# Patient Record
Sex: MALE | Race: WHITE | URBAN - METROPOLITAN AREA
[De-identification: names, ages, dates, MRNs, and addresses within clinical notes are randomized per-mention and may not be internally consistent; named-entity substitution may affect disease eponyms.]

---

## 2017-10-18 ENCOUNTER — EMERGENCY (EMERGENCY)
Facility: HOSPITAL | Age: 82
LOS: 0 days | Discharge: HOME | End: 2017-10-18

## 2017-10-18 DIAGNOSIS — J44.1 CHRONIC OBSTRUCTIVE PULMONARY DISEASE WITH (ACUTE) EXACERBATION: ICD-10-CM

## 2017-10-18 DIAGNOSIS — R10.13 EPIGASTRIC PAIN: ICD-10-CM

## 2017-10-18 DIAGNOSIS — Z79.899 OTHER LONG TERM (CURRENT) DRUG THERAPY: ICD-10-CM

## 2017-10-18 DIAGNOSIS — I10 ESSENTIAL (PRIMARY) HYPERTENSION: ICD-10-CM

## 2017-10-18 DIAGNOSIS — K80.20 CALCULUS OF GALLBLADDER WITHOUT CHOLECYSTITIS WITHOUT OBSTRUCTION: ICD-10-CM

## 2017-10-18 DIAGNOSIS — E87.1 HYPO-OSMOLALITY AND HYPONATREMIA: ICD-10-CM

## 2017-10-24 PROBLEM — Z00.00 ENCOUNTER FOR PREVENTIVE HEALTH EXAMINATION: Status: ACTIVE | Noted: 2017-10-24

## 2017-11-09 ENCOUNTER — APPOINTMENT (OUTPATIENT)
Dept: SURGERY | Facility: CLINIC | Age: 82
End: 2017-11-09
Payer: MEDICARE

## 2017-11-09 VITALS
HEIGHT: 65 IN | OXYGEN SATURATION: 98 % | SYSTOLIC BLOOD PRESSURE: 130 MMHG | HEART RATE: 88 BPM | DIASTOLIC BLOOD PRESSURE: 70 MMHG | BODY MASS INDEX: 22.49 KG/M2 | WEIGHT: 135 LBS

## 2017-11-09 DIAGNOSIS — Z86.79 PERSONAL HISTORY OF OTHER DISEASES OF THE CIRCULATORY SYSTEM: ICD-10-CM

## 2017-11-09 DIAGNOSIS — Z87.448 PERSONAL HISTORY OF OTHER DISEASES OF URINARY SYSTEM: ICD-10-CM

## 2017-11-09 DIAGNOSIS — Z78.9 OTHER SPECIFIED HEALTH STATUS: ICD-10-CM

## 2017-11-09 PROCEDURE — 99202 OFFICE O/P NEW SF 15 MIN: CPT

## 2017-11-09 RX ORDER — BENAZEPRIL HYDROCHLORIDE 5 MG/1
5 TABLET ORAL
Refills: 0 | Status: ACTIVE | COMMUNITY

## 2017-11-15 ENCOUNTER — OUTPATIENT (OUTPATIENT)
Dept: OUTPATIENT SERVICES | Facility: HOSPITAL | Age: 82
LOS: 1 days | Discharge: HOME | End: 2017-11-15

## 2017-11-15 DIAGNOSIS — Z01.818 ENCOUNTER FOR OTHER PREPROCEDURAL EXAMINATION: ICD-10-CM

## 2017-11-15 DIAGNOSIS — J44.1 CHRONIC OBSTRUCTIVE PULMONARY DISEASE WITH (ACUTE) EXACERBATION: ICD-10-CM

## 2017-11-15 DIAGNOSIS — I10 ESSENTIAL (PRIMARY) HYPERTENSION: ICD-10-CM

## 2017-11-27 ENCOUNTER — OUTPATIENT (OUTPATIENT)
Dept: OUTPATIENT SERVICES | Facility: HOSPITAL | Age: 82
LOS: 1 days | Discharge: HOME | End: 2017-11-27

## 2017-11-27 DIAGNOSIS — J44.1 CHRONIC OBSTRUCTIVE PULMONARY DISEASE WITH (ACUTE) EXACERBATION: ICD-10-CM

## 2017-11-27 DIAGNOSIS — I10 ESSENTIAL (PRIMARY) HYPERTENSION: ICD-10-CM

## 2017-11-30 DIAGNOSIS — I10 ESSENTIAL (PRIMARY) HYPERTENSION: ICD-10-CM

## 2017-11-30 DIAGNOSIS — K80.10 CALCULUS OF GALLBLADDER WITH CHRONIC CHOLECYSTITIS WITHOUT OBSTRUCTION: ICD-10-CM

## 2017-12-05 ENCOUNTER — APPOINTMENT (OUTPATIENT)
Dept: SURGERY | Facility: CLINIC | Age: 82
End: 2017-12-05
Payer: MEDICARE

## 2017-12-05 VITALS
BODY MASS INDEX: 22.49 KG/M2 | DIASTOLIC BLOOD PRESSURE: 78 MMHG | HEIGHT: 65 IN | WEIGHT: 135 LBS | SYSTOLIC BLOOD PRESSURE: 133 MMHG

## 2017-12-05 PROCEDURE — 99024 POSTOP FOLLOW-UP VISIT: CPT

## 2018-01-24 ENCOUNTER — APPOINTMENT (OUTPATIENT)
Dept: UROLOGY | Facility: CLINIC | Age: 83
End: 2018-01-24
Payer: MEDICARE

## 2018-01-24 VITALS
BODY MASS INDEX: 22.49 KG/M2 | WEIGHT: 135 LBS | SYSTOLIC BLOOD PRESSURE: 137 MMHG | DIASTOLIC BLOOD PRESSURE: 66 MMHG | HEART RATE: 88 BPM | HEIGHT: 65 IN

## 2018-01-24 DIAGNOSIS — K80.20 CALCULUS OF GALLBLADDER W/OUT CHOLECYSTITIS W/OUT OBSTRUCTION: ICD-10-CM

## 2018-01-24 PROCEDURE — 99202 OFFICE O/P NEW SF 15 MIN: CPT

## 2018-01-24 PROCEDURE — 51702 INSERT TEMP BLADDER CATH: CPT

## 2018-01-24 NOTE — LETTER BODY
[Dear  ___] : Dear  [unfilled], [Attached please find my note.] : Attached please find my note. [Thank you very much for allowing me to participate in the care of this patient. If you have any questions, please do not hesitate to contact me] : Thank you very much for allowing me to participate in the care of this patient. If you have any questions, please do not hesitate to contact me. [DrPatti  ___] : Dr. GOLD  [FreeTextEntry2] : Kumar Aceves MD\par [address]\[place] [address]

## 2018-01-24 NOTE — PHYSICAL EXAM
[General Appearance - Well Developed] : well developed [General Appearance - Well Nourished] : well nourished [Normal Appearance] : normal appearance [Well Groomed] : well groomed [General Appearance - In No Acute Distress] : no acute distress [Edema] : no peripheral edema [Respiration, Rhythm And Depth] : normal respiratory rhythm and effort [Exaggerated Use Of Accessory Muscles For Inspiration] : no accessory muscle use [Auscultation Breath Sounds / Voice Sounds] : lungs were clear to auscultation bilaterally [Abdomen Soft] : soft [Abdomen Tenderness] : non-tender [Abdomen Hernia] : no hernia was discovered [Costovertebral Angle Tenderness] : no ~M costovertebral angle tenderness [Urethral Meatus] : meatus normal [Penis Abnormality] : normal uncircumcised penis [Testes Tenderness] : no tenderness of the testes [Testes Mass (___cm)] : there were no testicular masses [Normal Station and Gait] : the gait and station were normal for the patient's age [] : no rash [Oriented To Time, Place, And Person] : oriented to person, place, and time [Affect] : the affect was normal [Mood] : the mood was normal [Not Anxious] : not anxious [FreeTextEntry1] : DTR's = & 2+

## 2018-01-24 NOTE — ASSESSMENT
[FreeTextEntry1] : Walking around with a catheter long-term is not the safest thing due to the risk of stricture, prostatitis, and epididymo-orchitis. He’s done well with it for a long time but I still think other options might suffice. As well though he’s got a small feeling prostate, if urodynamic shows that he could Valsalva and raises intravesical pressure it might be worthwhile decreasing outflow resistant so we can get catheter free. However until and if we know that he’s a candidate for that and be willing to consider it I would not do any further testing. An alternative option would be to put in a suprapubic tube but he’d rather not have any procedures given how well he’s doing till now.\par Please note he was often the InterStim which is a potential option but at 82 years old he does not want to start up with his spine.

## 2018-01-24 NOTE — LETTER HEADER
[Viviane Mirza MD] : Viviane Mirza MD [Director of Urology] : Director of Urology [[address]] : [address] [[address]] : [address] [[place] [address]] : [place] [address] [Tel [phone]] : Tel [phone] [Fax [phone]] : Fax [phone]

## 2018-01-24 NOTE — HISTORY OF PRESENT ILLNESS
[Urinary Retention] : urinary retention [FreeTextEntry1] : Douglas is a very pleasant 82-year-old male who has at least two years of urinary retention. He was treated by Dr. Omayra anders with a microwave and was offered the InterStim. His been living with a transurethral Waldron catheter and tells me he was never offered self-catheterization and never offered a suprapubic tube. He’s been doing very well with it having it changed every six weeks, initially was every four weeks and he understands it could clog off but his co-pay went up and he really can’t come back any more often. He is here today as the other Dr. grossman [facility] physicians practice and our group is now the participating physicians on [place].

## 2018-02-26 ENCOUNTER — APPOINTMENT (OUTPATIENT)
Dept: UROLOGY | Facility: CLINIC | Age: 83
End: 2018-02-26
Payer: MEDICARE

## 2018-02-26 VITALS
HEART RATE: 90 BPM | SYSTOLIC BLOOD PRESSURE: 140 MMHG | DIASTOLIC BLOOD PRESSURE: 67 MMHG | HEIGHT: 65 IN | BODY MASS INDEX: 22.49 KG/M2 | WEIGHT: 135 LBS

## 2018-02-26 PROCEDURE — 51702 INSERT TEMP BLADDER CATH: CPT

## 2018-04-09 ENCOUNTER — APPOINTMENT (OUTPATIENT)
Dept: UROLOGY | Facility: CLINIC | Age: 83
End: 2018-04-09
Payer: MEDICARE

## 2018-04-09 VITALS
BODY MASS INDEX: 23.32 KG/M2 | SYSTOLIC BLOOD PRESSURE: 138 MMHG | HEART RATE: 82 BPM | HEIGHT: 65 IN | WEIGHT: 140 LBS | DIASTOLIC BLOOD PRESSURE: 84 MMHG

## 2018-04-09 PROCEDURE — 51702 INSERT TEMP BLADDER CATH: CPT

## 2018-04-09 PROCEDURE — 51705 CHANGE OF BLADDER TUBE: CPT

## 2018-05-25 ENCOUNTER — APPOINTMENT (OUTPATIENT)
Dept: UROLOGY | Facility: CLINIC | Age: 83
End: 2018-05-25
Payer: MEDICARE

## 2018-05-25 VITALS
DIASTOLIC BLOOD PRESSURE: 62 MMHG | SYSTOLIC BLOOD PRESSURE: 119 MMHG | HEART RATE: 83 BPM | WEIGHT: 140 LBS | HEIGHT: 65 IN | BODY MASS INDEX: 23.32 KG/M2

## 2018-05-25 PROCEDURE — 51705 CHANGE OF BLADDER TUBE: CPT

## 2018-05-25 PROCEDURE — 51702 INSERT TEMP BLADDER CATH: CPT

## 2018-07-16 ENCOUNTER — APPOINTMENT (OUTPATIENT)
Dept: UROLOGY | Facility: CLINIC | Age: 83
End: 2018-07-16
Payer: MEDICARE

## 2018-07-16 VITALS
BODY MASS INDEX: 23.32 KG/M2 | HEART RATE: 85 BPM | WEIGHT: 140 LBS | HEIGHT: 65 IN | DIASTOLIC BLOOD PRESSURE: 71 MMHG | SYSTOLIC BLOOD PRESSURE: 116 MMHG

## 2018-07-16 PROCEDURE — 51705 CHANGE OF BLADDER TUBE: CPT

## 2018-07-16 PROCEDURE — 51702 INSERT TEMP BLADDER CATH: CPT

## 2018-07-27 PROBLEM — Z78.9 ALCOHOL USE: Status: ACTIVE | Noted: 2017-11-09

## 2018-08-09 ENCOUNTER — EMERGENCY (EMERGENCY)
Facility: HOSPITAL | Age: 83
LOS: 0 days | Discharge: HOME | End: 2018-08-09
Attending: EMERGENCY MEDICINE | Admitting: EMERGENCY MEDICINE

## 2018-08-09 VITALS
HEART RATE: 111 BPM | HEIGHT: 64 IN | RESPIRATION RATE: 19 BRPM | SYSTOLIC BLOOD PRESSURE: 163 MMHG | TEMPERATURE: 97 F | OXYGEN SATURATION: 98 % | WEIGHT: 139.99 LBS | DIASTOLIC BLOOD PRESSURE: 81 MMHG

## 2018-08-09 DIAGNOSIS — T83.091A OTHER MECHANICAL COMPLICATION OF INDWELLING URETHRAL CATHETER, INITIAL ENCOUNTER: ICD-10-CM

## 2018-08-09 DIAGNOSIS — Z79.899 OTHER LONG TERM (CURRENT) DRUG THERAPY: ICD-10-CM
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
## 2018-08-09 DIAGNOSIS — Z87.448 PERSONAL HISTORY OF OTHER DISEASES OF URINARY SYSTEM: ICD-10-CM
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
## 2018-08-09 DIAGNOSIS — I10 ESSENTIAL (PRIMARY) HYPERTENSION: ICD-10-CM
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
## 2018-08-09 LAB
APPEARANCE UR: CLEAR — SIGNIFICANT CHANGE UP
BACTERIA # UR AUTO: ABNORMAL
BILIRUB UR-MCNC: NEGATIVE — SIGNIFICANT CHANGE UP
COLOR SPEC: YELLOW — SIGNIFICANT CHANGE UP
COMMENT - URINE: SIGNIFICANT CHANGE UP
DIFF PNL FLD: ABNORMAL
EPI CELLS # UR: ABNORMAL /HPF
GLUCOSE UR QL: NEGATIVE MG/DL — SIGNIFICANT CHANGE UP
KETONES UR-MCNC: NEGATIVE — SIGNIFICANT CHANGE UP
LEUKOCYTE ESTERASE UR-ACNC: ABNORMAL
NITRITE UR-MCNC: NEGATIVE — SIGNIFICANT CHANGE UP
PH UR: 5.5 — SIGNIFICANT CHANGE UP (ref 5–8)
PROT UR-MCNC: 30 MG/DL
RBC CASTS # UR COMP ASSIST: SIGNIFICANT CHANGE UP /HPF
SP GR SPEC: 1.02 — SIGNIFICANT CHANGE UP (ref 1.01–1.03)
UROBILINOGEN FLD QL: 0.2 MG/DL — SIGNIFICANT CHANGE UP (ref 0.2–0.2)
WBC UR QL: SIGNIFICANT CHANGE UP /HPF

## 2018-08-09 RX ORDER — ACETAMINOPHEN 500 MG
650 TABLET ORAL ONCE
Qty: 0 | Refills: 0 | Status: COMPLETED | OUTPATIENT
Start: 2018-08-09 | End: 2018-08-09

## 2018-08-09 NOTE — ED PROVIDER NOTE - OBJECTIVE STATEMENT
Patient is an 84 yo m who presents for evaluation of discharge and urinary pain from a catheter he denies any fevers or chills he denies any vomiting he denies any back pain he is able to ambulate well he has a history of neurogenic bladder.

## 2018-08-09 NOTE — ED PROVIDER NOTE - MEDICAL DECISION MAKING DETAILS
Patient is an 82 yo m who presents for evaluation of discharge and urinary pain from a catheter he denies any fevers or chills he denies any vomiting he denies any back pain he is able to ambulate well he has a history of neurogenic bladder.  On physical exam the patient is nc/at perrla eomi oropharynx clear cta b/l, rrr s1s2 noted abd-soft nt nd bs + ext from neuro from with  no focal deficits, gu- indwelling catheter noted with no pain to palpation of the posteior cva I will obtain ua and replace catheter patient agrees with the plan of care

## 2018-08-09 NOTE — ED ADULT NURSE NOTE - NSIMPLEMENTINTERV_GEN_ALL_ED
Implemented All Universal Safety Interventions:  [place] to call system. Call bell, personal items and telephone within reach. Instruct patient to call for assistance. Room bathroom lighting operational. Non-slip footwear when patient is off stretcher. Physically safe environment: no spills, clutter or unnecessary equipment. Stretcher in lowest position, wheels locked, appropriate side rails in place.

## 2018-08-09 NOTE — ED PROVIDER NOTE - ATTENDING CONTRIBUTION TO CARE
Patient is an 84 yo m who presents for evaluation of discharge and urinary pain from a catheter he denies any fevers or chills he denies any vomiting he denies any back pain he is able to ambulate well he has a history of neurogenic bladder.  On physical exam the patient is nc/at perrla eomi oropharynx clear cta b/l, rrr s1s2 noted abd-soft nt nd bs + ext from neuro from with  no focal deficits, gu- indwelling catheter noted with no pain to palpation of the posteior cva I will obtain ua and replace catheter patient agrees with the plan of care

## 2018-08-10 LAB
CULTURE RESULTS: NO GROWTH — SIGNIFICANT CHANGE UP
SPECIMEN SOURCE: SIGNIFICANT CHANGE UP

## 2018-08-27 ENCOUNTER — APPOINTMENT (OUTPATIENT)
Dept: UROLOGY | Facility: CLINIC | Age: 83
End: 2018-08-27
Payer: MEDICARE

## 2018-08-27 VITALS
SYSTOLIC BLOOD PRESSURE: 149 MMHG | WEIGHT: 140 LBS | BODY MASS INDEX: 23.32 KG/M2 | HEART RATE: 82 BPM | HEIGHT: 65 IN | DIASTOLIC BLOOD PRESSURE: 82 MMHG

## 2018-08-27 PROCEDURE — 51702 INSERT TEMP BLADDER CATH: CPT

## 2018-10-05 ENCOUNTER — APPOINTMENT (OUTPATIENT)
Dept: UROLOGY | Facility: CLINIC | Age: 83
End: 2018-10-05
Payer: MEDICARE

## 2018-10-05 VITALS
SYSTOLIC BLOOD PRESSURE: 150 MMHG | BODY MASS INDEX: 23.32 KG/M2 | HEART RATE: 80 BPM | DIASTOLIC BLOOD PRESSURE: 72 MMHG | HEIGHT: 65 IN | WEIGHT: 140 LBS

## 2018-10-05 PROBLEM — I10 ESSENTIAL (PRIMARY) HYPERTENSION: Chronic | Status: ACTIVE | Noted: 2018-08-09

## 2018-10-05 PROBLEM — Z92.89 PERSONAL HISTORY OF OTHER MEDICAL TREATMENT: Chronic | Status: ACTIVE | Noted: 2018-08-09

## 2018-10-05 PROBLEM — N31.9 NEUROMUSCULAR DYSFUNCTION OF BLADDER, UNSPECIFIED: Chronic | Status: ACTIVE | Noted: 2018-08-09

## 2018-10-05 PROCEDURE — 51702 INSERT TEMP BLADDER CATH: CPT

## 2018-10-05 PROCEDURE — 99213 OFFICE O/P EST LOW 20 MIN: CPT | Mod: 25

## 2018-11-13 ENCOUNTER — APPOINTMENT (OUTPATIENT)
Dept: UROLOGY | Facility: CLINIC | Age: 83
End: 2018-11-13
Payer: MEDICARE

## 2018-11-13 VITALS
DIASTOLIC BLOOD PRESSURE: 72 MMHG | HEIGHT: 65 IN | SYSTOLIC BLOOD PRESSURE: 125 MMHG | BODY MASS INDEX: 23.32 KG/M2 | WEIGHT: 140 LBS | HEART RATE: 84 BPM

## 2018-11-13 PROCEDURE — 51703 INSERT BLADDER CATH COMPLEX: CPT

## 2019-01-02 ENCOUNTER — APPOINTMENT (OUTPATIENT)
Dept: UROLOGY | Facility: CLINIC | Age: 84
End: 2019-01-02
Payer: COMMERCIAL

## 2019-01-02 VITALS
SYSTOLIC BLOOD PRESSURE: 129 MMHG | HEIGHT: 65 IN | WEIGHT: 140 LBS | BODY MASS INDEX: 23.32 KG/M2 | DIASTOLIC BLOOD PRESSURE: 72 MMHG | HEART RATE: 101 BPM

## 2019-01-02 PROCEDURE — 51703 INSERT BLADDER CATH COMPLEX: CPT

## 2019-02-01 ENCOUNTER — APPOINTMENT (OUTPATIENT)
Dept: UROLOGY | Facility: CLINIC | Age: 84
End: 2019-02-01

## 2019-02-01 VITALS — WEIGHT: 140 LBS | HEIGHT: 65 IN | BODY MASS INDEX: 23.32 KG/M2

## 2019-03-01 ENCOUNTER — APPOINTMENT (OUTPATIENT)
Dept: UROLOGY | Facility: CLINIC | Age: 84
End: 2019-03-01

## 2019-04-02 ENCOUNTER — APPOINTMENT (OUTPATIENT)
Dept: UROLOGY | Facility: CLINIC | Age: 84
End: 2019-04-02
Payer: MEDICARE

## 2019-04-02 VITALS — WEIGHT: 140 LBS | HEIGHT: 65 IN | BODY MASS INDEX: 23.32 KG/M2

## 2019-04-02 PROCEDURE — 51703 INSERT BLADDER CATH COMPLEX: CPT

## 2019-05-02 ENCOUNTER — APPOINTMENT (OUTPATIENT)
Dept: UROLOGY | Facility: CLINIC | Age: 84
End: 2019-05-02
Payer: MEDICARE

## 2019-05-02 VITALS — WEIGHT: 140 LBS | HEIGHT: 65 IN | BODY MASS INDEX: 23.32 KG/M2

## 2019-05-02 PROCEDURE — 51703 INSERT BLADDER CATH COMPLEX: CPT

## 2019-06-12 ENCOUNTER — APPOINTMENT (OUTPATIENT)
Dept: UROLOGY | Facility: CLINIC | Age: 84
End: 2019-06-12
Payer: MEDICARE

## 2019-06-12 PROCEDURE — 51703 INSERT BLADDER CATH COMPLEX: CPT

## 2019-07-17 ENCOUNTER — APPOINTMENT (OUTPATIENT)
Dept: UROLOGY | Facility: CLINIC | Age: 84
End: 2019-07-17
Payer: MEDICARE

## 2019-07-17 PROCEDURE — 51703 INSERT BLADDER CATH COMPLEX: CPT

## 2019-08-14 ENCOUNTER — APPOINTMENT (OUTPATIENT)
Dept: UROLOGY | Facility: CLINIC | Age: 84
End: 2019-08-14
Payer: MEDICARE

## 2019-08-14 VITALS — SYSTOLIC BLOOD PRESSURE: 116 MMHG | HEART RATE: 69 BPM | DIASTOLIC BLOOD PRESSURE: 71 MMHG

## 2019-08-14 PROCEDURE — 51703 INSERT BLADDER CATH COMPLEX: CPT

## 2019-09-11 ENCOUNTER — APPOINTMENT (OUTPATIENT)
Dept: UROLOGY | Facility: CLINIC | Age: 84
End: 2019-09-11

## 2019-09-19 ENCOUNTER — APPOINTMENT (OUTPATIENT)
Dept: UROLOGY | Facility: CLINIC | Age: 84
End: 2019-09-19
Payer: MEDICARE

## 2019-09-19 PROCEDURE — 51703 INSERT BLADDER CATH COMPLEX: CPT

## 2019-09-24 ENCOUNTER — OUTPATIENT (OUTPATIENT)
Dept: OUTPATIENT SERVICES | Facility: HOSPITAL | Age: 84
LOS: 1 days | Discharge: HOME | End: 2019-09-24

## 2019-09-24 DIAGNOSIS — Z79.899 OTHER LONG TERM (CURRENT) DRUG THERAPY: ICD-10-CM

## 2019-09-24 DIAGNOSIS — D50.9 IRON DEFICIENCY ANEMIA, UNSPECIFIED: ICD-10-CM

## 2019-10-23 ENCOUNTER — APPOINTMENT (OUTPATIENT)
Dept: UROLOGY | Facility: CLINIC | Age: 84
End: 2019-10-23
Payer: MEDICARE

## 2019-10-23 PROCEDURE — 51703 INSERT BLADDER CATH COMPLEX: CPT

## 2019-11-26 ENCOUNTER — APPOINTMENT (OUTPATIENT)
Dept: UROLOGY | Facility: CLINIC | Age: 84
End: 2019-11-26
Payer: MEDICARE

## 2019-11-26 PROCEDURE — 51703 INSERT BLADDER CATH COMPLEX: CPT

## 2020-01-02 ENCOUNTER — APPOINTMENT (OUTPATIENT)
Dept: UROLOGY | Facility: CLINIC | Age: 85
End: 2020-01-02
Payer: MEDICARE

## 2020-01-02 PROCEDURE — 51703 INSERT BLADDER CATH COMPLEX: CPT

## 2020-02-06 ENCOUNTER — APPOINTMENT (OUTPATIENT)
Dept: UROLOGY | Facility: CLINIC | Age: 85
End: 2020-02-06
Payer: MEDICARE

## 2020-02-06 VITALS — WEIGHT: 140 LBS | BODY MASS INDEX: 23.32 KG/M2 | HEIGHT: 65 IN

## 2020-02-06 PROCEDURE — 51703 INSERT BLADDER CATH COMPLEX: CPT

## 2020-03-11 ENCOUNTER — APPOINTMENT (OUTPATIENT)
Dept: UROLOGY | Facility: CLINIC | Age: 85
End: 2020-03-11
Payer: MEDICARE

## 2020-03-11 PROCEDURE — 51702 INSERT TEMP BLADDER CATH: CPT

## 2020-04-15 ENCOUNTER — APPOINTMENT (OUTPATIENT)
Dept: UROLOGY | Facility: CLINIC | Age: 85
End: 2020-04-15
Payer: MEDICARE

## 2020-04-15 VITALS
TEMPERATURE: 97.6 F | BODY MASS INDEX: 23.32 KG/M2 | WEIGHT: 140 LBS | DIASTOLIC BLOOD PRESSURE: 70 MMHG | SYSTOLIC BLOOD PRESSURE: 110 MMHG | HEIGHT: 65 IN | HEART RATE: 60 BPM

## 2020-04-15 PROCEDURE — 51702 INSERT TEMP BLADDER CATH: CPT

## 2020-05-12 ENCOUNTER — APPOINTMENT (OUTPATIENT)
Dept: UROLOGY | Facility: CLINIC | Age: 85
End: 2020-05-12
Payer: MEDICARE

## 2020-05-12 VITALS
HEIGHT: 65 IN | BODY MASS INDEX: 23.32 KG/M2 | TEMPERATURE: 97.6 F | HEART RATE: 82 BPM | SYSTOLIC BLOOD PRESSURE: 123 MMHG | WEIGHT: 140 LBS | DIASTOLIC BLOOD PRESSURE: 63 MMHG

## 2020-05-12 PROCEDURE — 51703 INSERT BLADDER CATH COMPLEX: CPT

## 2020-06-16 ENCOUNTER — APPOINTMENT (OUTPATIENT)
Dept: UROLOGY | Facility: CLINIC | Age: 85
End: 2020-06-16
Payer: MEDICARE

## 2020-06-16 PROCEDURE — 51703 INSERT BLADDER CATH COMPLEX: CPT

## 2020-07-21 ENCOUNTER — APPOINTMENT (OUTPATIENT)
Dept: UROLOGY | Facility: CLINIC | Age: 85
End: 2020-07-21
Payer: MEDICARE

## 2020-07-21 VITALS — HEIGHT: 65 IN | BODY MASS INDEX: 23.32 KG/M2 | TEMPERATURE: 98.2 F | WEIGHT: 140 LBS

## 2020-07-21 PROCEDURE — 51703 INSERT BLADDER CATH COMPLEX: CPT

## 2020-09-01 ENCOUNTER — APPOINTMENT (OUTPATIENT)
Dept: UROLOGY | Facility: CLINIC | Age: 85
End: 2020-09-01
Payer: MEDICARE

## 2020-09-01 VITALS
WEIGHT: 140 LBS | DIASTOLIC BLOOD PRESSURE: 66 MMHG | HEART RATE: 73 BPM | TEMPERATURE: 98.1 F | SYSTOLIC BLOOD PRESSURE: 124 MMHG | HEIGHT: 65 IN | BODY MASS INDEX: 23.32 KG/M2

## 2020-09-01 PROCEDURE — 51703 INSERT BLADDER CATH COMPLEX: CPT

## 2020-10-06 ENCOUNTER — APPOINTMENT (OUTPATIENT)
Dept: UROLOGY | Facility: CLINIC | Age: 85
End: 2020-10-06
Payer: MEDICARE

## 2020-10-06 VITALS — BODY MASS INDEX: 23.32 KG/M2 | HEIGHT: 65 IN | TEMPERATURE: 97.3 F | WEIGHT: 140 LBS

## 2020-10-06 PROCEDURE — 51703 INSERT BLADDER CATH COMPLEX: CPT

## 2020-11-17 ENCOUNTER — APPOINTMENT (OUTPATIENT)
Dept: UROLOGY | Facility: CLINIC | Age: 85
End: 2020-11-17
Payer: MEDICARE

## 2020-11-17 VITALS — WEIGHT: 140 LBS | TEMPERATURE: 97.3 F | HEIGHT: 65 IN | BODY MASS INDEX: 23.32 KG/M2

## 2020-11-17 PROCEDURE — 99072 ADDL SUPL MATRL&STAF TM PHE: CPT

## 2020-11-17 PROCEDURE — 51703 INSERT BLADDER CATH COMPLEX: CPT

## 2020-12-23 ENCOUNTER — APPOINTMENT (OUTPATIENT)
Dept: UROLOGY | Facility: CLINIC | Age: 85
End: 2020-12-23
Payer: MEDICARE

## 2020-12-23 VITALS
DIASTOLIC BLOOD PRESSURE: 64 MMHG | HEART RATE: 82 BPM | SYSTOLIC BLOOD PRESSURE: 118 MMHG | WEIGHT: 140 LBS | TEMPERATURE: 97.9 F | BODY MASS INDEX: 23.32 KG/M2 | HEIGHT: 65 IN

## 2020-12-23 PROCEDURE — 99072 ADDL SUPL MATRL&STAF TM PHE: CPT

## 2020-12-23 PROCEDURE — 51703 INSERT BLADDER CATH COMPLEX: CPT

## 2021-01-27 ENCOUNTER — APPOINTMENT (OUTPATIENT)
Dept: UROLOGY | Facility: CLINIC | Age: 86
End: 2021-01-27
Payer: MEDICARE

## 2021-01-27 VITALS — HEART RATE: 74 BPM | DIASTOLIC BLOOD PRESSURE: 74 MMHG | SYSTOLIC BLOOD PRESSURE: 131 MMHG

## 2021-01-27 VITALS — TEMPERATURE: 97.5 F | WEIGHT: 135 LBS | BODY MASS INDEX: 22.49 KG/M2 | HEIGHT: 65 IN

## 2021-01-27 PROCEDURE — 51703 INSERT BLADDER CATH COMPLEX: CPT

## 2021-01-27 PROCEDURE — 99072 ADDL SUPL MATRL&STAF TM PHE: CPT

## 2021-03-03 ENCOUNTER — APPOINTMENT (OUTPATIENT)
Dept: UROLOGY | Facility: CLINIC | Age: 86
End: 2021-03-03
Payer: MEDICARE

## 2021-03-03 VITALS — TEMPERATURE: 97.8 F | BODY MASS INDEX: 22.82 KG/M2 | HEIGHT: 65 IN | WEIGHT: 137 LBS

## 2021-03-03 PROCEDURE — 99072 ADDL SUPL MATRL&STAF TM PHE: CPT

## 2021-03-03 PROCEDURE — 51702 INSERT TEMP BLADDER CATH: CPT

## 2021-04-01 ENCOUNTER — APPOINTMENT (OUTPATIENT)
Dept: UROLOGY | Facility: CLINIC | Age: 86
End: 2021-04-01
Payer: MEDICARE

## 2021-04-01 VITALS — BODY MASS INDEX: 22.82 KG/M2 | HEIGHT: 65 IN | TEMPERATURE: 97.1 F | WEIGHT: 137 LBS

## 2021-04-01 PROCEDURE — 51703 INSERT BLADDER CATH COMPLEX: CPT

## 2021-04-01 PROCEDURE — 99072 ADDL SUPL MATRL&STAF TM PHE: CPT

## 2021-05-13 ENCOUNTER — APPOINTMENT (OUTPATIENT)
Dept: UROLOGY | Facility: CLINIC | Age: 86
End: 2021-05-13
Payer: MEDICARE

## 2021-05-13 VITALS — WEIGHT: 129 LBS | HEIGHT: 65 IN | BODY MASS INDEX: 21.49 KG/M2 | TEMPERATURE: 98.2 F

## 2021-05-13 PROCEDURE — 99072 ADDL SUPL MATRL&STAF TM PHE: CPT

## 2021-05-13 PROCEDURE — 51703 INSERT BLADDER CATH COMPLEX: CPT

## 2021-06-17 ENCOUNTER — APPOINTMENT (OUTPATIENT)
Dept: UROLOGY | Facility: CLINIC | Age: 86
End: 2021-06-17
Payer: MEDICARE

## 2021-06-17 VITALS — BODY MASS INDEX: 21.66 KG/M2 | HEIGHT: 65 IN | TEMPERATURE: 98.2 F | WEIGHT: 130 LBS

## 2021-06-17 PROCEDURE — 51703 INSERT BLADDER CATH COMPLEX: CPT

## 2021-07-22 ENCOUNTER — APPOINTMENT (OUTPATIENT)
Dept: UROLOGY | Facility: CLINIC | Age: 86
End: 2021-07-22
Payer: MEDICARE

## 2021-07-22 PROCEDURE — 51703 INSERT BLADDER CATH COMPLEX: CPT

## 2021-08-25 ENCOUNTER — APPOINTMENT (OUTPATIENT)
Dept: UROLOGY | Facility: CLINIC | Age: 86
End: 2021-08-25
Payer: MEDICARE

## 2021-08-25 VITALS — BODY MASS INDEX: 21.66 KG/M2 | WEIGHT: 130 LBS | HEIGHT: 65 IN

## 2021-08-25 PROCEDURE — 51703 INSERT BLADDER CATH COMPLEX: CPT

## 2021-09-29 ENCOUNTER — APPOINTMENT (OUTPATIENT)
Dept: UROLOGY | Facility: CLINIC | Age: 86
End: 2021-09-29
Payer: MEDICARE

## 2021-09-29 VITALS — HEIGHT: 65 IN | WEIGHT: 130 LBS | BODY MASS INDEX: 21.66 KG/M2

## 2021-09-29 PROCEDURE — 51703 INSERT BLADDER CATH COMPLEX: CPT

## 2021-11-02 ENCOUNTER — APPOINTMENT (OUTPATIENT)
Dept: UROLOGY | Facility: CLINIC | Age: 86
End: 2021-11-02
Payer: MEDICARE

## 2021-11-02 PROCEDURE — 51703 INSERT BLADDER CATH COMPLEX: CPT

## 2021-12-08 ENCOUNTER — APPOINTMENT (OUTPATIENT)
Dept: UROLOGY | Facility: CLINIC | Age: 86
End: 2021-12-08
Payer: MEDICARE

## 2021-12-08 VITALS — WEIGHT: 140 LBS | HEIGHT: 65 IN | BODY MASS INDEX: 23.32 KG/M2

## 2021-12-08 PROCEDURE — 51703 INSERT BLADDER CATH COMPLEX: CPT

## 2022-01-12 ENCOUNTER — APPOINTMENT (OUTPATIENT)
Dept: UROLOGY | Facility: CLINIC | Age: 87
End: 2022-01-12
Payer: MEDICARE

## 2022-01-12 VITALS — WEIGHT: 135 LBS | BODY MASS INDEX: 22.49 KG/M2 | HEIGHT: 65 IN

## 2022-01-12 PROCEDURE — 51703 INSERT BLADDER CATH COMPLEX: CPT

## 2022-02-16 ENCOUNTER — APPOINTMENT (OUTPATIENT)
Dept: UROLOGY | Facility: CLINIC | Age: 87
End: 2022-02-16
Payer: MEDICARE

## 2022-02-16 VITALS — HEIGHT: 65 IN | TEMPERATURE: 98.3 F | WEIGHT: 135 LBS | BODY MASS INDEX: 22.49 KG/M2

## 2022-02-16 PROCEDURE — 51703 INSERT BLADDER CATH COMPLEX: CPT

## 2022-03-23 ENCOUNTER — APPOINTMENT (OUTPATIENT)
Dept: UROLOGY | Facility: CLINIC | Age: 87
End: 2022-03-23
Payer: MEDICARE

## 2022-03-23 PROCEDURE — 51703 INSERT BLADDER CATH COMPLEX: CPT

## 2022-04-27 ENCOUNTER — APPOINTMENT (OUTPATIENT)
Dept: UROLOGY | Facility: CLINIC | Age: 87
End: 2022-04-27
Payer: MEDICARE

## 2022-04-27 VITALS — HEIGHT: 65 IN | BODY MASS INDEX: 22.49 KG/M2 | WEIGHT: 135 LBS

## 2022-04-27 PROCEDURE — 51703 INSERT BLADDER CATH COMPLEX: CPT

## 2022-06-01 ENCOUNTER — APPOINTMENT (OUTPATIENT)
Dept: UROLOGY | Facility: CLINIC | Age: 87
End: 2022-06-01
Payer: MEDICARE

## 2022-06-01 VITALS
DIASTOLIC BLOOD PRESSURE: 79 MMHG | HEART RATE: 77 BPM | WEIGHT: 135 LBS | SYSTOLIC BLOOD PRESSURE: 144 MMHG | HEIGHT: 65 IN | BODY MASS INDEX: 22.49 KG/M2

## 2022-06-01 PROCEDURE — 51703 INSERT BLADDER CATH COMPLEX: CPT

## 2022-07-13 ENCOUNTER — APPOINTMENT (OUTPATIENT)
Dept: UROLOGY | Facility: CLINIC | Age: 87
End: 2022-07-13

## 2022-07-13 VITALS
WEIGHT: 136 LBS | HEIGHT: 65 IN | DIASTOLIC BLOOD PRESSURE: 85 MMHG | BODY MASS INDEX: 22.66 KG/M2 | HEART RATE: 73 BPM | SYSTOLIC BLOOD PRESSURE: 139 MMHG

## 2022-07-13 PROCEDURE — 51703 INSERT BLADDER CATH COMPLEX: CPT

## 2022-08-17 ENCOUNTER — APPOINTMENT (OUTPATIENT)
Dept: UROLOGY | Facility: CLINIC | Age: 87
End: 2022-08-17

## 2022-08-17 PROCEDURE — 51703 INSERT BLADDER CATH COMPLEX: CPT

## 2022-09-21 ENCOUNTER — APPOINTMENT (OUTPATIENT)
Dept: UROLOGY | Facility: CLINIC | Age: 87
End: 2022-09-21

## 2022-09-21 VITALS
DIASTOLIC BLOOD PRESSURE: 87 MMHG | TEMPERATURE: 97.5 F | SYSTOLIC BLOOD PRESSURE: 162 MMHG | HEART RATE: 73 BPM | HEIGHT: 65 IN | WEIGHT: 140 LBS | BODY MASS INDEX: 23.32 KG/M2

## 2022-09-21 PROCEDURE — 51703 INSERT BLADDER CATH COMPLEX: CPT

## 2022-10-18 ENCOUNTER — APPOINTMENT (OUTPATIENT)
Dept: UROLOGY | Facility: CLINIC | Age: 87
End: 2022-10-18

## 2022-10-18 PROCEDURE — 51703 INSERT BLADDER CATH COMPLEX: CPT

## 2022-11-22 ENCOUNTER — APPOINTMENT (OUTPATIENT)
Dept: UROLOGY | Facility: CLINIC | Age: 87
End: 2022-11-22

## 2022-11-22 VITALS
WEIGHT: 140 LBS | DIASTOLIC BLOOD PRESSURE: 87 MMHG | HEIGHT: 65 IN | HEART RATE: 75 BPM | SYSTOLIC BLOOD PRESSURE: 171 MMHG | BODY MASS INDEX: 23.32 KG/M2

## 2022-11-22 DIAGNOSIS — N40.1 BENIGN PROSTATIC HYPERPLASIA WITH LOWER URINARY TRACT SYMPMS: ICD-10-CM

## 2022-11-22 DIAGNOSIS — N13.8 BENIGN PROSTATIC HYPERPLASIA WITH LOWER URINARY TRACT SYMPMS: ICD-10-CM

## 2022-11-22 PROCEDURE — 51703 INSERT BLADDER CATH COMPLEX: CPT

## 2022-11-22 RX ORDER — FLUOCINONIDE 0.5 MG/G
0.05 CREAM TOPICAL
Qty: 30 | Refills: 0 | Status: ACTIVE | COMMUNITY
Start: 2022-09-02

## 2022-12-11 ENCOUNTER — EMERGENCY (EMERGENCY)
Facility: HOSPITAL | Age: 87
LOS: 0 days | Discharge: HOME | End: 2022-12-11
Attending: EMERGENCY MEDICINE | Admitting: EMERGENCY MEDICINE

## 2022-12-11 VITALS
OXYGEN SATURATION: 98 % | SYSTOLIC BLOOD PRESSURE: 169 MMHG | TEMPERATURE: 96 F | DIASTOLIC BLOOD PRESSURE: 94 MMHG | WEIGHT: 139.99 LBS | HEIGHT: 65 IN | HEART RATE: 124 BPM | RESPIRATION RATE: 18 BRPM

## 2022-12-11 VITALS
OXYGEN SATURATION: 99 % | SYSTOLIC BLOOD PRESSURE: 174 MMHG | DIASTOLIC BLOOD PRESSURE: 91 MMHG | HEART RATE: 96 BPM | RESPIRATION RATE: 20 BRPM | TEMPERATURE: 98 F
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
## 2022-12-11 DIAGNOSIS — T83.031A LEAKAGE OF INDWELLING URETHRAL CATHETER, INITIAL ENCOUNTER: ICD-10-CM

## 2022-12-11 DIAGNOSIS — I10 ESSENTIAL (PRIMARY) HYPERTENSION: ICD-10-CM

## 2022-12-11 DIAGNOSIS — Z87.448 PERSONAL HISTORY OF OTHER DISEASES OF URINARY SYSTEM: ICD-10-CM
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
## 2022-12-11 PROCEDURE — 99283 EMERGENCY DEPT VISIT LOW MDM: CPT

## 2022-12-11 NOTE — ED PROVIDER NOTE - ATTENDING APP SHARED VISIT CONTRIBUTION OF CARE
86 yo M h/o neurogenic bladder with indwelling Waldron catheter, last changed about 4-5 weeks ago by Urology presents with urine leaking around catheter. no fever or chills.  On exam pt in NAD AAO x 3, seen ambulate with steady gait, abd soft nt, bladder distended,

## 2022-12-11 NOTE — ED PROVIDER NOTE - PATIENT PORTAL LINK FT
You can access the FollowMyHealth Patient Portal offered by [facility] by registering at the following website: [email or web address]. By joining [employer]’s FollowMyHealth portal, you will also be able to view your health information using other applications (apps) compatible with our system.

## 2022-12-11 NOTE — ED PROVIDER NOTE - CLINICAL SUMMARY MEDICAL DECISION MAKING FREE TEXT BOX
Wladron catheter changed with good urinary output, no complication.  Pt will follow up with his Urologist as scheduled.

## 2022-12-11 NOTE — ED ADULT NURSE NOTE - CAS DISCH TRANSFER METHOD
Foot and Ankle Follow Up                                         6/12/2020    Ai Stair     1967       History of Present Illness:    Parish Ma is seen for Follow-up (Bilateral foot pain  LOV 10/18/19)       Parish Ma returns after 8 months for reevaluation of his bilateral foot pain. 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 He states he spent 20 years in the [employer] standing on steel surfaces and feels that his hyperpronation has caused his chronic pain. He states that he has constant pain in his feet up to 6 at rest and up to 7+ with ambulation. This is associated with a burning pain. He has had no specific history of injury and has not required any surgery on his bilateral feet. He describes no sensory loss, and describes no evidence of infection. Pertinent items are noted in HPI  Review of systems reviewed from Patient History Form dated on 10/18/19 and available in the patient's chart under the Media tab. The patient's past medical history, medications, allergies, family history, social history, HPI have been reviewed, dated, and recorded in the chart. Temp 97.7 °F (36.5 °C)   Ht 6' 2\" (1.88 m)   Wt 285 lb (129.3 kg)   BMI 36.59 kg/m²      Physical Examination    General Appearance: no acute distress, alert, oriented x 3, appropriate mood and affect, obese  Limps : yes - moderate  Swelling: Yes first MP Left   Ecchymosis: No   Errythemia: No   Deformity: Yes bilateral hyperpronation    Palpitation/Tenderness: Yes LT First MP, plantar fascia, ankle and ATFL ;  RT First MP, ankle, ATFL, plantar fascia bilateral      Pules (0-4) Dorsalis  Pedis Posterior  Tibialis   Right 2+    Left 2+      Range of Motion: Degrees   Dorsiflexion Plantar Flexion Inversion Eversion   Right 12 52 50 12   Left 12 54 54 12     Sensation: Normal    Testing:    Too Many Toe Sign Single Toe Raise Tinnel Sign   Right           + neg    Left           + neg
Private car

## 2022-12-11 NOTE — ED PROVIDER NOTE - NSICDXPASTMEDICALHX_GEN_ALL_CORE_FT
PAST MEDICAL HISTORY:  Waldron catheter in place 2016    HTN (hypertension)     Neurogenic bladder

## 2022-12-11 NOTE — ED PROVIDER NOTE - PHYSICAL EXAMINATION
Vital Signs: I have reviewed the initial vital signs.  Constitutional: well-nourished, no acute distress, normocephalic  Eyes: PERRLA, EOMI,  clear conjunctiva  Gastrointestinal: soft, non-tender, urinary bladder distention   Musculoskeletal: supple neck, no lower extremity edema, no bony tenderness  Integumentary: warm, dry, no rash  Neurologic: awake, alert, cranial nerves II-XII grossly intact, extremities’ motor and sensory functions grossly intact, no focal deficits

## 2022-12-11 NOTE — ED PROVIDER NOTE - OBJECTIVE STATEMENT
88 y/o male with hx of neurogenic bladder with yañez catheter last changed 5 weeks ago , presents with leaking from around yañez catheter. patient with increased abdominal distention. no fevers. no vomiting or diarrhea

## 2022-12-11 NOTE — ED PROCEDURE NOTE - NS ED ATTENDING STATEMENT MOD
This was a shared visit with the PANCHITO. I reviewed and verified the documentation and independently performed the documented:

## 2022-12-11 NOTE — ED PROVIDER NOTE - NS ED ROS FT
Review of Systems    Constitutional: (-) fever/ chills (-)loss of appetite or  weight loss  Cardiovascular: (-) chest pain, (-) syncope (-) palpitations  Respiratory: (-) cough, (-) shortness of breath  Gastrointestinal: (-) vomiting, (-) diarrhea (-) abdominal pain  : (-) dysuria , hematuria   neck: (-) neck pain or stiffness  Musculoskeletal:  (-) back pain, (-) joint pain   Integumentary: (-) rash, (-) swelling  Neurological: (-) headache, (-) altered mental status

## 2022-12-11 NOTE — ED PROVIDER NOTE - NSFOLLOWUPINSTRUCTIONS_ED_ALL_ED_FT
Waldron Catheter Care, Adult    A Waldron catheter is a soft, flexible tube that is placed into the bladder to drain urine. A Waldron catheter may be inserted if:    You leak urine or are not able to control when you urinate (urinary incontinence).  You are not able to urinate when you need to (urinary retention).   You had prostate surgery or surgery on the genitals.  You have certain medical conditions, such as multiple sclerosis, dementia, or a spinal cord injury.    If you are going home with a Waldron catheter in place, follow the instructions below.    TAKING CARE OF THE CATHETER   Wash your hands with soap and water.   Using mild soap and warm water on a clean washcloth:    Clean the area on your body closest to the catheter insertion site using a circular motion, moving away from the catheter. Never wipe toward the catheter because this could sweep bacteria up into the urethra and cause infection.   Remove all traces of soap. Pat the area dry with a clean towel. For males, reposition the foreskin.    Attach the catheter to your leg so there is no tension on the catheter. Use adhesive tape or a leg strap. If you are using adhesive tape, remove any sticky residue left behind by the previous tape you used.   Keep the drainage bag below the level of the bladder, but keep it off the floor.   Check throughout the day to be sure the catheter is working and urine is draining freely. Make sure the tubing does not become kinked.  Do not pull on the catheter or try to remove it. Pulling could damage internal tissues.    TAKING CARE OF THE DRAINAGE BAGS  You will be given two drainage bags to take home. One is a large overnight drainage bag, and the other is a smaller leg bag that fits underneath clothing. You may wear the overnight bag at any time, but you should never wear the smaller leg bag at night. Follow the instructions below for how to empty, change, and clean your drainage bags.    Emptying the Drainage Bag    You must empty your drainage bag when it is ?–½ full or at least 2–3 times a day.     Wash your hands with soap and water.   Keep the drainage bag below your hips, below the level of your bladder. This stops urine from going back into the tubing and into your bladder.    Hold the dirty bag over the toilet or a clean container.   Open the pour spout at the bottom of the bag and empty the urine into the toilet or container. Do not let the pour spout touch the toilet, container, or any other surface. Doing so can place bacteria on the bag, which can cause an infection.   Clean the pour spout with a gauze pad or cotton ball that has rubbing alcohol on it.   Close the pour spout.   Attach the bag to your leg with adhesive tape or a leg strap.   Wash your hands well.    Changing the Drainage Bag    Change your drainage bag once a month or sooner if it starts to smell bad or look dirty. Below are steps to follow when changing the drainage bag.     Wash your hands with soap and water.   Pinch off the rubber catheter so that urine does not spill out.   Disconnect the catheter tube from the drainage tube at the connection valve. Do not let the tubes touch any surface.    Clean the end of the catheter tube with an alcohol wipe. Use a different alcohol wipe to clean the end of the drainage tube.   Connect the catheter tube to the drainage tube of the clean drainage bag.   Attach the new bag to the leg with adhesive tape or a leg strap. Avoid attaching the new bag too tightly.    Wash your hands well.    Cleaning the Drainage Bag     Wash your hands with soap and water.   Wash the bag in warm, soapy water.   Rinse the bag thoroughly with warm water.   Fill the bag with a solution of white vinegar and water (1 cup vinegar to 1 qt warm water [.2 L vinegar to 1 L warm water]). Close the bag and soak it for 30 minutes in the solution.    Rinse the bag with warm water.    Hang the bag to dry with the pour spout open and hanging downward.   Store the clean bag (once it is dry) in a clean plastic bag.   Wash your hands well.     PREVENTING INFECTION  Wash your hands before and after handling your catheter.  Take showers daily and wash the area where the catheter enters your body. Do not take baths. Replace wet leg straps with dry ones, if this applies.  Do not use powders, sprays, or lotions on the genital area. Only use creams, lotions, or ointments as directed by your caregiver.  For females, wipe from front to back after each bowel movement.   Drink enough fluids to keep your urine clear or pale yellow unless you have a fluid restriction.   Do not let the drainage bag or tubing touch or lie on the floor.   Wear cotton underwear to absorb moisture and to keep your .    SEEK MEDICAL CARE IF:  Your urine is cloudy or smells unusually bad.  Your catheter becomes clogged.  You are not draining urine into the bag or your bladder feels full.  Your catheter starts to leak.    SEEK IMMEDIATE MEDICAL CARE IF:  You have pain, swelling, redness, or pus where the catheter enters the body.  You have pain in the abdomen, legs, lower back, or bladder.  You have a fever.  You see blood fill the catheter, or your urine is pink or red.  You have nausea, vomiting, or chills.  Your catheter gets pulled out.    MAKE SURE YOU:  Understand these instructions.  Will watch your condition.  Will get help right away if you are not doing well or get worse.    ADDITIONAL NOTES AND INSTRUCTIONS    Please follow up with your Primary MD in 24-48 hr.  Seek immediate medical care for any new/worsening signs or symptoms.

## 2022-12-11 NOTE — ED PROVIDER NOTE - NSICDXNOFAMILYHX_GEN_ALL_ED
<-- Click to add NO pertinent Family History Bill For Individual Tests Below?: no Venipuncture Paragraph: An alcohol pad was applied to the venipuncture site. Venipuncture was performed using a butterfly needle. Pressure and a bandaid was applied to the site. No complications were noted. Detail Level: None Number Of Tubes Drawn: 3

## 2022-12-11 NOTE — ED PROVIDER NOTE - CARE PROVIDER_API CALL
Feliz Mario)  Urology  [address] [place] [address]  Phone: [phone]  Fax: [phone]  Follow Up Time:

## 2022-12-21 ENCOUNTER — APPOINTMENT (OUTPATIENT)
Dept: UROLOGY | Facility: CLINIC | Age: 87
End: 2022-12-21

## 2023-01-10 ENCOUNTER — APPOINTMENT (OUTPATIENT)
Dept: UROLOGY | Facility: CLINIC | Age: 88
End: 2023-01-10
Payer: MEDICARE

## 2023-01-10 VITALS
SYSTOLIC BLOOD PRESSURE: 189 MMHG | BODY MASS INDEX: 24.16 KG/M2 | WEIGHT: 145 LBS | HEIGHT: 65 IN | DIASTOLIC BLOOD PRESSURE: 84 MMHG

## 2023-01-10 PROCEDURE — 51702 INSERT TEMP BLADDER CATH: CPT

## 2023-02-15 ENCOUNTER — APPOINTMENT (OUTPATIENT)
Dept: UROLOGY | Facility: CLINIC | Age: 88
End: 2023-02-15
Payer: MEDICARE

## 2023-02-15 PROCEDURE — 51702 INSERT TEMP BLADDER CATH: CPT

## 2023-03-22 ENCOUNTER — APPOINTMENT (OUTPATIENT)
Dept: UROLOGY | Facility: CLINIC | Age: 88
End: 2023-03-22
Payer: MEDICARE

## 2023-03-22 PROCEDURE — 51705 CHANGE OF BLADDER TUBE: CPT

## 2023-04-26 ENCOUNTER — APPOINTMENT (OUTPATIENT)
Dept: UROLOGY | Facility: CLINIC | Age: 88
End: 2023-04-26

## 2023-04-27 ENCOUNTER — APPOINTMENT (OUTPATIENT)
Dept: UROLOGY | Facility: CLINIC | Age: 88
End: 2023-04-27
Payer: MEDICARE

## 2023-04-27 PROCEDURE — 51702 INSERT TEMP BLADDER CATH: CPT

## 2023-05-30 ENCOUNTER — APPOINTMENT (OUTPATIENT)
Dept: UROLOGY | Facility: CLINIC | Age: 88
End: 2023-05-30
Payer: MEDICARE

## 2023-05-30 PROCEDURE — 51702 INSERT TEMP BLADDER CATH: CPT

## 2023-05-30 RX ORDER — CIPROFLOXACIN HYDROCHLORIDE 500 MG/1
500 TABLET, FILM COATED ORAL
Qty: 10 | Refills: 5 | Status: ACTIVE | COMMUNITY
Start: 2019-01-02 | End: 1900-01-01

## 2023-07-05 ENCOUNTER — APPOINTMENT (OUTPATIENT)
Dept: UROLOGY | Facility: CLINIC | Age: 88
End: 2023-07-05
Payer: MEDICARE

## 2023-07-05 VITALS
HEIGHT: 65 IN | HEART RATE: 77 BPM | DIASTOLIC BLOOD PRESSURE: 78 MMHG | SYSTOLIC BLOOD PRESSURE: 142 MMHG | BODY MASS INDEX: 24.16 KG/M2 | WEIGHT: 145 LBS

## 2023-07-05 DIAGNOSIS — N31.9 NEUROMUSCULAR DYSFUNCTION OF BLADDER, UNSPECIFIED: ICD-10-CM

## 2023-07-05 DIAGNOSIS — R33.9 RETENTION OF URINE, UNSPECIFIED: ICD-10-CM

## 2023-07-05 PROCEDURE — 51702 INSERT TEMP BLADDER CATH: CPT

## 2023-08-09 ENCOUNTER — APPOINTMENT (OUTPATIENT)
Dept: UROLOGY | Facility: CLINIC | Age: 88
End: 2023-08-09
Payer: MEDICARE

## 2023-08-09 VITALS
WEIGHT: 145 LBS | HEART RATE: 79 BPM | SYSTOLIC BLOOD PRESSURE: 144 MMHG | DIASTOLIC BLOOD PRESSURE: 79 MMHG | BODY MASS INDEX: 24.16 KG/M2 | TEMPERATURE: 98 F | HEIGHT: 65 IN

## 2023-08-09 PROCEDURE — 51702 INSERT TEMP BLADDER CATH: CPT

## 2023-09-13 ENCOUNTER — APPOINTMENT (OUTPATIENT)
Dept: UROLOGY | Facility: CLINIC | Age: 88
End: 2023-09-13
Payer: MEDICARE

## 2023-09-13 PROCEDURE — 51702 INSERT TEMP BLADDER CATH: CPT

## 2023-10-20 ENCOUNTER — APPOINTMENT (OUTPATIENT)
Dept: UROLOGY | Facility: CLINIC | Age: 88
End: 2023-10-20
Payer: MEDICARE

## 2023-10-20 PROCEDURE — 51702 INSERT TEMP BLADDER CATH: CPT

## 2023-10-23 ENCOUNTER — INPATIENT (INPATIENT)
Facility: HOSPITAL | Age: 88
LOS: 2 days | Discharge: HOME | DRG: 64 | End: 2023-10-26
Attending: GENERAL ACUTE CARE HOSPITAL | Admitting: GENERAL ACUTE CARE HOSPITAL
Payer: MEDICARE

## 2023-10-23 VITALS
HEART RATE: 86 BPM | RESPIRATION RATE: 18 BRPM | TEMPERATURE: 98 F | SYSTOLIC BLOOD PRESSURE: 175 MMHG | OXYGEN SATURATION: 99 % | DIASTOLIC BLOOD PRESSURE: 86 MMHG | WEIGHT: 141.98 LBS

## 2023-10-23 DIAGNOSIS — I62.9 NONTRAUMATIC INTRACRANIAL HEMORRHAGE, UNSPECIFIED: ICD-10-CM

## 2023-10-23 LAB
ALBUMIN SERPL ELPH-MCNC: 4.2 G/DL — SIGNIFICANT CHANGE UP (ref 3.5–5.2)
ALBUMIN SERPL ELPH-MCNC: 4.2 G/DL — SIGNIFICANT CHANGE UP (ref 3.5–5.2)
ALP SERPL-CCNC: 90 U/L — SIGNIFICANT CHANGE UP (ref 30–115)
ALP SERPL-CCNC: 90 U/L — SIGNIFICANT CHANGE UP (ref 30–115)
ALT FLD-CCNC: 10 U/L — SIGNIFICANT CHANGE UP (ref 0–41)
ALT FLD-CCNC: 10 U/L — SIGNIFICANT CHANGE UP (ref 0–41)
ANION GAP SERPL CALC-SCNC: 10 MMOL/L — SIGNIFICANT CHANGE UP (ref 7–14)
ANION GAP SERPL CALC-SCNC: 10 MMOL/L — SIGNIFICANT CHANGE UP (ref 7–14)
AST SERPL-CCNC: 20 U/L — SIGNIFICANT CHANGE UP (ref 0–41)
AST SERPL-CCNC: 20 U/L — SIGNIFICANT CHANGE UP (ref 0–41)
BASOPHILS # BLD AUTO: 0.02 K/UL — SIGNIFICANT CHANGE UP (ref 0–0.2)
BASOPHILS # BLD AUTO: 0.02 K/UL — SIGNIFICANT CHANGE UP (ref 0–0.2)
BASOPHILS NFR BLD AUTO: 0.4 % — SIGNIFICANT CHANGE UP (ref 0–1)
BASOPHILS NFR BLD AUTO: 0.4 % — SIGNIFICANT CHANGE UP (ref 0–1)
BILIRUB SERPL-MCNC: 0.4 MG/DL — SIGNIFICANT CHANGE UP (ref 0.2–1.2)
BILIRUB SERPL-MCNC: 0.4 MG/DL — SIGNIFICANT CHANGE UP (ref 0.2–1.2)
BUN SERPL-MCNC: 13 MG/DL — SIGNIFICANT CHANGE UP (ref 10–20)
BUN SERPL-MCNC: 13 MG/DL — SIGNIFICANT CHANGE UP (ref 10–20)
CALCIUM SERPL-MCNC: 9 MG/DL — SIGNIFICANT CHANGE UP (ref 8.4–10.5)
CALCIUM SERPL-MCNC: 9 MG/DL — SIGNIFICANT CHANGE UP (ref 8.4–10.5)
CHLORIDE SERPL-SCNC: 91 MMOL/L — LOW (ref 98–110)
CHLORIDE SERPL-SCNC: 91 MMOL/L — LOW (ref 98–110)
CO2 SERPL-SCNC: 25 MMOL/L — SIGNIFICANT CHANGE UP (ref 17–32)
CO2 SERPL-SCNC: 25 MMOL/L — SIGNIFICANT CHANGE UP (ref 17–32)
CREAT SERPL-MCNC: 1.2 MG/DL — SIGNIFICANT CHANGE UP (ref 0.7–1.5)
CREAT SERPL-MCNC: 1.2 MG/DL — SIGNIFICANT CHANGE UP (ref 0.7–1.5)
EGFR: 59 ML/MIN/1.73M2 — LOW
EGFR: 59 ML/MIN/1.73M2 — LOW
EOSINOPHIL # BLD AUTO: 0.22 K/UL — SIGNIFICANT CHANGE UP (ref 0–0.7)
EOSINOPHIL # BLD AUTO: 0.22 K/UL — SIGNIFICANT CHANGE UP (ref 0–0.7)
EOSINOPHIL NFR BLD AUTO: 4.3 % — SIGNIFICANT CHANGE UP (ref 0–8)
EOSINOPHIL NFR BLD AUTO: 4.3 % — SIGNIFICANT CHANGE UP (ref 0–8)
FLUAV AG NPH QL: SIGNIFICANT CHANGE UP
FLUAV AG NPH QL: SIGNIFICANT CHANGE UP
FLUBV AG NPH QL: SIGNIFICANT CHANGE UP
FLUBV AG NPH QL: SIGNIFICANT CHANGE UP
GAS PNL BLDV: SIGNIFICANT CHANGE UP
GAS PNL BLDV: SIGNIFICANT CHANGE UP
GLUCOSE SERPL-MCNC: 89 MG/DL — SIGNIFICANT CHANGE UP (ref 70–99)
GLUCOSE SERPL-MCNC: 89 MG/DL — SIGNIFICANT CHANGE UP (ref 70–99)
HCT VFR BLD CALC: 34.7 % — LOW (ref 42–52)
HCT VFR BLD CALC: 34.7 % — LOW (ref 42–52)
HGB BLD-MCNC: 12.1 G/DL — LOW (ref 14–18)
HGB BLD-MCNC: 12.1 G/DL — LOW (ref 14–18)
IMM GRANULOCYTES NFR BLD AUTO: 0.2 % — SIGNIFICANT CHANGE UP (ref 0.1–0.3)
IMM GRANULOCYTES NFR BLD AUTO: 0.2 % — SIGNIFICANT CHANGE UP (ref 0.1–0.3)
LYMPHOCYTES # BLD AUTO: 0.77 K/UL — LOW (ref 1.2–3.4)
LYMPHOCYTES # BLD AUTO: 0.77 K/UL — LOW (ref 1.2–3.4)
LYMPHOCYTES # BLD AUTO: 15.2 % — LOW (ref 20.5–51.1)
LYMPHOCYTES # BLD AUTO: 15.2 % — LOW (ref 20.5–51.1)
MCHC RBC-ENTMCNC: 29.8 PG — SIGNIFICANT CHANGE UP (ref 27–31)
MCHC RBC-ENTMCNC: 29.8 PG — SIGNIFICANT CHANGE UP (ref 27–31)
MCHC RBC-ENTMCNC: 34.9 G/DL — SIGNIFICANT CHANGE UP (ref 32–37)
MCHC RBC-ENTMCNC: 34.9 G/DL — SIGNIFICANT CHANGE UP (ref 32–37)
MCV RBC AUTO: 85.5 FL — SIGNIFICANT CHANGE UP (ref 80–94)
MCV RBC AUTO: 85.5 FL — SIGNIFICANT CHANGE UP (ref 80–94)
MONOCYTES # BLD AUTO: 0.57 K/UL — SIGNIFICANT CHANGE UP (ref 0.1–0.6)
MONOCYTES # BLD AUTO: 0.57 K/UL — SIGNIFICANT CHANGE UP (ref 0.1–0.6)
MONOCYTES NFR BLD AUTO: 11.3 % — HIGH (ref 1.7–9.3)
MONOCYTES NFR BLD AUTO: 11.3 % — HIGH (ref 1.7–9.3)
NEUTROPHILS # BLD AUTO: 3.47 K/UL — SIGNIFICANT CHANGE UP (ref 1.4–6.5)
NEUTROPHILS # BLD AUTO: 3.47 K/UL — SIGNIFICANT CHANGE UP (ref 1.4–6.5)
NEUTROPHILS NFR BLD AUTO: 68.6 % — SIGNIFICANT CHANGE UP (ref 42.2–75.2)
NEUTROPHILS NFR BLD AUTO: 68.6 % — SIGNIFICANT CHANGE UP (ref 42.2–75.2)
NRBC # BLD: 0 /100 WBCS — SIGNIFICANT CHANGE UP (ref 0–0)
NRBC # BLD: 0 /100 WBCS — SIGNIFICANT CHANGE UP (ref 0–0)
PLATELET # BLD AUTO: 173 K/UL — SIGNIFICANT CHANGE UP (ref 130–400)
PLATELET # BLD AUTO: 173 K/UL — SIGNIFICANT CHANGE UP (ref 130–400)
PMV BLD: 9.3 FL — SIGNIFICANT CHANGE UP (ref 7.4–10.4)
PMV BLD: 9.3 FL — SIGNIFICANT CHANGE UP (ref 7.4–10.4)
POTASSIUM SERPL-MCNC: 4.4 MMOL/L — SIGNIFICANT CHANGE UP (ref 3.5–5)
POTASSIUM SERPL-MCNC: 4.4 MMOL/L — SIGNIFICANT CHANGE UP (ref 3.5–5)
POTASSIUM SERPL-SCNC: 4.4 MMOL/L — SIGNIFICANT CHANGE UP (ref 3.5–5)
POTASSIUM SERPL-SCNC: 4.4 MMOL/L — SIGNIFICANT CHANGE UP (ref 3.5–5)
PROT SERPL-MCNC: 6.7 G/DL — SIGNIFICANT CHANGE UP (ref 6–8)
PROT SERPL-MCNC: 6.7 G/DL — SIGNIFICANT CHANGE UP (ref 6–8)
RBC # BLD: 4.06 M/UL — LOW (ref 4.7–6.1)
RBC # BLD: 4.06 M/UL — LOW (ref 4.7–6.1)
RBC # FLD: 13.4 % — SIGNIFICANT CHANGE UP (ref 11.5–14.5)
RBC # FLD: 13.4 % — SIGNIFICANT CHANGE UP (ref 11.5–14.5)
RSV RNA NPH QL NAA+NON-PROBE: SIGNIFICANT CHANGE UP
RSV RNA NPH QL NAA+NON-PROBE: SIGNIFICANT CHANGE UP
SARS-COV-2 RNA SPEC QL NAA+PROBE: SIGNIFICANT CHANGE UP
SARS-COV-2 RNA SPEC QL NAA+PROBE: SIGNIFICANT CHANGE UP
SODIUM SERPL-SCNC: 126 MMOL/L — LOW (ref 135–146)
SODIUM SERPL-SCNC: 126 MMOL/L — LOW (ref 135–146)
TROPONIN T SERPL-MCNC: <0.01 NG/ML — SIGNIFICANT CHANGE UP
TROPONIN T SERPL-MCNC: <0.01 NG/ML — SIGNIFICANT CHANGE UP
WBC # BLD: 5.06 K/UL — SIGNIFICANT CHANGE UP (ref 4.8–10.8)
WBC # BLD: 5.06 K/UL — SIGNIFICANT CHANGE UP (ref 4.8–10.8)
WBC # FLD AUTO: 5.06 K/UL — SIGNIFICANT CHANGE UP (ref 4.8–10.8)
WBC # FLD AUTO: 5.06 K/UL — SIGNIFICANT CHANGE UP (ref 4.8–10.8)

## 2023-10-23 PROCEDURE — 97162 PT EVAL MOD COMPLEX 30 MIN: CPT | Mod: GP

## 2023-10-23 PROCEDURE — 70496 CT ANGIOGRAPHY HEAD: CPT | Mod: 26

## 2023-10-23 PROCEDURE — 93010 ELECTROCARDIOGRAM REPORT: CPT | Mod: 77

## 2023-10-23 PROCEDURE — 83036 HEMOGLOBIN GLYCOSYLATED A1C: CPT

## 2023-10-23 PROCEDURE — 70551 MRI BRAIN STEM W/O DYE: CPT

## 2023-10-23 PROCEDURE — 93306 TTE W/DOPPLER COMPLETE: CPT

## 2023-10-23 PROCEDURE — 93005 ELECTROCARDIOGRAM TRACING: CPT

## 2023-10-23 PROCEDURE — 71046 X-RAY EXAM CHEST 2 VIEWS: CPT | Mod: 26

## 2023-10-23 PROCEDURE — 86780 TREPONEMA PALLIDUM: CPT

## 2023-10-23 PROCEDURE — 99282 EMERGENCY DEPT VISIT SF MDM: CPT

## 2023-10-23 PROCEDURE — 83930 ASSAY OF BLOOD OSMOLALITY: CPT

## 2023-10-23 PROCEDURE — 80061 LIPID PANEL: CPT

## 2023-10-23 PROCEDURE — 70545 MR ANGIOGRAPHY HEAD W/DYE: CPT

## 2023-10-23 PROCEDURE — 82607 VITAMIN B-12: CPT

## 2023-10-23 PROCEDURE — 85027 COMPLETE CBC AUTOMATED: CPT

## 2023-10-23 PROCEDURE — 80053 COMPREHEN METABOLIC PANEL: CPT

## 2023-10-23 PROCEDURE — 97116 GAIT TRAINING THERAPY: CPT | Mod: GP

## 2023-10-23 PROCEDURE — 82746 ASSAY OF FOLIC ACID SERUM: CPT

## 2023-10-23 PROCEDURE — 80048 BASIC METABOLIC PNL TOTAL CA: CPT

## 2023-10-23 PROCEDURE — 70450 CT HEAD/BRAIN W/O DYE: CPT | Mod: 26,MA

## 2023-10-23 PROCEDURE — 97535 SELF CARE MNGMENT TRAINING: CPT | Mod: GO

## 2023-10-23 PROCEDURE — 97530 THERAPEUTIC ACTIVITIES: CPT | Mod: GO

## 2023-10-23 PROCEDURE — 99285 EMERGENCY DEPT VISIT HI MDM: CPT

## 2023-10-23 PROCEDURE — 84425 ASSAY OF VITAMIN B-1: CPT

## 2023-10-23 PROCEDURE — 97166 OT EVAL MOD COMPLEX 45 MIN: CPT | Mod: GO

## 2023-10-23 PROCEDURE — A9579: CPT

## 2023-10-23 PROCEDURE — 70552 MRI BRAIN STEM W/DYE: CPT

## 2023-10-23 PROCEDURE — 83935 ASSAY OF URINE OSMOLALITY: CPT

## 2023-10-23 PROCEDURE — 84300 ASSAY OF URINE SODIUM: CPT

## 2023-10-23 PROCEDURE — 85025 COMPLETE CBC W/AUTO DIFF WBC: CPT

## 2023-10-23 PROCEDURE — 84443 ASSAY THYROID STIM HORMONE: CPT

## 2023-10-23 PROCEDURE — 70496 CT ANGIOGRAPHY HEAD: CPT | Mod: MA

## 2023-10-23 PROCEDURE — 70498 CT ANGIOGRAPHY NECK: CPT | Mod: 26

## 2023-10-23 PROCEDURE — 70498 CT ANGIOGRAPHY NECK: CPT | Mod: MA

## 2023-10-23 PROCEDURE — 83735 ASSAY OF MAGNESIUM: CPT

## 2023-10-23 PROCEDURE — 36415 COLL VENOUS BLD VENIPUNCTURE: CPT

## 2023-10-23 RX ORDER — LABETALOL HCL 100 MG
10 TABLET ORAL EVERY 6 HOURS
Refills: 0 | Status: DISCONTINUED | OUTPATIENT
Start: 2023-10-23 | End: 2023-10-26

## 2023-10-23 RX ORDER — HYDRALAZINE HCL 50 MG
10 TABLET ORAL EVERY 6 HOURS
Refills: 0 | Status: DISCONTINUED | OUTPATIENT
Start: 2023-10-23 | End: 2023-10-23

## 2023-10-23 RX ORDER — SENNA PLUS 8.6 MG/1
2 TABLET ORAL AT BEDTIME
Refills: 0 | Status: DISCONTINUED | OUTPATIENT
Start: 2023-10-23 | End: 2023-10-26

## 2023-10-23 RX ORDER — INFLUENZA VIRUS VACCINE 15; 15; 15; 15 UG/.5ML; UG/.5ML; UG/.5ML; UG/.5ML
0.7 SUSPENSION INTRAMUSCULAR ONCE
Refills: 0 | Status: DISCONTINUED | OUTPATIENT
Start: 2023-10-23 | End: 2023-10-26

## 2023-10-23 RX ORDER — LABETALOL HCL 100 MG
5 TABLET ORAL ONCE
Refills: 0 | Status: COMPLETED | OUTPATIENT
Start: 2023-10-23 | End: 2023-10-23

## 2023-10-23 RX ORDER — LABETALOL HCL 100 MG
10 TABLET ORAL EVERY 6 HOURS
Refills: 0 | Status: DISCONTINUED | OUTPATIENT
Start: 2023-10-23 | End: 2023-10-23

## 2023-10-23 RX ORDER — HYDRALAZINE HCL 50 MG
10 TABLET ORAL EVERY 6 HOURS
Refills: 0 | Status: DISCONTINUED | OUTPATIENT
Start: 2023-10-23 | End: 2023-10-24

## 2023-10-23 RX ORDER — BENAZEPRIL HYDROCHLORIDE 40 MG/1
1 TABLET ORAL
Qty: 0 | Refills: 0 | DISCHARGE

## 2023-10-23 RX ORDER — LEVETIRACETAM 250 MG/1
1000 TABLET, FILM COATED ORAL EVERY 12 HOURS
Refills: 0 | Status: DISCONTINUED | OUTPATIENT
Start: 2023-10-23 | End: 2023-10-23

## 2023-10-23 RX ORDER — SODIUM CHLORIDE 9 MG/ML
1000 INJECTION INTRAMUSCULAR; INTRAVENOUS; SUBCUTANEOUS
Refills: 0 | Status: DISCONTINUED | OUTPATIENT
Start: 2023-10-23 | End: 2023-10-24

## 2023-10-23 RX ADMIN — SODIUM CHLORIDE 65 MILLILITER(S): 9 INJECTION INTRAMUSCULAR; INTRAVENOUS; SUBCUTANEOUS at 20:12

## 2023-10-23 RX ADMIN — Medication 5 MILLIGRAM(S): at 23:53

## 2023-10-23 RX ADMIN — LEVETIRACETAM 400 MILLIGRAM(S): 250 TABLET, FILM COATED ORAL at 14:04

## 2023-10-23 NOTE — H&P ADULT - HISTORY OF PRESENT ILLNESS
88M. PMH: HTN, BPH, Neurogenic Bladder w/ indwelling yañez, Hx of Cholecystectomy, BL cataract surgery  Presented (10/23) c/o R forehead pain x1week a/w worsening confusion and weakness xfew months, episodes of "legs giving out", with known fall 1week prior (unwitnessed). Pt fell while getting out of bed, his his left arm. Denies HT/LOC. Son at bedside notes pt is getting lost while driving, having difficulty paying bills. Pt also reports recent non-productive cough and post-nasal drip with subjective chills and feeling "warm", no measured fever. Denies chest pain, SOB, dizziness, nausea, vomiting, abdominal pain, unexpected weight loss, urinary symptoms, or changes in BM.  Baseline:  Patient lives at home alone, ambulates without assistive devices, and carries out all ADL independently, son checks in on him every day.    In ED: VS: afebrile, HTN (175/86), nontachycardic (HR 86), saturating well on RA  Labs significant for: Na 126, Trop (-), VBG unremarkable, RVP (-), CXR: trace blunting of L costophrenic angle, EKG: Normal  CTH: (+): Acute hemorrhage in R parieto-occipital region  ED Interventions: Started on keppra, JEANETTEU eval'd (not a candidate)   88M. PMH: HTN (BP medications discontinued 1 month ago by PCP), BPH, Neurogenic Bladder w/ indwelling yañez, Hx of Cholecystectomy, BL cataract surgery  Presented (10/23) c/o R forehead pain x1week a/w worsening confusion and weakness xfew months, episodes of "legs giving out", with known fall 1week prior (unwitnessed). Pt fell while getting out of bed, his his left arm. Denies HT/LOC. Son at bedside notes pt is getting lost while driving, having difficulty paying bills. Pt also reports recent non-productive cough and post-nasal drip with subjective chills and feeling "warm", no measured fever. Denies chest pain, SOB, dizziness, nausea, vomiting, abdominal pain, unexpected weight loss, urinary symptoms, or changes in BM.  Baseline:  Patient lives at home alone, ambulates without assistive devices, and carries out all ADL independently, son checks in on him every day.    In ED: VS: afebrile, HTN (175/86), nontachycardic (HR 86), saturating well on RA  Labs significant for: Na 126, Trop (-), VBG unremarkable, RVP (-), CXR: trace blunting of L costophrenic angle, EKG: Normal  CTH: (+): Acute hemorrhage in R parieto-occipital region  ED Interventions: Started on keppra, NSICU eval'd (not a candidate)

## 2023-10-23 NOTE — H&P ADULT - NSHPLABSRESULTS_GEN_ALL_CORE
.  LABS:                         12.1   5.06  )-----------( 173      ( 23 Oct 2023 13:10 )             34.7     10-23    126<L>  |  91<L>  |  13  ----------------------------<  89  4.4   |  25  |  1.2    Ca    9.0      23 Oct 2023 13:10    TPro  6.7  /  Alb  4.2  /  TBili  0.4  /  DBili  x   /  AST  20  /  ALT  10  /  AlkPhos  90  10-23      Urinalysis Basic - ( 23 Oct 2023 13:10 )    Color: x / Appearance: x / SG: x / pH: x  Gluc: 89 mg/dL / Ketone: x  / Bili: x / Urobili: x   Blood: x / Protein: x / Nitrite: x   Leuk Esterase: x / RBC: x / WBC x   Sq Epi: x / Non Sq Epi: x / Bacteria: x            RADIOLOGY, EKG & ADDITIONAL TESTS: Reviewed.

## 2023-10-23 NOTE — H&P ADULT - NSHPPHYSICALEXAM_GEN_ALL_CORE
- Mental Status:  Pleasant, Alert, awake, oriented to person, place, and time; Speech is fluent with intact naming, repetition, and comprehension; Occasionally forgetful during interview, briefly shouted at overhead announcement to stop interrupting him  - Cranial Nerves II-XII:    II:  Visual field LLQ deficit; Pupils are equal, round, and reactive to light;    III, IV, VI:  Extraocular movements are intact without nystagmus.  V:  Facial sensation is intact in the V1-V3 distribution bilaterally.  VII:  Face is symmetric with normal eye closure and smile  VIII:  Hearing is intact to finger rub.  IX, X:  Uvula is midline and soft palate rises symmetrically  XI:  Head turning and shoulder shrug are intact.  XII:  Tongue protudes in the midline.  - Motor:  Strength is 5/5 throughout.  There is no pronator drift.  Normal muscle bulk and tone throughout.  - Sensory:  Intact throughout to light touch, no extinction   - Coordination:  Finger-nose-finger and heel-knee-shin intact without dysmetria.   NIHSS: 1 (VF cut)

## 2023-10-23 NOTE — ED PROVIDER NOTE - PHYSICAL EXAMINATION
VITAL SIGNS: I have reviewed nursing notes and confirm.  CONSTITUTIONAL: In no acute distress.  SKIN: Skin exam is warm and dry.  HEAD: Normocephalic; atraumatic.  CARD: S1, S2; Regular rate and rhythm.  RESP: CTAB. Speaking in full sentences.   ABD: Normal bowel sounds; soft; non-distended; non-tender. No CVA tenderness.  EXT: Normal ROM.   NEURO: Alert, oriented. EOM intact. Strength 5/5 throughout. (-) pronator drift. Sensation intact.   PSYCH: Cooperative, appropriate.

## 2023-10-23 NOTE — H&P ADULT - ASSESSMENT
88M. PMH: HTN, BPH, Neurogenic Bladder w/ indwelling yañez, BL cataract surgery. Presented (10/23) c/o R forehead pain x1week a/w worsening confusion and weakness xfew months with 1 known fall 1 month prior (unwitnessed). Found to have acute hemorrhage on CTH.     NEUROLOGY  # Acute hemorrhage in R parieto-occipital region  - CTH: (+): Acute hemorrhage in R parieto-occipital region  - CTA H&N (10/23): Redemonstration of an acute hemorrhage within the right occipital lobe without abnormal underlying vascularity to suggest vascular malformation, Stenosis of the proximal right P2 segment of the PCA, Mild stenosis of the proximal right internal carotid artery due to calcified atherosclerotic plaque  - NSICU eval'd (not a candidate)  - Admit to Stroke Unit and Tele monitoring  - Neurochecks & Vitals q4 hrs  - Fall and Aspiration precautions  - Provide stroke education  - Physiatry eval/Physical therapy/Occupational therapy/Speech and Swallow  - Obtain Paraneoplastic w/u  - F/u HbA1c, TSH and Lipid panel  - F/u CTV head and neck - R/O Dural sinus thrombosis, cortical vein thrombosis, hemorrhagic mass,  dural / parenchymal vascular anomaly  - F/u MRI Brain Non-con - R/O hemorrhagic mass/ amyloid ( superficial siderosis vs micro hemorrhages)  - F/u TTE w bubble study  - Hold AEDs    #Worsening cognition, possible dementia   - Obtain MOCA/Mini mental status exam  - Obtain TSH, B12, Folate, RPR    CARDIOLOGY  #Hypertension  - Stroke Code EKG Results: Normal, Trop (-)  - BP Goal: <140  - F/u TTE with bubble  - C/w Labetalol 10mg IV PRN/Hydralazine 10mg IV PRN    #HLD  - LDL results:     PULMONOLOGY  - VBG unremarkable  - CXR: trace blunting of L costophrenic angle,   - call provider if SPO2 < 94%    GATROENTEROLOGY  - Speech and Swallow Eval:   - Diet:  - PPX:     RENAL/ELECTROLYTES  - Replete K<4 and Mg <2  #Hyponatremia  - Denies diuretics, appears euvolemic on exam - 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  - Obtain urine Na and osmolality, serum osmolality   - Obtain AM cortisol, TSH, Free T4  - C/w IVF: NaCl at 60 cc/hr  and check Na in 8 hrs do not correct > 8-10mmoles/ 24 hrs    #Neurogenic Bladder ( refused self catheterization in the passed)  #Hx of TURP  #CKD3 (Baseline Cr 1.3-1.5)  - Check UA  - Check PSA     INFECTIOUS DISEASE  - RVP (-)  - Monitor     ENDOCRINE  - A1C results:   - TSH results:  - Can start ISS if indicated    HEME/ONC  - DVT ppx: SCDs    Dispo  - Stroke Unit  - Physiatry eval:  - Physical therapy Eval: 88M. PMH: HTN, BPH, Neurogenic Bladder w/ indwelling yañez, BL cataract surgery. Presented (10/23) c/o R forehead pain x1week a/w worsening confusion and weakness xfew months with 1 known fall 1 month prior (unwitnessed). Found to have acute hemorrhage on CTH.     NEUROLOGY  # Acute hemorrhage in R parieto-occipital region  - CTH (10/23): (+): Acute hemorrhage in R parieto-occipital region  - CTA H&N (10/23): Redemonstration of an acute hemorrhage within the right occipital lobe without abnormal underlying vascularity to suggest vascular malformation, Stenosis of the proximal right P2 segment of the PCA, Mild stenosis of the proximal right internal carotid artery due to calcified atherosclerotic plaque  - NSICU eval'd (10/23) (not a candidate), Neurosurgery eval'd (10/23): No acute intervention  - Admit to Stroke Unit and Tele monitoring  - Neurochecks & Vitals q4 hrs  - Fall and Aspiration precautions  - Provide stroke education  - Physiatry eval/Physical therapy/Occupational therapy/Speech and Swallow  - Obtain Paraneoplastic w/u  - F/u HbA1c, TSH and Lipid panel  - F/u CTV head and neck - R/O Dural sinus thrombosis, cortical vein thrombosis, hemorrhagic mass,  dural / parenchymal vascular anomaly  - F/u MRI Brain Non-con - R/O hemorrhagic mass/ amyloid ( superficial siderosis vs micro hemorrhages)  - F/u TTE w bubble study  - Hold AEDs    #Worsening cognition, possible dementia   - Obtain MOCA/Mini mental status exam  - Obtain TSH, B12, Folate, RPR    CARDIOLOGY  #Hypertension  - Stroke Code EKG Results: Normal, Trop (-)  - BP Goal: <140  - F/u TTE with bubble  - C/w Labetalol 10mg IV PRN/Hydralazine 10mg IV PRN    #HLD  - LDL results:     PULMONOLOGY  - VBG unremarkable  - CXR: trace blunting of L costophrenic angle,   - call provider if SPO2 < 94%    GATROENTEROLOGY  - Speech and Swallow Eval:   - Diet:  - PPX:     RENAL/ELECTROLYTES  - Replete K<4 and Mg <2  #Hyponatremia  - Denies diuretics, appears euvolemic on exam - 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  - Obtain urine Na and osmolality, serum osmolality   - Obtain AM cortisol, TSH, Free T4  - C/w IVF: NaCl at 60 cc/hr  and check Na in 8 hrs do not correct > 8-10mmoles/ 24 hrs    #Neurogenic Bladder ( refused self catheterization in the passed)  #Hx of TURP  #CKD3 (Baseline Cr 1.3-1.5)  - Check UA  - Check PSA     INFECTIOUS DISEASE  - RVP (-)  - Monitor     ENDOCRINE  - A1C results:   - TSH results:  - Can start ISS if indicated    HEME/ONC  - DVT ppx: SCDs    Dispo  - Stroke Unit  - Physiatry eval:  - Physical therapy Eval: 88M. PMH: HTN, BPH, Neurogenic Bladder w/ indwelling yañez, BL cataract surgery. Presented (10/23) c/o R forehead pain x1week a/w worsening confusion and weakness xfew months with 1 known fall 1 month prior (unwitnessed). Found to have acute hemorrhage on CTH.     NEUROLOGY  # Acute hemorrhage in R parieto-occipital region  - CTH (10/23): (+): Acute hemorrhage in R parieto-occipital region  - CTA H&N (10/23): Redemonstration of an acute hemorrhage within the right occipital lobe without abnormal underlying vascularity to suggest vascular malformation, Stenosis of the proximal right P2 segment of the PCA, Mild stenosis of the proximal right internal carotid artery due to calcified atherosclerotic plaque  - NSICU eval'd (10/23) (not a candidate), Neurosurgery eval'd (10/23): No acute intervention  - Admit to Stroke Unit and Tele monitoring  - Neurochecks & Vitals Q2H  - Fall and Aspiration precautions  - Provide stroke education  - Physiatry eval/Physical therapy/Occupational therapy/Speech and Swallow  - Obtain Paraneoplastic w/u  - F/u HbA1c, TSH and Lipid panel  - F/u CTV head and neck - R/O Dural sinus thrombosis, cortical vein thrombosis, hemorrhagic mass,  dural / parenchymal vascular anomaly  - F/u MRI Brain Non-con - R/O hemorrhagic mass/ amyloid ( superficial siderosis vs micro hemorrhages)  - F/u TTE w bubble study  - Hold AEDs    #Worsening cognition, possible dementia   - Obtain MOCA/Mini mental status exam  - Obtain TSH, B12, Folate, RPR    CARDIOLOGY  #Hypertension  - Stroke Code EKG Results: Normal, Trop (-)  - BP Goal: <140  - F/u TTE with bubble  - C/w Labetalol 10mg IV PRN/Hydralazine 10mg IV PRN    #HLD  - LDL results:     PULMONOLOGY  - VBG unremarkable  - CXR: trace blunting of L costophrenic angle,   - call provider if SPO2 < 94%    GATROENTEROLOGY  - Speech and Swallow Eval:   - Diet:  - PPX:     RENAL/ELECTROLYTES  - Replete K<4 and Mg <2  #Hyponatremia  - Denies diuretics, appears euvolemic on exam - 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  - Obtain urine Na and osmolality, serum osmolality   - Obtain AM cortisol, TSH, Free T4  - C/w IVF: NaCl at 60 cc/hr  and check Na in 8 hrs do not correct > 8-10mmoles/ 24 hrs    #Neurogenic Bladder ( refused self catheterization in the passed)  #Hx of TURP  #CKD3 (Baseline Cr 1.3-1.5)  - Check UA  - Check PSA     INFECTIOUS DISEASE  - RVP (-)  - Monitor     ENDOCRINE  - A1C results:   - TSH results:  - Can start ISS if indicated    HEME/ONC  - DVT ppx: SCDs    Dispo  - Stroke Unit  - Physiatry eval:  - Physical therapy Eval: 88M. PMH: HTN, BPH, Neurogenic Bladder w/ indwelling yañez, BL cataract surgery. Presented (10/23) c/o R forehead pain x1week a/w worsening confusion and weakness xfew months with 1 known fall 1 month prior (unwitnessed). Found to have acute hemorrhage on CTH.     NEUROLOGY  # Acute hemorrhage in R parieto-occipital region  - CTH (10/23): (+): Acute hemorrhage in R parieto-occipital region  - CTA H&N (10/23): Redemonstration of an acute hemorrhage within the right occipital lobe without abnormal underlying vascularity to suggest vascular malformation, Stenosis of the proximal right P2 segment of the PCA, Mild stenosis of the proximal right internal carotid artery due to calcified atherosclerotic plaque  - NSICU eval'd (10/23) (not a candidate), Neurosurgery eval'd (10/23): No acute intervention  - Admit to Stroke Unit and Tele monitoring  - Neurochecks & Vitals Q2H  - Fall and Aspiration precautions  - Provide stroke education  - Physiatry eval/Physical therapy/Occupational therapy/Speech and Swallow  - Obtain Paraneoplastic w/u  - F/u HbA1c, TSH and Lipid panel  - F/u CTV head and neck - R/O Dural sinus thrombosis, cortical vein thrombosis, hemorrhagic mass,  dural / parenchymal vascular anomaly  - F/u MRI Brain Non-con - R/O hemorrhagic mass/ amyloid ( superficial siderosis vs micro hemorrhages)  - F/u TTE w bubble study  - Hold AEDs    #Worsening cognition, possible dementia   - Obtain MOCA/Mini mental status exam  - Obtain TSH, B12, Folate, RPR    CARDIOLOGY  #Hypertension  - Pt off BP meds per PCP, reports home readings in 130s typically   - Stroke Code EKG Results: Normal, Trop (-)  - BP Goal: <140  - F/u TTE with bubble  - C/w Labetalol 10mg IV PRN/Hydralazine 10mg IV PRN    #HLD  - LDL results:     PULMONOLOGY  - VBG unremarkable  - CXR: trace blunting of L costophrenic angle,   - call provider if SPO2 < 94%    GATROENTEROLOGY  - Speech and Swallow Eval:   - Diet:  - PPX:     RENAL/ELECTROLYTES  - Replete K<4 and Mg <2  #Hyponatremia  - Denies diuretics, appears euvolemic on exam - 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  - Obtain urine Na and osmolality, serum osmolality   - Obtain AM cortisol, TSH, Free T4  - C/w IVF: NaCl at 60 cc/hr  and check Na in 8 hrs do not correct > 8-10mmoles/ 24 hrs    #Neurogenic Bladder ( refused self catheterization in the passed)  #Hx of TURP  #CKD3 (Baseline Cr 1.3-1.5)  - Check UA  - Check PSA     INFECTIOUS DISEASE  - RVP (-)  - Monitor     ENDOCRINE  - A1C results:   - TSH results:  - Can start ISS if indicated    HEME/ONC  - DVT ppx: SCDs    Dispo  - Stroke Unit  - Physiatry eval:  - Physical therapy Eval:

## 2023-10-23 NOTE — CONSULT NOTE ADULT - TIME BILLING
Patient is a 88y Male PMH cholecystectomy, B/L cataract surgery, and neurogenic bladder w chronic indwelling yañez changed [id number] .  patient presents to ED with complaint of headache and "worsening forgetfulness over several months".  He also had episodes of "legs   giving out" and falls . Recent episode approximately one week ago an episode occurred when standing out of bed.  Not taking amtiplt,  anticoagulants or herbal remedies. He denies dizziness,, nausea, LOC, with no unexpected weight loss.  Assessment    Spontaneous R parietal/occipital 11cc  Intraparenchymal hemorrhage  - Check CTA /CTV  head and neck - R/O Dural sinus thrombosis, cortical vein thrombosis, hemorrhagic mass,  dural / parenchymal vascular anomaly  - MRI- R/O hemorrhagic mass/ amyloid ( superficial siderosis vs micro hemorrhages)  - Repeat CT of Brain at 5pm - check for hemorrhagic expansion  - Hold starting anti- epileptic medication  - cardiac monitoring in step down unit  - Check Cardiac Echo/ EKG ( peaked T waves - check troponin )  - Check Stroke core measures- HGBA1C, Lipid profile   - PT/OT consultation     Hyponatremia  - Denies diuretics  - Check cortisol and TSH with free T4  - Start Normal saline at 60 cc/hr  and check Na in 8 hrs do not correct > 8-10mmoles/ 24 hrs  - Clinically patient appears euvolemic - ? SIADH - check urine Na and osmolality    neurogenic Bladder ( refused self catheterization in the passed)  - Check UA  - Check PSA- Hx of TURP    Worsening cognition ( Dementia eval)  - subacute to chronic  - Check TSH  - B12 and Folate   - OT eval - MOCA vs MMSE tseting.   - Check RPR.     Renal insufficency  - Monitoring Cr - unclear of acuity in increased CR

## 2023-10-23 NOTE — ED PROVIDER NOTE - CLINICAL SUMMARY MEDICAL DECISION MAKING FREE TEXT BOX
Pt transferred to [facility] from [facility], signed out to me from Dr. Valdovinos. 87 yo M presented to ED with worsening confusion and weakness, found to have ICH on imaging. Labs and EKG were ordered and reviewed.  Imaging was ordered and reviewed by me.  Appropriate medications for patient's presenting complaints were ordered and effects were reassessed.  Patient's records (prior hospital, ED visit, and/or nursing home notes if available) were reviewed.  Additional history was obtained from EMS, family, and/or PCP (where available).  Escalation to admission/observation was considered. Neurosurgery, neurocritical care and neurology consulted. Pending recommendations and disposition. Signed out to Dr. Alberto. Pt transferred to [facility] from [facility], signed out to me from Dr. Valdovinos. 89 yo M presented to ED with worsening confusion and weakness, found to have ICH on imaging. Labs and EKG were ordered and reviewed.  Imaging was ordered and reviewed by me.  Appropriate medications for patient's presenting complaints were ordered and effects were reassessed.  Patient's records (prior hospital, ED visit, and/or nursing home notes if available) were reviewed.  Additional history was obtained from EMS, family, and/or PCP (where available).  Escalation to admission/observation was considered. Neurosurgery, neurocritical care and neurology consulted. Pending recommendations and disposition. Signed out to Dr. Alberto.    Per neurology - admit to stroke unit.

## 2023-10-23 NOTE — H&P ADULT - ATTENDING COMMENTS
In discussing in-depth with patient and son at bedside, patient has suffered recent falls but denies any apparent known head trauma. They report that patient has been having progressive mentation issues for the past several months. I described the current imaging findings and indicated that further workup was necessary to assess for underlying vascular lesions that may be responsible for the bleed. No focal deficits appreciated on exam. The bleed appears a bit heterogeneic and multi-layered with surrounding edema in a location that gives concern for an underlying vascular lesion, tumor, or amyloid angiopathy as potential causes. Venous hemorrhage also remains a part of the differential given timeframe and bleed appearance.    - Follow-up MRI and vessel imaging; further workup to follow based on findings  - SBP <140  - Follow-up workup for hyponatremia (presumptively chronic)  - PT/OT  - Rest as above

## 2023-10-23 NOTE — CONSULT NOTE ADULT - ASSESSMENT
88y Male PMH cholecystectomy, B/L cataract surgery, and neurogenic bladder w indwelling yañez changed [id number] who presents to the ED brought in by his son with concerns for worsening confusion and weakness over the past few months, getting lost while driving, difficulty paying bills, along with recent complaints of intermittent right forehead pain that began a week ago.    Recommendations:    - Q4H Neurochecks  - Repeat CTH with CTA at 1700 - Rule out any vascular anomaly as cause for the ICH  - MR Brain/MRA (if concerned regarding renal fx, can get MRA over CTA) - Rule out tumor  - Dementia w/u: B12, TSH, free T4, VDRL  - MOCA/Mini mental status exam  - Paraneoplastic w/u  - Cardiac Enzymes  - ECHO  - Formal EKG  - CXR given smoking hx  - UA - Chronic indwelling yañez with recent infectious symptoms  - NeuroIR consult  - Not a candidate for NSICU at this time, would defer disposition per stroke neurology.    88y Male PMH cholecystectomy, B/L cataract surgery, and neurogenic bladder w indwelling yañez changed [id number] who presents to the ED brought in by his son with concerns for worsening confusion and weakness over the past few months, getting lost while driving, difficulty paying bills, along with recent complaints of intermittent right forehead pain that began a week ago.    Recommendations:    - Q4H Neurochecks  - Repeat CTH with CTA at 1700 - Rule out any vascular anomaly as cause for the ICH  - MR Brain/MRA (if concerned regarding renal fx, can get MRA over CTA) - Rule out tumor  - Dementia w/u: B12, TSH, free T4, VDRL  - MOCA/Mini mental status exam  - Paraneoplastic w/u  - Cardiac Enzymes  - Hyponatremic, likely chronic- replace with NS  - random cortisol - R/o cause of hyponatremia  - ECHO  - Formal EKG  - CXR given smoking hx  - UA - Chronic indwelling yañez with recent infectious symptoms  - NeuroIR consult  - Not a candidate for NSICU at this time, would defer disposition per stroke neurology.

## 2023-10-23 NOTE — ED ADULT NURSE NOTE - NSFALLHARMRISKINTERV_ED_ALL_ED

## 2023-10-23 NOTE — ED PROVIDER NOTE - OBJECTIVE STATEMENT
Patient is a 88y Male PMH cholecystectomy, B/L cataract surgery, and neurogenic bladder w indwelling yañez changed [id number] who presents to the ED brought in by his son with concerns for worsening confusion and weakness over the past few months, along with recent complaints of intermittent right forehead pain that began a week ago. Patient lives at home alone, ambulates without assistive devices, and carries out all ADL independently, son checks in on him every day. Patient endorses an unwitnessed fall 1 month ago in the middle of the night while getting out of bed when he hit his left arm, denies head strike or known LOC. Reports recent non-productive cough and post-nasal drip with subjective chills and feeling "warm", no measured fever. Denies chest pain, SOB, dizziness, nausea, vomiting, abdominal pain, urinary symptoms, or changes in BM.

## 2023-10-23 NOTE — PATIENT PROFILE ADULT - PUBLIC BENEFITS
Dr.Kelly Ross called in regards to the pt. 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        spoke with , pt will f/u with him this Monday 05/14/18 if possible.         Nelsy, please double book pt at 3pm on Monday the 14th. Per  pt is already aware of appt   no

## 2023-10-23 NOTE — ED PROVIDER NOTE - PROGRESS NOTE DETAILS
Spoke with radiologist about head CT. Consult placed in teams ICH chat. supervised care of this patient. patient ct shows bleed. ich chat started . [place]: spoke with Darío from [facility] recommends transfer [place]. SPoke with Dr. arora at [place] accepting Authored by Frances Lorenz DO: Pt arrived at [facility]. Pt stable at this time, neurocritical care bedside. Not recommending neuro critical care at this time for admission. Pending neurology eval and recommendations.

## 2023-10-24 LAB
ALBUMIN SERPL ELPH-MCNC: 3.6 G/DL — SIGNIFICANT CHANGE UP (ref 3.5–5.2)
ALBUMIN SERPL ELPH-MCNC: 3.6 G/DL — SIGNIFICANT CHANGE UP (ref 3.5–5.2)
ALP SERPL-CCNC: 81 U/L — SIGNIFICANT CHANGE UP (ref 30–115)
ALP SERPL-CCNC: 81 U/L — SIGNIFICANT CHANGE UP (ref 30–115)
ALT FLD-CCNC: 10 U/L — SIGNIFICANT CHANGE UP (ref 0–41)
ALT FLD-CCNC: 10 U/L — SIGNIFICANT CHANGE UP (ref 0–41)
ANION GAP SERPL CALC-SCNC: 8 MMOL/L — SIGNIFICANT CHANGE UP (ref 7–14)
ANION GAP SERPL CALC-SCNC: 8 MMOL/L — SIGNIFICANT CHANGE UP (ref 7–14)
AST SERPL-CCNC: 18 U/L — SIGNIFICANT CHANGE UP (ref 0–41)
AST SERPL-CCNC: 18 U/L — SIGNIFICANT CHANGE UP (ref 0–41)
BASOPHILS # BLD AUTO: 0.01 K/UL — SIGNIFICANT CHANGE UP (ref 0–0.2)
BASOPHILS # BLD AUTO: 0.01 K/UL — SIGNIFICANT CHANGE UP (ref 0–0.2)
BASOPHILS NFR BLD AUTO: 0.2 % — SIGNIFICANT CHANGE UP (ref 0–1)
BASOPHILS NFR BLD AUTO: 0.2 % — SIGNIFICANT CHANGE UP (ref 0–1)
BILIRUB SERPL-MCNC: 0.6 MG/DL — SIGNIFICANT CHANGE UP (ref 0.2–1.2)
BILIRUB SERPL-MCNC: 0.6 MG/DL — SIGNIFICANT CHANGE UP (ref 0.2–1.2)
BUN SERPL-MCNC: 12 MG/DL — SIGNIFICANT CHANGE UP (ref 10–20)
BUN SERPL-MCNC: 12 MG/DL — SIGNIFICANT CHANGE UP (ref 10–20)
CALCIUM SERPL-MCNC: 8.9 MG/DL — SIGNIFICANT CHANGE UP (ref 8.4–10.4)
CALCIUM SERPL-MCNC: 8.9 MG/DL — SIGNIFICANT CHANGE UP (ref 8.4–10.4)
CHLORIDE SERPL-SCNC: 93 MMOL/L — LOW (ref 98–110)
CHLORIDE SERPL-SCNC: 93 MMOL/L — LOW (ref 98–110)
CO2 SERPL-SCNC: 25 MMOL/L — SIGNIFICANT CHANGE UP (ref 17–32)
CO2 SERPL-SCNC: 25 MMOL/L — SIGNIFICANT CHANGE UP (ref 17–32)
CREAT SERPL-MCNC: 1.3 MG/DL — SIGNIFICANT CHANGE UP (ref 0.7–1.5)
CREAT SERPL-MCNC: 1.3 MG/DL — SIGNIFICANT CHANGE UP (ref 0.7–1.5)
EGFR: 54 ML/MIN/1.73M2 — LOW
EGFR: 54 ML/MIN/1.73M2 — LOW
EOSINOPHIL # BLD AUTO: 0.27 K/UL — SIGNIFICANT CHANGE UP (ref 0–0.7)
EOSINOPHIL # BLD AUTO: 0.27 K/UL — SIGNIFICANT CHANGE UP (ref 0–0.7)
EOSINOPHIL NFR BLD AUTO: 4.8 % — SIGNIFICANT CHANGE UP (ref 0–8)
EOSINOPHIL NFR BLD AUTO: 4.8 % — SIGNIFICANT CHANGE UP (ref 0–8)
GLUCOSE SERPL-MCNC: 93 MG/DL — SIGNIFICANT CHANGE UP (ref 70–99)
GLUCOSE SERPL-MCNC: 93 MG/DL — SIGNIFICANT CHANGE UP (ref 70–99)
HCT VFR BLD CALC: 35.5 % — LOW (ref 42–52)
HCT VFR BLD CALC: 35.5 % — LOW (ref 42–52)
HGB BLD-MCNC: 12.1 G/DL — LOW (ref 14–18)
HGB BLD-MCNC: 12.1 G/DL — LOW (ref 14–18)
IMM GRANULOCYTES NFR BLD AUTO: 0.4 % — HIGH (ref 0.1–0.3)
IMM GRANULOCYTES NFR BLD AUTO: 0.4 % — HIGH (ref 0.1–0.3)
LYMPHOCYTES # BLD AUTO: 0.97 K/UL — LOW (ref 1.2–3.4)
LYMPHOCYTES # BLD AUTO: 0.97 K/UL — LOW (ref 1.2–3.4)
LYMPHOCYTES # BLD AUTO: 17.2 % — LOW (ref 20.5–51.1)
LYMPHOCYTES # BLD AUTO: 17.2 % — LOW (ref 20.5–51.1)
MAGNESIUM SERPL-MCNC: 1.9 MG/DL — SIGNIFICANT CHANGE UP (ref 1.8–2.4)
MAGNESIUM SERPL-MCNC: 1.9 MG/DL — SIGNIFICANT CHANGE UP (ref 1.8–2.4)
MCHC RBC-ENTMCNC: 29.9 PG — SIGNIFICANT CHANGE UP (ref 27–31)
MCHC RBC-ENTMCNC: 29.9 PG — SIGNIFICANT CHANGE UP (ref 27–31)
MCHC RBC-ENTMCNC: 34.1 G/DL — SIGNIFICANT CHANGE UP (ref 32–37)
MCHC RBC-ENTMCNC: 34.1 G/DL — SIGNIFICANT CHANGE UP (ref 32–37)
MCV RBC AUTO: 87.7 FL — SIGNIFICANT CHANGE UP (ref 80–94)
MCV RBC AUTO: 87.7 FL — SIGNIFICANT CHANGE UP (ref 80–94)
MONOCYTES # BLD AUTO: 0.62 K/UL — HIGH (ref 0.1–0.6)
MONOCYTES # BLD AUTO: 0.62 K/UL — HIGH (ref 0.1–0.6)
MONOCYTES NFR BLD AUTO: 11 % — HIGH (ref 1.7–9.3)
MONOCYTES NFR BLD AUTO: 11 % — HIGH (ref 1.7–9.3)
NEUTROPHILS # BLD AUTO: 3.76 K/UL — SIGNIFICANT CHANGE UP (ref 1.4–6.5)
NEUTROPHILS # BLD AUTO: 3.76 K/UL — SIGNIFICANT CHANGE UP (ref 1.4–6.5)
NEUTROPHILS NFR BLD AUTO: 66.4 % — SIGNIFICANT CHANGE UP (ref 42.2–75.2)
NEUTROPHILS NFR BLD AUTO: 66.4 % — SIGNIFICANT CHANGE UP (ref 42.2–75.2)
NRBC # BLD: 0 /100 WBCS — SIGNIFICANT CHANGE UP (ref 0–0)
NRBC # BLD: 0 /100 WBCS — SIGNIFICANT CHANGE UP (ref 0–0)
OSMOLALITY SERPL: 272 MOS/KG — LOW (ref 280–301)
OSMOLALITY SERPL: 272 MOS/KG — LOW (ref 280–301)
PLATELET # BLD AUTO: 159 K/UL — SIGNIFICANT CHANGE UP (ref 130–400)
PLATELET # BLD AUTO: 159 K/UL — SIGNIFICANT CHANGE UP (ref 130–400)
PMV BLD: 9.9 FL — SIGNIFICANT CHANGE UP (ref 7.4–10.4)
PMV BLD: 9.9 FL — SIGNIFICANT CHANGE UP (ref 7.4–10.4)
POTASSIUM SERPL-MCNC: 4.7 MMOL/L — SIGNIFICANT CHANGE UP (ref 3.5–5)
POTASSIUM SERPL-MCNC: 4.7 MMOL/L — SIGNIFICANT CHANGE UP (ref 3.5–5)
POTASSIUM SERPL-SCNC: 4.7 MMOL/L — SIGNIFICANT CHANGE UP (ref 3.5–5)
POTASSIUM SERPL-SCNC: 4.7 MMOL/L — SIGNIFICANT CHANGE UP (ref 3.5–5)
PROT SERPL-MCNC: 6 G/DL — SIGNIFICANT CHANGE UP (ref 6–8)
PROT SERPL-MCNC: 6 G/DL — SIGNIFICANT CHANGE UP (ref 6–8)
RBC # BLD: 4.05 M/UL — LOW (ref 4.7–6.1)
RBC # BLD: 4.05 M/UL — LOW (ref 4.7–6.1)
RBC # FLD: 13.4 % — SIGNIFICANT CHANGE UP (ref 11.5–14.5)
RBC # FLD: 13.4 % — SIGNIFICANT CHANGE UP (ref 11.5–14.5)
SODIUM SERPL-SCNC: 126 MMOL/L — LOW (ref 135–146)
SODIUM SERPL-SCNC: 126 MMOL/L — LOW (ref 135–146)
WBC # BLD: 5.65 K/UL — SIGNIFICANT CHANGE UP (ref 4.8–10.8)
WBC # BLD: 5.65 K/UL — SIGNIFICANT CHANGE UP (ref 4.8–10.8)
WBC # FLD AUTO: 5.65 K/UL — SIGNIFICANT CHANGE UP (ref 4.8–10.8)
WBC # FLD AUTO: 5.65 K/UL — SIGNIFICANT CHANGE UP (ref 4.8–10.8)

## 2023-10-24 PROCEDURE — 99233 SBSQ HOSP IP/OBS HIGH 50: CPT

## 2023-10-24 PROCEDURE — 70551 MRI BRAIN STEM W/O DYE: CPT | Mod: 26

## 2023-10-24 PROCEDURE — 93306 TTE W/DOPPLER COMPLETE: CPT | Mod: 26

## 2023-10-24 PROCEDURE — 99223 1ST HOSP IP/OBS HIGH 75: CPT

## 2023-10-24 RX ORDER — LISINOPRIL 2.5 MG/1
5 TABLET ORAL DAILY
Refills: 0 | Status: DISCONTINUED | OUTPATIENT
Start: 2023-10-24 | End: 2023-10-24

## 2023-10-24 RX ORDER — LISINOPRIL 2.5 MG/1
5 TABLET ORAL DAILY
Refills: 0 | Status: DISCONTINUED | OUTPATIENT
Start: 2023-10-24 | End: 2023-10-26

## 2023-10-24 RX ORDER — ENOXAPARIN SODIUM 100 MG/ML
40 INJECTION SUBCUTANEOUS EVERY 24 HOURS
Refills: 0 | Status: DISCONTINUED | OUTPATIENT
Start: 2023-10-24 | End: 2023-10-26

## 2023-10-24 RX ADMIN — Medication 10 MILLIGRAM(S): at 08:46

## 2023-10-24 RX ADMIN — ENOXAPARIN SODIUM 40 MILLIGRAM(S): 100 INJECTION SUBCUTANEOUS at 13:17

## 2023-10-24 RX ADMIN — LISINOPRIL 5 MILLIGRAM(S): 2.5 TABLET ORAL at 13:16

## 2023-10-24 RX ADMIN — Medication 1 MILLIGRAM(S): at 16:09

## 2023-10-24 NOTE — PROGRESS NOTE ADULT - SUBJECTIVE AND OBJECTIVE BOX
HD#2    S/P Occ ICH    Pt seen and examined at bedside. Pt without complaints at this time. Denies HA, dizziness or visual changes.     Vital Signs Last 24 Hrs  T(C): 36 (24 Oct 2023 08:00), Max: 36.8 (23 Oct 2023 21:35)  T(F): 96.8 (24 Oct 2023 08:00), Max: 98.3 (23 Oct 2023 21:35)  HR: 68 (24 Oct 2023 08:00) (58 - 95)  BP: 139/69 (24 Oct 2023 09:05) (121/62 - 175/85)  BP(mean): 111 (24 Oct 2023 06:00) (82 - 112)  RR: 20 (24 Oct 2023 09:05) (14 - 20)  SpO2: 99% (24 Oct 2023 08:00) (97% - 100%)    Parameters below as of 24 Oct 2023 08:00  Patient On (Oxygen Delivery Method): room air        I&O's Detail    23 Oct 2023 07:01  -  24 Oct 2023 07:00  --------------------------------------------------------  IN:  Total IN: 0 mL    OUT:    Indwelling Catheter - Urethral (mL): 700 mL  Total OUT: 700 mL    Total NET: -700 mL        I&O's Summary    23 Oct 2023 07:01  -  24 Oct 2023 07:00  --------------------------------------------------------  IN: 0 mL / OUT: 700 mL / NET: -700 mL        REVIEW OF SYSTEMS    [X] A ten-point review of systems was otherwise negative except as noted.  [ ] Due to altered mental status/intubation, subjective information were not able to be obtained from the patient. History was obtained, to the extent possible, from review of the chart and collateral sources of information.      PHYSICAL EXAM:  Neurological:  On PE:    A&Ox3 with clear speech  PERRL  EOMI  No droop  tongue midline  No drift  Finger to nose intact  ANDREA - good strength  Follows complex commands    LABS:                        12.1   5.06  )-----------( 173      ( 23 Oct 2023 13:10 )             34.7     10-23    126<L>  |  91<L>  |  13  ----------------------------<  89  4.4   |  25  |  1.2    Ca    9.0      23 Oct 2023 13:10    TPro  6.7  /  Alb  4.2  /  TBili  0.4  /  DBili  x   /  AST  20  /  ALT  10  /  AlkPhos  90  10-23      Urinalysis Basic - ( 23 Oct 2023 13:10 )    Color: x / Appearance: x / SG: x / pH: x  Gluc: 89 mg/dL / Ketone: x  / Bili: x / Urobili: x   Blood: x / Protein: x / Nitrite: x   Leuk Esterase: x / RBC: x / WBC x   Sq Epi: x / Non Sq Epi: x / Bacteria: x      CARDIAC MARKERS ( 23 Oct 2023 13:10 )  x     / <0.01 ng/mL / x     / x     / x        Allergies    No Known Allergies    Intolerances      MEDICATIONS:  Antibiotics:    Neuro:  LORazepam   Injectable 1 milliGRAM(s) IV Push once PRN      IVF:      CULTURES:      RADIOLOGY & ADDITIONAL TESTS:      ASSESSMENT:  88y Male s/p    Non-traumatic intracranial hemorrhage    No pertinent family history in first degree relatives    Handoff    MEWS Score    HTN (hypertension)    Neurogenic bladder    Waldron catheter in place    Intracranial hemorrhage    No significant past surgical history    WEAKNESS    90+    [email or web address]        PLAN:  No Neurosurgical Intervention  Care as per Neurology

## 2023-10-24 NOTE — OCCUPATIONAL THERAPY INITIAL EVALUATION ADULT - PLANNED THERAPY INTERVENTIONS, OT EVAL
ADL retraining/IADL retraining/balance training/bed mobility training/neuromuscular re-education/strengthening/stretching/transfer training

## 2023-10-24 NOTE — PHYSICAL THERAPY INITIAL EVALUATION ADULT - PERTINENT HX OF CURRENT PROBLEM, REHAB EVAL
87 y/o R handed male with PMH: HTN (BP medications discontinued 1 month ago by PCP), BPH, Neurogenic Bladder w/ indwelling yañez, Hx of Cholecystectomy, BL cataract surgery  Presented (10/23) c/o R forehead pain x1week a/w worsening confusion and weakness xfew months, episodes of "legs giving out", with known fall 1week prior (unwitnessed). Pt fell while getting out of bed, his his left arm. Denies HT/LOC. Son at bedside notes pt is getting lost while driving, having difficulty paying bills. Pt also reports recent non-productive cough and post-nasal drip with subjective chills and feeling "warm", no measured fever. Denies chest pain, SOB, dizziness, nausea, vomiting, abdominal pain, unexpected weight loss, urinary symptoms, or changes in BM.

## 2023-10-24 NOTE — OCCUPATIONAL THERAPY INITIAL EVALUATION ADULT - GENERAL OBSERVATIONS, REHAB EVAL
Pt encountered semi english in bed in NAD +IV locked +b/l sequentials +tele +pulse oxi +BP cuff. Pt agreed to OT eval. BP initially supine in bed 145/76 [id number], seated at eob 151/79 [id number] hr73, prior to stand up 141/73 [id number] hr69, and final supine in bed 139/72 [id number] hr75(JEFF Moore notified and permitted the eval, blood pressure medication taken 15 minutes prior to OT eval). Pt left semi english in bed in [facility], JEFF Moore aware.

## 2023-10-24 NOTE — CONSULT NOTE ADULT - ASSESSMENT
88M. PMH: HTN, BPH, Neurogenic Bladder w/ indwelling yañez, BL cataract surgery. Presented (10/23) c/o R forehead pain x1week a/w worsening confusion and weakness xfew months with 1 known fall 1 month prior (unwitnessed). Found to have acute hemorrhage on CTH.     # Acute hemorrhage in R parieto-occipital region  - CTH (10/23): (+): Acute hemorrhage in R parieto-occipital region  - CTA H&N (10/23): Redemonstration of an acute hemorrhage within the right occipital lobe without abnormal underlying vascularity to suggest vascular malformation, Stenosis of the proximal right P2 segment of the PCA, Mild stenosis of the proximal right internal carotid artery due to calcified atherosclerotic plaque  - NSICU eval'd (10/23) (not a candidate), Neurosurgery eval'd (10/23): No acute intervention  - Stroke Unit and Tele monitoring  - Neurochecks & Vitals Q2H  - Fall and Aspiration precautions  - Provide stroke education  - Physiatry eval/Physical therapy/Occupational therapy/Speech and Swallow  - Obtain Paraneoplastic w/u  - F/u HbA1c, TSH and Lipid panel  - F/u CTV head and neck - R/O Dural sinus thrombosis, cortical vein thrombosis, hemorrhagic mass,  dural / parenchymal vascular anomaly  - F/u MRI Brain Non-con - R/O hemorrhagic mass/ amyloid ( superficial siderosis vs micro hemorrhages)  - F/u TTE w bubble study    #Suspected mild dementia   - Obtain TSH, B12, Folate, RPR, U/A    #Hypertension  - Pt off BP meds per PCP, reports home readings in 130s typically   - Stroke Code EKG Results: Normal, Trop (-)  - BP Goal: <140  - C/w Labetalol 10mg IV PRN/Hydralazine 10mg IV PRN  - consider resuming ACE (was on Benazepril before)    #HLD  - LDL results:     RENAL/ELECTROLYTES  - Replete K<4 and Mg <2    #Hyponatremia  - Denies diuretics, appears euvolemic on exam - 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  - Obtain urine Na and osmolality, serum osmolality   - Obtain AM cortisol, TSH  - check Na this am: do not correct > 8-10mmoles/ 24 hrs    #Neurogenic Bladder ( refused self catheterization in the passed)  #Hx of TURP  #CKD3 (Baseline Cr 1.3-1.5)  - cont yañez    #Nutrition/Fluids/Electrolytes   - replete K<4 and Mg <2  - ensure regular BMs  - consider Miralax BID, Senna    #DVT Px  SCDs  Heparin or Lovenox if ok from neuro standpoint    #Progress Note Handoff  Pending: Clinical improvement and stability__x___PT____x___MRI, TTE_  Pt/Family discussion: Pt informed and agrees with the current plan  Disposition: Home______/SNF_______/4A______/To be determined____x____    My note supersedes the residents note should a discrepancy arise.    Chart and notes personally reviewed.  Care Discussed with Consultants/Other Providers/ Housestaff [ x] YES [ ] NO   Radiology, labs, old records personally reviewed.    discussed w/ housestaff, nursing, case management, neuro team    Attestation Statements:    Attestation Statements:  Risk Statement (NON-critical care).     On this date of service, level of risk to patient is considered: High.     Due to: acute ICH, stroke unit care, neuro checks, telemetry monitoring, hyponatremia    Time-based billing (NON-critical care).     75 minutes spent on total encounter. The necessity of the time spent during the encounter on this date of service was due to:     time spent on review of labs, imaging studies, old records, obtaining history, personally examining patient, counselling and communicating with patient/ family, entering orders for medications/tests/etc, discussions with other health care providers, documentation in electronic health records, independent interpretation of labs, imaging/procedure results and care coordination.

## 2023-10-24 NOTE — OCCUPATIONAL THERAPY INITIAL EVALUATION ADULT - NS ASR FOLLOW COMMAND OT EVAL
benefits from repeat cues and visual demonstrations to enhance command following/75% of the time/able to follow single-step instructions

## 2023-10-24 NOTE — PROGRESS NOTE ADULT - ASSESSMENT
88M. PMH: HTN, BPH, Neurogenic Bladder w/ indwelling yañez, BL cataract surgery. Presented (10/23) c/o R forehead pain x1week a/w worsening confusion and weakness xfew months with 1 known fall 1 month prior (unwitnessed). Found to have acute hemorrhage on CTH.    # R parieto-occipital acute hemorrhage possibly 2/2 tumor vs hemorrhagic conversion of ischemic stroke vs AVM vs amyloid angiopathy  - CTH (10/23): (+): Acute hemorrhage in R parieto-occipital region  - CTA H&N (10/23): Redemonstration of an acute hemorrhage within the right occipital lobe without abnormal underlying vascularity to suggest vascular malformation, Stenosis of the proximal right P2 segment of the PCA, Mild stenosis of the proximal right internal carotid artery due to calcified atherosclerotic plaque  - NSICU eval'd (10/23) (not a candidate), Neurosurgery eval'd (10/23): No acute intervention  - Brain MRI w/ and w/out contrast  - Fall and Aspiration precautions  - Physiatry eval/Physical therapy/Occupational therapy/Speech and Swallow  - F/u HbA1c, TSH and Lipid panel  - F/u TTE     #Worsening cognition, possible dementia   - Obtain TSH, B12, Folate, thiamine, RPR    #Hypertension  - Pt off BP meds per PCP, reports home readings in 130s typically   - Stroke Code EKG Results: Normal, Trop (-)  - BP Goal: <140  - start lisinopril 5 mg    #HLD  - LDL results:     #Hyponatremia  - Denies diuretics, appears euvolemic on exam - 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

## 2023-10-24 NOTE — OCCUPATIONAL THERAPY INITIAL EVALUATION ADULT - SHORT TERM MEMORY, REHAB EVAL
able to repeat 3/3 words immediately; able to recall 3/3 words with 2 options provided, moderate impairment/impaired

## 2023-10-24 NOTE — PROGRESS NOTE ADULT - SUBJECTIVE AND OBJECTIVE BOX
Neurology Progress Note    Interval History:    Patient was seen and examined, no acute event over night.     Medications:  enoxaparin Injectable 40 milliGRAM(s) SubCutaneous every 24 hours  influenza  Vaccine (HIGH DOSE) 0.7 milliLiter(s) IntraMuscular once  labetalol Injectable 10 milliGRAM(s) IV Push every 6 hours PRN  lisinopril 5 milliGRAM(s) Oral daily  LORazepam   Injectable 1 milliGRAM(s) IV Push once PRN  senna 2 Tablet(s) Oral at bedtime PRN      Vital Signs Last 24 Hrs  T(C): 36 (24 Oct 2023 08:00), Max: 36.8 (23 Oct 2023 21:35)  T(F): 96.8 (24 Oct 2023 08:00), Max: 98.3 (23 Oct 2023 21:35)  HR: 68 (24 Oct 2023 08:00) (58 - 95)  BP: 139/69 (24 Oct 2023 09:05) (121/62 - 175/85)  BP(mean): 111 (24 Oct 2023 06:00) (82 - 112)  RR: 20 (24 Oct 2023 09:05) (14 - 20)  SpO2: 99% (24 Oct 2023 08:00) (97% - 100%)    Parameters below as of 24 Oct 2023 08:00  Patient On (Oxygen Delivery Method): room air        Neurological Examination:  General:  Appearance is consistent with chronologic age.   Cognitive/Language:  Awake, alert, and oriented to person, place, time and date.  Recent and remote memory intact.  Fund of knowledge is appropriate.  Naming, repetition and comprehension intact. Nondysarthric.    Cranial Nerves  - Eyes: Visual acuity intact, L inferior quadrantanopsia, EOMI w/o nystagmus, skew or reported double vision.  PERRL.  No ptosis/weakness of eyelid closure.    - Face:  Facial sensation normal V1 - 3, no facial asymmetry.    - Ears/Nose/Throat:  Hearing grossly intact b/l to finger rub.  Palate elevates midline.  Tongue and uvula midline.   Motor exam: Normal tone and bulk. No tenderness, twitching, tremors or involuntary movements.            Upper extremity                  Bicep     Tricep     HG                                                 R      5/5        5/5          5/5                                                    L       5/5        5/5          5/5              Lower extremity                   HF        KE        DF         PF                                                  R     5/5       5/5       5/5       5/5                                               L      5/5      5/5       5/5        5/5    Sensory examination:  Intact to light touch and pinprick, pain, temperature and proprioception and vibration in all extremities.  Reflexes: 2+ b/l biceps, triceps, patella and achilles.  Plantar response downgoing b/l.  Lashaun, clonus absent.  Cerebellum: FTN/HKS intact.  No dysmetria.    Gait narrow based and normal.    Labs:  CBC Full  -  ( 23 Oct 2023 13:10 )  WBC Count : 5.06 K/uL  RBC Count : 4.06 M/uL  Hemoglobin : 12.1 g/dL  Hematocrit : 34.7 %  Platelet Count - Automated : 173 K/uL  Mean Cell Volume : 85.5 fL  Mean Cell Hemoglobin : 29.8 pg  Mean Cell Hemoglobin Concentration : 34.9 g/dL  Auto Neutrophil # : 3.47 K/uL  Auto Lymphocyte # : 0.77 K/uL  Auto Monocyte # : 0.57 K/uL  Auto Eosinophil # : 0.22 K/uL  Auto Basophil # : 0.02 K/uL  Auto Neutrophil % : 68.6 %  Auto Lymphocyte % : 15.2 %  Auto Monocyte % : 11.3 %  Auto Eosinophil % : 4.3 %  Auto Basophil % : 0.4 %    10-23    126<L>  |  91<L>  |  13  ----------------------------<  89  4.4   |  25  |  1.2    Ca    9.0      23 Oct 2023 13:10    TPro  6.7  /  Alb  4.2  /  TBili  0.4  /  DBili  x   /  AST  20  /  ALT  10  /  AlkPhos  90  10-23    LIVER FUNCTIONS - ( 23 Oct 2023 13:10 )  Alb: 4.2 g/dL / Pro: 6.7 g/dL / ALK PHOS: 90 U/L / ALT: 10 U/L / AST: 20 U/L / GGT: x             Urinalysis Basic - ( 23 Oct 2023 13:10 )    Color: x / Appearance: x / SG: x / pH: x  Gluc: 89 mg/dL / Ketone: x  / Bili: x / Urobili: x   Blood: x / Protein: x / Nitrite: x   Leuk Esterase: x / RBC: x / WBC x   Sq Epi: x / Non Sq Epi: x / Bacteria: x        RADIOLOGY & ADDITIONAL TESTS:

## 2023-10-24 NOTE — PROGRESS NOTE ADULT - ATTENDING COMMENTS
Exam stable. No concerning findings noted on CTA. Reviewed images/plan with patient in-depth.    - Obtain MRI/MRV to further investigate for underlying lesion  - Follow-up serum workup for mentation  - Start antihypertensives for SBP goal <140  - Send serum/urine studies for hyponatremia  - Given 24h+ from ictus, okay to start chemical DVT ppx  - Okay to downgrade to regular floor bed  - Rest as above. Exam stable. No concerning findings noted on CTA. Reviewed images/plan with patient in-depth.    - Obtain MRI/MRV to further investigate for underlying lesion  - Follow-up serum workup for mentation  - Start antihypertensives for SBP goal <140  - Send serum/urine studies for hyponatremia  - D/C IVF; repeat BMP; avoid rapid correction of Na  - Given 24h+ from ictus, okay to start chemical DVT ppx  - Okay to downgrade to regular floor bed  - Rest as above.

## 2023-10-24 NOTE — OCCUPATIONAL THERAPY INITIAL EVALUATION ADULT - TRANSFER TRAINING, PT EVAL
Pt will perform bed<>chair transfer using appropriate AD with independent by dc. Pt will perform tub transfer with independent by dc.

## 2023-10-24 NOTE — OCCUPATIONAL THERAPY INITIAL EVALUATION ADULT - ADL RETRAINING, OT EVAL
Pt will perform LB dressing task with independent by dc. Pt will perform bathing task with independent by dc.

## 2023-10-24 NOTE — CONSULT NOTE ADULT - SUBJECTIVE AND OBJECTIVE BOX
HPI:    Patient is a 88y Male PMH cholecystectomy, B/L cataract surgery, and neurogenic bladder w indwelling yañez changed [id number] who presents to the ED brought in by his son with concerns for worsening confusion and weakness over the past few months, getting lost while driving, difficulty paying bills, along with recent complaints of intermittent right forehead pain that began a week ago. Patient lives at home alone, ambulates without assistive devices, and carries out all ADL independently, son checks in on him every day. Patient endorses an unwitnessed fall 1 month ago in the middle of the night while getting out of bed when he hit his left arm, denies head strike or known LOC. Reports recent non-productive cough and post-nasal drip with subjective chills and feeling "warm", no measured fever. Denies chest pain, SOB, dizziness, nausea, vomiting, abdominal pain, urinary symptoms, or changes in BM. Pt c/o some HA, Michael dizziness or visual changes.     PAST MEDICAL & SURGICAL HISTORY:  HTN (hypertension)  Neurogenic bladder  Yañez catheter in place  2016  No significant past surgical history    Home Medications:  benazepril 5 mg oral tablet: 1 tab(s) orally once a day (09 Aug 2018 07:40)      Allergies    No Known Allergies    Intolerances    ROS:  [X] A ten-point review of systems is negative except as noted   [  ] Due to altered mental status/intubation, subjective information were not able to be obtained from the patient. History was obtained, to the extent possible, from review of the chart and collateral sources of information    MEDICATIONS  (STANDING):  levETIRAcetam  IVPB 1000 milliGRAM(s) IV Intermittent every 12 hours    MEDICATIONS  (PRN):      ICU Vital Signs Last 24 Hrs  T(C): 36.6 (23 Oct 2023 15:05), Max: 37.1 (23 Oct 2023 12:19)  T(F): 97.9 (23 Oct 2023 15:05), Max: 98.7 (23 Oct 2023 12:19)  HR: 88 (23 Oct 2023 15:05) (84 - 95)  BP: 157/82 (23 Oct 2023 15:05) (129/80 - 175/86)  BP(mean): --  ABP: --  ABP(mean): --  RR: 16 (23 Oct 2023 15:05) (16 - 18)  SpO2: 100% (23 Oct 2023 15:05) (99% - 100%)    O2 Parameters below as of 23 Oct 2023 15:05  Patient On (Oxygen Delivery Method): room air    I&O's Detail                            12.1   5.06  )-----------( 173      ( 23 Oct 2023 13:10 )             34.7     10-23    126<L>  |  91<L>  |  13  ----------------------------<  89  4.4   |  25  |  1.2    Ca    9.0      23 Oct 2023 13:10    TPro  6.7  /  Alb  4.2  /  TBili  0.4  /  DBili  x   /  AST  20  /  ALT  10  /  AlkPhos  90  10-23    CARDIAC MARKERS ( 23 Oct 2023 13:10 )  x     / <0.01 ng/mL / x     / x     / x          Urinalysis Basic - ( 23 Oct 2023 13:10 )    Color: x / Appearance: x / SG: x / pH: x  Gluc: 89 mg/dL / Ketone: x  / Bili: x / Urobili: x   Blood: x / Protein: x / Nitrite: x   Leuk Esterase: x / RBC: x / WBC x   Sq Epi: x / Non Sq Epi: x / Bacteria: x        On PE:    A&Ox3 with clear speech  Pupils unequal, B/L cataract surg. Left pupil irreg  EOMI  No droop  tongue midline  No drift  Finger to nose intact  ANDREA - good strength  Follows complex commands      Radiology:  < from: CT Head No Cont (10.23.23 @ 13:07) >  1.  Acute hemorrhage in the right posterior parietal-occipital region,   differential includes parenchymal hematoma, hemorrhagic infarct, and   hemorrhage into an underlying lesion, follow-up to resolution is   recommended followed by postcontrast examination.    2.  Cerebral and cerebellar atrophy.    3.  Periventricular white matter hypodensities, nonspecific, differential   diagnostic possibilities include ischemic change, gliosis or   demyelination.    < end of copied text >      Assessment:  As above    Plan:  No Neurosurgical intervention  Repeat HCT   Frequent Neuro checks  Neurology  
SUMMARY: Patient is a 88y Male PMH cholecystectomy, B/L cataract surgery, and neurogenic bladder w indwelling yañez changed [id number] who presents to the ED brought in by his son with concerns for worsening confusion and weakness over the past few months, getting lost while driving, difficulty paying bills, along with recent complaints of intermittent right forehead pain that began a week ago. Patient lives at home alone, ambulates without assistive devices, and carries out all ADL independently, son checks in on him every day. Patient endorses an unwitnessed fall 1 month ago in the middle of the night while getting out of bed when he hit his left arm, denies head strike or known LOC. Reports recent non-productive cough and post-nasal drip with subjective chills and feeling "warm", no measured fever. Denies chest pain, SOB, dizziness, nausea, vomiting, abdominal pain, urinary symptoms, or changes in BM.    ALLERGIES: Allergies    No Known Allergies    Intolerances    ADMISSION SCORES:   GCS: 15  NIHSS: 0   ICH Score: 1    SEDATION SCORES:  RASS:   CAM-ICU:     REVIEW OF SYSTEMS: Negative except for that of HPI    VITALS: [x] Reviewed  ICU Vital Signs Last 24 Hrs  T(C): 36.6 (23 Oct 2023 15:05), Max: 37.1 (23 Oct 2023 12:19)  T(F): 97.9 (23 Oct 2023 15:05), Max: 98.7 (23 Oct 2023 12:19)  HR: 88 (23 Oct 2023 15:05) (84 - 95)  BP: 157/82 (23 Oct 2023 15:05) (129/80 - 175/86)  BP(mean): --  ABP: --  ABP(mean): --  RR: 16 (23 Oct 2023 15:05) (16 - 18)  SpO2: 100% (23 Oct 2023 15:05) (99% - 100%)          LABS:  Na: 126 (10-23 @ 13:10)  K: 4.4 (10-23 @ 13:10)  Cl: 91 (10-23 @ 13:10)  CO2: 25 (10-23 @ 13:10)  BUN: 13 (10-23 @ 13:10)  Cr: 1.2 (10-23 @ 13:10)  Glu: 89(10-23 @ 13:10)    Hgb: 12.1 (10-23 @ 13:10)  Hct: 34.7 (10-23 @ 13:10)  WBC: 5.06 (10-23 @ 13:10)  Plt: 173 (10-23 @ 13:10)    INR:   PTT:     LIVER FUNCTIONS - ( 23 Oct 2023 13:10 )  Alb: 4.2 g/dL / Pro: 6.7 g/dL / ALK PHOS: 90 U/L / ALT: 10 U/L / AST: 20 U/L / GGT: x             MEDICATIONS  (STANDING):  levETIRAcetam  IVPB 1000 milliGRAM(s) IV Intermittent every 12 hours    MEDICATIONS  (PRN):      IMAGING/DATA: [x] Reviewed    EXAMINATION:  General: No acute distress  HEENT: Anicteric sclerae  Cardiac: S1, split S2  Lungs: Clear  Abdomen: Soft, non-tender, +BS  Extremities: No c/c/e  Skin/Incision Site: Clean, dry and intact  Neurologic: Awake, alert, fully oriented, follows commands, PERRL, VFFtc, EOMI, face symmetric, tongue midline, no drift, full strength, unable to perform 3 word recall      DEVICES:   [] Restraints [] PIVs [] ET tube [] central line [] PICC [] arterial line [] yañez [] NGT/OGT [] EVD [] LD [] VENANCIO/HMV [] LiCOX [] ICP monitor [] Trach [] PEG [] Chest Tube [] other:    
JEMAL RANGEL  88y  Male    Patient is a 88y old  Male who presents with a chief complaint of     HPI:  88M. PMH: HTN (BP medications discontinued 1 month ago by PCP), BPH, Neurogenic Bladder w/ indwelling yañez, Hx of Cholecystectomy, BL cataract surgery  Presented (10/23) c/o R forehead pain x1week a/w worsening confusion and weakness xfew months, episodes of "legs giving out", with known fall 1week prior (unwitnessed). Pt fell while getting out of bed, his his left arm. Denies HT/LOC. Son at bedside notes pt is getting lost while driving, having difficulty paying bills. Pt also reports recent non-productive cough and post-nasal drip with subjective chills and feeling "warm", no measured fever. Denies chest pain, SOB, dizziness, nausea, vomiting, abdominal pain, unexpected weight loss, urinary symptoms, or changes in BM.  Baseline:  Patient lives at home alone, ambulates without assistive devices, and carries out all ADL independently, son checks in on him every day.    In ED: VS: afebrile, HTN (175/86), nontachycardic (HR 86), saturating well on RA  Labs significant for: Na 126, Trop (-), VBG unremarkable, RVP (-), CXR: trace blunting of L costophrenic angle, EKG: Normal  CTH: (+): Acute hemorrhage in R parieto-occipital region  ED Interventions: Started on keppra, NSICU eval'd (not a candidate)   (23 Oct 2023 19:22)    S: Patient was examined and seen at bedside. This morning pt is resting comfortably in bed and reports no new issues or overnight events. No complaints, feels better. Hypertensive overnight.  Denies CP, SOB, N/V/D/C/AP, cough, F, chills, dizziness, new focal weakness, HA, vision changes, dysuria, or urinary symptoms, blood in stool.  ROS: all other systems reviewed and are negative    PAST MEDICAL & SURGICAL HISTORY:  HTN (hypertension)      Neurogenic bladder      Yañez catheter in place  2016      No significant past surgical history        SOCIAL HISTORY:  Tobacco: negative  Illicit drugs: negative  Alcohol: social  Family history reviewed and otherwise non-contributory No clotting disorders, CVAs at early age.  ALLERGIES: NKDA    MEDICATIONS  (STANDING):  influenza  Vaccine (HIGH DOSE) 0.7 milliLiter(s) IntraMuscular once  sodium chloride 0.9%. 1000 milliLiter(s) (65 mL/Hr) IV Continuous <Continuous>    MEDICATIONS  (PRN):  hydrALAZINE Injectable 10 milliGRAM(s) IV Push every 6 hours PRN SBP >140 and HR <70  labetalol Injectable 10 milliGRAM(s) IV Push every 6 hours PRN Systolic blood pressure > 140 and HR >70  LORazepam   Injectable 1 milliGRAM(s) IV Push once PRN Anxiety  senna 2 Tablet(s) Oral at bedtime PRN Constipation    Home Medications:          Vital Signs Last 24 Hrs  T(C): 36 (24 Oct 2023 08:00), Max: 37.1 (23 Oct 2023 12:19)  T(F): 96.8 (24 Oct 2023 08:00), Max: 98.7 (23 Oct 2023 12:19)  HR: 68 (24 Oct 2023 08:00) (58 - 95)  BP: 139/69 (24 Oct 2023 09:05) (121/62 - 175/86)  BP(mean): 111 (24 Oct 2023 06:00) (82 - 112)  RR: 20 (24 Oct 2023 09:05) (14 - 20)  SpO2: 99% (24 Oct 2023 08:00) (97% - 100%)    Parameters below as of 24 Oct 2023 08:00  Patient On (Oxygen Delivery Method): room air      CAPILLARY BLOOD GLUCOSE          General: NAD. Looks stated age.  HEENT: clean oropharynx, EOMI, no LAD, LLQ field cut  Neck: trachea midline, no thyromegaly  CV: nl S1 S2; no m/r/g  Resp: decreased breath sounds at base  GI: NT/ND/S +BS  MS: no clubbing/cyanosis/edema, +pulses  Neuro: motor, sensory intact; + reflexes  Skin: no rashes, nl turgor  Psychiatric: AA0x3 w/ fair insight and judgement    tele: SR, nonspecific changes (on my own evaluation of tele monitor)    LABS:                        12.1   5.06  )-----------( 173      ( 23 Oct 2023 13:10 )             34.7     10-23    126<L>  |  91<L>  |  13  ----------------------------<  89  4.4   |  25  |  1.2    Ca    9.0      23 Oct 2023 13:10    TPro  6.7  /  Alb  4.2  /  TBili  0.4  /  DBili  x   /  AST  20  /  ALT  10  /  AlkPhos  90  10-23    LIVER FUNCTIONS - ( 23 Oct 2023 13:10 )  Alb: 4.2 g/dL / Pro: 6.7 g/dL / ALK PHOS: 90 U/L / ALT: 10 U/L / AST: 20 U/L / GGT: x           CARDIAC MARKERS ( 23 Oct 2023 13:10 )  x     / <0.01 ng/mL / x     / x     / x            Urinalysis Basic - ( 23 Oct 2023 13:10 )    Color: x / Appearance: x / SG: x / pH: x  Gluc: 89 mg/dL / Ketone: x  / Bili: x / Urobili: x   Blood: x / Protein: x / Nitrite: x   Leuk Esterase: x / RBC: x / WBC x   Sq Epi: x / Non Sq Epi: x / Bacteria: x            EKG - SR, nonspecific changes (my read)  Chart and consultant noted personally reviewed.  Care Discussed with Consultants/Other Providers/ Housestaff [ x] YES [ ] NO   Radiology, labs, old records personally reviewed.

## 2023-10-24 NOTE — PHYSICAL THERAPY INITIAL EVALUATION ADULT - GAIT TRAINING, PT EVAL
Goal: By discharge: Ambulation: 200 feet without AD with supervision Stairs: 4 with 1 HR with supervision

## 2023-10-24 NOTE — PHYSICAL THERAPY INITIAL EVALUATION ADULT - GENERAL OBSERVATIONS, REHAB EVAL
Patient was seen from 14:50-14:08 for PT IE. Patient was rec'd in semi reclined in bed, +yañez catheter, +BP cuff, +Cardiac monitor, +pulse ox, +IVL, NAD, agreeable to participate in PT. Patient was seen from 14:50-14:08 for PT IE. Patient was rec'd in semi reclined in bed, +yañez catheter, +BP cuff, +Cardiac monitor, +pulse ox, +IVL, NAD, agreeable to participate in PT. VS at the beginning of PT session: 136/73, HR: 72

## 2023-10-24 NOTE — OCCUPATIONAL THERAPY INITIAL EVALUATION ADULT - ADDITIONAL COMMENTS
+pt reported independent prior ambulated with no AD(has sc and rw from a friend) +independent in BADLs prior, has shower chair and used it for showering activity

## 2023-10-25 LAB
A1C WITH ESTIMATED AVERAGE GLUCOSE RESULT: 5.5 % — SIGNIFICANT CHANGE UP (ref 4–5.6)
A1C WITH ESTIMATED AVERAGE GLUCOSE RESULT: 5.5 % — SIGNIFICANT CHANGE UP (ref 4–5.6)
ANION GAP SERPL CALC-SCNC: 9 MMOL/L — SIGNIFICANT CHANGE UP (ref 7–14)
ANION GAP SERPL CALC-SCNC: 9 MMOL/L — SIGNIFICANT CHANGE UP (ref 7–14)
BUN SERPL-MCNC: 10 MG/DL — SIGNIFICANT CHANGE UP (ref 10–20)
BUN SERPL-MCNC: 10 MG/DL — SIGNIFICANT CHANGE UP (ref 10–20)
CALCIUM SERPL-MCNC: 9 MG/DL — SIGNIFICANT CHANGE UP (ref 8.4–10.5)
CALCIUM SERPL-MCNC: 9 MG/DL — SIGNIFICANT CHANGE UP (ref 8.4–10.5)
CHLORIDE SERPL-SCNC: 93 MMOL/L — LOW (ref 98–110)
CHLORIDE SERPL-SCNC: 93 MMOL/L — LOW (ref 98–110)
CHOLEST SERPL-MCNC: 215 MG/DL — HIGH
CHOLEST SERPL-MCNC: 215 MG/DL — HIGH
CO2 SERPL-SCNC: 24 MMOL/L — SIGNIFICANT CHANGE UP (ref 17–32)
CO2 SERPL-SCNC: 24 MMOL/L — SIGNIFICANT CHANGE UP (ref 17–32)
CREAT SERPL-MCNC: 1.3 MG/DL — SIGNIFICANT CHANGE UP (ref 0.7–1.5)
CREAT SERPL-MCNC: 1.3 MG/DL — SIGNIFICANT CHANGE UP (ref 0.7–1.5)
EGFR: 54 ML/MIN/1.73M2 — LOW
EGFR: 54 ML/MIN/1.73M2 — LOW
ESTIMATED AVERAGE GLUCOSE: 111 MG/DL — SIGNIFICANT CHANGE UP (ref 68–114)
ESTIMATED AVERAGE GLUCOSE: 111 MG/DL — SIGNIFICANT CHANGE UP (ref 68–114)
FOLATE SERPL-MCNC: 5.9 NG/ML — SIGNIFICANT CHANGE UP
FOLATE SERPL-MCNC: 5.9 NG/ML — SIGNIFICANT CHANGE UP
GLUCOSE SERPL-MCNC: 93 MG/DL — SIGNIFICANT CHANGE UP (ref 70–99)
GLUCOSE SERPL-MCNC: 93 MG/DL — SIGNIFICANT CHANGE UP (ref 70–99)
HCT VFR BLD CALC: 37.6 % — LOW (ref 42–52)
HCT VFR BLD CALC: 37.6 % — LOW (ref 42–52)
HDLC SERPL-MCNC: 46 MG/DL — SIGNIFICANT CHANGE UP
HDLC SERPL-MCNC: 46 MG/DL — SIGNIFICANT CHANGE UP
HGB BLD-MCNC: 12.9 G/DL — LOW (ref 14–18)
HGB BLD-MCNC: 12.9 G/DL — LOW (ref 14–18)
LIPID PNL WITH DIRECT LDL SERPL: 152 MG/DL — HIGH
LIPID PNL WITH DIRECT LDL SERPL: 152 MG/DL — HIGH
MCHC RBC-ENTMCNC: 29.7 PG — SIGNIFICANT CHANGE UP (ref 27–31)
MCHC RBC-ENTMCNC: 29.7 PG — SIGNIFICANT CHANGE UP (ref 27–31)
MCHC RBC-ENTMCNC: 34.3 G/DL — SIGNIFICANT CHANGE UP (ref 32–37)
MCHC RBC-ENTMCNC: 34.3 G/DL — SIGNIFICANT CHANGE UP (ref 32–37)
MCV RBC AUTO: 86.4 FL — SIGNIFICANT CHANGE UP (ref 80–94)
MCV RBC AUTO: 86.4 FL — SIGNIFICANT CHANGE UP (ref 80–94)
NON HDL CHOLESTEROL: 169 MG/DL — HIGH
NON HDL CHOLESTEROL: 169 MG/DL — HIGH
NRBC # BLD: 0 /100 WBCS — SIGNIFICANT CHANGE UP (ref 0–0)
NRBC # BLD: 0 /100 WBCS — SIGNIFICANT CHANGE UP (ref 0–0)
OSMOLALITY UR: 188 MOS/KG — SIGNIFICANT CHANGE UP (ref 50–1200)
OSMOLALITY UR: 188 MOS/KG — SIGNIFICANT CHANGE UP (ref 50–1200)
PLATELET # BLD AUTO: 166 K/UL — SIGNIFICANT CHANGE UP (ref 130–400)
PLATELET # BLD AUTO: 166 K/UL — SIGNIFICANT CHANGE UP (ref 130–400)
PMV BLD: 9.1 FL — SIGNIFICANT CHANGE UP (ref 7.4–10.4)
PMV BLD: 9.1 FL — SIGNIFICANT CHANGE UP (ref 7.4–10.4)
POTASSIUM SERPL-MCNC: 5.2 MMOL/L — HIGH (ref 3.5–5)
POTASSIUM SERPL-MCNC: 5.2 MMOL/L — HIGH (ref 3.5–5)
POTASSIUM SERPL-SCNC: 5.2 MMOL/L — HIGH (ref 3.5–5)
POTASSIUM SERPL-SCNC: 5.2 MMOL/L — HIGH (ref 3.5–5)
RBC # BLD: 4.35 M/UL — LOW (ref 4.7–6.1)
RBC # BLD: 4.35 M/UL — LOW (ref 4.7–6.1)
RBC # FLD: 13.3 % — SIGNIFICANT CHANGE UP (ref 11.5–14.5)
RBC # FLD: 13.3 % — SIGNIFICANT CHANGE UP (ref 11.5–14.5)
SODIUM SERPL-SCNC: 126 MMOL/L — LOW (ref 135–146)
SODIUM SERPL-SCNC: 126 MMOL/L — LOW (ref 135–146)
SODIUM UR-SCNC: 44 MMOL/L — SIGNIFICANT CHANGE UP
SODIUM UR-SCNC: 44 MMOL/L — SIGNIFICANT CHANGE UP
T PALLIDUM AB TITR SER: NEGATIVE — SIGNIFICANT CHANGE UP
T PALLIDUM AB TITR SER: NEGATIVE — SIGNIFICANT CHANGE UP
TRIGL SERPL-MCNC: 83 MG/DL — SIGNIFICANT CHANGE UP
TRIGL SERPL-MCNC: 83 MG/DL — SIGNIFICANT CHANGE UP
TSH SERPL-MCNC: 1.28 UIU/ML — SIGNIFICANT CHANGE UP (ref 0.27–4.2)
TSH SERPL-MCNC: 1.28 UIU/ML — SIGNIFICANT CHANGE UP (ref 0.27–4.2)
VIT B12 SERPL-MCNC: 415 PG/ML — SIGNIFICANT CHANGE UP (ref 232–1245)
VIT B12 SERPL-MCNC: 415 PG/ML — SIGNIFICANT CHANGE UP (ref 232–1245)
WBC # BLD: 5.56 K/UL — SIGNIFICANT CHANGE UP (ref 4.8–10.8)
WBC # BLD: 5.56 K/UL — SIGNIFICANT CHANGE UP (ref 4.8–10.8)
WBC # FLD AUTO: 5.56 K/UL — SIGNIFICANT CHANGE UP (ref 4.8–10.8)
WBC # FLD AUTO: 5.56 K/UL — SIGNIFICANT CHANGE UP (ref 4.8–10.8)

## 2023-10-25 PROCEDURE — 99232 SBSQ HOSP IP/OBS MODERATE 35: CPT

## 2023-10-25 RX ORDER — MIDAZOLAM HYDROCHLORIDE 1 MG/ML
0.5 INJECTION, SOLUTION INTRAMUSCULAR; INTRAVENOUS
Refills: 0 | Status: DISCONTINUED | OUTPATIENT
Start: 2023-10-25 | End: 2023-10-26

## 2023-10-25 RX ORDER — MIDAZOLAM HYDROCHLORIDE 1 MG/ML
5 INJECTION, SOLUTION INTRAMUSCULAR; INTRAVENOUS ONCE
Refills: 0 | Status: DISCONTINUED | OUTPATIENT
Start: 2023-10-25 | End: 2023-10-25

## 2023-10-25 RX ADMIN — LISINOPRIL 5 MILLIGRAM(S): 2.5 TABLET ORAL at 05:38

## 2023-10-25 RX ADMIN — ENOXAPARIN SODIUM 40 MILLIGRAM(S): 100 INJECTION SUBCUTANEOUS at 13:05

## 2023-10-25 NOTE — PROGRESS NOTE ADULT - ATTENDING COMMENTS
No acute events overnight. MRI obtained is suboptimal given patient movement/restlessness. Etiology remains unclear at this time. Exam remains stable.    - Will obtain repeat MRI/MRV under sedation  - SBP goal <140 for now  - Follow-up up serum/urine studies for hyponatremia  - PT/OT recommending home PT/OT  - Once imaging complete, will consider discharge

## 2023-10-25 NOTE — PROGRESS NOTE ADULT - ASSESSMENT
88M. PMH: HTN, BPH, Neurogenic Bladder w/ indwelling yañez, BL cataract surgery. Presented (10/23) c/o R forehead pain x1week a/w worsening confusion and weakness xfew months with 1 known fall 1 month prior (unwitnessed). Found to have acute hemorrhage on CTH.    # R parieto-occipital acute hemorrhage possibly 2/2 tumor vs hemorrhagic conversion of ischemic stroke vs AVM vs amyloid angiopathy  - CTH (10/23): (+): Acute hemorrhage in R parieto-occipital region  - CTA H&N (10/23): Redemonstration of an acute hemorrhage within the right occipital lobe without abnormal underlying vascularity to suggest vascular malformation, Stenosis of the proximal right P2 segment of the PCA, Mild stenosis of the proximal right internal carotid artery due to calcified atherosclerotic plaque  - NSICU eval'd (10/23) (not a candidate), Neurosurgery eval'd (10/23): No acute intervention  - Brain MRI w/ and w/out contrast completed report pending  - obtain MRV  - Fall and Aspiration precautions  - Physiatry eval/Physical therapy/Occupational therapy/Speech and Swallow  - F/u HbA1c, TSH and Lipid panel  - F/u TTE     #Worsening cognition, possible dementia   - Obtain TSH, B12, Folate, thiamine, RPR    #Hypertension  - Pt off BP meds per PCP, reports home readings in 130s typically   - BP Goal: <140  - start lisinopril 5 mg    #HLD  - LDL results:     #Hyponatremia  - Denies diuretics, appears euvolemic on exam - 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 PMH: HTN, BPH, Neurogenic Bladder w/ indwelling yañez, BL cataract surgery. Presented (10/23) c/o R forehead pain x1week a/w worsening confusion and weakness xfew months with 1 known fall 1 month prior (unwitnessed). Found to have acute hemorrhage on CTH.    # R parieto-occipital acute hemorrhage possibly 2/2 tumor vs hemorrhagic conversion of ischemic stroke vs AVM vs amyloid angiopathy  - CTH (10/23): (+): Acute hemorrhage in R parieto-occipital region  - CTA H&N (10/23): Redemonstration of an acute hemorrhage within the right occipital lobe without abnormal underlying vascularity to suggest vascular malformation, Stenosis of the proximal right P2 segment of the PCA, Mild stenosis of the proximal right internal carotid artery due to calcified atherosclerotic plaque  - NSICU eval'd (10/23) (not a candidate), Neurosurgery eval'd (10/23): No acute intervention  - Brain MRI w/out con completed report pending  - obtain MRV and MR Brain w/ contrast  - Fall and Aspiration precautions  - Physiatry eval/Physical therapy/Occupational therapy/Speech and Swallow  - F/u HbA1c, TSH and Lipid panel  - F/u TTE     #Worsening cognition, possible dementia   - Obtain TSH, B12, Folate, thiamine, RPR    #Hypertension  - Pt off BP meds per PCP, reports home readings in 130s typically   - BP Goal: <140  - start lisinopril 5 mg    #HLD  - LDL results:     #Hyponatremia  - Denies diuretics, appears euvolemic on exam - 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 PMH: HTN, BPH, Neurogenic Bladder w/ indwelling yañez, BL cataract surgery. Presented (10/23) c/o R forehead pain x1week a/w worsening confusion and weakness xfew months with 1 known fall 1 month prior (unwitnessed). Found to have acute hemorrhage on CTH.    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

## 2023-10-25 NOTE — PROGRESS NOTE ADULT - ASSESSMENT
88M. PMH: HTN, BPH, Neurogenic Bladder w/ indwelling yañez, BL cataract surgery. Presented (10/23) c/o R forehead pain x1week a/w worsening confusion and weakness xfew months with 1 known fall 1 month prior (unwitnessed). Found to have acute hemorrhage on CTH.     # Acute hemorrhage in R parieto-occipital region  - CTH (10/23): (+): Acute hemorrhage in R parieto-occipital region  - CTA H&N (10/23): Redemonstration of an acute hemorrhage within the right occipital lobe without abnormal underlying vascularity to suggest vascular malformation, Stenosis of the proximal right P2 segment of the PCA, Mild stenosis of the proximal right internal carotid artery due to calcified atherosclerotic plaque  - NSICU eval'd (10/23) (not a candidate), Neurosurgery eval'd (10/23): No acute intervention  - Tele monitoring  - Neurochecks & Vitals q8H  - Fall and Aspiration precautions  - Provide stroke education  - Physiatry eval/Physical therapy/Occupational therapy/Speech and Swallow- Home PT; vs; Outpatient PT  - Obtain Paraneoplastic w/u  - F/u HbA1c, TSH and Lipid panel  - IMaging as above       #Suspected mild dementia   - Obtain TSH, B12, Folate, RPR, U/A    #Hypertension  - Pt off BP meds per PCP, reports home readings in 130s typically   - Stroke Code EKG Results: Normal, Trop (-)  - BP Goal: <140  - C/w Labetalol 10mg IV PRN/Hydralazine 10mg IV PRN  - continue lisinopril 5 mg oral     #HLD  - LDL results: pending     RENAL/ELECTROLYTES  - Replete K<4 and Mg <2    #Hyponatremia  - Denies diuretics, appears euvolemic on exam - 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  - Obtain urine Na and osmolality, serum osmolality   - Obtain AM cortisol, TSH  - check Na this am: do not correct > 8-10mmoles/ 24 hrs    #Neurogenic Bladder ( refused self catheterization in the passed)  #Hx of TURP  #CKD3 (Baseline Cr 1.3-1.5)  - cont yañez    #Nutrition/Fluids/Electrolytes   - replete K<4 and Mg <2  - ensure regular BMs  - consider Miralax BID, Senna    #DVT Px  SCDs  Lovenox if ok from neuro standpoint    #Progress Note Handoff  Pending: Clinical improvement and stability__x___PT____  Pt/Family discussion: Pt informed and agrees with the current plan  Disposition: Home______/SNF_______/4A______/To be determined____x____

## 2023-10-25 NOTE — PROGRESS NOTE ADULT - SUBJECTIVE AND OBJECTIVE BOX
SUBJECTIVE:    Patient is a 88y old Male who presents with a chief complaint of HA, unsteady, confusion (24 Oct 2023 10:24)    Currently admitted to medicine with the primary diagnosis of:    Today is hospital day 2d.     Overnight Events:     No acute overnight events     PAST MEDICAL & SURGICAL HISTORY  HTN (hypertension)    Neurogenic bladder    Waldron catheter in place  2016    No significant past surgical history      ALLERGIES:  No Known Allergies    MEDICATIONS:  STANDING MEDICATIONS  enoxaparin Injectable 40 milliGRAM(s) SubCutaneous every 24 hours  influenza  Vaccine (HIGH DOSE) 0.7 milliLiter(s) IntraMuscular once  lisinopril 5 milliGRAM(s) Oral daily  LORazepam   Injectable 0.5 milliGRAM(s) IV Push once    PRN MEDICATIONS  labetalol Injectable 10 milliGRAM(s) IV Push every 6 hours PRN  senna 2 Tablet(s) Oral at bedtime PRN    VITALS:   ICU Vital Signs Last 24 Hrs  T(C): 36.2 (25 Oct 2023 04:50), Max: 36.2 (24 Oct 2023 12:25)  T(F): 97.2 (25 Oct 2023 04:50), Max: 97.2 (24 Oct 2023 12:25)  HR: 73 (25 Oct 2023 05:36) (66 - 84)  BP: 122/61 (25 Oct 2023 05:36) (89/55 - 154/84)  BP(mean): 86 (25 Oct 2023 05:36) (86 - 98)  ABP: --  ABP(mean): --  RR: 18 (25 Oct 2023 05:36) (18 - 19)  SpO2: --        LABS:                        12.1   5.65  )-----------( 159      ( 24 Oct 2023 16:00 )             35.5     10-24    126<L>  |  93<L>  |  12  ----------------------------<  93  4.7   |  25  |  1.3    Ca    8.9      24 Oct 2023 16:00  Mg     1.9     10-24    TPro  6.0  /  Alb  3.6  /  TBili  0.6  /  DBili  x   /  AST  18  /  ALT  10  /  AlkPhos  81  10-24      Urinalysis Basic - ( 24 Oct 2023 16:00 )    Color: x / Appearance: x / SG: x / pH: x  Gluc: 93 mg/dL / Ketone: x  / Bili: x / Urobili: x   Blood: x / Protein: x / Nitrite: x   Leuk Esterase: x / RBC: x / WBC x   Sq Epi: x / Non Sq Epi: x / Bacteria: x            CARDIAC MARKERS ( 23 Oct 2023 13:10 )  x     / <0.01 ng/mL / x     / x     / x            RADIOLOGY:    IMPRESSION:    CTA HEAD:  Redemonstration of an acute hemorrhage within the right occipital lobe   without abnormal underlying vascularity to suggest vascular malformation.    Stenosis of the proximal right P2 segment of the PCA.    CTA NECK:  Mild stenosis of the proximal right internal carotid artery due to   calcified atherosclerotic plaque.    --- End of Report ---    IMPRESSION:    1.  Motion limited, incomplete exam. The patient was unable to tolerate   the study.    2.  Right occipital lobe parenchymal hematoma measuring 3.4 cm with   surrounding edema.    3.  Moderate chronic microvascular change and parenchymal atrophy.    --- End of Report ---      < from: TTE Echo Complete w/o Contrast w/ Doppler (10.24.23 @ 11:50) >  Summary:   1. LV EjectionFraction by De La Rosa's Method with a biplane EF of 71 %.   2. Normal global left ventricular systolic function.   3. Spectral Doppler shows impaired relaxation pattern of left   ventricular myocardial filling (Grade I diastolic dysfunction).    < end of copied text >    PHYSICAL EXAM:  GEN:   LUNGS:   HEART:   ABD:   EXT:   NEURO:     Mobility (6 Click Score):      /24    Lines:  Central line:              Date placed:             Indication:   Intravenous Access:   NG tube:   Waldron Catheter:          SUBJECTIVE:    Patient is a 88y old Male who presents with a chief complaint of HA, unsteady, confusion (24 Oct 2023 10:24)    Currently admitted to medicine with the primary diagnosis of:    Today is hospital day 2d.     Overnight Events:     No acute overnight events     PAST MEDICAL & SURGICAL HISTORY  HTN (hypertension)    Neurogenic bladder    Waldron catheter in place  2016    No significant past surgical history      ALLERGIES:  No Known Allergies    MEDICATIONS:  STANDING MEDICATIONS  enoxaparin Injectable 40 milliGRAM(s) SubCutaneous every 24 hours  influenza  Vaccine (HIGH DOSE) 0.7 milliLiter(s) IntraMuscular once  lisinopril 5 milliGRAM(s) Oral daily  LORazepam   Injectable 0.5 milliGRAM(s) IV Push once    PRN MEDICATIONS  labetalol Injectable 10 milliGRAM(s) IV Push every 6 hours PRN  senna 2 Tablet(s) Oral at bedtime PRN    VITALS:   ICU Vital Signs Last 24 Hrs  T(C): 36.2 (25 Oct 2023 04:50), Max: 36.2 (24 Oct 2023 12:25)  T(F): 97.2 (25 Oct 2023 04:50), Max: 97.2 (24 Oct 2023 12:25)  HR: 73 (25 Oct 2023 05:36) (66 - 84)  BP: 122/61 (25 Oct 2023 05:36) (89/55 - 154/84)  BP(mean): 86 (25 Oct 2023 05:36) (86 - 98)  ABP: --  ABP(mean): --  RR: 18 (25 Oct 2023 05:36) (18 - 19)  SpO2: --        LABS:                        12.1   5.65  )-----------( 159      ( 24 Oct 2023 16:00 )             35.5     10-24    126<L>  |  93<L>  |  12  ----------------------------<  93  4.7   |  25  |  1.3    Ca    8.9      24 Oct 2023 16:00  Mg     1.9     10-24    TPro  6.0  /  Alb  3.6  /  TBili  0.6  /  DBili  x   /  AST  18  /  ALT  10  /  AlkPhos  81  10-24      Urinalysis Basic - ( 24 Oct 2023 16:00 )    Color: x / Appearance: x / SG: x / pH: x  Gluc: 93 mg/dL / Ketone: x  / Bili: x / Urobili: x   Blood: x / Protein: x / Nitrite: x   Leuk Esterase: x / RBC: x / WBC x   Sq Epi: x / Non Sq Epi: x / Bacteria: x            CARDIAC MARKERS ( 23 Oct 2023 13:10 )  x     / <0.01 ng/mL / x     / x     / x            RADIOLOGY:    IMPRESSION:    CTA HEAD:  Redemonstration of an acute hemorrhage within the right occipital lobe   without abnormal underlying vascularity to suggest vascular malformation.    Stenosis of the proximal right P2 segment of the PCA.    CTA NECK:  Mild stenosis of the proximal right internal carotid artery due to   calcified atherosclerotic plaque.    --- End of Report ---    IMPRESSION:    1.  Motion limited, incomplete exam. The patient was unable to tolerate   the study.    2.  Right occipital lobe parenchymal hematoma measuring 3.4 cm with   surrounding edema.    3.  Moderate chronic microvascular change and parenchymal atrophy.    --- End of Report ---      < from: TTE Echo Complete w/o Contrast w/ Doppler (10.24.23 @ 11:50) >  Summary:   1. LV EjectionFraction by De La Rosa's Method with a biplane EF of 71 %.   2. Normal global left ventricular systolic function.   3. Spectral Doppler shows impaired relaxation pattern of left   ventricular myocardial filling (Grade I diastolic dysfunction).    < end of copied text >    PHYSICAL EXAM:    GENERAL: NAD, lying in bed comfortably  CHEST/LUNG: Clear to auscultation bilaterally;   Unlabored respirations  HEART: Regular rate and rhythm; No murmurs, rubs, or gallops  ABDOMEN: Bowel sounds present; Soft, Nontender, Nondistended.    EXTREMITIES:  no edema  NERVOUS SYSTEM:  Alert & Oriented X3, speech clear. right arm drift

## 2023-10-25 NOTE — PROGRESS NOTE ADULT - SUBJECTIVE AND OBJECTIVE BOX
Neurology Progress Note    Interval History:    Patient was seen and examined, no acute event over night.     Medications:  enoxaparin Injectable 40 milliGRAM(s) SubCutaneous every 24 hours  influenza  Vaccine (HIGH DOSE) 0.7 milliLiter(s) IntraMuscular once  labetalol Injectable 10 milliGRAM(s) IV Push every 6 hours PRN  lisinopril 5 milliGRAM(s) Oral daily  LORazepam   Injectable 0.5 milliGRAM(s) IV Push once  senna 2 Tablet(s) Oral at bedtime PRN      Vital Signs Last 24 Hrs  T(C): 36.2 (25 Oct 2023 04:50), Max: 36.2 (24 Oct 2023 12:25)  T(F): 97.2 (25 Oct 2023 04:50), Max: 97.2 (24 Oct 2023 12:25)  HR: 73 (25 Oct 2023 05:36) (66 - 84)  BP: 122/61 (25 Oct 2023 05:36) (89/55 - 154/84)  BP(mean): 86 (25 Oct 2023 05:36) (86 - 98)  RR: 18 (25 Oct 2023 05:36) (18 - 20)  SpO2: --        Neurological Examination:  General:  Appearance is consistent with chronologic age.   Cognitive/Language:  Awake, alert, and oriented to person, place, time and date.  Recent and remote memory intact.  Fund of knowledge is appropriate.  Naming, repetition and comprehension intact. Nondysarthric.    Cranial Nerves  - Eyes: Visual acuity intact, L inferior quadrantoanopsia. EOMI w/o nystagmus, skew or reported double vision.  PERRL.  No ptosis/weakness of eyelid closure.    - Face:  Facial sensation normal V1 - 3, no facial asymmetry.    - Ears/Nose/Throat:  Hearing grossly intact b/l to finger rub.  Palate elevates midline.  Tongue and uvula midline.   Motor exam: Normal tone and bulk. No tenderness, twitching, tremors or involuntary movements.            Upper extremity                  Bicep     Tricep     HG                                                 R      5/5        5/5          5/5                                                    L       5/5        5/5          5/5              Lower extremity                   HF        KE        DF         PF                                                  R     5/5       5/5       5/5       5/5                                               L      5/5      5/5       5/5        5/5    Sensory examination:  Intact to light touch and pinprick, pain, temperature and proprioception and vibration in all extremities.  Reflexes: 2+ b/l biceps, triceps, patella and achilles.  Plantar response downgoing b/l.  Lashaun, clonus absent.  Cerebellum: FTN/HKS intact.  No dysmetria.    Gait narrow based and normal.    Labs:  CBC Full  -  ( 24 Oct 2023 16:00 )  WBC Count : 5.65 K/uL  RBC Count : 4.05 M/uL  Hemoglobin : 12.1 g/dL  Hematocrit : 35.5 %  Platelet Count - Automated : 159 K/uL  Mean Cell Volume : 87.7 fL  Mean Cell Hemoglobin : 29.9 pg  Mean Cell Hemoglobin Concentration : 34.1 g/dL  Auto Neutrophil # : 3.76 K/uL  Auto Lymphocyte # : 0.97 K/uL  Auto Monocyte # : 0.62 K/uL  Auto Eosinophil # : 0.27 K/uL  Auto Basophil # : 0.01 K/uL  Auto Neutrophil % : 66.4 %  Auto Lymphocyte % : 17.2 %  Auto Monocyte % : 11.0 %  Auto Eosinophil % : 4.8 %  Auto Basophil % : 0.2 %    10-24    126<L>  |  93<L>  |  12  ----------------------------<  93  4.7   |  25  |  1.3    Ca    8.9      24 Oct 2023 16:00  Mg     1.9     10-24    TPro  6.0  /  Alb  3.6  /  TBili  0.6  /  DBili  x   /  AST  18  /  ALT  10  /  AlkPhos  81  10-24    LIVER FUNCTIONS - ( 24 Oct 2023 16:00 )  Alb: 3.6 g/dL / Pro: 6.0 g/dL / ALK PHOS: 81 U/L / ALT: 10 U/L / AST: 18 U/L / GGT: x             Urinalysis Basic - ( 24 Oct 2023 16:00 )    Color: x / Appearance: x / SG: x / pH: x  Gluc: 93 mg/dL / Ketone: x  / Bili: x / Urobili: x   Blood: x / Protein: x / Nitrite: x   Leuk Esterase: x / RBC: x / WBC x   Sq Epi: x / Non Sq Epi: x / Bacteria: x        RADIOLOGY & ADDITIONAL TESTS:

## 2023-10-26 ENCOUNTER — TRANSCRIPTION ENCOUNTER (OUTPATIENT)
Age: 88
End: 2023-10-26

## 2023-10-26 VITALS — HEIGHT: 65 IN | WEIGHT: 145.28 LBS

## 2023-10-26 DIAGNOSIS — I10 ESSENTIAL (PRIMARY) HYPERTENSION: ICD-10-CM

## 2023-10-26 DIAGNOSIS — Z87.438 PERSONAL HISTORY OF OTHER DISEASES OF MALE GENITAL ORGANS: ICD-10-CM

## 2023-10-26 DIAGNOSIS — N31.9 NEUROMUSCULAR DYSFUNCTION OF BLADDER, UNSPECIFIED: ICD-10-CM

## 2023-10-26 LAB
ANION GAP SERPL CALC-SCNC: 9 MMOL/L — SIGNIFICANT CHANGE UP (ref 7–14)
ANION GAP SERPL CALC-SCNC: 9 MMOL/L — SIGNIFICANT CHANGE UP (ref 7–14)
BUN SERPL-MCNC: 10 MG/DL — SIGNIFICANT CHANGE UP (ref 10–20)
BUN SERPL-MCNC: 10 MG/DL — SIGNIFICANT CHANGE UP (ref 10–20)
CALCIUM SERPL-MCNC: 9 MG/DL — SIGNIFICANT CHANGE UP (ref 8.4–10.5)
CALCIUM SERPL-MCNC: 9 MG/DL — SIGNIFICANT CHANGE UP (ref 8.4–10.5)
CHLORIDE SERPL-SCNC: 97 MMOL/L — LOW (ref 98–110)
CHLORIDE SERPL-SCNC: 97 MMOL/L — LOW (ref 98–110)
CO2 SERPL-SCNC: 23 MMOL/L — SIGNIFICANT CHANGE UP (ref 17–32)
CO2 SERPL-SCNC: 23 MMOL/L — SIGNIFICANT CHANGE UP (ref 17–32)
CREAT SERPL-MCNC: 1.3 MG/DL — SIGNIFICANT CHANGE UP (ref 0.7–1.5)
CREAT SERPL-MCNC: 1.3 MG/DL — SIGNIFICANT CHANGE UP (ref 0.7–1.5)
EGFR: 54 ML/MIN/1.73M2 — LOW
EGFR: 54 ML/MIN/1.73M2 — LOW
GLUCOSE SERPL-MCNC: 85 MG/DL — SIGNIFICANT CHANGE UP (ref 70–99)
GLUCOSE SERPL-MCNC: 85 MG/DL — SIGNIFICANT CHANGE UP (ref 70–99)
HCT VFR BLD CALC: 38.1 % — LOW (ref 42–52)
HCT VFR BLD CALC: 38.1 % — LOW (ref 42–52)
HGB BLD-MCNC: 13 G/DL — LOW (ref 14–18)
HGB BLD-MCNC: 13 G/DL — LOW (ref 14–18)
MCHC RBC-ENTMCNC: 29.8 PG — SIGNIFICANT CHANGE UP (ref 27–31)
MCHC RBC-ENTMCNC: 29.8 PG — SIGNIFICANT CHANGE UP (ref 27–31)
MCHC RBC-ENTMCNC: 34.1 G/DL — SIGNIFICANT CHANGE UP (ref 32–37)
MCHC RBC-ENTMCNC: 34.1 G/DL — SIGNIFICANT CHANGE UP (ref 32–37)
MCV RBC AUTO: 87.4 FL — SIGNIFICANT CHANGE UP (ref 80–94)
MCV RBC AUTO: 87.4 FL — SIGNIFICANT CHANGE UP (ref 80–94)
NRBC # BLD: 0 /100 WBCS — SIGNIFICANT CHANGE UP (ref 0–0)
NRBC # BLD: 0 /100 WBCS — SIGNIFICANT CHANGE UP (ref 0–0)
PLATELET # BLD AUTO: 169 K/UL — SIGNIFICANT CHANGE UP (ref 130–400)
PLATELET # BLD AUTO: 169 K/UL — SIGNIFICANT CHANGE UP (ref 130–400)
PMV BLD: 9.4 FL — SIGNIFICANT CHANGE UP (ref 7.4–10.4)
PMV BLD: 9.4 FL — SIGNIFICANT CHANGE UP (ref 7.4–10.4)
POTASSIUM SERPL-MCNC: 4.9 MMOL/L — SIGNIFICANT CHANGE UP (ref 3.5–5)
POTASSIUM SERPL-MCNC: 4.9 MMOL/L — SIGNIFICANT CHANGE UP (ref 3.5–5)
POTASSIUM SERPL-SCNC: 4.9 MMOL/L — SIGNIFICANT CHANGE UP (ref 3.5–5)
POTASSIUM SERPL-SCNC: 4.9 MMOL/L — SIGNIFICANT CHANGE UP (ref 3.5–5)
RBC # BLD: 4.36 M/UL — LOW (ref 4.7–6.1)
RBC # BLD: 4.36 M/UL — LOW (ref 4.7–6.1)
RBC # FLD: 13.5 % — SIGNIFICANT CHANGE UP (ref 11.5–14.5)
RBC # FLD: 13.5 % — SIGNIFICANT CHANGE UP (ref 11.5–14.5)
SODIUM SERPL-SCNC: 129 MMOL/L — LOW (ref 135–146)
SODIUM SERPL-SCNC: 129 MMOL/L — LOW (ref 135–146)
WBC # BLD: 5.5 K/UL — SIGNIFICANT CHANGE UP (ref 4.8–10.8)
WBC # BLD: 5.5 K/UL — SIGNIFICANT CHANGE UP (ref 4.8–10.8)
WBC # FLD AUTO: 5.5 K/UL — SIGNIFICANT CHANGE UP (ref 4.8–10.8)
WBC # FLD AUTO: 5.5 K/UL — SIGNIFICANT CHANGE UP (ref 4.8–10.8)

## 2023-10-26 PROCEDURE — 70545 MR ANGIOGRAPHY HEAD W/DYE: CPT | Mod: 26,59

## 2023-10-26 PROCEDURE — 70552 MRI BRAIN STEM W/DYE: CPT | Mod: 26

## 2023-10-26 PROCEDURE — 99232 SBSQ HOSP IP/OBS MODERATE 35: CPT

## 2023-10-26 PROCEDURE — 99239 HOSP IP/OBS DSCHRG MGMT >30: CPT

## 2023-10-26 RX ORDER — SODIUM CHLORIDE 9 MG/ML
1 INJECTION INTRAMUSCULAR; INTRAVENOUS; SUBCUTANEOUS ONCE
Refills: 0 | Status: COMPLETED | OUTPATIENT
Start: 2023-10-26 | End: 2023-10-26

## 2023-10-26 RX ORDER — LISINOPRIL 2.5 MG/1
1 TABLET ORAL
Qty: 30 | Refills: 0
Start: 2023-10-26 | End: 2023-11-24

## 2023-10-26 RX ORDER — BENAZEPRIL HYDROCHLORIDE 40 MG/1
0.5 TABLET ORAL
Refills: 0 | DISCHARGE

## 2023-10-26 RX ADMIN — LISINOPRIL 5 MILLIGRAM(S): 2.5 TABLET ORAL at 05:29

## 2023-10-26 RX ADMIN — ENOXAPARIN SODIUM 40 MILLIGRAM(S): 100 INJECTION SUBCUTANEOUS at 11:56

## 2023-10-26 RX ADMIN — SODIUM CHLORIDE 1 GRAM(S): 9 INJECTION INTRAMUSCULAR; INTRAVENOUS; SUBCUTANEOUS at 11:54

## 2023-10-26 NOTE — PROGRESS NOTE ADULT - ASSESSMENT
88M. PMH: HTN, BPH, Neurogenic Bladder w/ indwelling yañez, BL cataract surgery. Presented (10/23) c/o R forehead pain x1week a/w worsening confusion and weakness xfew months with 1 known fall 1 month prior (unwitnessed). Found to have acute hemorrhage on CTH.     # Acute hemorrhage in R parieto-occipital region  - CTH (10/23): (+): Acute hemorrhage in R parieto-occipital region  - CTA H&N (10/23): Redemonstration of an acute hemorrhage within the right occipital lobe without abnormal underlying vascularity to suggest vascular malformation, Stenosis of the proximal right P2 segment of the PCA, Mild stenosis of the proximal right internal carotid artery due to calcified atherosclerotic plaque  - NSICU eval'd (10/23) (not a candidate), Neurosurgery eval'd (10/23): No acute intervention  - Stroke Unit and Tele monitoring  - Neurochecks   - Fall and Aspiration precautions  - Provide stroke education  - Physiatry eval/Physical therapy/Occupational therapy/Speech and Swallow  MRI/MRV pending    #Suspected mild dementia   - f/u TSH, B12, Folate, RPR, U/A    #Hypertension  - Pt off BP meds per PCP, reports home readings in 130s typically   - Stroke Code EKG Results: Normal, Trop (-)  - BP Goal: <140  - C/w Labetalol 10mg IV PRN/Hydralazine 10mg IV PRN  - resumed ACE (was on Benazepril before)    #HLD  -   statin    RENAL/ELECTROLYTES  - Replete K<4 and Mg <2    #Hyponatremia  - Denies diuretics, appears euvolemic on exam - 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  -improved w/ salt tabs    #Neurogenic Bladder ( refused self catheterization in the passed)  #Hx of TURP  #CKD3 (Baseline Cr 1.3-1.5)  - cont yañez    #Nutrition/Fluids/Electrolytes   - replete K<4 and Mg <2  - ensure regular BMs  - consider Miralax BID, Senna    #DVT Px  SCDs  Heparin or Lovenox if ok from neuro standpoint    #Progress Note Handoff  Pending: Clinical improvement and stability__x___PT____x___MRI  Pt/Family discussion: Pt informed and agrees with the current plan  Disposition: Home______/SNF_______/4A______/To be determined____x____    My note supersedes the residents note should a discrepancy arise.    Chart and notes personally reviewed.  Care Discussed with Consultants/Other Providers/ Housestaff [ x] YES [ ] NO   Radiology, labs, old records personally reviewed.    discussed w/ housestaff, nursing, case management, neuro team    Attestation Statements:    Attestation Statements:  Risk Statement (NON-critical care).     On this date of service, level of risk to patient is considered: High.     Due to: acute ICH, stroke unit care, neuro checks, telemetry monitoring, hyponatremia    Time-based billing (NON-critical care).     35 minutes spent on total encounter. The necessity of the time spent during the encounter on this date of service was due to:     time spent on review of labs, imaging studies, old records, obtaining history, personally examining patient, counselling and communicating with patient/ family, entering orders for medications/tests/etc, discussions with other health care providers, documentation in electronic health records, independent interpretation of labs, imaging/procedure results and care coordination.     88M. PMH: HTN, BPH, Neurogenic Bladder w/ indwelling yañez, BL cataract surgery. Presented (10/23) c/o R forehead pain x1week a/w worsening confusion and weakness xfew months with 1 known fall 1 month prior (unwitnessed). Found to have acute hemorrhage on CTH.     # Acute hemorrhage in R parieto-occipital region w/ surrounding edema  - CTH (10/23): (+): Acute hemorrhage in R parieto-occipital region  - CTA H&N (10/23): Redemonstration of an acute hemorrhage within the right occipital lobe without abnormal underlying vascularity to suggest vascular malformation, Stenosis of the proximal right P2 segment of the PCA, Mild stenosis of the proximal right internal carotid artery due to calcified atherosclerotic plaque  - NSICU eval'd (10/23) (not a candidate), Neurosurgery eval'd (10/23): No acute intervention  - Stroke Unit and Tele monitoring  - Neurochecks   - Fall and Aspiration precautions  - Provide stroke education  - Physiatry eval/Physical therapy/Occupational therapy/Speech and Swallow  MRI/MRV pending    #Suspected mild dementia   - f/u TSH, B12, Folate, RPR, U/A    #Hypertension  - Pt off BP meds per PCP, reports home readings in 130s typically   - Stroke Code EKG Results: Normal, Trop (-)  - BP Goal: <140  - C/w Labetalol 10mg IV PRN/Hydralazine 10mg IV PRN  - resumed ACE (was on Benazepril before)    #HLD  -   statin    RENAL/ELECTROLYTES  - Replete K<4 and Mg <2    #Hyponatremia  - Denies diuretics, appears euvolemic on exam - 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  -improved w/ salt tabs    #Neurogenic Bladder ( refused self catheterization in the passed)  #Hx of TURP  #CKD3 (Baseline Cr 1.3-1.5)  - cont yañez    #Nutrition/Fluids/Electrolytes   - replete K<4 and Mg <2  - ensure regular BMs  - consider Miralax BID, Senna    #DVT Px  SCDs  Heparin or Lovenox if ok from neuro standpoint    #Progress Note Handoff  Pending: Clinical improvement and stability__x___PT____x___MRI  Pt/Family discussion: Pt informed and agrees with the current plan  Disposition: Home______/SNF_______/4A______/To be determined____x____    My note supersedes the residents note should a discrepancy arise.    Chart and notes personally reviewed.  Care Discussed with Consultants/Other Providers/ Housestaff [ x] YES [ ] NO   Radiology, labs, old records personally reviewed.    discussed w/ housestaff, nursing, case management, neuro team    Attestation Statements:    Attestation Statements:  Risk Statement (NON-critical care).     On this date of service, level of risk to patient is considered: High.     Due to: acute ICH, stroke unit care, neuro checks, telemetry monitoring, hyponatremia    Time-based billing (NON-critical care).     35 minutes spent on total encounter. The necessity of the time spent during the encounter on this date of service was due to:     time spent on review of labs, imaging studies, old records, obtaining history, personally examining patient, counselling and communicating with patient/ family, entering orders for medications/tests/etc, discussions with other health care providers, documentation in electronic health records, independent interpretation of labs, imaging/procedure results and care coordination.

## 2023-10-26 NOTE — DISCHARGE NOTE NURSING/CASE MANAGEMENT/SOCIAL WORK - NSDCPEFALRISK_GEN_ALL_CORE
For information on Fall & Injury Prevention, visit: [email or web address] OR  [email or web address] OR  https://www.cdc.gov/steadi/patient.html

## 2023-10-26 NOTE — DISCHARGE NOTE PROVIDER - NSDCCPCAREPLAN_GEN_ALL_CORE_FT
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 During an hemorrhagic stroke, blood leaks out of your blood vessels into your brain because of a break in the blood vessel wall. This can damage areas in the brain that control other parts of the body. The blood will get absorbed back into your body over time like a bruise.   Doing your regular tasks may be difficult after you've had a stroke, but you can learn new ways to manage your daily activities. In fact, doing daily activities may help you to regain muscle strength. Be patient, give yourself time to adjust, and appreciate the progress you make. When showering or bathing, test the water temperature with a hand or foot that was not affected by the stroke. Use grab bars, a shower seat, a hand-held showerhead, and a long-handled brush. For dressing, dress while sitting, starting with the affected side or limb. Wear shirts that pull easily over your head and pants or skirts with elastic waistbands. Use zippers with loops attached to the pull tabs. You will learn additional new ways to manage after a stroke in rehabilitation. It is normal to feel fatigue after a stroke, while some days may be worse than others, you will continue to improve.  Call 911 right away if you have any of the following symptoms of another stroke:  B: Balance: Sudden: Dizziness, loss of balance, or a sense of falling, difficulty with coordinating movement  E: Eyes: Sudden double vision or trouble seeing in one or both eyes  F: Face: Sudden uneven face  A: Arms (Legs): Sudden weakness, tingling, or loss of feeling on one side of your face or body  S: Speech: Sudden trouble talking or slurred speech, sudden difficulty understanding others  T: Time: Please call 911 right away and go to the emergency room  •Sudden, severe headache  •Blackouts or seizures

## 2023-10-26 NOTE — PROGRESS NOTE ADULT - SUBJECTIVE AND OBJECTIVE BOX
JEMAL RANGEL  88y  Male    Patient is a 88y old  Male who presents with a chief complaint of     HPI:  88M. PMH: HTN (BP medications discontinued 1 month ago by PCP), BPH, Neurogenic Bladder w/ indwelling yañez, Hx of Cholecystectomy, BL cataract surgery  Presented (10/23) c/o R forehead pain x1week a/w worsening confusion and weakness xfew months, episodes of "legs giving out", with known fall 1week prior (unwitnessed). Pt fell while getting out of bed, his his left arm. Denies HT/LOC. Son at bedside notes pt is getting lost while driving, having difficulty paying bills. Pt also reports recent non-productive cough and post-nasal drip with subjective chills and feeling "warm", no measured fever. Denies chest pain, SOB, dizziness, nausea, vomiting, abdominal pain, unexpected weight loss, urinary symptoms, or changes in BM.  Baseline:  Patient lives at home alone, ambulates without assistive devices, and carries out all ADL independently, son checks in on him every day.    In ED: VS: afebrile, HTN (175/86), nontachycardic (HR 86), saturating well on RA  Labs significant for: Na 126, Trop (-), VBG unremarkable, RVP (-), CXR: trace blunting of L costophrenic angle, EKG: Normal  CTH: (+): Acute hemorrhage in R parieto-occipital region  ED Interventions: Started on keppra, NSICU eval'd (not a candidate)   (23 Oct 2023 19:22)    S: Patient was examined and seen at bedside. This morning pt is resting comfortably in bed and reports no new issues or overnight events. No complaints, feels better. Awaiting repeat MRI.  Denies CP, SOB, N/V/D/C/AP, cough, F, chills, dizziness, new focal weakness, HA, vision changes, dysuria, or urinary symptoms, blood in stool.  ROS: all other systems reviewed and are negative    PAST MEDICAL & SURGICAL HISTORY:  HTN (hypertension)      Neurogenic bladder      Yañez catheter in place  2016      No significant past surgical history        SOCIAL HISTORY:  Tobacco: negative  Illicit drugs: negative  Alcohol: social  Family history reviewed and otherwise non-contributory No clotting disorders, CVAs at early age.  ALLERGIES: NKDA      General: NAD. Looks stated age.  HEENT: clean oropharynx, EOMI, no LAD, LLQ field cut  Neck: trachea midline, no thyromegaly  CV: nl S1 S2; no m/r/g  Resp: decreased breath sounds at base  GI: NT/ND/S +BS  MS: no clubbing/cyanosis/edema, +pulses  Neuro: motor, sensory intact; + reflexes  Skin: no rashes, nl turgor  Psychiatric: AA0x3 w/ fair insight and judgement    tele: SR, nonspecific changes (on my own evaluation of tele monitor)          Home Medications:    MEDICATIONS  (STANDING):  enoxaparin Injectable 40 milliGRAM(s) SubCutaneous every 24 hours  influenza  Vaccine (HIGH DOSE) 0.7 milliLiter(s) IntraMuscular once  lisinopril 5 milliGRAM(s) Oral daily    MEDICATIONS  (PRN):  labetalol Injectable 10 milliGRAM(s) IV Push every 6 hours PRN Systolic blood pressure > 140 and HR >70  midazolam Injectable 0.5 milliGRAM(s) IV Push every 5 minutes PRN agitation  senna 2 Tablet(s) Oral at bedtime PRN Constipation    Vital Signs Last 24 Hrs  T(C): 36.3 (26 Oct 2023 12:39), Max: 36.3 (25 Oct 2023 20:59)  T(F): 97.3 (26 Oct 2023 12:39), Max: 97.4 (25 Oct 2023 20:59)  HR: 75 (26 Oct 2023 12:39) (70 - 80)  BP: 169/77 (26 Oct 2023 12:39) (110/55 - 169/77)  BP(mean): 111 (26 Oct 2023 12:39) (76 - 111)  RR: 18 (26 Oct 2023 12:39) (18 - 18)  SpO2: 97% (26 Oct 2023 12:39) (97% - 97%)      CAPILLARY BLOOD GLUCOSE        LABS:                        13.0   5.50  )-----------( 169      ( 26 Oct 2023 06:23 )             38.1     10-26    129<L>  |  97<L>  |  10  ----------------------------<  85  4.9   |  23  |  1.3    Ca    9.0      26 Oct 2023 06:23              Urinalysis Basic - ( 26 Oct 2023 06:23 )    Color: x / Appearance: x / SG: x / pH: x  Gluc: 85 mg/dL / Ketone: x  / Bili: x / Urobili: x   Blood: x / Protein: x / Nitrite: x   Leuk Esterase: x / RBC: x / WBC x   Sq Epi: x / Non Sq Epi: x / Bacteria: x              Consultant Notes Reviewed:  [x ] YES  [ ] NO  Care Discussed with Consultants/Other Providers/ Housestaff [ x] YES  [ ] NO  Radiology, labs, new studies personally reviewed.

## 2023-10-26 NOTE — CDI QUERY NOTE - NSCDIOTHERTXTBX_GEN_ALL_CORE_HH
Based on your professional judgment and clinical indicators, can the MRI findings of surrounding edema be further specified as:     --- Clinically significant MRI findings of surrounding edema associated with Right occipital lobe hematoma   --- Clinically insignificant MRI findings of surrounding edema within the right occipital lobe  --- Other (please specify)  --- Clinically unable to determine    Clinical Indicator:   MRI:  10/25/2023:  FINDINGS: There is a T2 hypointense lesion with susceptibility artifact within the right occipital lobe measuring 2.7 x 3.4 cm with surrounding edema compatible with a hematoma.  IMPRESSION:  2.  Right occipital lobe parenchymal hematoma measuring 3.4 cm with surrounding edema.    Documentation:  10/24/2023:  Progress Note: Neurosurgery PA: · This patient has the following condition(s)/diagnoses on this admission:	Cerebral Edema                        Assessment:  Non-traumatic intracranial hemorrhage     Meds: Levetiracetam IV ( 10/23/2023),  [id number] IV every 6hrs (10/23/2023 – 9/20/2024)    Thank you.

## 2023-10-26 NOTE — DISCHARGE NOTE PROVIDER - NSDCFUADDINST_GEN_ALL_CORE_FT
You have been given a prescription for lisinopril an anti-hypertensive medication. Please monitor your blood pressure at home and keep a daily log and bring it to your next appointment with your PMD.  Please follow up with your PMD within 1-2 weeks of discharge.   Please follow up with outpatient physical therapy upon discharge, we have given you a prescription for it.

## 2023-10-26 NOTE — PROGRESS NOTE ADULT - ASSESSMENT
88M w/ PMH HTN, BPH, Neurogenic Bladder w/ indwelling yañez, BL cataract surgery. Presented (10/23) c/o R forehead pain x1week a/w worsening confusion and weakness for few months with 1 known fall 1 month prior (unwitnessed). Found to have acute hemorrhage on CTH.    # R parieto-occipital acute hemorrhage possibly 2/2 tumor vs hemorrhagic conversion of ischemic stroke vs AVM vs amyloid angiopathy  - Brain MRI w/out con completed no acute infarct confirmed R parieto occipital infarct  - obtain MRV and MR Brain w/ contrast  - Fall and Aspiration precautions    #Worsening cognition, possible dementia   - TSH, vitamin b12, folate wnl  - RPR negative    #Hypertension  - BP Goal: <140  - c/w lisinopril 5 mg    #HLD  - LDL results: 152    #Hyponatremia  - Denies diuretics, appears euvolemic on exam - 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  - urine Na nad urine osm are elevated with euvolemia inidicated possible cerebral salt wasting  - replete sodium with sodium tab once and trend bmp    #Neurogenic Bladder (w/ indwelling yañez)  #Hx of TURP  #CKD3 (Baseline Cr 1.3-1.5)    Diet: regular  DVT ppx: SCDs and lovenox

## 2023-10-26 NOTE — DISCHARGE NOTE PROVIDER - NSDCFUADDAPPT_GEN_ALL_CORE_FT
Please follow up with the stroke neurologist within 1-2 weeks of discharge. The neurology office will call you with your appointment date and time.

## 2023-10-26 NOTE — DISCHARGE NOTE NURSING/CASE MANAGEMENT/SOCIAL WORK - PATIENT PORTAL LINK FT
You can access the FollowMyHealth Patient Portal offered by [facility] by registering at the following website: [email or web address]. By joining [facility]’s FollowMyHealth portal, you will also be able to view your health information using other applications (apps) compatible with our system.

## 2023-10-26 NOTE — DISCHARGE NOTE PROVIDER - NSFOLLOWUPCLINICS_GEN_ALL_ED_FT
[facility]  Neurology  [address] [place] [address], [facility] [address]  [place] [address]  Phone: [phone]  Fax:   Follow Up Time: 1 month

## 2023-10-26 NOTE — DISCHARGE NOTE PROVIDER - HOSPITAL COURSE
Hospital course:  88y Male with PMH     During this hospital course, patient had a Intracranial R parieto-occipital hemorrhage secondary to    Patient had the following workup done in house:  CTH: Acute hemorrhage in R parieto-occipital region  CTA Head: Redemonstration of an acute hemorrhage within the right occipital lobe without abnormal underlying vascularity to suggest vascular malformation.  Stenosis of the proximal right P2 segment of the PCA.  CTA NECK: Mild stenosis of the proximal right internal carotid artery due to calcified atherosclerotic plaque.  MR Head: no acute infarct, ICH bleed stable R parieto-occipital region  MR Brain w/ Contrast  MR venogram w/ contrast  TTE:   Labs:     Physical exam at discharge:    Discharge NIHSS:   New medications on discharge:    Imaging to be done as outpatient:    Further outpatient workup:    Outpatient follow up with stroke neurology and PMD. Hospital course:  88y Male with PMH     During this hospital course, patient had a Intracranial R parieto-occipital hemorrhage secondary to    Patient had the following workup done in house:  CTH: Acute hemorrhage in R parieto-occipital region  CTA Head: Redemonstration of an acute hemorrhage within the right occipital lobe without abnormal underlying vascularity to suggest vascular malformation.  Stenosis of the proximal right P2 segment of the PCA.  CTA NECK: Mild stenosis of the proximal right internal carotid artery due to calcified atherosclerotic plaque.  MR Head: no acute infarct, ICH bleed stable R parieto-occipital region  MR Brain w/ Contrast:  MR venogram w/ contrast:  TTE: wnl   Labs: LDL: 152, A1c: 5.5, TSH wnl, vitamin b12 wnl, folate wnl    Physical exam at discharge:  Neurological Examination:  General:  Appearance is consistent with chronologic age.   Cognitive/Language:  Awake, alert, and oriented to person, place, time and date.  Recent and remote memory intact.  Fund of knowledge is appropriate.  Naming, repetition and comprehension intact. Nondysarthric.    Cranial Nerves  - Eyes: Visual acuity intact, L inferior quandrantanopsia. EOMI w/o nystagmus, skew or reported double vision.  PERRL.  No ptosis/weakness of eyelid closure.    - Face:  Facial sensation normal V1 - 3, no facial asymmetry.    - Ears/Nose/Throat:  Hearing grossly intact b/l to finger rub.  Palate elevates midline.  Tongue and uvula midline.   Motor exam: Normal tone and bulk. No tenderness, twitching, tremors or involuntary movements.            Upper extremity                  Bicep     Tricep     HG                                                 R      5/5        5/5          5/5                                                    L       5/5        5/5          5/5              Lower extremity                   HF        KE        DF         PF                                                  R     5/5       5/5       5/5       5/5                                               L      5/5      5/5       5/5        5/5    Sensory examination:  Intact to light touch and pinprick, pain, temperature and proprioception and vibration in all extremities.  Reflexes: 2+ b/l biceps, triceps, patella and achilles.  Plantar response downgoing b/l.  Lashaun, clonus absent.  Cerebellum: FTN/HKS intact.  No dysmetria.    Gait narrow based and normal.    Discharge NIHSS: 1 for L inferior quadrantanopsia    New medications on discharge:    Outpatient follow up with stroke neurology and PMD. Hospital course:  88y Male with PMH HTN (BP medications discontinued 1 month ago by PCP), BPH, Neurogenic Bladder w/ indwelling yañez, Hx of Cholecystectomy, BL cataract surgery  Presented (10/23) c/o R forehead pain x1week a/w worsening confusion and weakness xfew months, episodes of "legs giving out", with known fall 1week prior (unwitnessed). Pt fell while getting out of bed, his his left arm. Denies HT/LOC. Pt was found to have acute IPH on CT head so pt was admitted to stroke unit for further wokrup.    During this hospital course, patient had a Intracranial R parieto-occipital hemorrhage secondary 2/2 to trauma from fall. AVM, Tumor, infarct, and venous thrombosis ruled out were ruled out.       Patient had the following workup done in house:  CTH: Acute hemorrhage in R parieto-occipital region  CTA Head: Redemonstration of an acute hemorrhage within the right occipital lobe without abnormal underlying vascularity to suggest vascular malformation.  Stenosis of the proximal right P2 segment of the PCA.  CTA NECK: Mild stenosis of the proximal right internal carotid artery due to calcified atherosclerotic plaque.  MR Head: no acute infarct, ICH bleed stable R parieto-occipital region  MR Brain w/ Contrast: no evidence of tumor, Bleed is stable  MR venogram w/ contrast: no evidence of venous thrombosis  TTE: wnl   Labs: LDL: 152, A1c: 5.5, TSH wnl, vitamin b12 wnl, folate wnl    Physical exam at discharge:  Neurological Examination:  General:  Appearance is consistent with chronologic age.   Cognitive/Language:  Awake, alert, and oriented to person, place, time and date.  Recent and remote memory intact.  Fund of knowledge is appropriate.  Naming, repetition and comprehension intact. Nondysarthric.    Cranial Nerves  - Eyes: Visual acuity intact, L inferior quandrantanopsia. EOMI w/o nystagmus, skew or reported double vision.  PERRL.  No ptosis/weakness of eyelid closure.    - Face:  Facial sensation normal V1 - 3, no facial asymmetry.    - Ears/Nose/Throat:  Hearing grossly intact b/l to finger rub.  Palate elevates midline.  Tongue and uvula midline.   Motor exam: Normal tone and bulk. No tenderness, twitching, tremors or involuntary movements.            Upper extremity                  Bicep     Tricep     HG                                                 R      5/5        5/5          5/5                                                    L       5/5        5/5          5/5              Lower extremity                   HF        KE        DF         PF                                                  R     5/5 5/5 5/5 5/5                                               L      5/5 5/5 5/5 5/5    Sensory examination:  Intact to light touch and pinprick, pain, temperature and proprioception and vibration in all extremities.  Reflexes: 2+ b/l biceps, triceps, patella and achilles.  Plantar response downgoing b/l.  Lashaun, clonus absent.  Cerebellum: FTN/HKS intact.  No dysmetria.      Discharge NIHSS: 1 for L inferior quadrantanopsia    Outpatient follow up with stroke neurology and PMD. Hospital course:  88y Male with PMH HTN (BP medications discontinued 1 month ago by PCP), BPH, Neurogenic Bladder w/ indwelling yañez, Hx of Cholecystectomy, BL cataract surgery Presented (10/23) c/o R forehead pain x1week a/w worsening confusion and weakness xfew months, episodes of "legs giving out", with known fall 1week prior (unwitnessed). Pt fell while getting out of bed, his his left arm. Denies HT/LOC. Pt was found to have acute IPH on CT head so pt was admitted to stroke unit for further workup. Repeat scans showed stable bleed and AVM, Tumor, infarct, and venous thrombosis ruled out were ruled out as causes of the bleed. Physical therapy evaluated the patient and recommended outpatient physical therapy.      During this hospital course, patient had a Intracranial R parieto-occipital hemorrhage secondary 2/2 to trauma from fall. AVM, Tumor, infarct, and venous thrombosis ruled out were ruled out.       Patient had the following workup done in house:  CTH: Acute hemorrhage in R parieto-occipital region  CTA Head: Redemonstration of an acute hemorrhage within the right occipital lobe without abnormal underlying vascularity to suggest vascular malformation.  Stenosis of the proximal right P2 segment of the PCA.  CTA NECK: Mild stenosis of the proximal right internal carotid artery due to calcified atherosclerotic plaque.  MR Head: no acute infarct, ICH bleed stable R parieto-occipital region  MR Brain w/ Contrast: no evidence of tumor, Bleed is stable  MR venogram w/ contrast: no evidence of venous thrombosis  TTE: wnl   Labs: LDL: 152, A1c: 5.5, TSH wnl, vitamin b12 wnl, folate wnl    Physical exam at discharge:  Neurological Examination:  General:  Appearance is consistent with chronologic age.   Cognitive/Language:  Awake, alert, and oriented to person, place, time and date.  Recent and remote memory intact.  Fund of knowledge is appropriate.  Naming, repetition and comprehension intact. Nondysarthric.    Cranial Nerves  - Eyes: Visual acuity intact, L inferior quandrantanopsia. EOMI w/o nystagmus, skew or reported double vision.  PERRL.  No ptosis/weakness of eyelid closure.    - Face:  Facial sensation normal V1 - 3, no facial asymmetry.    - Ears/Nose/Throat:  Hearing grossly intact b/l to finger rub.  Palate elevates midline.  Tongue and uvula midline.   Motor exam: Normal tone and bulk. No tenderness, twitching, tremors or involuntary movements.            Upper extremity                  Bicep     Tricep     HG                                                 R      5/5 5/5          5/5                                                    L       5/5 5/5          5/5              Lower extremity                   HF        KE        DF         PF                                                  R     5/5 5/5       5/5       5/5                                               L      5/5 5/5 5/5 5/5    Sensory examination:  Intact to light touch and pinprick, pain, temperature and proprioception and vibration in all extremities.  Reflexes: 2+ b/l biceps, triceps, patella and achilles.  Plantar response downgoing b/l.  Lashaun, clonus absent.  Cerebellum: FTN/HKS intact.  No dysmetria.      Discharge NIHSS: 1 for L inferior quadrantanopsia    Outpatient follow up with stroke neurology and PMD.

## 2023-10-26 NOTE — DISCHARGE NOTE PROVIDER - NSDCFUSCHEDAPPT_GEN_ALL_CORE_FT
[facility]  UROLOGY [address] [place] [address]  Scheduled Appointment: 11/22/2023

## 2023-10-26 NOTE — PROGRESS NOTE ADULT - SUBJECTIVE AND OBJECTIVE BOX
Neurology Progress Note    Interval History:    Patient was seen and examined, no acute event over night.     Medications:  enoxaparin Injectable 40 milliGRAM(s) SubCutaneous every 24 hours  influenza  Vaccine (HIGH DOSE) 0.7 milliLiter(s) IntraMuscular once  labetalol Injectable 10 milliGRAM(s) IV Push every 6 hours PRN  lisinopril 5 milliGRAM(s) Oral daily  midazolam Injectable 0.5 milliGRAM(s) IV Push every 5 minutes PRN  senna 2 Tablet(s) Oral at bedtime PRN  sodium chloride 1 Gram(s) Oral once      Vital Signs Last 24 Hrs  T(C): 35.6 (26 Oct 2023 04:52), Max: 36.3 (25 Oct 2023 20:59)  T(F): 96 (26 Oct 2023 04:52), Max: 97.4 (25 Oct 2023 20:59)  HR: 70 (26 Oct 2023 04:52) (70 - 80)  BP: 110/55 (26 Oct 2023 04:52) (110/55 - 140/73)  BP(mean): 76 (26 Oct 2023 04:52) (76 - 100)  RR: 18 (26 Oct 2023 04:52) (18 - 18)  SpO2: --        Neurological Examination:  General:  Appearance is consistent with chronologic age.   Cognitive/Language:  Awake, alert, and oriented to person, place, time and date.  Recent and remote memory intact.  Fund of knowledge is appropriate.  Naming, repetition and comprehension intact. Nondysarthric.    Cranial Nerves  - Eyes: Visual acuity intact, L inferior quandrantanopsia. EOMI w/o nystagmus, skew or reported double vision.  PERRL.  No ptosis/weakness of eyelid closure.    - Face:  Facial sensation normal V1 - 3, no facial asymmetry.    - Ears/Nose/Throat:  Hearing grossly intact b/l to finger rub.  Palate elevates midline.  Tongue and uvula midline.   Motor exam: Normal tone and bulk. No tenderness, twitching, tremors or involuntary movements.            Upper extremity                  Bicep     Tricep     HG                                                 R      5/5        5/5          5/5                                                    L       5/5        5/5          5/5              Lower extremity                   HF        KE        DF         PF                                                  R     5/5       5/5       5/5       5/5                                               L      5/5      5/5       5/5        5/5    Sensory examination:  Intact to light touch and pinprick, pain, temperature and proprioception and vibration in all extremities.  Reflexes: 2+ b/l biceps, triceps, patella and achilles.  Plantar response downgoing b/l.  Lashaun, clonus absent.  Cerebellum: FTN/HKS intact.  No dysmetria.    Gait narrow based and normal.    Labs:  CBC Full  -  ( 26 Oct 2023 06:23 )  WBC Count : 5.50 K/uL  RBC Count : 4.36 M/uL  Hemoglobin : 13.0 g/dL  Hematocrit : 38.1 %  Platelet Count - Automated : 169 K/uL  Mean Cell Volume : 87.4 fL  Mean Cell Hemoglobin : 29.8 pg  Mean Cell Hemoglobin Concentration : 34.1 g/dL  Auto Neutrophil # : x  Auto Lymphocyte # : x  Auto Monocyte # : x  Auto Eosinophil # : x  Auto Basophil # : x  Auto Neutrophil % : x  Auto Lymphocyte % : x  Auto Monocyte % : x  Auto Eosinophil % : x  Auto Basophil % : x    10-26    129<L>  |  97<L>  |  10  ----------------------------<  85  4.9   |  23  |  1.3    Ca    9.0      26 Oct 2023 06:23  Mg     1.9     10-24    TPro  6.0  /  Alb  3.6  /  TBili  0.6  /  DBili  x   /  AST  18  /  ALT  10  /  AlkPhos  81  10-24    LIVER FUNCTIONS - ( 24 Oct 2023 16:00 )  Alb: 3.6 g/dL / Pro: 6.0 g/dL / ALK PHOS: 81 U/L / ALT: 10 U/L / AST: 18 U/L / GGT: x             Urinalysis Basic - ( 26 Oct 2023 06:23 )    Color: x / Appearance: x / SG: x / pH: x  Gluc: 85 mg/dL / Ketone: x  / Bili: x / Urobili: x   Blood: x / Protein: x / Nitrite: x   Leuk Esterase: x / RBC: x / WBC x   Sq Epi: x / Non Sq Epi: x / Bacteria: x        RADIOLOGY & ADDITIONAL TESTS:

## 2023-10-27 ENCOUNTER — INPATIENT (INPATIENT)
Facility: HOSPITAL | Age: 88
LOS: 4 days | Discharge: NURSING, REHAB OR LONG-TERM CARE | DRG: 562 | End: 2023-11-01
Attending: INTERNAL MEDICINE | Admitting: INTERNAL MEDICINE
Payer: MEDICARE

## 2023-10-27 VITALS
HEART RATE: 95 BPM | WEIGHT: 145.06 LBS | DIASTOLIC BLOOD PRESSURE: 78 MMHG | HEIGHT: 65 IN | TEMPERATURE: 98 F | RESPIRATION RATE: 18 BRPM | SYSTOLIC BLOOD PRESSURE: 141 MMHG | OXYGEN SATURATION: 100 %
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
## 2023-10-27 LAB
ALBUMIN SERPL ELPH-MCNC: 4.3 G/DL — SIGNIFICANT CHANGE UP (ref 3.5–5.2)
ALBUMIN SERPL ELPH-MCNC: 4.3 G/DL — SIGNIFICANT CHANGE UP (ref 3.5–5.2)
ALP SERPL-CCNC: 84 U/L — SIGNIFICANT CHANGE UP (ref 30–115)
ALP SERPL-CCNC: 84 U/L — SIGNIFICANT CHANGE UP (ref 30–115)
ALT FLD-CCNC: 17 U/L — SIGNIFICANT CHANGE UP (ref 0–41)
ALT FLD-CCNC: 17 U/L — SIGNIFICANT CHANGE UP (ref 0–41)
ANION GAP SERPL CALC-SCNC: 13 MMOL/L — SIGNIFICANT CHANGE UP (ref 7–14)
ANION GAP SERPL CALC-SCNC: 13 MMOL/L — SIGNIFICANT CHANGE UP (ref 7–14)
APTT BLD: 32 SEC — SIGNIFICANT CHANGE UP (ref 27–39.2)
APTT BLD: 32 SEC — SIGNIFICANT CHANGE UP (ref 27–39.2)
AST SERPL-CCNC: 33 U/L — SIGNIFICANT CHANGE UP (ref 0–41)
AST SERPL-CCNC: 33 U/L — SIGNIFICANT CHANGE UP (ref 0–41)
BASOPHILS # BLD AUTO: 0.01 K/UL — SIGNIFICANT CHANGE UP (ref 0–0.2)
BASOPHILS # BLD AUTO: 0.01 K/UL — SIGNIFICANT CHANGE UP (ref 0–0.2)
BASOPHILS NFR BLD AUTO: 0.1 % — SIGNIFICANT CHANGE UP (ref 0–1)
BASOPHILS NFR BLD AUTO: 0.1 % — SIGNIFICANT CHANGE UP (ref 0–1)
BILIRUB SERPL-MCNC: 0.9 MG/DL — SIGNIFICANT CHANGE UP (ref 0.2–1.2)
BILIRUB SERPL-MCNC: 0.9 MG/DL — SIGNIFICANT CHANGE UP (ref 0.2–1.2)
BUN SERPL-MCNC: 23 MG/DL — HIGH (ref 10–20)
BUN SERPL-MCNC: 23 MG/DL — HIGH (ref 10–20)
CALCIUM SERPL-MCNC: 9.8 MG/DL — SIGNIFICANT CHANGE UP (ref 8.4–10.5)
CALCIUM SERPL-MCNC: 9.8 MG/DL — SIGNIFICANT CHANGE UP (ref 8.4–10.5)
CHLORIDE SERPL-SCNC: 91 MMOL/L — LOW (ref 98–110)
CHLORIDE SERPL-SCNC: 91 MMOL/L — LOW (ref 98–110)
CO2 SERPL-SCNC: 23 MMOL/L — SIGNIFICANT CHANGE UP (ref 17–32)
CO2 SERPL-SCNC: 23 MMOL/L — SIGNIFICANT CHANGE UP (ref 17–32)
CREAT SERPL-MCNC: 1.6 MG/DL — HIGH (ref 0.7–1.5)
CREAT SERPL-MCNC: 1.6 MG/DL — HIGH (ref 0.7–1.5)
EGFR: 42 ML/MIN/1.73M2 — LOW
EGFR: 42 ML/MIN/1.73M2 — LOW
EOSINOPHIL # BLD AUTO: 0.05 K/UL — SIGNIFICANT CHANGE UP (ref 0–0.7)
EOSINOPHIL # BLD AUTO: 0.05 K/UL — SIGNIFICANT CHANGE UP (ref 0–0.7)
EOSINOPHIL NFR BLD AUTO: 0.7 % — SIGNIFICANT CHANGE UP (ref 0–8)
EOSINOPHIL NFR BLD AUTO: 0.7 % — SIGNIFICANT CHANGE UP (ref 0–8)
GLUCOSE SERPL-MCNC: 90 MG/DL — SIGNIFICANT CHANGE UP (ref 70–99)
GLUCOSE SERPL-MCNC: 90 MG/DL — SIGNIFICANT CHANGE UP (ref 70–99)
HCT VFR BLD CALC: 37 % — LOW (ref 42–52)
HCT VFR BLD CALC: 37 % — LOW (ref 42–52)
HGB BLD-MCNC: 13.4 G/DL — LOW (ref 14–18)
HGB BLD-MCNC: 13.4 G/DL — LOW (ref 14–18)
IMM GRANULOCYTES NFR BLD AUTO: 0.3 % — SIGNIFICANT CHANGE UP (ref 0.1–0.3)
IMM GRANULOCYTES NFR BLD AUTO: 0.3 % — SIGNIFICANT CHANGE UP (ref 0.1–0.3)
INR BLD: 1.12 RATIO — SIGNIFICANT CHANGE UP (ref 0.65–1.3)
INR BLD: 1.12 RATIO — SIGNIFICANT CHANGE UP (ref 0.65–1.3)
LYMPHOCYTES # BLD AUTO: 0.55 K/UL — LOW (ref 1.2–3.4)
LYMPHOCYTES # BLD AUTO: 0.55 K/UL — LOW (ref 1.2–3.4)
LYMPHOCYTES # BLD AUTO: 8.2 % — LOW (ref 20.5–51.1)
LYMPHOCYTES # BLD AUTO: 8.2 % — LOW (ref 20.5–51.1)
MCHC RBC-ENTMCNC: 31.1 PG — HIGH (ref 27–31)
MCHC RBC-ENTMCNC: 31.1 PG — HIGH (ref 27–31)
MCHC RBC-ENTMCNC: 36.2 G/DL — SIGNIFICANT CHANGE UP (ref 32–37)
MCHC RBC-ENTMCNC: 36.2 G/DL — SIGNIFICANT CHANGE UP (ref 32–37)
MCV RBC AUTO: 85.8 FL — SIGNIFICANT CHANGE UP (ref 80–94)
MCV RBC AUTO: 85.8 FL — SIGNIFICANT CHANGE UP (ref 80–94)
MONOCYTES # BLD AUTO: 0.64 K/UL — HIGH (ref 0.1–0.6)
MONOCYTES # BLD AUTO: 0.64 K/UL — HIGH (ref 0.1–0.6)
MONOCYTES NFR BLD AUTO: 9.6 % — HIGH (ref 1.7–9.3)
MONOCYTES NFR BLD AUTO: 9.6 % — HIGH (ref 1.7–9.3)
NEUTROPHILS # BLD AUTO: 5.42 K/UL — SIGNIFICANT CHANGE UP (ref 1.4–6.5)
NEUTROPHILS # BLD AUTO: 5.42 K/UL — SIGNIFICANT CHANGE UP (ref 1.4–6.5)
NEUTROPHILS NFR BLD AUTO: 81.1 % — HIGH (ref 42.2–75.2)
NEUTROPHILS NFR BLD AUTO: 81.1 % — HIGH (ref 42.2–75.2)
NRBC # BLD: 0 /100 WBCS — SIGNIFICANT CHANGE UP (ref 0–0)
NRBC # BLD: 0 /100 WBCS — SIGNIFICANT CHANGE UP (ref 0–0)
PLATELET # BLD AUTO: 190 K/UL — SIGNIFICANT CHANGE UP (ref 130–400)
PLATELET # BLD AUTO: 190 K/UL — SIGNIFICANT CHANGE UP (ref 130–400)
PMV BLD: 9.7 FL — SIGNIFICANT CHANGE UP (ref 7.4–10.4)
PMV BLD: 9.7 FL — SIGNIFICANT CHANGE UP (ref 7.4–10.4)
POTASSIUM SERPL-MCNC: 5 MMOL/L — SIGNIFICANT CHANGE UP (ref 3.5–5)
POTASSIUM SERPL-MCNC: 5 MMOL/L — SIGNIFICANT CHANGE UP (ref 3.5–5)
POTASSIUM SERPL-SCNC: 5 MMOL/L — SIGNIFICANT CHANGE UP (ref 3.5–5)
POTASSIUM SERPL-SCNC: 5 MMOL/L — SIGNIFICANT CHANGE UP (ref 3.5–5)
PROT SERPL-MCNC: 7 G/DL — SIGNIFICANT CHANGE UP (ref 6–8)
PROT SERPL-MCNC: 7 G/DL — SIGNIFICANT CHANGE UP (ref 6–8)
PROTHROM AB SERPL-ACNC: 12.8 SEC — SIGNIFICANT CHANGE UP (ref 9.95–12.87)
PROTHROM AB SERPL-ACNC: 12.8 SEC — SIGNIFICANT CHANGE UP (ref 9.95–12.87)
RBC # BLD: 4.31 M/UL — LOW (ref 4.7–6.1)
RBC # BLD: 4.31 M/UL — LOW (ref 4.7–6.1)
RBC # FLD: 13.4 % — SIGNIFICANT CHANGE UP (ref 11.5–14.5)
RBC # FLD: 13.4 % — SIGNIFICANT CHANGE UP (ref 11.5–14.5)
SODIUM SERPL-SCNC: 127 MMOL/L — LOW (ref 135–146)
SODIUM SERPL-SCNC: 127 MMOL/L — LOW (ref 135–146)
WBC # BLD: 6.69 K/UL — SIGNIFICANT CHANGE UP (ref 4.8–10.8)
WBC # BLD: 6.69 K/UL — SIGNIFICANT CHANGE UP (ref 4.8–10.8)
WBC # FLD AUTO: 6.69 K/UL — SIGNIFICANT CHANGE UP (ref 4.8–10.8)
WBC # FLD AUTO: 6.69 K/UL — SIGNIFICANT CHANGE UP (ref 4.8–10.8)

## 2023-10-27 PROCEDURE — 97110 THERAPEUTIC EXERCISES: CPT | Mod: GP

## 2023-10-27 PROCEDURE — 73070 X-RAY EXAM OF ELBOW: CPT | Mod: 26,LT

## 2023-10-27 PROCEDURE — 93010 ELECTROCARDIOGRAM REPORT: CPT

## 2023-10-27 PROCEDURE — 99285 EMERGENCY DEPT VISIT HI MDM: CPT

## 2023-10-27 PROCEDURE — 84100 ASSAY OF PHOSPHORUS: CPT

## 2023-10-27 PROCEDURE — 83735 ASSAY OF MAGNESIUM: CPT

## 2023-10-27 PROCEDURE — 81001 URINALYSIS AUTO W/SCOPE: CPT

## 2023-10-27 PROCEDURE — 70450 CT HEAD/BRAIN W/O DYE: CPT | Mod: 26,MA

## 2023-10-27 PROCEDURE — 83930 ASSAY OF BLOOD OSMOLALITY: CPT

## 2023-10-27 PROCEDURE — 97116 GAIT TRAINING THERAPY: CPT | Mod: GP

## 2023-10-27 PROCEDURE — 73030 X-RAY EXAM OF SHOULDER: CPT | Mod: 26,LT

## 2023-10-27 PROCEDURE — 80048 BASIC METABOLIC PNL TOTAL CA: CPT

## 2023-10-27 PROCEDURE — 99222 1ST HOSP IP/OBS MODERATE 55: CPT

## 2023-10-27 PROCEDURE — 85027 COMPLETE CBC AUTOMATED: CPT

## 2023-10-27 PROCEDURE — 85025 COMPLETE CBC W/AUTO DIFF WBC: CPT

## 2023-10-27 PROCEDURE — 36415 COLL VENOUS BLD VENIPUNCTURE: CPT

## 2023-10-27 PROCEDURE — 97162 PT EVAL MOD COMPLEX 30 MIN: CPT | Mod: GP

## 2023-10-27 PROCEDURE — 87635 SARS-COV-2 COVID-19 AMP PRB: CPT

## 2023-10-27 RX ORDER — SODIUM CHLORIDE 9 MG/ML
1000 INJECTION, SOLUTION INTRAVENOUS ONCE
Refills: 0 | Status: COMPLETED | OUTPATIENT
Start: 2023-10-27 | End: 2023-10-27

## 2023-10-27 RX ORDER — LISINOPRIL 2.5 MG/1
5 TABLET ORAL DAILY
Refills: 0 | Status: DISCONTINUED | OUTPATIENT
Start: 2023-10-27 | End: 2023-11-01

## 2023-10-27 RX ORDER — INFLUENZA VIRUS VACCINE 15; 15; 15; 15 UG/.5ML; UG/.5ML; UG/.5ML; UG/.5ML
0.7 SUSPENSION INTRAMUSCULAR ONCE
Refills: 0 | Status: COMPLETED | OUTPATIENT
Start: 2023-10-27 | End: 2023-10-27

## 2023-10-27 RX ORDER — HEPARIN SODIUM 5000 [USP'U]/ML
5000 INJECTION INTRAVENOUS; SUBCUTANEOUS EVERY 8 HOURS
Refills: 0 | Status: DISCONTINUED | OUTPATIENT
Start: 2023-10-27 | End: 2023-11-01

## 2023-10-27 RX ORDER — PANTOPRAZOLE SODIUM 20 MG/1
40 TABLET, DELAYED RELEASE ORAL
Refills: 0 | Status: DISCONTINUED | OUTPATIENT
Start: 2023-10-27 | End: 2023-11-01

## 2023-10-27 RX ORDER — CHLORHEXIDINE GLUCONATE 213 G/1000ML
1 SOLUTION TOPICAL
Refills: 0 | Status: DISCONTINUED | OUTPATIENT
Start: 2023-10-27 | End: 2023-11-01

## 2023-10-27 RX ADMIN — SODIUM CHLORIDE 1000 MILLILITER(S): 9 INJECTION, SOLUTION INTRAVENOUS at 19:47

## 2023-10-27 RX ADMIN — HEPARIN SODIUM 5000 UNIT(S): 5000 INJECTION INTRAVENOUS; SUBCUTANEOUS at 23:54

## 2023-10-27 NOTE — H&P ADULT - NSHPLABSRESULTS_GEN_ALL_CORE
10-27    127<L>  |  91<L>  |  23<H>  ----------------------------<  90  5.0   |  23  |  1.6<H>    Ca    9.8      27 Oct 2023 18:43    TPro  7.0  /  Alb  4.3  /  TBili  0.9  /  DBili  x   /  AST  33  /  ALT  17  /  AlkPhos  84  10-27                            13.4   6.69  )-----------( 190      ( 27 Oct 2023 18:43 )             37.0     CAPILLARY BLOOD GLUCOSE    < from: CT Head No Cont (10.27.23 @ 18:44) >    IMPRESSION:    1. The hemorrhage and surrounding edema in the right posterior   parietal-occipital region are unchanged when compared with the most   recent MRI of 10/26/2023.    2. There is no acute subdural, epidural, or subarachnoid hemorrhage.    3. No evidence of depressed skull fracture.    < end of copied text >

## 2023-10-27 NOTE — ED PROVIDER NOTE - OBJECTIVE STATEMENT
patient brought to ED by family with c/o fall, and left elbow pain, Was admitted to [place] [address] with fall, intracranial bleed, Dc'd home yesterday, Family unable to care for patient , would like rehab admission

## 2023-10-27 NOTE — H&P ADULT - HISTORY OF PRESENT ILLNESS
88y Male with PMH HTN (BP medications discontinued 1 month ago by PCP), BPH, Neurogenic Bladder w/ indwelling yañez, Hx of Cholecystectomy, BL cataract surgery Presented (10/23) to [facility] c/o R forehead pain x1week a/w worsening confusion and weakness xfew months, episodes of "legs giving out", with known fall 1week prior (unwitnessed). Pt fell while getting out of bed, hit his left arm. Denies HT/LOC. Pt was found to have acute IPH on CT head so pt was admitted to stroke unit for further workup. Repeat scans showed stable bleed and AVM, Tumor, infarct, and venous thrombosis ruled out were ruled out as causes of the bleed. Physical therapy evaluated the patient and recommended outpatient physical therapy.    During the hospital course, patient had a Intracranial R parieto-occipital hemorrhage secondary 2/2 to trauma from fall. AVM, Tumor, infarct, and venous thrombosis ruled out were ruled out.     pt had multiple ct and mri at [facility] on previous admission    Patient presenting to the [facility] for evaluation s/p fall. and family unable to care for him   family wants patient placed, unable  to manage at home, Needs ortho eval for elbow fx with overlying abrasion,  Splinted in ED   Case discussed with neurosurgery no change in CT from yesterday,

## 2023-10-27 NOTE — PATIENT PROFILE ADULT - FALL HARM RISK - ATTEMPT OOB
Dear Abeba,     -Mammogram was normal.  ADVISE: rechecking in 1 year.      Please send a [employer] message or call [phone]  if you have any questions.      DEZ Henley, CNP  [facility]    If you have further questions about the interpretation of your labs, [email or web address] is a good website to check out for further information.    
No

## 2023-10-27 NOTE — ED PROVIDER NOTE - CARE PLAN
1 Principal Discharge DX:	Fall  Secondary Diagnosis:	Left elbow fracture  Secondary Diagnosis:	Abrasion  Secondary Diagnosis:	H/O intracranial hemorrhage

## 2023-10-27 NOTE — ED ADULT NURSE NOTE - NSFALLUNIVINTERV_ED_ALL_ED
Bed/Stretcher in lowest position, wheels locked, appropriate side rails in place/Call bell, personal items and telephone in reach/Instruct patient to call for assistance before getting out of bed/chair/stretcher/Non-slip footwear applied when patient is off stretcher/[place] to call system/Physically safe environment - no spills, clutter or unnecessary equipment/Purposeful proactive rounding/Room/bathroom lighting operational, light cord in reach

## 2023-10-27 NOTE — H&P ADULT - NS ATTEND AMEND GEN_ALL_CORE FT
Seen while in the ER Seen soon after arrival to medical floor, resting. Agree with above Seen soon after arrival to medical floor, resting. Agree with above, consider Rehab consult after seen by orthopedist. Clarify advance directives with family when able

## 2023-10-27 NOTE — PATIENT PROFILE ADULT - HAVE YOU RECENTLY LOST WEIGHT WITHOUT TRYING?
Medicare Wellness Visit, Male    The best way to live healthy is to have a lifestyle where you eat a well-balanced diet, exercise regularly, limit alcohol use, and quit all forms of tobacco/nicotine, if applicable. Regular preventive services are another way to keep healthy. Preventive services (vaccines, screening tests, monitoring & exams) can help personalize your care plan, which helps you manage your own care. Screening tests can find health problems at the earliest stages, when they are easiest to treat. [facility] follows the current, evidence-based guidelines published by the [facility] Demond Kristopher ([facility]) when recommending preventive services for our patients. Because we follow these guidelines, sometimes recommendations change over time as research supports it. (For example, a prostate screening blood test is no longer routinely recommended for men with no symptoms). Of course, you and your doctor may decide to screen more often for some diseases, based on your risk and co-morbidities (chronic disease you are already diagnosed with). Preventive services for you include:  - Medicare offers their members a free annual wellness visit, which is time for you and your primary care provider to discuss and plan for your preventive service needs.  Take advantage of this benefit every year!    -Over the age of 72 should receive the recommended pneumonia vaccines.   -All adults should have a flu vaccine yearly.  -All adults should receive a tetanus vaccine every 10 years.  -Over the age of 48 should receive the shingles vaccines.    -All adults should be screened once for Hepatitis C.  -All adults age 38-68 who are overweight should have a diabetes screening test once every three years.   -Other screening tests & preventive services for persons with diabetes include: an eye exam to screen for diabetic retinopathy, a kidney function test, a foot exam, and stricter control over your cholesterol.   -Cardiovascular screening for adults with routine risk involves an electrocardiogram (ECG) at intervals determined by the provider.     -Colorectal cancer screening should be done for adults age 43-69 with no increased risk factors for colorectal cancer. There are a number of acceptable methods of screening for this type of cancer. Each test has its own benefits and drawbacks. Discuss with your provider what is most appropriate for you during your annual wellness visit. The different tests include: colonoscopy (considered the best screening method), a fecal occult blood test, a fecal DNA test, and sigmoidoscopy.    -Lung cancer screening is recommended annually with a low dose CT scan for adults between age 54 and 68, who have smoked at least 30 pack years (equivalent of 1 pack per day for 30 days), and who is a current smoker or quit less than 15 years ago. -An Abdominal Aortic Aneurysm (AAA) Screening is recommended for men age 73-68 who has ever smoked in their lifetime. Here is a list of your current Health Maintenance items (your personalized list of preventive services) with a due date: There are no preventive care reminders to display for this patient. Preventing Falls: Care Instructions  Your Care Instructions    Getting around your home safely can be a challenge if you have injuries or health problems that make it easy for you to fall. Loose rugs and furniture in walkways are among the dangers for many older people who have problems walking or who have poor eyesight. People who have conditions such as arthritis, osteoporosis, or dementia also have to be careful not to fall. You can make your home safer with a few simple measures. Follow-up care is a key part of your treatment and safety. Be sure to make and go to all appointments, and call your doctor if you are having problems.  It's also a good idea to know your test results and keep a list of the medicines you take. How can you care for yourself at home? Taking care of yourself  You may get dizzy if you do not drink enough water. To prevent dehydration, drink plenty of fluids, enough so that your urine is light yellow or clear like water. Choose water and other caffeine-free clear liquids. If you have kidney, heart, or liver disease and have to limit fluids, talk with your doctor before you increase the amount of fluids you drink. Exercise regularly to improve your strength, muscle tone, and balance. Walk if you can. Swimming may be a good choice if you cannot walk easily. Have your vision and hearing checked each year or any time you notice a change. If you have trouble seeing and hearing, you might not be able to avoid objects and could lose your balance. Know the side effects of the medicines you take. Ask your doctor or pharmacist whether the medicines you take can affect your balance. Sleeping pills or sedatives can affect your balance. Limit the amount of alcohol you drink. Alcohol can impair your balance and other senses. Ask your doctor whether calluses or corns on your feet need to be removed. If you wear loose-fitting shoes because of calluses or corns, you can lose your balance and fall. Talk to your doctor if you have numbness in your feet. Preventing falls at home  Remove raised doorway thresholds, throw rugs, and clutter. Repair loose carpet or raised areas in the floor. Move furniture and electrical cords to keep them out of walking paths. Use nonskid floor wax, and wipe up spills right away, especially on ceramic tile floors. If you use a walker or cane, put rubber tips on it. If you use crutches, clean the bottoms of them regularly with an abrasive pad, such as steel wool. Keep your house well lit, especially stairways, porches, and outside walkways. Use night-lights in areas such as hallways and bathrooms.  Add extra light switches or use remote switches (such as switches that go on or off when you clap your hands) to make it easier to turn lights on if you have to get up during the night. Install sturdy handrails on stairways. Move items in your cabinets so that the things you use a lot are on the lower shelves (about waist level). Keep a cordless phone and a flashlight with new batteries by your bed. If possible, put a phone in each of the main rooms of your house, or carry a cell phone in case you fall and cannot reach a phone. Or, you can wear a device around your neck or wrist. You push a button that sends a signal for help. Wear low-heeled shoes that fit well and give your feet good support. Use footwear with nonskid soles. Check the heels and soles of your shoes for wear. Repair or replace worn heels or soles. Do not wear socks without shoes on wood floors. Walk on the grass when the sidewalks are slippery. If you live in an area that gets snow and ice in the winter, sprinkle salt on slippery steps and sidewalks. Preventing falls in the bath  Install grab bars and nonskid mats inside and outside your shower or tub and near the toilet and sinks. Use shower chairs and bath benches. Use a hand-held shower head that will allow you to sit while showering. Get into a tub or shower by putting the weaker leg in first. Get out of a tub or shower with your strong side first.  Repair loose toilet seats and consider installing a raised toilet seat to make getting on and off the toilet easier. Keep your bathroom door unlocked while you are in the shower. Where can you learn more? Go to http://deirdre-ladonna.info/. Enter [phone] [id number] [phone] in the search box to learn more about \"Preventing Falls: Care Instructions. \"  Current as of: March 16, 2018  Content Version: 11.8  © 8797-2669 [employer]. Care instructions adapted under license by [employer] (which disclaims liability or warranty for this information).  If you have questions about a medical condition or this instruction, always ask your healthcare professional. [place] [address] any warranty or liability for your use of this information. No (0)

## 2023-10-27 NOTE — PATIENT PROFILE ADULT - FUNCTIONAL ASSESSMENT - BASIC MOBILITY 6.
3-calculated by average/Not able to assess (calculate score using [facility] averaging method)

## 2023-10-27 NOTE — ED ADULT NURSE NOTE - NSFALLRISKASMT_ED_ALL_ED_DT
Clinic hours for Dr. Pak:  MONDAY   7:00 A.M. - 4:15 P.M.   TUESDAY  7:30 A.M. - 4:00 P.M.  WEDNESDAY  7:30 A.M. - 4:00 P.M.   THURSDAY  9:00 A.M. - 3:30 P.M.   FRIDAY   WE ARE OUT OF THE OFFICE     [address] [address]                      Phone [phone]                  If you need a refill on your prescription please call your pharmacy and let them know. Please be proactive and call before your medication runs out. The pharmacy will then contact us for the refill. Please allow 24-48 hours for the refill to be processed.      If your physician has ordered additional laboratory or radiology testing as part of your ongoing plan of care, please allow 5-7 business days from the day of your lab draw or test for the results to be sent and reviewed by your provider. If your results are critical and require more immediate intervention, you will be contacted sooner. Your results will be conveyed to you via a phone call or letter.    If you are a [employer] user-Test results may be posted within a few hours or a few weeks, depending on the test. Once your test results are available for viewing, you will receive an e-mail stating that you have a test result, which is ready for review.  Not all tests result at the same time.  Your office may wait to reach out to you once ALL results are final.  Please keep in mind, your doctor may not always have had the opportunity to review your results before they were released to your [employer] account.     You may receive a patient satisfaction survey in the mail. Please take the time to complete, as your feedback is very important to us. We strive to make your experience exceptional and your comments help us with that goal. We look forward to hearing from you.    [facility]     [address] [place] [address].   [place], [place]  [id number] [phone]  Hours of Operation:   Monday - Friday 700  a.m. -1000 p.m.  Saturday and Sunday 800 a.m. - 400 p.m.  Holiday Hours Vary. Please call the clinic for Holiday hours.     27-Oct-2023 19:05

## 2023-10-27 NOTE — ED ADULT TRIAGE NOTE - CHIEF COMPLAINT QUOTE
Patient had a trip and fall at home. Patient states he feels like his legs gave out. Denies head trauma. C/O pain to left knee and left elbow

## 2023-10-27 NOTE — H&P ADULT - ASSESSMENT
88y Male with PMH HTN (BP medications discontinued 1 month ago by PCP), BPH, Neurogenic Bladder w/ indwelling yañez, Hx of Cholecystectomy, BL cataract surgery Presented (10/23) to [facility] ER c/o R forehead pain x1week a/w worsening confusion and weakness xfew months, episodes of "legs giving out", with known fall 1week prior (unwitnessed). Pt fell while getting out of bed, hit his left arm.  During the hospital course, patient had a Intracranial R parieto-occipital hemorrhage secondary 2/2 to trauma from fall. AVM, Tumor, infarct, and venous thrombosis ruled out were ruled out.   [facility] and family unable to care for him   family wants patient placed, unable  to manage at home, Needs ortho eval for elbow fx with overlying abrasion, Case discussed with neurosurgery no change in CT from yesterday,     # s/p fall  # LUE fx   # R parieto-occipital acute hemorrhage possibly 2/2 tumor vs hemorrhagic conversion of ischemic stroke vs AVM vs amyloid angiopathy  - pt eval   - Fall and Aspiration precautions  - No acute neurosurgical intervention as per note  - Obtain CTH for any change in mental status  - Can follow up outpatient with Dr. Weinstein in 2 weeks  - ortho eval for fx   - ss for placement     # FRANCI 1.6  ( was discharged yesterday 1.3 )   # Hyponatremia 127  - likely hypovolemia   - s/p 1 liter given , will c/w light hydration   - trend bmp    #Worsening cognition, possible dementia   - TSH, vitamin b12, folate wnl  - RPR negative    #Hypertension  - BP Goal: <140  - c/w lisinopril 5 mg    #HLD  - LDL results: 152    #Neurogenic Bladder (w/ indwelling yañez)  #Hx of TURP  #CKD3 (Baseline Cr 1.3-1.5)    Diet: regular  DVT ppx: SCDs and lovenox ( as was given on last admission )     d/w dr jean baptiste

## 2023-10-27 NOTE — ED PROVIDER NOTE - PROGRESS NOTE DETAILS
family wants patient placed, unable  to manage at home, Needs ortho eval for elbow fx with overlying abrasion, Case discussed with neurosurgery no change in CT from yesterday, no need to transfer north

## 2023-10-27 NOTE — ED PROVIDER NOTE - CLINICAL SUMMARY MEDICAL DECISION MAKING FREE TEXT BOX
88-year-old male history of hypertension BPH neurogenic bladder with indewelling yañez catheter presenting for evaluation from home status post mechanical trip and fall today.  Per patient, tripped fell, landed on left elbow.  No head injury/LOC or anticoagulation.  Per son EMR, patient was recently admitted/discharged for intraparenchymal hemorrhage.  Per son, since discharge, patient has not appeared well, is intermittently confused, is unsteady, and is unable to be left alone for extended periods of time.  Well appearing, NAD, non toxic. NCAT PERRLA EOMI neck supple non tender normal wob cta bl rrr abdomen s nt nd no rebound no guarding WWPx4 neuro non focal 2+ equal pulses, within 2-second capillary refill.  Abrasion to left elbow with tenderness to palpation to site.  No other bony tenderness to palpation throughout.  No signs of trauma to trunk.  No visible injuries or bony tenderness to palpation to bilateral lower extremities.  Labs imaging reviewed.  Patient splinted.    Patient found to have mild FRANCI.  Likely prerenal.  Fluids given.  Will admit for further evaluation.

## 2023-10-27 NOTE — H&P ADULT - NSHPPHYSICALEXAM_GEN_ALL_CORE
Vital Signs Last 24 Hrs  T(C): 36.2 (27 Oct 2023 21:07), Max: 36.8 (27 Oct 2023 17:15)  T(F): 97.2 (27 Oct 2023 21:07), Max: 98.3 (27 Oct 2023 17:15)  HR: 98 (27 Oct 2023 21:07) (95 - 98)  BP: 128/75 (27 Oct 2023 21:07) (128/75 - 141/78)  RR: 18 (27 Oct 2023 21:07) (18 - 18)  SpO2: 100% (27 Oct 2023 21:07) (100% - 100%)    Parameters below as of 27 Oct 2023 21:07  Patient On (Oxygen Delivery Method): room air    GEN : Well appearing, NAD, non toxic.  ENT:  NCAT  EOMI neck supple non tender normal wob   CHest : cta bl   cards: s1 s2 heard rrr ,   Abd:  abdomen soft nt nd no rebound no guarding   neuro / msk: non focal 2+ equal pulses, within 2-second capillary refill.  Abrasion to left elbow with tenderness to palpation to site.  No other bony tenderness to palpation throughout.  No signs of trauma to trunk.  No visible injuries or bony tenderness to palpation to bilateral lower extremities.  .  LUE :tenderness/ swelling, and abrasion to left elbow

## 2023-10-27 NOTE — PATIENT PROFILE ADULT - FALL HARM RISK - HARM RISK INTERVENTIONS

## 2023-10-28 LAB
ANION GAP SERPL CALC-SCNC: 13 MMOL/L — SIGNIFICANT CHANGE UP (ref 7–14)
ANION GAP SERPL CALC-SCNC: 13 MMOL/L — SIGNIFICANT CHANGE UP (ref 7–14)
APPEARANCE UR: CLEAR — SIGNIFICANT CHANGE UP
APPEARANCE UR: CLEAR — SIGNIFICANT CHANGE UP
BACTERIA # UR AUTO: ABNORMAL /HPF
BACTERIA # UR AUTO: ABNORMAL /HPF
BILIRUB UR-MCNC: NEGATIVE — SIGNIFICANT CHANGE UP
BILIRUB UR-MCNC: NEGATIVE — SIGNIFICANT CHANGE UP
BUN SERPL-MCNC: 20 MG/DL — SIGNIFICANT CHANGE UP (ref 10–20)
BUN SERPL-MCNC: 20 MG/DL — SIGNIFICANT CHANGE UP (ref 10–20)
CALCIUM SERPL-MCNC: 9.6 MG/DL — SIGNIFICANT CHANGE UP (ref 8.4–10.5)
CALCIUM SERPL-MCNC: 9.6 MG/DL — SIGNIFICANT CHANGE UP (ref 8.4–10.5)
CHLORIDE SERPL-SCNC: 96 MMOL/L — LOW (ref 98–110)
CHLORIDE SERPL-SCNC: 96 MMOL/L — LOW (ref 98–110)
CO2 SERPL-SCNC: 22 MMOL/L — SIGNIFICANT CHANGE UP (ref 17–32)
CO2 SERPL-SCNC: 22 MMOL/L — SIGNIFICANT CHANGE UP (ref 17–32)
COLOR SPEC: YELLOW — SIGNIFICANT CHANGE UP
COLOR SPEC: YELLOW — SIGNIFICANT CHANGE UP
CREAT SERPL-MCNC: 1.3 MG/DL — SIGNIFICANT CHANGE UP (ref 0.7–1.5)
CREAT SERPL-MCNC: 1.3 MG/DL — SIGNIFICANT CHANGE UP (ref 0.7–1.5)
DIFF PNL FLD: ABNORMAL
DIFF PNL FLD: ABNORMAL
EGFR: 54 ML/MIN/1.73M2 — LOW
EGFR: 54 ML/MIN/1.73M2 — LOW
EPI CELLS # UR: PRESENT
EPI CELLS # UR: PRESENT
GLUCOSE SERPL-MCNC: 78 MG/DL — SIGNIFICANT CHANGE UP (ref 70–99)
GLUCOSE SERPL-MCNC: 78 MG/DL — SIGNIFICANT CHANGE UP (ref 70–99)
GLUCOSE UR QL: NEGATIVE MG/DL — SIGNIFICANT CHANGE UP
GLUCOSE UR QL: NEGATIVE MG/DL — SIGNIFICANT CHANGE UP
HCT VFR BLD CALC: 38.4 % — LOW (ref 42–52)
HCT VFR BLD CALC: 38.4 % — LOW (ref 42–52)
HGB BLD-MCNC: 13.4 G/DL — LOW (ref 14–18)
HGB BLD-MCNC: 13.4 G/DL — LOW (ref 14–18)
KETONES UR-MCNC: 40 MG/DL
KETONES UR-MCNC: 40 MG/DL
LEUKOCYTE ESTERASE UR-ACNC: ABNORMAL
LEUKOCYTE ESTERASE UR-ACNC: ABNORMAL
MAGNESIUM SERPL-MCNC: 2 MG/DL — SIGNIFICANT CHANGE UP (ref 1.8–2.4)
MAGNESIUM SERPL-MCNC: 2 MG/DL — SIGNIFICANT CHANGE UP (ref 1.8–2.4)
MCHC RBC-ENTMCNC: 30 PG — SIGNIFICANT CHANGE UP (ref 27–31)
MCHC RBC-ENTMCNC: 30 PG — SIGNIFICANT CHANGE UP (ref 27–31)
MCHC RBC-ENTMCNC: 34.9 G/DL — SIGNIFICANT CHANGE UP (ref 32–37)
MCHC RBC-ENTMCNC: 34.9 G/DL — SIGNIFICANT CHANGE UP (ref 32–37)
MCV RBC AUTO: 86.1 FL — SIGNIFICANT CHANGE UP (ref 80–94)
MCV RBC AUTO: 86.1 FL — SIGNIFICANT CHANGE UP (ref 80–94)
NITRITE UR-MCNC: POSITIVE
NITRITE UR-MCNC: POSITIVE
NRBC # BLD: 0 /100 WBCS — SIGNIFICANT CHANGE UP (ref 0–0)
NRBC # BLD: 0 /100 WBCS — SIGNIFICANT CHANGE UP (ref 0–0)
PH UR: 6.5 — SIGNIFICANT CHANGE UP (ref 5–8)
PH UR: 6.5 — SIGNIFICANT CHANGE UP (ref 5–8)
PHOSPHATE SERPL-MCNC: 3.1 MG/DL — SIGNIFICANT CHANGE UP (ref 2.1–4.9)
PHOSPHATE SERPL-MCNC: 3.1 MG/DL — SIGNIFICANT CHANGE UP (ref 2.1–4.9)
PLATELET # BLD AUTO: 174 K/UL — SIGNIFICANT CHANGE UP (ref 130–400)
PLATELET # BLD AUTO: 174 K/UL — SIGNIFICANT CHANGE UP (ref 130–400)
PMV BLD: 9.5 FL — SIGNIFICANT CHANGE UP (ref 7.4–10.4)
PMV BLD: 9.5 FL — SIGNIFICANT CHANGE UP (ref 7.4–10.4)
POTASSIUM SERPL-MCNC: 4.8 MMOL/L — SIGNIFICANT CHANGE UP (ref 3.5–5)
POTASSIUM SERPL-MCNC: 4.8 MMOL/L — SIGNIFICANT CHANGE UP (ref 3.5–5)
POTASSIUM SERPL-SCNC: 4.8 MMOL/L — SIGNIFICANT CHANGE UP (ref 3.5–5)
POTASSIUM SERPL-SCNC: 4.8 MMOL/L — SIGNIFICANT CHANGE UP (ref 3.5–5)
PROT UR-MCNC: SIGNIFICANT CHANGE UP MG/DL
PROT UR-MCNC: SIGNIFICANT CHANGE UP MG/DL
RBC # BLD: 4.46 M/UL — LOW (ref 4.7–6.1)
RBC # BLD: 4.46 M/UL — LOW (ref 4.7–6.1)
RBC # FLD: 13.2 % — SIGNIFICANT CHANGE UP (ref 11.5–14.5)
RBC # FLD: 13.2 % — SIGNIFICANT CHANGE UP (ref 11.5–14.5)
RBC CASTS # UR COMP ASSIST: 15 /HPF — HIGH (ref 0–4)
RBC CASTS # UR COMP ASSIST: 15 /HPF — HIGH (ref 0–4)
SODIUM SERPL-SCNC: 131 MMOL/L — LOW (ref 135–146)
SODIUM SERPL-SCNC: 131 MMOL/L — LOW (ref 135–146)
SP GR SPEC: 1.01 — SIGNIFICANT CHANGE UP (ref 1–1.03)
SP GR SPEC: 1.01 — SIGNIFICANT CHANGE UP (ref 1–1.03)
UROBILINOGEN FLD QL: 1 MG/DL — SIGNIFICANT CHANGE UP (ref 0.2–1)
UROBILINOGEN FLD QL: 1 MG/DL — SIGNIFICANT CHANGE UP (ref 0.2–1)
WBC # BLD: 5.75 K/UL — SIGNIFICANT CHANGE UP (ref 4.8–10.8)
WBC # BLD: 5.75 K/UL — SIGNIFICANT CHANGE UP (ref 4.8–10.8)
WBC # FLD AUTO: 5.75 K/UL — SIGNIFICANT CHANGE UP (ref 4.8–10.8)
WBC # FLD AUTO: 5.75 K/UL — SIGNIFICANT CHANGE UP (ref 4.8–10.8)
WBC UR QL: 20 /HPF — HIGH (ref 0–5)
WBC UR QL: 20 /HPF — HIGH (ref 0–5)

## 2023-10-28 PROCEDURE — 99233 SBSQ HOSP IP/OBS HIGH 50: CPT

## 2023-10-28 RX ORDER — SENNA PLUS 8.6 MG/1
2 TABLET ORAL AT BEDTIME
Refills: 0 | Status: DISCONTINUED | OUTPATIENT
Start: 2023-10-28 | End: 2023-11-01

## 2023-10-28 RX ORDER — CEFAZOLIN SODIUM 1 G
1000 VIAL (EA) INJECTION EVERY 8 HOURS
Refills: 0 | Status: DISCONTINUED | OUTPATIENT
Start: 2023-10-29 | End: 2023-11-01

## 2023-10-28 RX ORDER — CEFAZOLIN SODIUM 1 G
1000 VIAL (EA) INJECTION ONCE
Refills: 0 | Status: COMPLETED | OUTPATIENT
Start: 2023-10-28 | End: 2023-10-29

## 2023-10-28 RX ORDER — CEPHALEXIN 500 MG
500 CAPSULE ORAL
Refills: 0 | Status: DISCONTINUED | OUTPATIENT
Start: 2023-10-28 | End: 2023-10-28

## 2023-10-28 RX ORDER — LACTULOSE 10 G/15ML
10 SOLUTION ORAL DAILY
Refills: 0 | Status: DISCONTINUED | OUTPATIENT
Start: 2023-10-28 | End: 2023-11-01

## 2023-10-28 RX ORDER — SODIUM CHLORIDE 9 MG/ML
1000 INJECTION INTRAMUSCULAR; INTRAVENOUS; SUBCUTANEOUS
Refills: 0 | Status: DISCONTINUED | OUTPATIENT
Start: 2023-10-28 | End: 2023-10-28

## 2023-10-28 RX ORDER — CEFAZOLIN SODIUM 1 G
VIAL (EA) INJECTION
Refills: 0 | Status: DISCONTINUED | OUTPATIENT
Start: 2023-10-29 | End: 2023-11-01

## 2023-10-28 RX ORDER — LANOLIN ALCOHOL/MO/W.PET/CERES
5 CREAM (GRAM) TOPICAL AT BEDTIME
Refills: 0 | Status: DISCONTINUED | OUTPATIENT
Start: 2023-10-28 | End: 2023-11-01

## 2023-10-28 RX ADMIN — SODIUM CHLORIDE 50 MILLILITER(S): 9 INJECTION INTRAMUSCULAR; INTRAVENOUS; SUBCUTANEOUS at 03:18

## 2023-10-28 RX ADMIN — PANTOPRAZOLE SODIUM 40 MILLIGRAM(S): 20 TABLET, DELAYED RELEASE ORAL at 05:21

## 2023-10-28 RX ADMIN — CHLORHEXIDINE GLUCONATE 1 APPLICATION(S): 213 SOLUTION TOPICAL at 05:22

## 2023-10-28 RX ADMIN — LISINOPRIL 5 MILLIGRAM(S): 2.5 TABLET ORAL at 05:21

## 2023-10-28 RX ADMIN — HEPARIN SODIUM 5000 UNIT(S): 5000 INJECTION INTRAVENOUS; SUBCUTANEOUS at 05:21

## 2023-10-28 RX ADMIN — HEPARIN SODIUM 5000 UNIT(S): 5000 INJECTION INTRAVENOUS; SUBCUTANEOUS at 17:19

## 2023-10-28 RX ADMIN — Medication 500 MILLIGRAM(S): at 17:19

## 2023-10-28 RX ADMIN — LACTULOSE 10 GRAM(S): 10 SOLUTION ORAL at 17:18

## 2023-10-28 NOTE — CONSULT NOTE ADULT - SUBJECTIVE AND OBJECTIVE BOX
Orthopedic Consult  Time Consult called: 800 AM ; Time Patient seen: ;   CC: Asked to evaluate left olecranon fracture by Medicine service.    HPI:  88y Male patient presented to [facility] with complaints of pain about above noted fracture.  Patient most likely sustained the fracture as a result of a GLF one week ago.  Head injury worked up extensively.  Stable intracranial bleed.  Admitted for CHI w/u and now for rehabilitation.    Radiographs were attained, confirming above noted fracture and Orthopedics called to evaluate.    Patient with mild complaints of pain localized to above noted fracture with no radiation.  Patient denies any sensory complaints associated with the fracture.  Denies history of pain in the area of the fracture.      Review of Symptoms: No recent fevers, night sweats or other constitutional symptoms.  No unexplained weight loss, weight gain, bowl or bladder disturbance.  Hx of falls.      PMedHx and PsurgHx:     HTN (hypertension)    Neurogenic bladder - Waldron catheter in place    Now with H/O intracranial hemorrhage      Medications: chlorhexidine 2% Cloths 1 Application(s) Topical <User Schedule>  heparin   Injectable 5000 Unit(s) SubCutaneous every 8 hours  influenza  Vaccine (HIGH DOSE) 0.7 milliLiter(s) IntraMuscular once  lisinopril 5 milliGRAM(s) Oral daily  pantoprazole    Tablet 40 milliGRAM(s) Oral before breakfast  sodium chloride 0.9%. 1000 milliLiter(s) IV Continuous <Continuous>    Allergies: No Known Allergies      Social: Ambulatory status walks with assistive device or at least he should.  Cigarette Use: denies current  ETOH use: social; no evidence of abuse  Drugs: Denies.    Physical Exam    Laboratory:                        13.4   6.69  )-----------( 190      ( 27 Oct 2023 18:43 )             37.0       10-27    127<L>  |  91<L>  |  23<H>  ----------------------------<  90  5.0   |  23  |  1.6<H>    Ca    9.8      27 Oct 2023 18:43    TPro  7.0  /  Alb  4.3  /  TBili  0.9  /  DBili  x   /  AST  33  /  ALT  17  /  AlkPhos  84  10-27      10-27    127<L>  |  91<L>  |  23<H>  ----------------------------<  90  5.0   |  23  |  1.6<H>    Ca    9.8      27 Oct 2023 18:43    TPro  7.0  /  Alb  4.3  /  TBili  0.9  /  DBili  x   /  AST  33  /  ALT  17  /  AlkPhos  84  10-27        Radiographs  Left elbow (AP, Lateral) reviewed: Non-dispalced left olecranon fracture.           Orthopedic Consult  Time Consult called: 800 AM ; Time Patient seen: ;   CC: Asked to evaluate left olecranon fracture by Medicine service.    HPI:  88y Male patient presented to [facility] with complaints of pain about above noted fracture.  Patient most likely sustained the fracture as a result of a GLF one week ago.  Head injury worked up extensively.  Stable intracranial bleed.  Admitted for CHI w/u and now for rehabilitation.    Radiographs were attained, confirming above noted fracture and Orthopedics called to evaluate.    Patient with mild complaints of pain localized to above noted fracture with no radiation.  Patient denies any sensory complaints associated with the fracture.  Denies history of pain in the area of the fracture.      Review of Symptoms: No recent fevers, night sweats or other constitutional symptoms.  No unexplained weight loss, weight gain, bowl or bladder disturbance.  Hx of falls.      PMedHx and PsurgHx:     HTN (hypertension)    Neurogenic bladder - Waldron catheter in place    Now with H/O intracranial hemorrhage      Medications: chlorhexidine 2% Cloths 1 Application(s) Topical <User Schedule>  heparin   Injectable 5000 Unit(s) SubCutaneous every 8 hours  influenza  Vaccine (HIGH DOSE) 0.7 milliLiter(s) IntraMuscular once  lisinopril 5 milliGRAM(s) Oral daily  pantoprazole    Tablet 40 milliGRAM(s) Oral before breakfast  sodium chloride 0.9%. 1000 milliLiter(s) IV Continuous <Continuous>    Allergies: No Known Allergies      Social: Ambulatory status walks with assistive device or at least he should.  Cigarette Use: denies current  ETOH use: social; no evidence of abuse  Drugs: Denies.    Physical Exam    Laboratory:                        13.4   6.69  )-----------( 190      ( 27 Oct 2023 18:43 )             37.0       10-27    127<L>  |  91<L>  |  23<H>  ----------------------------<  90  5.0   |  23  |  1.6<H>    Ca    9.8      27 Oct 2023 18:43    TPro  7.0  /  Alb  4.3  /  TBili  0.9  /  DBili  x   /  AST  33  /  ALT  17  /  AlkPhos  84  10-27      10-27    127<L>  |  91<L>  |  23<H>  ----------------------------<  90  5.0   |  23  |  1.6<H>    Ca    9.8      27 Oct 2023 18:43    TPro  7.0  /  Alb  4.3  /  TBili  0.9  /  DBili  x   /  AST  33  /  ALT  17  /  AlkPhos  84  10-27        Radiographs  Left elbow (AP, Lateral) reviewed: Non-displaced left olecranon fracture.    Physical Exam  Left elbow: small pin prick laceration adjacent to fracture associated with ecchymosis.  Elbow extension grossly intact.  Nl light sens about fingers with intact intrinsic hand function.  2+ radial pulse with good capillary refill/  No surrounding erythema.

## 2023-10-28 NOTE — PROGRESS NOTE ADULT - SUBJECTIVE AND OBJECTIVE BOX
SUBJECTIVE:    Patient is a 88y old Male who presents with a chief complaint of s/p fall (28 Oct 2023 09:05)    Currently admitted to medicine with the primary diagnosis of Fall     Today is hospital day 1d. This morning he is resting in bed and reports no new issues or overnight events.     PAST MEDICAL & SURGICAL HISTORY  HTN (hypertension)    Neurogenic bladder    Waldron catheter in place  2016    No significant past surgical history      SOCIAL HISTORY:  Negative for smoking/alcohol/drug use.     ALLERGIES:  No Known Allergies    MEDICATIONS:  STANDING MEDICATIONS  chlorhexidine 2% Cloths 1 Application(s) Topical <User Schedule>  heparin   Injectable 5000 Unit(s) SubCutaneous every 8 hours  influenza  Vaccine (HIGH DOSE) 0.7 milliLiter(s) IntraMuscular once  lisinopril 5 milliGRAM(s) Oral daily  pantoprazole    Tablet 40 milliGRAM(s) Oral before breakfast    PRN MEDICATIONS    VITALS:   T(F): 98.2  HR: 88  BP: 140/64  RR: 18  SpO2: 100%    LABS:                        13.4   5.75  )-----------( 174      ( 28 Oct 2023 08:27 )             38.4     10-28    131<L>  |  96<L>  |  20  ----------------------------<  78  4.8   |  22  |  1.3    Ca    9.6      28 Oct 2023 08:27  Phos  3.1     10-28  Mg     2.0     10-28    TPro  7.0  /  Alb  4.3  /  TBili  0.9  /  DBili  x   /  AST  33  /  ALT  17  /  AlkPhos  84  10-27    PT/INR - ( 27 Oct 2023 18:43 )   PT: 12.80 sec;   INR: 1.12 ratio         PTT - ( 27 Oct 2023 18:43 )  PTT:32.0 sec  Urinalysis Basic - ( 28 Oct 2023 08:27 )    Color: x / Appearance: x / SG: x / pH: x  Gluc: 78 mg/dL / Ketone: x  / Bili: x / Urobili: x   Blood: x / Protein: x / Nitrite: x   Leuk Esterase: x / RBC: x / WBC x   Sq Epi: x / Non Sq Epi: x / Bacteria: x      RADIOLOGY:  CT Head No Cont (10.27.23): the hemorrhage and surrounding edema in the right posterior parietal-occipital region are unchanged when compared with the most recent MRI of 10/26/2023.    PHYSICAL EXAM:  GEN: No acute distress  LUNGS: Clear to auscultation bilaterally   HEART: S1/S2 present. RRR.   ABD: Soft, non-tender, non-distended. Bowel sounds present  EXT: NC/NC/NE/2+PP/ANDREA; left elbow sling  NEURO: AAOX3  SKIN: intact   (+) Waldron

## 2023-10-28 NOTE — CONSULT NOTE ADULT - ASSESSMENT
89 yo HD man s/p fall one week ago with non-displaced left olecranon fracture.   89 yo RHD man s/p fall one week ago with non-displaced left olecranon fracture.  Wound a little concerning for type I open fracture, but extensor mechanism intact.  DIscussed injury with delonn and cover medical doctor.  Patient does not want surgery.  Explained may be increased risk of infection.  Abiding by his wishes, will continue to manage non-operatively.  Extension splint, to be converted to extension cast Monday, after wound check.  Consider 10 days of Abx.  Ancef while in hospital converted to Keflex on discharge.  F/u in my office in 1-2 weeks.

## 2023-10-28 NOTE — PROGRESS NOTE ADULT - ASSESSMENT
88 yr old male with hx of HTN, BPH, Neurogenic Bladder w/ indwelling yañez, BL cataract surgery with recent admission of acute hemorrhage on CTH presnted to ER for fall.     # Mechanical Fall   # Acute hemorrhage in R parieto-occipital region w/ surrounding edema - stable  # Non-displaced left olecranon fracture  # Suspected mild dementia   - hemodynamically stable  - AAOx3  - check orthostatics   - start Keflex 500 mg 4 times daily (total 10 days)  - ortho f/u   - Fall and Aspiration precautions  - Physiatry eval/Physical therapy    # HTN / HLD  - c/w lisinopril and Statin     # Neurogenic Bladder ( refused self catheterization in the passed)  # Hx of TURP  # CKD3 (Baseline Cr 1.3-1.5)  - c/w yañez    # Constipation   - last BM 3 days ago   - start Miralax BID, Senna    # DVT prophylaxis   - heparin s/q     # Full code

## 2023-10-29 LAB
ANION GAP SERPL CALC-SCNC: 14 MMOL/L — SIGNIFICANT CHANGE UP (ref 7–14)
ANION GAP SERPL CALC-SCNC: 14 MMOL/L — SIGNIFICANT CHANGE UP (ref 7–14)
BASOPHILS # BLD AUTO: 0.02 K/UL — SIGNIFICANT CHANGE UP (ref 0–0.2)
BASOPHILS # BLD AUTO: 0.02 K/UL — SIGNIFICANT CHANGE UP (ref 0–0.2)
BASOPHILS NFR BLD AUTO: 0.3 % — SIGNIFICANT CHANGE UP (ref 0–1)
BASOPHILS NFR BLD AUTO: 0.3 % — SIGNIFICANT CHANGE UP (ref 0–1)
BUN SERPL-MCNC: 18 MG/DL — SIGNIFICANT CHANGE UP (ref 10–20)
BUN SERPL-MCNC: 18 MG/DL — SIGNIFICANT CHANGE UP (ref 10–20)
CALCIUM SERPL-MCNC: 9.4 MG/DL — SIGNIFICANT CHANGE UP (ref 8.4–10.5)
CALCIUM SERPL-MCNC: 9.4 MG/DL — SIGNIFICANT CHANGE UP (ref 8.4–10.5)
CHLORIDE SERPL-SCNC: 91 MMOL/L — LOW (ref 98–110)
CHLORIDE SERPL-SCNC: 91 MMOL/L — LOW (ref 98–110)
CO2 SERPL-SCNC: 22 MMOL/L — SIGNIFICANT CHANGE UP (ref 17–32)
CO2 SERPL-SCNC: 22 MMOL/L — SIGNIFICANT CHANGE UP (ref 17–32)
CREAT SERPL-MCNC: 1.3 MG/DL — SIGNIFICANT CHANGE UP (ref 0.7–1.5)
CREAT SERPL-MCNC: 1.3 MG/DL — SIGNIFICANT CHANGE UP (ref 0.7–1.5)
EGFR: 54 ML/MIN/1.73M2 — LOW
EGFR: 54 ML/MIN/1.73M2 — LOW
EOSINOPHIL # BLD AUTO: 0.02 K/UL — SIGNIFICANT CHANGE UP (ref 0–0.7)
EOSINOPHIL # BLD AUTO: 0.02 K/UL — SIGNIFICANT CHANGE UP (ref 0–0.7)
EOSINOPHIL NFR BLD AUTO: 0.3 % — SIGNIFICANT CHANGE UP (ref 0–8)
EOSINOPHIL NFR BLD AUTO: 0.3 % — SIGNIFICANT CHANGE UP (ref 0–8)
GLUCOSE SERPL-MCNC: 93 MG/DL — SIGNIFICANT CHANGE UP (ref 70–99)
GLUCOSE SERPL-MCNC: 93 MG/DL — SIGNIFICANT CHANGE UP (ref 70–99)
HCT VFR BLD CALC: 38.4 % — LOW (ref 42–52)
HCT VFR BLD CALC: 38.4 % — LOW (ref 42–52)
HGB BLD-MCNC: 13.7 G/DL — LOW (ref 14–18)
HGB BLD-MCNC: 13.7 G/DL — LOW (ref 14–18)
IMM GRANULOCYTES NFR BLD AUTO: 0.2 % — SIGNIFICANT CHANGE UP (ref 0.1–0.3)
IMM GRANULOCYTES NFR BLD AUTO: 0.2 % — SIGNIFICANT CHANGE UP (ref 0.1–0.3)
LYMPHOCYTES # BLD AUTO: 0.68 K/UL — LOW (ref 1.2–3.4)
LYMPHOCYTES # BLD AUTO: 0.68 K/UL — LOW (ref 1.2–3.4)
LYMPHOCYTES # BLD AUTO: 10.8 % — LOW (ref 20.5–51.1)
LYMPHOCYTES # BLD AUTO: 10.8 % — LOW (ref 20.5–51.1)
MAGNESIUM SERPL-MCNC: 1.9 MG/DL — SIGNIFICANT CHANGE UP (ref 1.8–2.4)
MAGNESIUM SERPL-MCNC: 1.9 MG/DL — SIGNIFICANT CHANGE UP (ref 1.8–2.4)
MCHC RBC-ENTMCNC: 30.2 PG — SIGNIFICANT CHANGE UP (ref 27–31)
MCHC RBC-ENTMCNC: 30.2 PG — SIGNIFICANT CHANGE UP (ref 27–31)
MCHC RBC-ENTMCNC: 35.7 G/DL — SIGNIFICANT CHANGE UP (ref 32–37)
MCHC RBC-ENTMCNC: 35.7 G/DL — SIGNIFICANT CHANGE UP (ref 32–37)
MCV RBC AUTO: 84.6 FL — SIGNIFICANT CHANGE UP (ref 80–94)
MCV RBC AUTO: 84.6 FL — SIGNIFICANT CHANGE UP (ref 80–94)
MONOCYTES # BLD AUTO: 0.58 K/UL — SIGNIFICANT CHANGE UP (ref 0.1–0.6)
MONOCYTES # BLD AUTO: 0.58 K/UL — SIGNIFICANT CHANGE UP (ref 0.1–0.6)
MONOCYTES NFR BLD AUTO: 9.2 % — SIGNIFICANT CHANGE UP (ref 1.7–9.3)
MONOCYTES NFR BLD AUTO: 9.2 % — SIGNIFICANT CHANGE UP (ref 1.7–9.3)
NEUTROPHILS # BLD AUTO: 5 K/UL — SIGNIFICANT CHANGE UP (ref 1.4–6.5)
NEUTROPHILS # BLD AUTO: 5 K/UL — SIGNIFICANT CHANGE UP (ref 1.4–6.5)
NEUTROPHILS NFR BLD AUTO: 79.2 % — HIGH (ref 42.2–75.2)
NEUTROPHILS NFR BLD AUTO: 79.2 % — HIGH (ref 42.2–75.2)
NRBC # BLD: 0 /100 WBCS — SIGNIFICANT CHANGE UP (ref 0–0)
NRBC # BLD: 0 /100 WBCS — SIGNIFICANT CHANGE UP (ref 0–0)
PLATELET # BLD AUTO: 175 K/UL — SIGNIFICANT CHANGE UP (ref 130–400)
PLATELET # BLD AUTO: 175 K/UL — SIGNIFICANT CHANGE UP (ref 130–400)
PMV BLD: 9 FL — SIGNIFICANT CHANGE UP (ref 7.4–10.4)
PMV BLD: 9 FL — SIGNIFICANT CHANGE UP (ref 7.4–10.4)
POTASSIUM SERPL-MCNC: 3.9 MMOL/L — SIGNIFICANT CHANGE UP (ref 3.5–5)
POTASSIUM SERPL-MCNC: 3.9 MMOL/L — SIGNIFICANT CHANGE UP (ref 3.5–5)
POTASSIUM SERPL-SCNC: 3.9 MMOL/L — SIGNIFICANT CHANGE UP (ref 3.5–5)
POTASSIUM SERPL-SCNC: 3.9 MMOL/L — SIGNIFICANT CHANGE UP (ref 3.5–5)
RBC # BLD: 4.54 M/UL — LOW (ref 4.7–6.1)
RBC # BLD: 4.54 M/UL — LOW (ref 4.7–6.1)
RBC # FLD: 13.1 % — SIGNIFICANT CHANGE UP (ref 11.5–14.5)
RBC # FLD: 13.1 % — SIGNIFICANT CHANGE UP (ref 11.5–14.5)
SODIUM SERPL-SCNC: 127 MMOL/L — LOW (ref 135–146)
SODIUM SERPL-SCNC: 127 MMOL/L — LOW (ref 135–146)
WBC # BLD: 6.31 K/UL — SIGNIFICANT CHANGE UP (ref 4.8–10.8)
WBC # BLD: 6.31 K/UL — SIGNIFICANT CHANGE UP (ref 4.8–10.8)
WBC # FLD AUTO: 6.31 K/UL — SIGNIFICANT CHANGE UP (ref 4.8–10.8)
WBC # FLD AUTO: 6.31 K/UL — SIGNIFICANT CHANGE UP (ref 4.8–10.8)

## 2023-10-29 PROCEDURE — 99232 SBSQ HOSP IP/OBS MODERATE 35: CPT

## 2023-10-29 RX ORDER — SODIUM CHLORIDE 9 MG/ML
1000 INJECTION INTRAMUSCULAR; INTRAVENOUS; SUBCUTANEOUS
Refills: 0 | Status: DISCONTINUED | OUTPATIENT
Start: 2023-10-29 | End: 2023-10-30

## 2023-10-29 RX ADMIN — SODIUM CHLORIDE 75 MILLILITER(S): 9 INJECTION INTRAMUSCULAR; INTRAVENOUS; SUBCUTANEOUS at 11:51

## 2023-10-29 RX ADMIN — HEPARIN SODIUM 5000 UNIT(S): 5000 INJECTION INTRAVENOUS; SUBCUTANEOUS at 22:47

## 2023-10-29 RX ADMIN — Medication 100 MILLIGRAM(S): at 13:23

## 2023-10-29 RX ADMIN — Medication 100 MILLIGRAM(S): at 01:03

## 2023-10-29 RX ADMIN — HEPARIN SODIUM 5000 UNIT(S): 5000 INJECTION INTRAVENOUS; SUBCUTANEOUS at 05:09

## 2023-10-29 RX ADMIN — Medication 100 MILLIGRAM(S): at 05:09

## 2023-10-29 RX ADMIN — Medication 100 MILLIGRAM(S): at 22:47

## 2023-10-29 RX ADMIN — CHLORHEXIDINE GLUCONATE 1 APPLICATION(S): 213 SOLUTION TOPICAL at 05:10

## 2023-10-29 NOTE — PHYSICAL THERAPY INITIAL EVALUATION ADULT - MANUAL MUSCLE TESTING RESULTS, REHAB EVAL
RUE/BLE ms strength grossly graded 3 to 3+/5; (L) shoulder 3-/5; (L) elbow/forearm/wrist kept in splint

## 2023-10-29 NOTE — PHYSICAL THERAPY INITIAL EVALUATION ADULT - ACTIVE RANGE OF MOTION EXAMINATION, REHAB EVAL
LUE required AAROM to complete with (L) elbow/forearm/wrist kept in splint/Right UE Active ROM was WFL (within functional limits)/bilateral  lower extremity Active ROM was WFL (within functional limits)

## 2023-10-29 NOTE — PHYSICAL THERAPY INITIAL EVALUATION ADULT - NS ASR WT BEARING DETAIL LUE
as discussed with BRIGITTE Roberson with patient LUE with splint for olecranon fracture/nonweight-bearing

## 2023-10-29 NOTE — PROGRESS NOTE ADULT - SUBJECTIVE AND OBJECTIVE BOX
SUBJECTIVE:    Patient is a 88y old Male who presents with a chief complaint of s/p fall (28 Oct 2023 14:04)    Currently admitted to medicine with the primary diagnosis of Fall     Today is hospital day 2d. This morning he is resting in bed. Overnight patient was refusing PO meds.     PAST MEDICAL & SURGICAL HISTORY  HTN (hypertension)    Neurogenic bladder    Waldron catheter in place  2016    No significant past surgical history      SOCIAL HISTORY:  Negative for smoking/alcohol/drug use.     ALLERGIES:  No Known Allergies    MEDICATIONS:  STANDING MEDICATIONS  ceFAZolin   IVPB 1000 milliGRAM(s) IV Intermittent every 8 hours  ceFAZolin   IVPB      chlorhexidine 2% Cloths 1 Application(s) Topical <User Schedule>  heparin   Injectable 5000 Unit(s) SubCutaneous every 8 hours  influenza  Vaccine (HIGH DOSE) 0.7 milliLiter(s) IntraMuscular once  lactulose Syrup 10 Gram(s) Oral daily  lisinopril 5 milliGRAM(s) Oral daily  pantoprazole    Tablet 40 milliGRAM(s) Oral before breakfast  senna 2 Tablet(s) Oral at bedtime    PRN MEDICATIONS  melatonin 5 milliGRAM(s) Oral at bedtime PRN    VITALS:   T(F): 96.3  HR: 81  BP: 148/74  RR: 16  SpO2: 97%    LABS:                        13.7   6.31  )-----------( 175      ( 29 Oct 2023 08:37 )             38.4     10-29    127<L>  |  91<L>  |  18  ----------------------------<  93  3.9   |  22  |  1.3    Ca    9.4      29 Oct 2023 08:37  Phos  3.1     10-28  Mg     1.9     10-29    TPro  7.0  /  Alb  4.3  /  TBili  0.9  /  DBili  x   /  AST  33  /  ALT  17  /  AlkPhos  84  10-27    PT/INR - ( 27 Oct 2023 18:43 )   PT: 12.80 sec;   INR: 1.12 ratio         PTT - ( 27 Oct 2023 18:43 )  PTT:32.0 sec  Urinalysis Basic - ( 29 Oct 2023 08:37 )    Color: x / Appearance: x / SG: x / pH: x  Gluc: 93 mg/dL / Ketone: x  / Bili: x / Urobili: x   Blood: x / Protein: x / Nitrite: x   Leuk Esterase: x / RBC: x / WBC x   Sq Epi: x / Non Sq Epi: x / Bacteria: x        RADIOLOGY:  No images to review     PHYSICAL EXAM:  GEN: No acute distress  LUNGS: Clear to auscultation bilaterally   HEART: S1/S2 present. RRR.   ABD: Soft, non-tender, non-distended. Bowel sounds present  EXT: NC/NC/NE/2+PP/ANDREA; left elbow sling  NEURO: AAOX3  SKIN: intact   (+) Waldron

## 2023-10-29 NOTE — PROGRESS NOTE ADULT - ASSESSMENT
88 yr old male with hx of HTN, BPH, Neurogenic Bladder w/ indwelling yañez, BL cataract surgery with recent admission of acute hemorrhage on CTH presnted to ER for fall.     # Mechanical Fall   # Acute hemorrhage in R parieto-occipital region w/ surrounding edema - stable  # Non-displaced left olecranon fracture - open fracture   # Suspected mild dementia   - hemodynamically stable  - AAOx3  - check orthostatics   - on Ancef for open fracture --> change to Keflex 500 mg 4 times daily (total 10 days)  - ortho plan to f/u tomorrow for re-evaluation  - Fall and Aspiration precautions  - Physiatry eval/Physical therapy    # HTN / HLD  - c/w lisinopril and Statin     # Neurogenic Bladder ( refused self catheterization in the passed)  # Hx of TURP  # CKD3 (Baseline Cr 1.3-1.5)  - c/w yañez    # Constipation   - last BM 3 days ago   - start Miralax BID, Senna    # DVT prophylaxis   - heparin s/q     # Full code    Anticipated for discharge  88 yr old male with hx of HTN, BPH, Neurogenic Bladder w/ indwelling yañez, BL cataract surgery with recent admission of acute hemorrhage on CTH presnted to ER for fall.     # Mechanical Fall   # Acute hemorrhage in R parieto-occipital region w/ surrounding edema - stable  # Non-displaced left olecranon fracture - open fracture   # Suspected mild dementia   - hemodynamically stable  - AAOx3  - check orthostatics   - on Ancef for open fracture --> change to Keflex 500 mg 4 times daily (total 10 days)  - ortho plan to f/u tomorrow for re-evaluation  - Fall and Aspiration precautions  - Physiatry eval/Physical therapy    # Mild Hyponatremia   - start NS@75   - monitor bmp    # HTN / HLD  - c/w lisinopril and Statin     # Neurogenic Bladder ( refused self catheterization in the passed)  # Hx of TURP  # CKD3 (Baseline Cr 1.3-1.5)  - c/w yañez    # Constipation   - last BM 3 days ago   - start Miralax BID, Senna    # DVT prophylaxis   - heparin s/q     # Full code    Anticipated for discharge

## 2023-10-29 NOTE — PHYSICAL THERAPY INITIAL EVALUATION ADULT - GENERAL OBSERVATIONS, REHAB EVAL
11:05-11:30 Chart reviewed. Pt encountered sitting in chair,  may be seen by Physical Therapist as confirmed with Nurse. Patient denied pain and ready to walk now; +heplock RUE/yañez catheter/ splint ANDREWE

## 2023-10-29 NOTE — PHYSICAL THERAPY INITIAL EVALUATION ADULT - GAIT DEVIATIONS NOTED, PT EVAL
guarded posture, dec arm swing/decreased yuli/decreased step length/decreased weight-shifting ability

## 2023-10-29 NOTE — PHYSICAL THERAPY INITIAL EVALUATION ADULT - ADDITIONAL COMMENTS
Per patient, he lives in a high ranch home with 3-4 steps outside with no rails and 3-4 steps inside 2 with 2 rails to enter home; was not using any assistive device but has a cane at home

## 2023-10-29 NOTE — PHYSICAL THERAPY INITIAL EVALUATION ADULT - PERTINENT HX OF CURRENT PROBLEM, REHAB EVAL
87 y/o male admitted with diagnosis of Unspecified fall, presented to ED with (L) elbow pain s/p fall, found to have non-displaced (L) olecranon fracture, splinted in ED, seen by Neurosurgery for recent (R) occipital parietal IPH, also seen by Ortho for conservative management of (L) olecranon fracture

## 2023-10-30 LAB
ANION GAP SERPL CALC-SCNC: 14 MMOL/L — SIGNIFICANT CHANGE UP (ref 7–14)
ANION GAP SERPL CALC-SCNC: 14 MMOL/L — SIGNIFICANT CHANGE UP (ref 7–14)
BASOPHILS # BLD AUTO: 0.02 K/UL — SIGNIFICANT CHANGE UP (ref 0–0.2)
BASOPHILS # BLD AUTO: 0.02 K/UL — SIGNIFICANT CHANGE UP (ref 0–0.2)
BASOPHILS NFR BLD AUTO: 0.3 % — SIGNIFICANT CHANGE UP (ref 0–1)
BASOPHILS NFR BLD AUTO: 0.3 % — SIGNIFICANT CHANGE UP (ref 0–1)
BUN SERPL-MCNC: 17 MG/DL — SIGNIFICANT CHANGE UP (ref 10–20)
BUN SERPL-MCNC: 17 MG/DL — SIGNIFICANT CHANGE UP (ref 10–20)
CALCIUM SERPL-MCNC: 9.6 MG/DL — SIGNIFICANT CHANGE UP (ref 8.4–10.5)
CALCIUM SERPL-MCNC: 9.6 MG/DL — SIGNIFICANT CHANGE UP (ref 8.4–10.5)
CHLORIDE SERPL-SCNC: 95 MMOL/L — LOW (ref 98–110)
CHLORIDE SERPL-SCNC: 95 MMOL/L — LOW (ref 98–110)
CO2 SERPL-SCNC: 22 MMOL/L — SIGNIFICANT CHANGE UP (ref 17–32)
CO2 SERPL-SCNC: 22 MMOL/L — SIGNIFICANT CHANGE UP (ref 17–32)
CREAT SERPL-MCNC: 1.4 MG/DL — SIGNIFICANT CHANGE UP (ref 0.7–1.5)
CREAT SERPL-MCNC: 1.4 MG/DL — SIGNIFICANT CHANGE UP (ref 0.7–1.5)
EGFR: 49 ML/MIN/1.73M2 — LOW
EGFR: 49 ML/MIN/1.73M2 — LOW
EOSINOPHIL # BLD AUTO: 0.12 K/UL — SIGNIFICANT CHANGE UP (ref 0–0.7)
EOSINOPHIL # BLD AUTO: 0.12 K/UL — SIGNIFICANT CHANGE UP (ref 0–0.7)
EOSINOPHIL NFR BLD AUTO: 2 % — SIGNIFICANT CHANGE UP (ref 0–8)
EOSINOPHIL NFR BLD AUTO: 2 % — SIGNIFICANT CHANGE UP (ref 0–8)
GLUCOSE SERPL-MCNC: 141 MG/DL — HIGH (ref 70–99)
GLUCOSE SERPL-MCNC: 141 MG/DL — HIGH (ref 70–99)
HCT VFR BLD CALC: 36.6 % — LOW (ref 42–52)
HCT VFR BLD CALC: 36.6 % — LOW (ref 42–52)
HGB BLD-MCNC: 12.8 G/DL — LOW (ref 14–18)
HGB BLD-MCNC: 12.8 G/DL — LOW (ref 14–18)
IMM GRANULOCYTES NFR BLD AUTO: 0.3 % — SIGNIFICANT CHANGE UP (ref 0.1–0.3)
IMM GRANULOCYTES NFR BLD AUTO: 0.3 % — SIGNIFICANT CHANGE UP (ref 0.1–0.3)
LYMPHOCYTES # BLD AUTO: 0.89 K/UL — LOW (ref 1.2–3.4)
LYMPHOCYTES # BLD AUTO: 0.89 K/UL — LOW (ref 1.2–3.4)
LYMPHOCYTES # BLD AUTO: 14.9 % — LOW (ref 20.5–51.1)
LYMPHOCYTES # BLD AUTO: 14.9 % — LOW (ref 20.5–51.1)
MAGNESIUM SERPL-MCNC: 2 MG/DL — SIGNIFICANT CHANGE UP (ref 1.8–2.4)
MAGNESIUM SERPL-MCNC: 2 MG/DL — SIGNIFICANT CHANGE UP (ref 1.8–2.4)
MCHC RBC-ENTMCNC: 30.3 PG — SIGNIFICANT CHANGE UP (ref 27–31)
MCHC RBC-ENTMCNC: 30.3 PG — SIGNIFICANT CHANGE UP (ref 27–31)
MCHC RBC-ENTMCNC: 35 G/DL — SIGNIFICANT CHANGE UP (ref 32–37)
MCHC RBC-ENTMCNC: 35 G/DL — SIGNIFICANT CHANGE UP (ref 32–37)
MCV RBC AUTO: 86.5 FL — SIGNIFICANT CHANGE UP (ref 80–94)
MCV RBC AUTO: 86.5 FL — SIGNIFICANT CHANGE UP (ref 80–94)
MONOCYTES # BLD AUTO: 0.58 K/UL — SIGNIFICANT CHANGE UP (ref 0.1–0.6)
MONOCYTES # BLD AUTO: 0.58 K/UL — SIGNIFICANT CHANGE UP (ref 0.1–0.6)
MONOCYTES NFR BLD AUTO: 9.7 % — HIGH (ref 1.7–9.3)
MONOCYTES NFR BLD AUTO: 9.7 % — HIGH (ref 1.7–9.3)
NEUTROPHILS # BLD AUTO: 4.33 K/UL — SIGNIFICANT CHANGE UP (ref 1.4–6.5)
NEUTROPHILS # BLD AUTO: 4.33 K/UL — SIGNIFICANT CHANGE UP (ref 1.4–6.5)
NEUTROPHILS NFR BLD AUTO: 72.8 % — SIGNIFICANT CHANGE UP (ref 42.2–75.2)
NEUTROPHILS NFR BLD AUTO: 72.8 % — SIGNIFICANT CHANGE UP (ref 42.2–75.2)
NRBC # BLD: 0 /100 WBCS — SIGNIFICANT CHANGE UP (ref 0–0)
NRBC # BLD: 0 /100 WBCS — SIGNIFICANT CHANGE UP (ref 0–0)
PLATELET # BLD AUTO: 181 K/UL — SIGNIFICANT CHANGE UP (ref 130–400)
PLATELET # BLD AUTO: 181 K/UL — SIGNIFICANT CHANGE UP (ref 130–400)
PMV BLD: 9.4 FL — SIGNIFICANT CHANGE UP (ref 7.4–10.4)
PMV BLD: 9.4 FL — SIGNIFICANT CHANGE UP (ref 7.4–10.4)
POTASSIUM SERPL-MCNC: 4.6 MMOL/L — SIGNIFICANT CHANGE UP (ref 3.5–5)
POTASSIUM SERPL-MCNC: 4.6 MMOL/L — SIGNIFICANT CHANGE UP (ref 3.5–5)
POTASSIUM SERPL-SCNC: 4.6 MMOL/L — SIGNIFICANT CHANGE UP (ref 3.5–5)
POTASSIUM SERPL-SCNC: 4.6 MMOL/L — SIGNIFICANT CHANGE UP (ref 3.5–5)
RBC # BLD: 4.23 M/UL — LOW (ref 4.7–6.1)
RBC # BLD: 4.23 M/UL — LOW (ref 4.7–6.1)
RBC # FLD: 13.2 % — SIGNIFICANT CHANGE UP (ref 11.5–14.5)
RBC # FLD: 13.2 % — SIGNIFICANT CHANGE UP (ref 11.5–14.5)
SARS-COV-2 RNA SPEC QL NAA+PROBE: SIGNIFICANT CHANGE UP
SARS-COV-2 RNA SPEC QL NAA+PROBE: SIGNIFICANT CHANGE UP
SODIUM SERPL-SCNC: 131 MMOL/L — LOW (ref 135–146)
SODIUM SERPL-SCNC: 131 MMOL/L — LOW (ref 135–146)
VIT B1 SERPL-MCNC: 83.9 NMOL/L — SIGNIFICANT CHANGE UP (ref 66.5–200)
VIT B1 SERPL-MCNC: 83.9 NMOL/L — SIGNIFICANT CHANGE UP (ref 66.5–200)
WBC # BLD: 5.96 K/UL — SIGNIFICANT CHANGE UP (ref 4.8–10.8)
WBC # BLD: 5.96 K/UL — SIGNIFICANT CHANGE UP (ref 4.8–10.8)
WBC # FLD AUTO: 5.96 K/UL — SIGNIFICANT CHANGE UP (ref 4.8–10.8)
WBC # FLD AUTO: 5.96 K/UL — SIGNIFICANT CHANGE UP (ref 4.8–10.8)

## 2023-10-30 PROCEDURE — 99232 SBSQ HOSP IP/OBS MODERATE 35: CPT

## 2023-10-30 RX ORDER — SODIUM CHLORIDE 9 MG/ML
500 INJECTION INTRAMUSCULAR; INTRAVENOUS; SUBCUTANEOUS ONCE
Refills: 0 | Status: COMPLETED | OUTPATIENT
Start: 2023-10-30 | End: 2023-10-30

## 2023-10-30 RX ADMIN — HEPARIN SODIUM 5000 UNIT(S): 5000 INJECTION INTRAVENOUS; SUBCUTANEOUS at 14:22

## 2023-10-30 RX ADMIN — PANTOPRAZOLE SODIUM 40 MILLIGRAM(S): 20 TABLET, DELAYED RELEASE ORAL at 06:13

## 2023-10-30 RX ADMIN — HEPARIN SODIUM 5000 UNIT(S): 5000 INJECTION INTRAVENOUS; SUBCUTANEOUS at 06:13

## 2023-10-30 RX ADMIN — Medication 100 MILLIGRAM(S): at 14:22

## 2023-10-30 RX ADMIN — Medication 100 MILLIGRAM(S): at 21:53

## 2023-10-30 RX ADMIN — LISINOPRIL 5 MILLIGRAM(S): 2.5 TABLET ORAL at 06:13

## 2023-10-30 RX ADMIN — HEPARIN SODIUM 5000 UNIT(S): 5000 INJECTION INTRAVENOUS; SUBCUTANEOUS at 21:50

## 2023-10-30 RX ADMIN — Medication 100 MILLIGRAM(S): at 06:13

## 2023-10-30 RX ADMIN — SENNA PLUS 2 TABLET(S): 8.6 TABLET ORAL at 21:51

## 2023-10-30 RX ADMIN — CHLORHEXIDINE GLUCONATE 1 APPLICATION(S): 213 SOLUTION TOPICAL at 06:13

## 2023-10-30 RX ADMIN — SODIUM CHLORIDE 500 MILLILITER(S): 9 INJECTION INTRAMUSCULAR; INTRAVENOUS; SUBCUTANEOUS at 12:44

## 2023-10-30 NOTE — PROGRESS NOTE ADULT - SUBJECTIVE AND OBJECTIVE BOX
SUBJECTIVE:    Patient is a 88y old Male who presents with a chief complaint of s/p fall (30 Oct 2023 09:24)    Currently admitted to medicine with the primary diagnosis of Fall       Today is hospital day 3d. This morning he is resting comfortably in bed and reports no new issues or overnight events.     ROS:   CONSTITUTIONAL: No weakness, fevers or chills   EYES/ENT: No visual changes; No vertigo or throat pain   NECK: No pain or stiffness   RESPIRATORY: No cough, wheezing, hemoptysis; No shortness of breath   CARDIOVASCULAR: No chest pain or palpitations   GASTROINTESTINAL: No abdominal or epigastric pain. No nausea, vomiting, or hematemesis; No diarrhea or constipation. No melena or hematochezia.  GENITOURINARY: No dysuria, frequency or hematuria  NEUROLOGICAL: No numbness or weakness  SKIN: No itching, rashes      PAST MEDICAL & SURGICAL HISTORY  HTN (hypertension)    Neurogenic bladder    Yañez catheter in place  2016    No significant past surgical history      SOCIAL HISTORY:    ALLERGIES:  No Known Allergies    MEDICATIONS:  STANDING MEDICATIONS  ceFAZolin   IVPB 1000 milliGRAM(s) IV Intermittent every 8 hours  ceFAZolin   IVPB      chlorhexidine 2% Cloths 1 Application(s) Topical <User Schedule>  heparin   Injectable 5000 Unit(s) SubCutaneous every 8 hours  influenza  Vaccine (HIGH DOSE) 0.7 milliLiter(s) IntraMuscular once  lactulose Syrup 10 Gram(s) Oral daily  lisinopril 5 milliGRAM(s) Oral daily  pantoprazole    Tablet 40 milliGRAM(s) Oral before breakfast  senna 2 Tablet(s) Oral at bedtime  sodium chloride 0.9% Bolus 500 milliLiter(s) IV Bolus once  sodium chloride 0.9%. 1000 milliLiter(s) IV Continuous <Continuous>    PRN MEDICATIONS  melatonin 5 milliGRAM(s) Oral at bedtime PRN    VITALS:   T(F): 97.3  HR: 78  BP: 132/76  RR: 18  SpO2: --    LABS:  Negative for smoking/alcohol/drug use.                         12.8   5.96  )-----------( 181      ( 30 Oct 2023 04:30 )             36.6     10-30    131<L>  |  95<L>  |  17  ----------------------------<  141<H>  4.6   |  22  |  1.4    Ca    9.6      30 Oct 2023 04:30  Mg     2.0     10-30        Urinalysis Basic - ( 30 Oct 2023 04:30 )    Color: x / Appearance: x / SG: x / pH: x  Gluc: 141 mg/dL / Ketone: x  / Bili: x / Urobili: x   Blood: x / Protein: x / Nitrite: x   Leuk Esterase: x / RBC: x / WBC x   Sq Epi: x / Non Sq Epi: x / Bacteria: x        RADIOLOGY:    PHYSICAL EXAM:  GEN: No acute distress  HEENT: normocephalic, atraumatic, aniceteric  LUNGS: bl breath sounds   HEART: S1/S2 present. RRR, no murmurs  ABD: Soft, non-tender, non-distended. Bowel sounds present  EXT: no edema   NEURO: non focal   SKIN: L sided eyebrow scratch non bleeding / left arm in cast       ASSESSMENT AND PLAN:    88 yr old male with hx of HTN, BPH, Neurogenic Bladder w/ indwelling yañez, BL cataract surgery with recent admission of acute hemorrhage on CTH presnted to ER for fall.     # Mechanical Fall   # Acute hemorrhage in R parieto-occipital region w/ surrounding edema - stable  # Non-displaced left olecranon fracture - open fracture   # Suspected mild dementia   - hemodynamically stable  - AAOx3  - check orthostatics   - on Ancef for open fracture --> change to Keflex 500 mg 4 times daily (total 10 days)  - spoke to ortho PA / resident - no further inpatient plan , op fu in 1 week with Dr. Cunningham   - Fall and Aspiration precautions  - Physiatry eval/Physical therapy - STR     # Mild Hyponatremia - improved   - start NS@75   - monitor bmp    # HTN / HLD- c/w lisinopril and Statin     # Neurogenic Bladder ( refused self catheterization in the passed)  # Hx of TURP  # CKD3 (Baseline Cr 1.3-1.5)  - c/w chronic yañez     # Constipation   - reported bm yesterday   -on Miralax BID, Senna    #  dvt/ gi ppx/diet  # dispo: dc ready - pending placement to STR     Ishan Allen updated at bedside 10/30 , all questions answered and concerns addressed

## 2023-10-30 NOTE — CONSULT NOTE ADULT - SUBJECTIVE AND OBJECTIVE BOX
HPI: 89 yo Male with PMH HTN (BP medications discontinued 1 month ago by PCP), BPH, Neurogenic Bladder w/ indwelling yañez, Hx of Cholecystectomy, BL cataract surgery Presented (10/23) to [facility] ER c/o R forehead pain x1week a/w worsening confusion and weakness xfew months, episodes of "legs giving out", with known fall 1week prior (unwitnessed). Pt fell while getting out of bed, hit his left arm. Denies HT/LOC. Pt was found to have acute IPH on CT head so pt was admitted to stroke unit for further workup. Repeat scans showed stable bleed and AVM, Tumor, infarct, and venous thrombosis ruled out were ruled out as causes of the bleed. Physical therapy evaluated the patient and recommended outpatient physical therapy.    During the hospital course, patient had a Intracranial R parieto-occipital hemorrhage secondary 2/2 to trauma from fall. AVM, Tumor, infarct, and venous thrombosis ruled out were ruled out.     pt had multiple ct and mri at [facility] on previous admission    Patient [facility] for evaluation s/p fall. and family unable to care for him   family wants patient placed, unable  to manage at home, Needs ortho eval for elbow fx with overlying abrasion,  Splinted in ED   Case discussed with neurosurgery no change in CT from yesterday,  ptn seen and exam at  bed  side  nad  aox3  nad  fu  command  ptn  labs  ,imaging  , medical and rehab  notes  are appreciated  and  reviewed      PTN  REFERRED TO ACUTE  REHAB  FOR  EVAL AND  TX   PAST MEDICAL & SURGICAL HISTORY:  HTN (hypertension)      Neurogenic bladder      Yañez catheter in place  2016      No significant past surgical history          Hospital Course:    TODAY'S SUBJECTIVE & REVIEW OF SYMPTOMS:     Constitutional WNL   Cardio WNL   Resp WNL   GI WNL  Heme WNL  Endo WNL  Skin WNL  MSK WNL  Neuro WNL  Cognitive WNL  Psych WNL      MEDICATIONS  (STANDING):  ceFAZolin   IVPB      ceFAZolin   IVPB 1000 milliGRAM(s) IV Intermittent every 8 hours  chlorhexidine 2% Cloths 1 Application(s) Topical <User Schedule>  heparin   Injectable 5000 Unit(s) SubCutaneous every 8 hours  influenza  Vaccine (HIGH DOSE) 0.7 milliLiter(s) IntraMuscular once  lactulose Syrup 10 Gram(s) Oral daily  lisinopril 5 milliGRAM(s) Oral daily  pantoprazole    Tablet 40 milliGRAM(s) Oral before breakfast  senna 2 Tablet(s) Oral at bedtime  sodium chloride 0.9%. 1000 milliLiter(s) (75 mL/Hr) IV Continuous <Continuous>    MEDICATIONS  (PRN):  melatonin 5 milliGRAM(s) Oral at bedtime PRN Insomnia      FAMILY HISTORY:      Allergies    No Known Allergies    Intolerances        SOCIAL HISTORY:    [  ] Etoh  [  ] Smoking  [  ] Substance abuse     Home Environment:  [ x ] Home Alone  [ x ] Lives with Family  [  ] Home Health Aid    Dwelling:  [  ] Apartment  [ x ] Private House  [  ] Adult Home  [  ] Skilled Nursing Facility      [  ] Short Term  [  ] Long Term  [ x ] Stairs       Elevator [  ]    FUNCTIONAL STATUS PTA: (Check all that apply)  Ambulation: [x  ]Independent    [  ] Dependent     [  ] Non-Ambulatory  Assistive Device: [ x ] SA Cane  [  ]  Q Cane  [  x] Walker  [  ]  Wheelchair  ADL : [x  ] Independent  [  ]  Dependent       Vital Signs Last 24 Hrs  T(C): 36.3 (30 Oct 2023 06:05), Max: 36.3 (30 Oct 2023 06:05)  T(F): 97.3 (30 Oct 2023 06:05), Max: 97.3 (30 Oct 2023 06:05)  HR: 78 (30 Oct 2023 06:05) (78 - 87)  BP: 132/76 (30 Oct 2023 06:05) (111/58 - 177/84)  BP(mean): --  RR: 18 (30 Oct 2023 06:05) (16 - 18)  SpO2: --          PHYSICAL EXAM: Alert & Oriented X3  GENERAL: NAD, well-groomed, well-developed  HEAD:  Atraumatic, Normocephalic  EYES: EOMI, PERRLA, conjunctiva and sclera clear  NECK: Supple, No JVD, Normal thyroid  CHEST/LUNG: Clear to percussion bilaterally; No rales, rhonchi, wheezing, or rubs  HEART: Regular rate and rhythm; No murmurs, rubs, or gallops  ABDOMEN: Soft, Nontender, Nondistended; Bowel sounds present  EXTREMITIES:  2+ Peripheral Pulses, No clubbing, cyanosis, or edema    NERVOUS SYSTEM:  Cranial Nerves 2-12 intact [ x ] Abnormal  [  ]  ROM: WFL all extremities [  ]  Abnormal [ x ]  Motor Strength: WFL all extremities  [  ]  Abnormal [x  ]  Sensation: intact to light touch [ x ] Abnormal [  ]  Reflexes: Symmetric [ x ]  Abnormal [  ]    FUNCTIONAL STATUS:  Bed Mobility: Independent [  ]  Supervision [  ]  Needs Assistance [x  ]  N/A [  ]  Transfers: Independent [  ]  Supervision [  ]  Needs Assistance [ x ]  N/A [  ]   Ambulation: Independent [  ]  Supervision [  ]  Needs Assistance [x  ]  N/A [  ]  ADL: Independent [  ] Requires Assistance [  ] N/A [ x ]  SEE PT/OT IE NOTES    LABS:                        13.7   6.31  )-----------( 175      ( 29 Oct 2023 08:37 )             38.4     10-29    127<L>  |  91<L>  |  18  ----------------------------<  93  3.9   |  22  |  1.3    Ca    9.4      29 Oct 2023 08:37  Mg     1.9     10-29        Urinalysis Basic - ( 29 Oct 2023 08:37 )    Color: x / Appearance: x / SG: x / pH: x  Gluc: 93 mg/dL / Ketone: x  / Bili: x / Urobili: x   Blood: x / Protein: x / Nitrite: x   Leuk Esterase: x / RBC: x / WBC x   Sq Epi: x / Non Sq Epi: x / Bacteria: x        RADIOLOGY & ADDITIONAL STUDIES:< from: Xray Elbow AP + Lateral, Left (10.27.23 @ 18:13) >    Findings/  impression: Proximal ulnar nondisplaced fracture.    --- End of Report ---    < end of copied text >      Assesment:

## 2023-10-30 NOTE — CONSULT NOTE ADULT - ASSESSMENT
IMPRESSION: Rehab of 87 y/o m rehab  for  gd  sp  fall  lt  elbow  fx      PRECAUTIONS: [  ] Cardiac  [  ] Respiratory  [  ] Seizures [  ] Contact Isolation  [  ] Droplet Isolation  [ FALL ] Other    Weight Bearing Status: WBAT LT UE with the  splint      RECOMMENDATION:    Out of Bed to Chair     DVT/Decubiti Prophylaxis    REHAB PLAN:     [ x  ] Bedside P/T 3-5 times a week   [ x  ]   Bedside O/T  2-3 times a week             [   ] No Rehab Therapy Indicated                   [   ]  Speech Therapy   Conditioning/ROM                                    ADL  Bed Mobility                                               Conditioning/ROM  Transfers                                                     Bed Mobility  Sitting /Standing Balance                         Transfers                                        Gait Training                                               Sitting/Standing Balance  Stair Training [   ]Applicable                    Home equipment Eval                                                                        Splinting  [   ] Only      GOALS:   ADL   [ x ]   Independent                    Transfers  [  x ] Independent                          Ambulation  [  x ] Independent     [ x   ] With device                            [x ]  CG                                                         [   ]  CG                                                                  [   ] CG                            [    ] Min A                                                   [   ] Min A                                                              [   ] Min  A          DISCHARGE PLAN:   [   ]  Good candidate for Intensive Rehabilitation/Hospital based-4A [facility]                                             Will tolerate 3hrs Intensive Rehab Daily                                       [  xx  ]  Short Term Rehab in Skilled Nursing Facility and  [facility] care                                        [    ]  Home with Outpatient or VN services                                         [    ]  Possible Candidate for Intensive Hospital based Rehab

## 2023-10-31 ENCOUNTER — TRANSCRIPTION ENCOUNTER (OUTPATIENT)
Age: 88
End: 2023-10-31

## 2023-10-31 LAB
ANION GAP SERPL CALC-SCNC: 10 MMOL/L — SIGNIFICANT CHANGE UP (ref 7–14)
ANION GAP SERPL CALC-SCNC: 10 MMOL/L — SIGNIFICANT CHANGE UP (ref 7–14)
ANION GAP SERPL CALC-SCNC: 11 MMOL/L — SIGNIFICANT CHANGE UP (ref 7–14)
ANION GAP SERPL CALC-SCNC: 11 MMOL/L — SIGNIFICANT CHANGE UP (ref 7–14)
BUN SERPL-MCNC: 15 MG/DL — SIGNIFICANT CHANGE UP (ref 10–20)
CALCIUM SERPL-MCNC: 8.8 MG/DL — SIGNIFICANT CHANGE UP (ref 8.4–10.5)
CALCIUM SERPL-MCNC: 8.8 MG/DL — SIGNIFICANT CHANGE UP (ref 8.4–10.5)
CALCIUM SERPL-MCNC: 9.5 MG/DL — SIGNIFICANT CHANGE UP (ref 8.4–10.5)
CALCIUM SERPL-MCNC: 9.5 MG/DL — SIGNIFICANT CHANGE UP (ref 8.4–10.5)
CHLORIDE SERPL-SCNC: 94 MMOL/L — LOW (ref 98–110)
CO2 SERPL-SCNC: 20 MMOL/L — SIGNIFICANT CHANGE UP (ref 17–32)
CO2 SERPL-SCNC: 20 MMOL/L — SIGNIFICANT CHANGE UP (ref 17–32)
CO2 SERPL-SCNC: 25 MMOL/L — SIGNIFICANT CHANGE UP (ref 17–32)
CO2 SERPL-SCNC: 25 MMOL/L — SIGNIFICANT CHANGE UP (ref 17–32)
CREAT SERPL-MCNC: 1.3 MG/DL — SIGNIFICANT CHANGE UP (ref 0.7–1.5)
CREAT SERPL-MCNC: 1.3 MG/DL — SIGNIFICANT CHANGE UP (ref 0.7–1.5)
CREAT SERPL-MCNC: 1.4 MG/DL — SIGNIFICANT CHANGE UP (ref 0.7–1.5)
CREAT SERPL-MCNC: 1.4 MG/DL — SIGNIFICANT CHANGE UP (ref 0.7–1.5)
EGFR: 49 ML/MIN/1.73M2 — LOW
EGFR: 49 ML/MIN/1.73M2 — LOW
EGFR: 54 ML/MIN/1.73M2 — LOW
EGFR: 54 ML/MIN/1.73M2 — LOW
GLUCOSE SERPL-MCNC: 138 MG/DL — HIGH (ref 70–99)
GLUCOSE SERPL-MCNC: 138 MG/DL — HIGH (ref 70–99)
GLUCOSE SERPL-MCNC: 92 MG/DL — SIGNIFICANT CHANGE UP (ref 70–99)
GLUCOSE SERPL-MCNC: 92 MG/DL — SIGNIFICANT CHANGE UP (ref 70–99)
POTASSIUM SERPL-MCNC: 3.8 MMOL/L — SIGNIFICANT CHANGE UP (ref 3.5–5)
POTASSIUM SERPL-MCNC: 3.8 MMOL/L — SIGNIFICANT CHANGE UP (ref 3.5–5)
POTASSIUM SERPL-MCNC: 5.1 MMOL/L — HIGH (ref 3.5–5)
POTASSIUM SERPL-MCNC: 5.1 MMOL/L — HIGH (ref 3.5–5)
POTASSIUM SERPL-SCNC: 3.8 MMOL/L — SIGNIFICANT CHANGE UP (ref 3.5–5)
POTASSIUM SERPL-SCNC: 3.8 MMOL/L — SIGNIFICANT CHANGE UP (ref 3.5–5)
POTASSIUM SERPL-SCNC: 5.1 MMOL/L — HIGH (ref 3.5–5)
POTASSIUM SERPL-SCNC: 5.1 MMOL/L — HIGH (ref 3.5–5)
SODIUM SERPL-SCNC: 125 MMOL/L — LOW (ref 135–146)
SODIUM SERPL-SCNC: 125 MMOL/L — LOW (ref 135–146)
SODIUM SERPL-SCNC: 129 MMOL/L — LOW (ref 135–146)
SODIUM SERPL-SCNC: 129 MMOL/L — LOW (ref 135–146)

## 2023-10-31 PROCEDURE — 99233 SBSQ HOSP IP/OBS HIGH 50: CPT

## 2023-10-31 RX ORDER — SODIUM CHLORIDE 9 MG/ML
1000 INJECTION INTRAMUSCULAR; INTRAVENOUS; SUBCUTANEOUS
Refills: 0 | Status: DISCONTINUED | OUTPATIENT
Start: 2023-10-31 | End: 2023-10-31

## 2023-10-31 RX ADMIN — PANTOPRAZOLE SODIUM 40 MILLIGRAM(S): 20 TABLET, DELAYED RELEASE ORAL at 05:15

## 2023-10-31 RX ADMIN — Medication 5 MILLIGRAM(S): at 21:31

## 2023-10-31 RX ADMIN — CHLORHEXIDINE GLUCONATE 1 APPLICATION(S): 213 SOLUTION TOPICAL at 05:19

## 2023-10-31 RX ADMIN — LACTULOSE 10 GRAM(S): 10 SOLUTION ORAL at 14:17

## 2023-10-31 RX ADMIN — Medication 100 MILLIGRAM(S): at 14:45

## 2023-10-31 RX ADMIN — HEPARIN SODIUM 5000 UNIT(S): 5000 INJECTION INTRAVENOUS; SUBCUTANEOUS at 14:17

## 2023-10-31 RX ADMIN — Medication 100 MILLIGRAM(S): at 05:18

## 2023-10-31 RX ADMIN — HEPARIN SODIUM 5000 UNIT(S): 5000 INJECTION INTRAVENOUS; SUBCUTANEOUS at 05:15

## 2023-10-31 RX ADMIN — LISINOPRIL 5 MILLIGRAM(S): 2.5 TABLET ORAL at 05:15

## 2023-10-31 RX ADMIN — HEPARIN SODIUM 5000 UNIT(S): 5000 INJECTION INTRAVENOUS; SUBCUTANEOUS at 21:31

## 2023-10-31 RX ADMIN — SODIUM CHLORIDE 75 MILLILITER(S): 9 INJECTION INTRAMUSCULAR; INTRAVENOUS; SUBCUTANEOUS at 17:38

## 2023-10-31 RX ADMIN — Medication 100 MILLIGRAM(S): at 21:31

## 2023-10-31 NOTE — DISCHARGE NOTE PROVIDER - NSDCCPCAREPLAN_GEN_ALL_CORE_FT
PRINCIPAL DISCHARGE DIAGNOSIS  Diagnosis: Fall  Assessment and Plan of Treatment: you were seen by physical therapy ,were able to ambulate 25 feet; x 2, rest period in standing, seen by physiatry needs STR.      SECONDARY DISCHARGE DIAGNOSES  Diagnosis: Left elbow fracture  Assessment and Plan of Treatment: Image showed Non-displaced left olecranon fracture - open fracture , started on  on Ancef for open fracture - then changed to Keflex 500 mg 4 times daily (total 10 days), seen by ortho PA  - no further inpatient plan , op fu in 1 week with Dr. Cunningham    Diagnosis: Hyponatremia  Assessment and Plan of Treatment: treated with IV fluids, improved, follow up with your PCP to monitor sodium level    Diagnosis: H/O intracranial hemorrhage  Assessment and Plan of Treatment: CT head showed  Acute hemorrhage in R parieto-occipital region w/ surrounding edema - stable, neurosurgey was consulted, no acute intervention, follow up with neurosurgery as outpt.

## 2023-10-31 NOTE — DISCHARGE NOTE PROVIDER - HOSPITAL COURSE
88 yr old male with hx of HTN, BPH, Neurogenic Bladder w/ indwelling yañez, BL cataract surgery with recent admission of acute hemorrhage on CTH presnted to ER for fall.     # Mechanical Fall   # Acute hemorrhage in R parieto-occipital region w/ surrounding edema - stable  # Image showed Non-displaced left olecranon fracture - open fracture , started on  on Ancef for open fracture --> changed to Keflex 500 mg 4 times daily (total 10 days)  seen by ortho PA / resident - no further inpatient plan , op fu in 1 week with Dr. Cunningham   Pt seen by physical therapy ,was able to ambulate 25 feet; x 2, rest period in standing, seen by physiatry needs STR.   Pt with  Mild Hyponatremia , treated with IV fluids  improved   # Constipation , treated with Miralax and senna, resolved  Pt to follow up with pcp as outpt      ICU Vital Signs Last 24 Hrs  T(C): 35.6 (31 Oct 2023 04:34), Max: 36.3 (30 Oct 2023 21:36)  T(F): 96.1 (31 Oct 2023 04:34), Max: 97.4 (30 Oct 2023 21:36)  HR: 76 (31 Oct 2023 04:34) (76 - 78)  BP: 150/89 (31 Oct 2023 04:34) (132/83 - 150/89)  RR: 18 (31 Oct 2023 04:34) (18 - 18)  SpO2: 96% (30 Oct 2023 21:36) (96% - 96%)    O2 Parameters below as of 30 Oct 2023 21:36  Patient On (Oxygen Delivery Method): room air         88 yr old male with hx of HTN, BPH, Neurogenic Bladder w/ indwelling yañez, BL cataract surgery with recent admission of acute hemorrhage on CTH presnted to ER for fall.     # Mechanical Fall   # Acute hemorrhage in R parieto-occipital region w/ surrounding edema - stable  - Neurosx - no intervention ; fu Dr. Weinstein in 2 weeks   # Image showed Non-displaced left olecranon fracture - open fracture , started on  on Ancef for open fracture --> changed to Keflex 500 mg 4 times daily (total 10 days)  seen by ortho PA / resident - no further inpatient plan , op fu in 1 week with Dr. Cunningham   Pt seen by physical therapy ,was able to ambulate 25 feet; x 2, rest period in standing, seen by physiatry needs STR.   Pt with  Mild Hyponatremia , treated with IV fluids  improved   # Constipation , treated with Miralax and senna, resolved  Pt to follow up with pcp as outpt    Continue chronic Yañez for neurogenic bladder

## 2023-10-31 NOTE — DISCHARGE NOTE PROVIDER - NSDCFUSCHEDAPPT_GEN_ALL_CORE_FT
Bailey Miranda  [facility]  NEUROLOGY [address] [facility]  Scheduled Appointment: 11/08/2023    [facility]  UROLOGY [address] [facility]  Scheduled Appointment: 11/22/2023

## 2023-10-31 NOTE — DISCHARGE NOTE PROVIDER - NSDCMRMEDTOKEN_GEN_ALL_CORE_FT
lisinopril 5 mg oral tablet: 1 tab(s) orally once a day   cephalexin 500 mg oral tablet: 1 tab(s) orally 4 times a day  lactulose 10 g/15 mL oral syrup: 15 milliliter(s) orally once a day  lisinopril 5 mg oral tablet: 1 tab(s) orally once a day  senna leaf extract oral tablet: 2 tab(s) orally once a day (at bedtime)

## 2023-10-31 NOTE — CHART NOTE - NSCHARTNOTEFT_GEN_A_CORE
88y Male with PMH HTN (BP medications discontinued 1 month ago by PCP), BPH, Neurogenic Bladder w/ indwelling yañez, Hx of Cholecystectomy, BL cataract surgery Presented (10/23) c/o R forehead pain x1week a/w worsening confusion and weakness xfew months, episodes of "legs giving out", with known fall 1week prior (unwitnessed). Pt fell while getting out of bed, his his left arm. Denies HT/LOC. Pt was found to have acute IPH on CT head so pt was admitted to stroke unit for further workup. Repeat scans showed stable bleed and AVM, Tumor, infarct, and venous thrombosis ruled out were ruled out as causes of the bleed. Physical therapy evaluated the patient and recommended outpatient physical therapy.    IMPRESSION:    1. The hemorrhage and surrounding edema in the right posterior   parietal-occipital region are unchanged when compared with the most   recent MRI of 10/26/2023.    2. There is no acute subdural, epidural, or subarachnoid hemorrhage.    3. No evidence of depressed skull fracture.    The findings were discussed with Dr. Haynes at 7:10 PM 10/27/2023, with   read back.    Neurosurgery was consulted on 10/23 for R occipital parietal IPH. Repeat CTH was stable on MRI. Another repeat MRI was done per neurology showing slight increase in heme, with stable mass effect and edema. [facility]. Remains neurologically intact. Repeat CTH non contrast is stable.     PLAN   - No acute neurosurgical interventions  - Ok to keep patient at [facility] for rehab  - Obtain CTH for any change in mental status  - Can follow up outpatient with Dr. Weinstein in 2 weeks  - Discussed case with attending.
sodium level 125.  Hold off on IVF.  1 liter fluid restriction, check urine sodium, and urine &serum osm.  Nephrology consult
As per RN patient continues to refuse to take his oral meds including antihypertensives. May need alternatives though BP currently acceptable
Called by nurse for pt refuses po ABX.  called pharmacy to change to IV recommending  Ancef 1Gram ivp q8

## 2023-10-31 NOTE — DISCHARGE NOTE PROVIDER - CARE PROVIDERS DIRECT ADDRESSES
[email or web address]

## 2023-10-31 NOTE — DISCHARGE NOTE PROVIDER - PROVIDER TOKENS
PROVIDER:[TOKEN:[[id number]],FOLLOWUP:[1 week]],PROVIDER:[TOKEN:[[id number]],FOLLOWUP:[1 week]],PROVIDER:[TOKEN:[[id number]],FOLLOWUP:[2 weeks]]

## 2023-10-31 NOTE — DISCHARGE NOTE PROVIDER - CARE PROVIDER_API CALL
Kumar Acvees  Internal Medicine  [address] [place] [address]  Phone: [phone]  Fax: [phone]  Follow Up Time: 1 week    Kunal Cunningham  Orthopaedic Surgery  [address] [place] [address]  Phone: [phone]  Fax: [phone]  Follow Up Time: 1 week    Kunal Weinstein  Neurosurgery  [address] [place] [address], [address]  [place] [address]  Phone: [phone]  Fax: [phone]  Follow Up Time: 2 weeks

## 2023-10-31 NOTE — PROGRESS NOTE ADULT - SUBJECTIVE AND OBJECTIVE BOX
SUBJECTIVE:    Patient is a 88y old Male who presents with a chief complaint of s/p fall (31 Oct 2023 13:48)    Currently admitted to medicine with the primary diagnosis of Fall       Today is hospital day 4d. This morning he is resting comfortably in bed and reports no new issues or overnight events.     ROS:   CONSTITUTIONAL: No weakness, fevers or chills   EYES/ENT: No visual changes; No vertigo or throat pain   NECK: No pain or stiffness   RESPIRATORY: No cough, wheezing, hemoptysis; No shortness of breath   CARDIOVASCULAR: No chest pain or palpitations   GASTROINTESTINAL: No abdominal or epigastric pain. No nausea, vomiting, or hematemesis; No diarrhea or constipation. No melena or hematochezia.  GENITOURINARY: No dysuria, frequency or hematuria  NEUROLOGICAL: No numbness or weakness  SKIN: No itching, rashes      PAST MEDICAL & SURGICAL HISTORY  HTN (hypertension)    Neurogenic bladder    Yañez catheter in place  2016    No significant past surgical history      SOCIAL HISTORY:    ALLERGIES:  No Known Allergies    MEDICATIONS:  STANDING MEDICATIONS  ceFAZolin   IVPB 1000 milliGRAM(s) IV Intermittent every 8 hours  ceFAZolin   IVPB      chlorhexidine 2% Cloths 1 Application(s) Topical <User Schedule>  heparin   Injectable 5000 Unit(s) SubCutaneous every 8 hours  lactulose Syrup 10 Gram(s) Oral daily  lisinopril 5 milliGRAM(s) Oral daily  pantoprazole    Tablet 40 milliGRAM(s) Oral before breakfast  senna 2 Tablet(s) Oral at bedtime    PRN MEDICATIONS  melatonin 5 milliGRAM(s) Oral at bedtime PRN    VITALS:   T(F): 96.1  HR: 76  BP: 150/89  RR: 18  SpO2: 96%    LABS:  Negative for smoking/alcohol/drug use.                         12.8   5.96  )-----------( 181      ( 30 Oct 2023 04:30 )             36.6     10-31    129<L>  |  94<L>  |  15  ----------------------------<  138<H>  3.8   |  25  |  1.3    Ca    9.5      31 Oct 2023 04:30  Mg     2.0     10-30        Urinalysis Basic - ( 31 Oct 2023 04:30 )    Color: x / Appearance: x / SG: x / pH: x  Gluc: 138 mg/dL / Ketone: x  / Bili: x / Urobili: x   Blood: x / Protein: x / Nitrite: x   Leuk Esterase: x / RBC: x / WBC x   Sq Epi: x / Non Sq Epi: x / Bacteria: x    RADIOLOGY:    PHYSICAL EXAM:  GEN: No acute distress  HEENT: normocephalic, atraumatic, aniceteric  LUNGS: Clear to auscultation bilaterally, no rales/wheezing/ rhonchi  HEART: S1/S2 present. RRR, no murmurs  ABD: Soft, non-tender, non-distended. Bowel sounds present  EXT: warm  NEURO: AAOX2, normal affect      ASSESSMENT AND PLAN:    88 yr old male with hx of HTN, BPH, Neurogenic Bladder w/ indwelling yañez, BL cataract surgery with recent admission of acute hemorrhage on CTH presnted to ER for fall.     # Mechanical Fall   # Acute hemorrhage in R parieto-occipital region w/ surrounding edema - stable  # Non-displaced left olecranon fracture - open fracture   # Suspected mild dementia   - hemodynamically stable  - AAOx3  - check orthostatics   - on Ancef for open fracture --> change to Keflex 500 mg 4 times daily (total 10 days)  - spoke to ortho PA / resident - no further inpatient plan - cw with splint (no need for cast) , op fu in 1 week with Dr. Cunningham   - Fall and Aspiration precautions  - Physiatry eval/Physical therapy - STR     # Mild Hyponatremia - improved   - start NS@75   - monitor bmp    # HTN / HLD- c/w lisinopril and Statin     # Neurogenic Bladder ( refused self catheterization in the passed)  # Hx of TURP  # CKD3 (Baseline Cr 1.3-1.5)  - c/w chronic yañez     # Constipation   - reported bm yesterday   -on Miralax BID, Senna    #  dvt/ gi ppx/diet  # dispo: dc ready - pending auth and placement to STR     Ishan [place] updated at bedside 10/30 , all questions answered and concerns addressed    SUBJECTIVE:    Patient is a 88y old Male who presents with a chief complaint of s/p fall (31 Oct 2023 13:48)    Currently admitted to medicine with the primary diagnosis of Fall       Today is hospital day 4d. This morning he is resting comfortably in bed and reports no new issues or overnight events.     ROS:   CONSTITUTIONAL: No weakness, fevers or chills   EYES/ENT: No visual changes; No vertigo or throat pain   NECK: No pain or stiffness   RESPIRATORY: No cough, wheezing, hemoptysis; No shortness of breath   CARDIOVASCULAR: No chest pain or palpitations   GASTROINTESTINAL: No abdominal or epigastric pain. No nausea, vomiting, or hematemesis; No diarrhea or constipation. No melena or hematochezia.  GENITOURINARY: No dysuria, frequency or hematuria  NEUROLOGICAL: No numbness or weakness  SKIN: No itching, rashes      PAST MEDICAL & SURGICAL HISTORY  HTN (hypertension)    Neurogenic bladder    Yañez catheter in place  2016    No significant past surgical history      SOCIAL HISTORY:    ALLERGIES:  No Known Allergies    MEDICATIONS:  STANDING MEDICATIONS  ceFAZolin   IVPB 1000 milliGRAM(s) IV Intermittent every 8 hours  ceFAZolin   IVPB      chlorhexidine 2% Cloths 1 Application(s) Topical <User Schedule>  heparin   Injectable 5000 Unit(s) SubCutaneous every 8 hours  lactulose Syrup 10 Gram(s) Oral daily  lisinopril 5 milliGRAM(s) Oral daily  pantoprazole    Tablet 40 milliGRAM(s) Oral before breakfast  senna 2 Tablet(s) Oral at bedtime    PRN MEDICATIONS  melatonin 5 milliGRAM(s) Oral at bedtime PRN    VITALS:   T(F): 96.1  HR: 76  BP: 150/89  RR: 18  SpO2: 96%    LABS:  Negative for smoking/alcohol/drug use.                         12.8   5.96  )-----------( 181      ( 30 Oct 2023 04:30 )             36.6     10-31    129<L>  |  94<L>  |  15  ----------------------------<  138<H>  3.8   |  25  |  1.3    Ca    9.5      31 Oct 2023 04:30  Mg     2.0     10-30        Urinalysis Basic - ( 31 Oct 2023 04:30 )    Color: x / Appearance: x / SG: x / pH: x  Gluc: 138 mg/dL / Ketone: x  / Bili: x / Urobili: x   Blood: x / Protein: x / Nitrite: x   Leuk Esterase: x / RBC: x / WBC x   Sq Epi: x / Non Sq Epi: x / Bacteria: x    RADIOLOGY:    PHYSICAL EXAM:  GEN: No acute distress  HEENT: normocephalic, atraumatic, aniceteric  LUNGS: Clear to auscultation bilaterally, no rales/wheezing/ rhonchi  HEART: S1/S2 present. RRR, no murmurs  ABD: Soft, non-tender, non-distended. Bowel sounds present  EXT: warm  NEURO: AAOX2, normal affect      ASSESSMENT AND PLAN:    88 yr old male with hx of HTN, BPH, Neurogenic Bladder w/ indwelling yañez, BL cataract surgery with recent admission of acute hemorrhage on CTH presnted to ER for fall.     # Mechanical Fall   # Acute hemorrhage in R parieto-occipital region w/ surrounding edema - stable  # Non-displaced left olecranon fracture - open fracture   # Suspected mild dementia   - hemodynamically stable  - AAOx3  - check orthostatics   - on Ancef for open fracture --> change to Keflex 500 mg 4 times daily (total 10 days)  - spoke to ortho PA / resident - no further inpatient plan - cw with splint (no need for cast) , op fu in 1 week with Dr. Cunningham   - Fall and Aspiration precautions  - Physiatry eval/Physical therapy - STR     # Mild Hyponatremia   - start NS@75   - monitor bmp  - do not over-correct more than 8-10 meq in 24 hrs  - encourage solute intake     # HTN / HLD- c/w lisinopril and Statin     # Neurogenic Bladder ( refused self catheterization in the passed)  # Hx of TURP  # CKD3 (Baseline Cr 1.3-1.5)  - c/w chronic yañez     # Constipation   - reported bm yesterday   -on Miralax BID, Senna    # dvt/ gi ppx/diet  # dispo: pending auth and placement to STR   # handoff: fu bmp Na on IVF     Son Alejandro updated at bedside 10/30 , all questions answered and concerns addressed    SUBJECTIVE:    Patient is a 88y old Male who presents with a chief complaint of s/p fall (31 Oct 2023 13:48)    Currently admitted to medicine with the primary diagnosis of Fall       Today is hospital day 4d. This morning he is resting comfortably in bed and reports no new issues or overnight events.     ROS:   CONSTITUTIONAL: No weakness, fevers or chills   EYES/ENT: No visual changes; No vertigo or throat pain   NECK: No pain or stiffness   RESPIRATORY: No cough, wheezing, hemoptysis; No shortness of breath   CARDIOVASCULAR: No chest pain or palpitations   GASTROINTESTINAL: No abdominal or epigastric pain. No nausea, vomiting, or hematemesis; No diarrhea or constipation. No melena or hematochezia.  GENITOURINARY: No dysuria, frequency or hematuria  NEUROLOGICAL: No numbness or weakness  SKIN: No itching, rashes      PAST MEDICAL & SURGICAL HISTORY  HTN (hypertension)    Neurogenic bladder    Yañez catheter in place  2016    No significant past surgical history      SOCIAL HISTORY:    ALLERGIES:  No Known Allergies    MEDICATIONS:  STANDING MEDICATIONS  ceFAZolin   IVPB 1000 milliGRAM(s) IV Intermittent every 8 hours  ceFAZolin   IVPB      chlorhexidine 2% Cloths 1 Application(s) Topical <User Schedule>  heparin   Injectable 5000 Unit(s) SubCutaneous every 8 hours  lactulose Syrup 10 Gram(s) Oral daily  lisinopril 5 milliGRAM(s) Oral daily  pantoprazole    Tablet 40 milliGRAM(s) Oral before breakfast  senna 2 Tablet(s) Oral at bedtime    PRN MEDICATIONS  melatonin 5 milliGRAM(s) Oral at bedtime PRN    VITALS:   T(F): 96.1  HR: 76  BP: 150/89  RR: 18  SpO2: 96%    LABS:  Negative for smoking/alcohol/drug use.                         12.8   5.96  )-----------( 181      ( 30 Oct 2023 04:30 )             36.6     10-31    129<L>  |  94<L>  |  15  ----------------------------<  138<H>  3.8   |  25  |  1.3    Ca    9.5      31 Oct 2023 04:30  Mg     2.0     10-30        Urinalysis Basic - ( 31 Oct 2023 04:30 )    Color: x / Appearance: x / SG: x / pH: x  Gluc: 138 mg/dL / Ketone: x  / Bili: x / Urobili: x   Blood: x / Protein: x / Nitrite: x   Leuk Esterase: x / RBC: x / WBC x   Sq Epi: x / Non Sq Epi: x / Bacteria: x    RADIOLOGY:    PHYSICAL EXAM:  GEN: No acute distress  HEENT: normocephalic, atraumatic, aniceteric  LUNGS: Clear to auscultation bilaterally, no rales/wheezing/ rhonchi  HEART: S1/S2 present. RRR, no murmurs  ABD: Soft, non-tender, non-distended. Bowel sounds present  EXT: warm  NEURO: AAOX2, normal affect      ASSESSMENT AND PLAN:    88 yr old male with hx of HTN, BPH, Neurogenic Bladder w/ indwelling yañez, BL cataract surgery with recent admission of acute hemorrhage on CTH presnted to ER for fall.     # Mechanical Fall   # Acute hemorrhage in R parieto-occipital region w/ surrounding edema - stable  # Non-displaced left olecranon fracture - open fracture   # Suspected mild dementia   - hemodynamically stable  - AAOx3  - check orthostatics neg  - on Ancef for open fracture --> change to Keflex 500 mg 4 times daily (total 10 days)  - Neurosx - no intervention ; fu Dr. Weinstein in 2 weeks   - spoke to ortho PA / resident - no further inpatient plan - cw with splint (no need for cast) , op fu in 1 week with Dr. Cunningham   - Fall and Aspiration precautions  - Physiatry eval/Physical therapy - STR     # Mild Hyponatremia   - start NS@75   - monitor bmp  - do not over-correct more than 8-10 meq in 24 hrs  - encourage solute intake     # HTN / HLD- c/w lisinopril and Statin     # Neurogenic Bladder ( refused self catheterization in the passed)  # Hx of TURP  # CKD3 (Baseline Cr 1.3-1.5)  - c/w chronic yañez     # Constipation   - reported bm yesterday   -on Miralax BID, Senna    # dvt/ gi ppx/diet  # dispo: pending auth and placement to STR   # handoff: fu bmp Na on IVF     Son Alejandro updated at bedside 10/30 , all questions answered and concerns addressed

## 2023-11-01 ENCOUNTER — TRANSCRIPTION ENCOUNTER (OUTPATIENT)
Age: 88
End: 2023-11-01

## 2023-11-01 VITALS
TEMPERATURE: 97 F | SYSTOLIC BLOOD PRESSURE: 143 MMHG | DIASTOLIC BLOOD PRESSURE: 72 MMHG | OXYGEN SATURATION: 99 % | HEART RATE: 83 BPM | RESPIRATION RATE: 18 BRPM

## 2023-11-01 LAB
ANION GAP SERPL CALC-SCNC: 10 MMOL/L — SIGNIFICANT CHANGE UP (ref 7–14)
ANION GAP SERPL CALC-SCNC: 10 MMOL/L — SIGNIFICANT CHANGE UP (ref 7–14)
BUN SERPL-MCNC: 13 MG/DL — SIGNIFICANT CHANGE UP (ref 10–20)
BUN SERPL-MCNC: 13 MG/DL — SIGNIFICANT CHANGE UP (ref 10–20)
CALCIUM SERPL-MCNC: 9.3 MG/DL — SIGNIFICANT CHANGE UP (ref 8.4–10.5)
CALCIUM SERPL-MCNC: 9.3 MG/DL — SIGNIFICANT CHANGE UP (ref 8.4–10.5)
CHLORIDE SERPL-SCNC: 96 MMOL/L — LOW (ref 98–110)
CHLORIDE SERPL-SCNC: 96 MMOL/L — LOW (ref 98–110)
CO2 SERPL-SCNC: 24 MMOL/L — SIGNIFICANT CHANGE UP (ref 17–32)
CO2 SERPL-SCNC: 24 MMOL/L — SIGNIFICANT CHANGE UP (ref 17–32)
CREAT SERPL-MCNC: 1.3 MG/DL — SIGNIFICANT CHANGE UP (ref 0.7–1.5)
CREAT SERPL-MCNC: 1.3 MG/DL — SIGNIFICANT CHANGE UP (ref 0.7–1.5)
EGFR: 54 ML/MIN/1.73M2 — LOW
EGFR: 54 ML/MIN/1.73M2 — LOW
GLUCOSE SERPL-MCNC: 92 MG/DL — SIGNIFICANT CHANGE UP (ref 70–99)
GLUCOSE SERPL-MCNC: 92 MG/DL — SIGNIFICANT CHANGE UP (ref 70–99)
OSMOLALITY SERPL: 268 MOS/KG — LOW (ref 280–301)
OSMOLALITY SERPL: 268 MOS/KG — LOW (ref 280–301)
POTASSIUM SERPL-MCNC: 4.2 MMOL/L — SIGNIFICANT CHANGE UP (ref 3.5–5)
POTASSIUM SERPL-MCNC: 4.2 MMOL/L — SIGNIFICANT CHANGE UP (ref 3.5–5)
POTASSIUM SERPL-SCNC: 4.2 MMOL/L — SIGNIFICANT CHANGE UP (ref 3.5–5)
POTASSIUM SERPL-SCNC: 4.2 MMOL/L — SIGNIFICANT CHANGE UP (ref 3.5–5)
SODIUM SERPL-SCNC: 130 MMOL/L — LOW (ref 135–146)
SODIUM SERPL-SCNC: 130 MMOL/L — LOW (ref 135–146)

## 2023-11-01 PROCEDURE — 99238 HOSP IP/OBS DSCHRG MGMT 30/<: CPT

## 2023-11-01 RX ORDER — SENNA PLUS 8.6 MG/1
2 TABLET ORAL
Qty: 0 | Refills: 0 | DISCHARGE
Start: 2023-11-01

## 2023-11-01 RX ORDER — LACTULOSE 10 G/15ML
15 SOLUTION ORAL
Qty: 0 | Refills: 0 | DISCHARGE
Start: 2023-11-01

## 2023-11-01 RX ADMIN — CHLORHEXIDINE GLUCONATE 1 APPLICATION(S): 213 SOLUTION TOPICAL at 05:25

## 2023-11-01 RX ADMIN — Medication 100 MILLIGRAM(S): at 05:25

## 2023-11-01 RX ADMIN — HEPARIN SODIUM 5000 UNIT(S): 5000 INJECTION INTRAVENOUS; SUBCUTANEOUS at 05:26

## 2023-11-01 RX ADMIN — LISINOPRIL 5 MILLIGRAM(S): 2.5 TABLET ORAL at 05:27

## 2023-11-01 RX ADMIN — PANTOPRAZOLE SODIUM 40 MILLIGRAM(S): 20 TABLET, DELAYED RELEASE ORAL at 06:19

## 2023-11-01 NOTE — DISCHARGE NOTE NURSING/CASE MANAGEMENT/SOCIAL WORK - PATIENT PORTAL LINK FT
You can access the FollowMyHealth Patient Portal offered by [facility] by registering at the following website: [email or web address]. By joining [facility]’s FollowMyHealth portal, you will also be able to view your health information using other applications (apps) compatible with our system.

## 2023-11-01 NOTE — DISCHARGE NOTE NURSING/CASE MANAGEMENT/SOCIAL WORK - NSDCPEFALRISK_GEN_ALL_CORE
For information on Fall & Injury Prevention, visit: [email or web address] OR  [email or web address] OR  https://www.cdc.gov/steadi/patient.html

## 2023-11-01 NOTE — DISCHARGE NOTE NURSING/CASE MANAGEMENT/SOCIAL WORK - NSPROEXTENSIONSOFSELF_GEN_A_NUR
[facility]  Progress Note - Maria Teresa Hdz 1942, 80 y o  male MRN: [id number]  Unit/Bed#: S -01 Encounter: [id number]  Primary Care Provider: Ursula Rojo MD   Date and time admitted to hospital: 1/10/2023 12:21 PM    * Acute respiratory failure with hypoxia and hypercapnia ([facility] )  Assessment & Plan    Hypoxia on arrival, desaturations to 77% on room air  Denies home O2 use  Status post-RHC  Patient now blood oxygen saturation 89-91% on 2 L   · V/Q scan shows low probability of pulmonary embolus  · Pulmonology: on bipap  · Continue BiPAP, IPAP 20, EPAP 8   · Continue at night (baseline) as well as if the patient naps during the day  · Respiratory protocol  · Monitor mental status  · PFT outpatient    Acute on chronic diastolic (congestive) heart failure (HCC)  Assessment & Plan  Wt Readings from Last 3 Encounters:   01/20/23 126 kg (278 lb)   12/15/22 (!) 139 kg (306 lb)   11/21/22 (!) 138 kg (304 lb)     · Patient presenting with worsening lower extremity edema, abdominal tightness, weight gain of 21 pounds in 2 to 3 weeks, PND  Patient denies any dietary or medication noncompliance  · Chronically on PO Lasix 80mg BID at home  · ECHO shows EF 55%, grade 2 DD, dilated RV with reduced systolic function and pulmonary hypertension   · Cardiology and Heart Failure following   · Has received two doses acetazolamide while inpatient    S/p RHC  - torsemide 40 mg QD restart today  - continue metoprolol and digoxin    BRBPR (bright red blood per rectum)  Assessment & Plan  · Noted on 1/12  Patient reports history of large hemorrhoid that has bled in the past   · Hemoglobin has been stable   · Monitor Hgb  · No intervention at this time   · No bleeding per nursing     Pulmonary hypertension ([facility] )  Assessment & Plan  · Noted on ECHO  Remote smoking history  Is on BiPAP at night here   Apparently on CPAP at home but question adequacy   · Heart Failure following  · Status post RHC  · VQ shows low probability of embolus    Venous stasis dermatitis of both lower extremities  Assessment & Plan  · Wound care    LUCIA (obstructive sleep apnea)  Assessment & Plan  · Patient was on CPAP  Recently transitioned to BiPAP by his pulmonologist but still has not received the machine  · Continue BiPAP overnight and with naps   · Bengay for lower back pain  · Nasal saline spray    Paroxysmal atrial fibrillation (HCC)  Assessment & Plan  Digoxin 0 9 1/16  · Rate control with metoprolol and digoxin   · On telemetry  · Continue apixaban    CAD (coronary artery disease)  Assessment & Plan  · Chest pain free  · Status post-RHC  · Atorvastatin    Type 2 diabetes mellitus with hyperglycemia, with long-term current use of insulin (HCC)  Assessment & Plan      Blood Sugar Average: Last 72 hrs:  (P) [phone]   · Home regimen: Tresiba 90 units daily  · Was hypoglycemic due to not liking food, and bipap use   · lantus held for now   · accuchecks q 6 hours given poor po intake   · Change to SSI only   · Adjust as needed and as patient improves with eating  Hypertension  Assessment & Plan  Blood Pressure: 144/58  ·   ·   · Stable   · Home torsemide  · Continue metoprolol      LACY (acute kidney injury) ([facility])-resolved as of 1/20/2023  Assessment & Plan  Recent Labs     01/19/23  0917 01/19/23  1756 01/20/23  0820   CREATININE 1 30 1 23 1 16   EGFR 51 55 59     Estimated Creatinine Clearance: 74 5 mL/min (by C-G formula based on SCr of 1 16 mg/dL)  ·   · Developed LACY on 1/13 with creatinine of 1 5, baseline of 1 0-1 1  ·   ·   · Monitor BMP  · I/O  · UOP - status post Carlin catheter 1/13 due to critical illness  · maintain carlin for I/O monitoring           VTE Pharmacologic Prophylaxis: VTE Score: 7 High Risk (Score >/= 5) - Pharmacological DVT Prophylaxis Ordered: apixaban (Eliquis)  Sequential Compression Devices Ordered      Patient Centered Rounds: I performed bedside rounds with nursing staff today   Discussions with Specialists or Other Care Team Provider: urology, pulmonology, cardiology      Current Length of Stay: 11 day(s)  Current Patient Status: Inpatient   Discharge Plan: Anticipate discharge in 24-48 hrs to rehab facility  Code Status: Level 3 - DNAR and DNI    Subjective:   Patient is feeling well today  He denies dizziness, lightheadedness  He continues to have some lower back pain  He is now out of bed and walking to the chair to sit  He feels that his walking is not yet normal however  He denies SOB  Objective:     Vitals:   Temp (24hrs), Av 4 °F (36 3 °C), Min:97 2 °F (36 2 °C), Max:97 8 °F (36 6 °C)    Temp:  [97 2 °F (36 2 °C)-97 8 °F (36 6 °C)] 97 2 °F (36 2 °C)  HR:  [65-79] 79  Resp:  [18-22] 19  BP: (119-143)/(57-70) 143/70  SpO2:  [83 %-98 %] 95 %  Body mass index is 33 84 kg/m²  Input and Output Summary (last 24 hours): Intake/Output Summary (Last 24 hours) at 2023 1224  Last data filed at 2023 2100  Gross per 24 hour   Intake 1230 ml   Output 350 ml   Net 880 ml       Physical Exam:   Physical Exam  Vitals and nursing note reviewed  Constitutional:       General: He is not in acute distress  Appearance: He is well-developed  HENT:      Head: Normocephalic and atraumatic  Eyes:      Conjunctiva/sclera: Conjunctivae normal    Cardiovascular:      Rate and Rhythm: Normal rate  Rhythm irregular  Heart sounds: No murmur heard  Pulmonary:      Effort: Pulmonary effort is normal  No respiratory distress  Breath sounds: Normal breath sounds  Abdominal:      Palpations: Abdomen is soft  Tenderness: There is no abdominal tenderness  Musculoskeletal:      Cervical back: Neck supple  Right lower leg: Edema present  Left lower leg: Edema present  Skin:     General: Skin is warm and dry  Capillary Refill: Capillary refill takes less than 2 seconds  Neurological:      Mental Status: He is alert   Mental status is at baseline  Additional Data:     Labs:  Results from last 7 days   Lab Units 01/21/23  0437   WBC Thousand/uL 9 05   HEMOGLOBIN g/dL 9 7*   HEMATOCRIT % 35 4*   PLATELETS Thousands/uL 221   NEUTROS PCT % 71   LYMPHS PCT % 15   MONOS PCT % 11   EOS PCT % 2     Results from last 7 days   Lab Units 01/21/23  0443   SODIUM mmol/L 136   POTASSIUM mmol/L 3 6   CHLORIDE mmol/L 90*   CO2 mmol/L 39*   BUN mg/dL 36*   CREATININE mg/dL 1 20   ANION GAP mmol/L 7   CALCIUM mg/dL 9 4   GLUCOSE RANDOM mg/dL 165*     Results from last 7 days   Lab Units 01/17/23  1534   INR  1 07     Results from last 7 days   Lab Units 01/21/23  1115 01/21/23  0742 01/20/23  2348 01/20/23  2153 01/20/23  1554 01/20/23  1141 01/20/23  0459 01/20/23  0010 01/19/23  1754 01/19/23  1147 01/19/23  0608 01/18/23  2248   POC GLUCOSE mg/dl 167* 160* 204* 209* 224* 215* 139 158* 196* 155* 121 202*               Lines/Drains:  Invasive Devices     Peripheral Intravenous Line  Duration           Long-Dwell Peripheral IV (Midline) 06[birth year] Left Cephalic 4 days          Drain  Duration           Urethral Catheter Straight-tip 16 Fr  8 days              Urinary Catheter:  Goal for removal: Voiding trial when ambulation improves               Imaging: No pertinent imaging reviewed      Recent Cultures (last 7 days):         Last 24 Hours Medication List:   Current Facility-Administered Medications   Medication Dose Route Frequency Provider Last Rate   • acetaminophen  650 mg Oral Q6H PRN Pati Roblero PA-C     • apixaban  5 mg Oral BID Lien Lim MD     • atorvastatin  40 mg Oral Daily Emi Miller MD     • dextrose  25 mL Intravenous PRN Marty Galloway PA-C     • dicyclomine  10 mg Oral BID PRN Emi Miller MD     • digoxin  125 mcg Oral Daily Emi Miller MD     • famotidine  40 mg Oral HS Emi Miller MD     • insulin lispro  1-5 Units Subcutaneous Q6H [facility] Carl Weaver PA-C     • lidocaine  2 patch Topical Daily Marty P Jose Trevizo PA-C     • loperamide  2 mg Oral TID PRN Flor Briceno PA-C     • menthol-methyl salicylate   Apply externally 4x Daily PRN Peace Erwin MD     • metoprolol succinate  25 mg Oral BID Montez Olguin PA-C     • nystatin   Topical BID Verle Fail, CRNP     • pantoprazole  40 mg Oral BID AC Amara Cavazos MD     • sodium chloride  2 spray Each Nare 4x Daily Cynthia Onofre MD     • torsemide  40 mg Oral Daily Rony Echeverria MD          Today, Patient Was Seen By: Cynthia Onofre MD Psychiatry    **Please Note: This note may have been constructed using a voice recognition system  ** none

## 2023-11-01 NOTE — DISCHARGE NOTE NURSING/CASE MANAGEMENT/SOCIAL WORK - NSTOBACCONEVERSMOKERY/N_GEN_A
Clinic Care Coordination Contact    Situation: 45 day chart review completed by SW care coordinator.    Background:   - Most recent SW assessment completed on 6/1/20.  - Pt initially enrolled for assistance applying for health insurance and picking up 90-day-supplies of her medications (in June).  - Pt was determined to be over income for MA/MNCARE by FRW on 6/26/20. See note from this date for further detail.  - Pt reported (via [employer] message with CHW on 8/18/20) that she had completed and submitted a financial aid/[employer] application.  - Per [employer] message exchange between pt and CHW dated 9/29/20, billing department did not receive pt's financial aid application. Pt downloaded PDF of application to fill out and submit again per 10/13/20 [employer] message exchange between pt and CHW.  - Pt was last seen by PCP in-clinic on 9/24/20.  - Pt has continued working with CHANTAL Blackmon for psychotherapy. Last visit on 10/9/20. Upcoming visit scheduled for 10/23/20.  - Last outreach by CHW on 10/14/20. CHW left a message for Emilia in the billing department and will follow up regarding pt's financial aid application again on 10/22/20.     Assessment: All goals reviewed during this encounter. [facility] SW will continue monitoring goal(s) and progress made towards goal completion. Chart review to be completed by SW every 45 days during continued course of CCC enrollment.     Plan/Recommendations:   1.) Continue scheduled outreach.   Yes

## 2023-11-07 ENCOUNTER — NON-APPOINTMENT (OUTPATIENT)
Age: 88
End: 2023-11-07

## 2023-11-07 DIAGNOSIS — E78.5 HYPERLIPIDEMIA, UNSPECIFIED: ICD-10-CM

## 2023-11-07 DIAGNOSIS — N40.0 BENIGN PROSTATIC HYPERPLASIA WITHOUT LOWER URINARY TRACT SYMPTOMS: ICD-10-CM

## 2023-11-07 DIAGNOSIS — I61.4 NONTRAUMATIC INTRACEREBRAL HEMORRHAGE IN CEREBELLUM: ICD-10-CM
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
## 2023-11-07 DIAGNOSIS — Z41.8 ENCOUNTER FOR OTHER PROCEDURES FOR PURPOSES OTHER THAN REMEDYING HEALTH STATE: ICD-10-CM

## 2023-11-07 DIAGNOSIS — Z90.49 ACQUIRED ABSENCE OF OTHER SPECIFIED PARTS OF DIGESTIVE TRACT: ICD-10-CM

## 2023-11-07 DIAGNOSIS — S52.022B DISPLACED FRACTURE OF OLECRANON PROCESS WITHOUT INTRAARTICULAR EXTENSION OF LEFT ULNA, INITIAL ENCOUNTER FOR OPEN FRACTURE TYPE I OR II: ICD-10-CM
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
## 2023-11-07 DIAGNOSIS — K59.00 CONSTIPATION, UNSPECIFIED: ICD-10-CM

## 2023-11-07 DIAGNOSIS — N31.9 NEUROMUSCULAR DYSFUNCTION OF BLADDER, UNSPECIFIED: ICD-10-CM

## 2023-11-07 DIAGNOSIS — G93.6 CEREBRAL EDEMA: ICD-10-CM

## 2023-11-07 DIAGNOSIS — E87.1 HYPO-OSMOLALITY AND HYPONATREMIA: ICD-10-CM

## 2023-11-07 DIAGNOSIS — N18.30 CHRONIC KIDNEY DISEASE, STAGE 3 UNSPECIFIED: ICD-10-CM

## 2023-11-07 DIAGNOSIS — I12.9 HYPERTENSIVE CHRONIC KIDNEY DISEASE WITH STAGE 1 THROUGH STAGE 4 CHRONIC KIDNEY DISEASE, OR UNSPECIFIED CHRONIC KIDNEY DISEASE: ICD-10-CM

## 2023-11-08 ENCOUNTER — APPOINTMENT (OUTPATIENT)
Dept: NEUROLOGY | Facility: CLINIC | Age: 88
End: 2023-11-08

## 2023-11-09 DIAGNOSIS — N18.30 CHRONIC KIDNEY DISEASE, STAGE 3 UNSPECIFIED: ICD-10-CM

## 2023-11-09 DIAGNOSIS — I12.9 HYPERTENSIVE CHRONIC KIDNEY DISEASE WITH STAGE 1 THROUGH STAGE 4 CHRONIC KIDNEY DISEASE, OR UNSPECIFIED CHRONIC KIDNEY DISEASE: ICD-10-CM

## 2023-11-09 DIAGNOSIS — G93.6 CEREBRAL EDEMA: ICD-10-CM

## 2023-11-09 DIAGNOSIS — H53.462 HOMONYMOUS BILATERAL FIELD DEFECTS, LEFT SIDE: ICD-10-CM

## 2023-11-09 DIAGNOSIS — Z92.89 PERSONAL HISTORY OF OTHER MEDICAL TREATMENT: ICD-10-CM

## 2023-11-09 DIAGNOSIS — N31.9 NEUROMUSCULAR DYSFUNCTION OF BLADDER, UNSPECIFIED: ICD-10-CM

## 2023-11-09 DIAGNOSIS — I61.1 NONTRAUMATIC INTRACEREBRAL HEMORRHAGE IN HEMISPHERE, CORTICAL: ICD-10-CM

## 2023-11-09 DIAGNOSIS — E78.5 HYPERLIPIDEMIA, UNSPECIFIED: ICD-10-CM

## 2023-11-09 DIAGNOSIS — E87.1 HYPO-OSMOLALITY AND HYPONATREMIA: ICD-10-CM

## 2023-11-09 DIAGNOSIS — Z87.891 PERSONAL HISTORY OF NICOTINE DEPENDENCE: ICD-10-CM

## 2023-11-09 DIAGNOSIS — R29.700 NIHSS SCORE 0: ICD-10-CM

## 2023-11-22 ENCOUNTER — APPOINTMENT (OUTPATIENT)
Dept: UROLOGY | Facility: CLINIC | Age: 88
End: 2023-11-22
Payer: MEDICARE

## 2023-11-22 PROCEDURE — 51702 INSERT TEMP BLADDER CATH: CPT

## 2023-12-12 NOTE — ED ADULT NURSE NOTE - CAS EDP DISCH DISPOSITION ADMI
well developed, well nourished , in no acute distress , ambulating without difficulty , normal communication ability
[facility]

## 2024-01-03 ENCOUNTER — APPOINTMENT (OUTPATIENT)
Dept: UROLOGY | Facility: CLINIC | Age: 89
End: 2024-01-03

## 2024-01-11 ENCOUNTER — APPOINTMENT (OUTPATIENT)
Dept: UROLOGY | Facility: CLINIC | Age: 89
End: 2024-01-11
Payer: MEDICARE

## 2024-01-11 PROCEDURE — 51702 INSERT TEMP BLADDER CATH: CPT

## 2024-02-07 ENCOUNTER — APPOINTMENT (OUTPATIENT)
Dept: UROLOGY | Facility: CLINIC | Age: 89
End: 2024-02-07
Payer: MEDICARE

## 2024-02-07 PROCEDURE — 51702 INSERT TEMP BLADDER CATH: CPT

## 2024-03-04 ENCOUNTER — INPATIENT (INPATIENT)
Facility: HOSPITAL | Age: 89
LOS: 4 days | Discharge: NURSING, REHAB OR LONG-TERM CARE | DRG: 125 | End: 2024-03-09
Attending: INTERNAL MEDICINE | Admitting: HOSPITALIST
Payer: MEDICARE

## 2024-03-04 VITALS
TEMPERATURE: 96 F | DIASTOLIC BLOOD PRESSURE: 88 MMHG | RESPIRATION RATE: 18 BRPM | HEART RATE: 114 BPM | SYSTOLIC BLOOD PRESSURE: 184 MMHG | OXYGEN SATURATION: 98 %

## 2024-03-04 DIAGNOSIS — Z78.9 OTHER SPECIFIED HEALTH STATUS: ICD-10-CM

## 2024-03-04 LAB
ALBUMIN SERPL ELPH-MCNC: 4.4 G/DL — SIGNIFICANT CHANGE UP (ref 3.5–5.2)
ALP SERPL-CCNC: 96 U/L — SIGNIFICANT CHANGE UP (ref 30–115)
ALT FLD-CCNC: 12 U/L — SIGNIFICANT CHANGE UP (ref 0–41)
ANION GAP SERPL CALC-SCNC: 12 MMOL/L — SIGNIFICANT CHANGE UP (ref 7–14)
AST SERPL-CCNC: 19 U/L — SIGNIFICANT CHANGE UP (ref 0–41)
BASOPHILS # BLD AUTO: 0.02 K/UL — SIGNIFICANT CHANGE UP (ref 0–0.2)
BASOPHILS NFR BLD AUTO: 0.3 % — SIGNIFICANT CHANGE UP (ref 0–1)
BILIRUB SERPL-MCNC: 0.5 MG/DL — SIGNIFICANT CHANGE UP (ref 0.2–1.2)
BUN SERPL-MCNC: 15 MG/DL — SIGNIFICANT CHANGE UP (ref 10–20)
CALCIUM SERPL-MCNC: 9.4 MG/DL — SIGNIFICANT CHANGE UP (ref 8.4–10.5)
CHLORIDE SERPL-SCNC: 92 MMOL/L — LOW (ref 98–110)
CK SERPL-CCNC: 143 U/L — SIGNIFICANT CHANGE UP (ref 0–225)
CO2 SERPL-SCNC: 25 MMOL/L — SIGNIFICANT CHANGE UP (ref 17–32)
CREAT SERPL-MCNC: 1.3 MG/DL — SIGNIFICANT CHANGE UP (ref 0.7–1.5)
EGFR: 54 ML/MIN/1.73M2 — LOW
EOSINOPHIL # BLD AUTO: 0.1 K/UL — SIGNIFICANT CHANGE UP (ref 0–0.7)
EOSINOPHIL NFR BLD AUTO: 1.5 % — SIGNIFICANT CHANGE UP (ref 0–8)
GLUCOSE SERPL-MCNC: 94 MG/DL — SIGNIFICANT CHANGE UP (ref 70–99)
HCT VFR BLD CALC: 35 % — LOW (ref 42–52)
HGB BLD-MCNC: 12.6 G/DL — LOW (ref 14–18)
IMM GRANULOCYTES NFR BLD AUTO: 0.4 % — HIGH (ref 0.1–0.3)
LYMPHOCYTES # BLD AUTO: 0.71 K/UL — LOW (ref 1.2–3.4)
LYMPHOCYTES # BLD AUTO: 10.5 % — LOW (ref 20.5–51.1)
MCHC RBC-ENTMCNC: 30.7 PG — SIGNIFICANT CHANGE UP (ref 27–31)
MCHC RBC-ENTMCNC: 36 G/DL — SIGNIFICANT CHANGE UP (ref 32–37)
MCV RBC AUTO: 85.2 FL — SIGNIFICANT CHANGE UP (ref 80–94)
MONOCYTES # BLD AUTO: 0.47 K/UL — SIGNIFICANT CHANGE UP (ref 0.1–0.6)
MONOCYTES NFR BLD AUTO: 7 % — SIGNIFICANT CHANGE UP (ref 1.7–9.3)
NEUTROPHILS # BLD AUTO: 5.4 K/UL — SIGNIFICANT CHANGE UP (ref 1.4–6.5)
NEUTROPHILS NFR BLD AUTO: 80.3 % — HIGH (ref 42.2–75.2)
NRBC # BLD: 0 /100 WBCS — SIGNIFICANT CHANGE UP (ref 0–0)
PLATELET # BLD AUTO: 178 K/UL — SIGNIFICANT CHANGE UP (ref 130–400)
PMV BLD: 9.2 FL — SIGNIFICANT CHANGE UP (ref 7.4–10.4)
POTASSIUM SERPL-MCNC: 4.3 MMOL/L — SIGNIFICANT CHANGE UP (ref 3.5–5)
POTASSIUM SERPL-SCNC: 4.3 MMOL/L — SIGNIFICANT CHANGE UP (ref 3.5–5)
PROT SERPL-MCNC: 7.3 G/DL — SIGNIFICANT CHANGE UP (ref 6–8)
RBC # BLD: 4.11 M/UL — LOW (ref 4.7–6.1)
RBC # FLD: 13 % — SIGNIFICANT CHANGE UP (ref 11.5–14.5)
SODIUM SERPL-SCNC: 129 MMOL/L — LOW (ref 135–146)
WBC # BLD: 6.73 K/UL — SIGNIFICANT CHANGE UP (ref 4.8–10.8)
WBC # FLD AUTO: 6.73 K/UL — SIGNIFICANT CHANGE UP (ref 4.8–10.8)

## 2024-03-04 PROCEDURE — 99222 1ST HOSP IP/OBS MODERATE 55: CPT

## 2024-03-04 PROCEDURE — 70450 CT HEAD/BRAIN W/O DYE: CPT | Mod: MC

## 2024-03-04 PROCEDURE — 36415 COLL VENOUS BLD VENIPUNCTURE: CPT

## 2024-03-04 PROCEDURE — 71045 X-RAY EXAM CHEST 1 VIEW: CPT | Mod: 26

## 2024-03-04 PROCEDURE — 12011 RPR F/E/E/N/L/M 2.5 CM/<: CPT

## 2024-03-04 PROCEDURE — 72170 X-RAY EXAM OF PELVIS: CPT | Mod: 26

## 2024-03-04 PROCEDURE — 85027 COMPLETE CBC AUTOMATED: CPT

## 2024-03-04 PROCEDURE — 70450 CT HEAD/BRAIN W/O DYE: CPT | Mod: 26,MC

## 2024-03-04 PROCEDURE — 97110 THERAPEUTIC EXERCISES: CPT | Mod: GP

## 2024-03-04 PROCEDURE — 81001 URINALYSIS AUTO W/SCOPE: CPT

## 2024-03-04 PROCEDURE — 83735 ASSAY OF MAGNESIUM: CPT

## 2024-03-04 PROCEDURE — 70480 CT ORBIT/EAR/FOSSA W/O DYE: CPT | Mod: MC

## 2024-03-04 PROCEDURE — 86900 BLOOD TYPING SEROLOGIC ABO: CPT

## 2024-03-04 PROCEDURE — 72125 CT NECK SPINE W/O DYE: CPT | Mod: 26,MC

## 2024-03-04 PROCEDURE — 86901 BLOOD TYPING SEROLOGIC RH(D): CPT

## 2024-03-04 PROCEDURE — 85025 COMPLETE CBC W/AUTO DIFF WBC: CPT

## 2024-03-04 PROCEDURE — 83605 ASSAY OF LACTIC ACID: CPT

## 2024-03-04 PROCEDURE — 97116 GAIT TRAINING THERAPY: CPT | Mod: GP

## 2024-03-04 PROCEDURE — 80053 COMPREHEN METABOLIC PANEL: CPT

## 2024-03-04 PROCEDURE — 71260 CT THORAX DX C+: CPT | Mod: 26,MC

## 2024-03-04 PROCEDURE — 97162 PT EVAL MOD COMPLEX 30 MIN: CPT | Mod: GP

## 2024-03-04 PROCEDURE — 74177 CT ABD & PELVIS W/CONTRAST: CPT | Mod: 26,MC

## 2024-03-04 PROCEDURE — 86850 RBC ANTIBODY SCREEN: CPT

## 2024-03-04 PROCEDURE — 99285 EMERGENCY DEPT VISIT HI MDM: CPT | Mod: 25

## 2024-03-04 PROCEDURE — 80048 BASIC METABOLIC PNL TOTAL CA: CPT

## 2024-03-04 PROCEDURE — 87635 SARS-COV-2 COVID-19 AMP PRB: CPT

## 2024-03-04 RX ORDER — SODIUM CHLORIDE 9 MG/ML
1000 INJECTION, SOLUTION INTRAVENOUS
Refills: 0 | Status: DISCONTINUED | OUTPATIENT
Start: 2024-03-04 | End: 2024-03-05

## 2024-03-04 RX ORDER — IBUPROFEN 200 MG
400 TABLET ORAL EVERY 6 HOURS
Refills: 0 | Status: DISCONTINUED | OUTPATIENT
Start: 2024-03-04 | End: 2024-03-05

## 2024-03-04 RX ORDER — SODIUM CHLORIDE 9 MG/ML
1000 INJECTION INTRAMUSCULAR; INTRAVENOUS; SUBCUTANEOUS ONCE
Refills: 0 | Status: COMPLETED | OUTPATIENT
Start: 2024-03-04 | End: 2024-03-04

## 2024-03-04 RX ORDER — TETANUS TOXOID, REDUCED DIPHTHERIA TOXOID AND ACELLULAR PERTUSSIS VACCINE, ADSORBED 5; 2.5; 8; 8; 2.5 [IU]/.5ML; [IU]/.5ML; UG/.5ML; UG/.5ML; UG/.5ML
0.5 SUSPENSION INTRAMUSCULAR ONCE
Refills: 0 | Status: COMPLETED | OUTPATIENT
Start: 2024-03-04 | End: 2024-03-04

## 2024-03-04 RX ORDER — SODIUM CHLORIDE 9 MG/ML
500 INJECTION, SOLUTION INTRAVENOUS ONCE
Refills: 0 | Status: COMPLETED | OUTPATIENT
Start: 2024-03-04 | End: 2024-03-04

## 2024-03-04 RX ORDER — ACETAMINOPHEN 500 MG
650 TABLET ORAL EVERY 6 HOURS
Refills: 0 | Status: DISCONTINUED | OUTPATIENT
Start: 2024-03-04 | End: 2024-03-09

## 2024-03-04 RX ORDER — LISINOPRIL 2.5 MG/1
5 TABLET ORAL DAILY
Refills: 0 | Status: DISCONTINUED | OUTPATIENT
Start: 2024-03-04 | End: 2024-03-09

## 2024-03-04 RX ORDER — ACETAMINOPHEN 500 MG
975 TABLET ORAL ONCE
Refills: 0 | Status: COMPLETED | OUTPATIENT
Start: 2024-03-04 | End: 2024-03-04

## 2024-03-04 RX ADMIN — SODIUM CHLORIDE 1000 MILLILITER(S): 9 INJECTION INTRAMUSCULAR; INTRAVENOUS; SUBCUTANEOUS at 13:26

## 2024-03-04 RX ADMIN — TETANUS TOXOID, REDUCED DIPHTHERIA TOXOID AND ACELLULAR PERTUSSIS VACCINE, ADSORBED 0.5 MILLILITER(S): 5; 2.5; 8; 8; 2.5 SUSPENSION INTRAMUSCULAR at 15:26

## 2024-03-04 RX ADMIN — SODIUM CHLORIDE 70 MILLILITER(S): 9 INJECTION, SOLUTION INTRAVENOUS at 22:23

## 2024-03-04 RX ADMIN — Medication 650 MILLIGRAM(S): at 22:23

## 2024-03-04 RX ADMIN — SODIUM CHLORIDE 500 MILLILITER(S): 9 INJECTION, SOLUTION INTRAVENOUS at 17:47

## 2024-03-04 NOTE — H&P ADULT - NSHPLABSRESULTS_GEN_ALL_CORE
12.6   6.73  )-----------( 178      ( 04 Mar 2024 13:15 )             35.0       03-04    129<L>  |  92<L>  |  15  ----------------------------<  94  4.3   |  25  |  1.3    Ca    9.4      04 Mar 2024 13:15    TPro  7.3  /  Alb  4.4  /  TBili  0.5  /  DBili  x   /  AST  19  /  ALT  12  /  AlkPhos  96  03-04              Urinalysis Basic - ( 04 Mar 2024 13:15 )    Color: x / Appearance: x / SG: x / pH: x  Gluc: 94 mg/dL / Ketone: x  / Bili: x / Urobili: x   Blood: x / Protein: x / Nitrite: x   Leuk Esterase: x / RBC: x / WBC x   Sq Epi: x / Non Sq Epi: x / Bacteria: x      Lactate Trend      CARDIAC MARKERS ( 04 Mar 2024 13:15 )  x     / x     / 143 U/L / x     / x            CAPILLARY BLOOD GLUCOSE    < from: CT Head No Cont (03.04.24 @ 14:08) >    IMPRESSION:    CT HEAD:  Large right periorbital hematoma without evidence for underlying fracture   or intracranial hemorrhage.    Encephalomalacia of the right occipital lobe related to remote hemorrhage.    CT CERVICAL SPINE:  Mild compression fracture deformity of the T2 vertebral body, not   previously seen.    No acute cervical fracture or subluxation.    Multilevel degenerative changes as detailed above.    --- End of Report ---      CJ VERMA MD; Attending Radiologist  This document has been electronically signed. Mar  4 2024  2:29PM    < end of copied text >

## 2024-03-04 NOTE — ED PROVIDER NOTE - CLINICAL SUMMARY MEDICAL DECISION MAKING FREE TEXT BOX
On physical exam patient is normocephalic speaking in full sentences patient has swelling to the right side of the periorbital region with surrounding ecchymosis laceration above right eyebrow however patient has no visual changes extraocular muscles intact not consistent with entrapment in addition patient has no Thornton sign no raccoon eyes no septal hematoma no tenderness to palpation midline CT or L-spine chest is clear to auscultation bilaterally abdomen soft nontender nondistended bowel sounds positive no guarding or rebound pedal pulse 2+ no edema noted    Assessment plan patient presents for evaluation status post fall patient had a recent fall status post ICH was doing well until he sustained a second fall here today prompting visit we obtained an corroborated history per son we obtained EKG per my independent evaluation not consistent with STEMI not consistent with arrhythmia we obtained chest x-ray per my independent evaluation not consistent with pneumothorax or pneumonia in addition pelvis x-ray not consistent with fracture given patient's age past medical history we obtain CT head C-spine chest abdomen pelvis negative for any acute abnormalities related to trauma the son was updated with plan of care son feels that patient is unable to care for self at home son is requesting admission for placement for further monitoring I will admit given the above

## 2024-03-04 NOTE — ED ADULT NURSE NOTE - NSHOSCREENINGQ1_ED_ALL_ED
Chart review reveals that patient resides with her daughter in the daughter's home  Patient did have BMP completed on 4/12/23 which revealed that BUN, creatinine and also eGFR was WNL  Patient has no complaints at this time except occasional discomfort in her back  Patient is using acetaminophen for pain control  Patient did complete a telemedicine visit with her PCP on 4/19/23  Patient/CG were advised to increase oral fluid intake  Patient has an appointment on 5/5/23 to see neurosurgery  Patient is using her back brace as directed  Chart review will continue until end of episode as CG declined further calls 
No

## 2024-03-04 NOTE — CONSULT NOTE ADULT - ASSESSMENT
Assessment:  88-year-old male with past medical history HTN, and hx of mechanical fall complicated by brain bleed 10/2023, coming with complaints of mechanical fall again today.  Patient reports he was walking when he tripped, hit his right side of face, denies LOC and not on AC.  Recalls entire event, no neck or back pain but complaining of pain to rt side of head and rt eye.  Patient is accompanied by son who reports that he needs placement for his father and escalation of care.  Pt has trauma to Rt side of head s/p suture by ED and RT eye contusion.   Pt denies chest pain, dyspnea, and loc.  Pt does admit to inability to open RT eye due  to swelling and discomfort from fall.  Denies any other complaints.  Presently, pt is NAD.  pt states fall happened early afternoon, approx 12Noon per pt.    D/W Dr Dahl via Teams.    Recommendations  - trend CBC  - pt Not on AC previously   - Surg team to follow.

## 2024-03-04 NOTE — ED PROVIDER NOTE - NS ED ATTENDING STATEMENT MOD
This was a shared visit with the PANCHITO. I reviewed and verified the documentation. Attending with

## 2024-03-04 NOTE — ED PROCEDURE NOTE - DEPTH OF REPAIR FOR WOUND CLOSURE
Mrs. Feng is an 81 yo woman with HTN, T2DM, HLD who presented for weakness, dyspnea and lower extremity edema. 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   Blood smear showed target cells, some bethany cells, but very few schistocytes (0-1 per hpf). She does not have platelet clumping and there were no blasts seen.     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    -Since her hemoglobin has been stable since admission and symptoms are resolving, do not recommend starting steroid therapy. Were her counts start to drop, prednisone course could be considered.   -We will continue to follow her counts.  -Recommend transfusion for hemoglobin <7, platelets <10 (or <50 if bleeding develops).        Qi Solis, DO  Internal Medicine, PGY-3  Hematology consults  [facility]           skin pt presents to ED c/o n/v/d for the last few days with intermittent fever, but no known sick contacts. Denies HA/dizziness/chest pain/palpitation/sob/abd pain black stool/bloody stool/urinary sxs

## 2024-03-04 NOTE — H&P ADULT - ASSESSMENT
This is a 88-year-old male with past medical history HTN, and hx of mechanical fall complicated by brain bleed 10/2023, coming with complaints of mechanical fall again today.    # Head/Facial Trauma  - Wound care s/p suture ED  - CTH :Large right periorbital hematoma without evidence for underlying fracture   or intracranial hemorrhage.  Encephalomalacia of the right occipital lobe related to remote hemorrhage.  - monitor vitals   - neuro check routine  CT Cervical: Mild compression fracture deformity of the T2 vertebral body, not   previously seen.  - Neuro Sx consult  - mild IV hydration  - pain management  - ice prn    # Mechanical Fall  - PT consult  - Fall precautions  - Social service consult    #HTN  - continue home medications  - monitor vitals

## 2024-03-04 NOTE — H&P ADULT - HISTORY OF PRESENT ILLNESS
88-year-old male with past medical history HTN, and hx of mechanical fall complicated by brain bleed 10/2023, coming with complaints of mechanical fall again today.  Patient reports he was walking when he tripped, hit his right side of face, denies LOC and not on AC.  Recalls entire event, no neck or back pain but complaining of pain to rt side of head and rt eye.  Patient is accompanied by son who reports that he needs placement for his father and escalation of care.  Pt has trauma to Rt side of head s/p suture by ED and RT eye contusion.   Pt denies chest pain, dyspnea, and loc.  Pt does admit to inability to open RT eye due  to swelling and discomfort from fall.  Denies any other complaints

## 2024-03-04 NOTE — ED PROVIDER NOTE - PHYSICAL EXAMINATION
CONSTITUTIONAL: NAD  SKIN: Warm dry  HEAD: Periorbital swelling with surrounding ecchymosis.  Linear 1.5 cm laceration above right eyebrow, well-approximated.  Minimal active bleeding  EYES: NL inspection  ENT: MMM  CARD: RRR  RESP: CTAB  ABD: Soft, non tender, no rebound or guarding   EXT: no pedal edema  NEURO: Grossly unremarkable  PSYCH: Cooperative, appropriate.

## 2024-03-04 NOTE — PATIENT PROFILE ADULT - FALL HARM RISK - HARM RISK INTERVENTIONS

## 2024-03-04 NOTE — ED ADULT NURSE NOTE - NSFALLRISKINTERV_ED_ALL_ED
Assistance OOB with selected safe patient handling equipment if applicable/Assistance with ambulation/Communicate fall risk and risk factors to all staff, patient, and family/Monitor gait and stability/Provide visual cue: yellow wristband, yellow gown, etc/Reinforce activity limits and safety measures with patient and family/Call bell, personal items and telephone in reach/Instruct patient to call for assistance before getting out of bed/chair/stretcher/Non-slip footwear applied when patient is off stretcher/[place] to call system/Physically safe environment - no spills, clutter or unnecessary equipment/Purposeful Proactive Rounding/Room/bathroom lighting operational, light cord in reach

## 2024-03-04 NOTE — H&P ADULT - NS ATTEND AMEND GEN_ALL_CORE FT
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 dehydration, continue to monitor    Pending: PT, improvement in sodium and tachycardia    #DVT PPX: scds

## 2024-03-04 NOTE — ED ADULT TRIAGE NOTE - CHIEF COMPLAINT QUOTE
pt had unwitnessed fall today, states he went outside and tripped hit the right side of face, laceration to eye lid, NO LOC, remembers event fully

## 2024-03-04 NOTE — ED PROVIDER NOTE - OBJECTIVE STATEMENT
88-year-old male with past medical history of mechanical fall complicated by head bleed recently coming with complaints of mechanical fall.  Patient reports he was walking when he tripped, hit his right side of face, denies LOC and not on AC.  Recalls entire event.  Patient is accompanied by son who reports that he needs placement for his father and escalation of care 88-year-old male with past medical history of mechanical fall complicated by ICH recently coming with complaints of mechanical fall.  Patient reports he was walking when he tripped, hit his right side of face, denies LOC and not on AC.  Recalls entire event.  Patient is accompanied by son who reports that he needs placement for his father and escalation of care

## 2024-03-04 NOTE — ED PROVIDER NOTE - ATTENDING CONTRIBUTION TO CARE
I have personally performed a history and physical exam on this patient and personally directed the management of the patient. Patient is a 8-year-old male past medical history of mechanical fall complicated by ich which presents for evaluation of recurrent fall here in the emergency department states that he was walking on uneven ground and sustained a fall he did not sustain LOC no vomiting currently patient denies any symptoms denies headache visual changes chest pain shortness of breath abdominal pain back pain or other extremity pain patient has laceration noted to the right upper eyelid    On physical exam patient is normocephalic speaking in full sentences patient has swelling to the right side of the periorbital region with surrounding ecchymosis laceration above right eyebrow however patient has no visual changes extraocular muscles intact not consistent with entrapment in addition patient has no Thornton sign no raccoon eyes no septal hematoma no tenderness to palpation midline CT or L-spine chest is clear to auscultation bilaterally abdomen soft nontender nondistended bowel sounds positive no guarding or rebound pedal pulse 2+ no edema noted    Assessment plan patient presents for evaluation status post fall patient had a recent fall status post ICH was doing well until he sustained a second fall here today prompting visit we obtained an corroborated history per son we obtained EKG per my independent evaluation not consistent with STEMI not consistent with arrhythmia we obtained chest x-ray per my independent evaluation not consistent with pneumothorax or pneumonia in addition pelvis x-ray not consistent with fracture given patient's age past medical history we obtain CT head C-spine chest abdomen pelvis negative for any acute abnormalities related to trauma the son was updated with plan of care son feels that patient is unable to care for self at home son is requesting admission for placement for further monitoring I will admit given the above

## 2024-03-04 NOTE — CONSULT NOTE ADULT - SUBJECTIVE AND OBJECTIVE BOX
General Surgical Attending:  Dr Dahl              HPI:  88-year-old male with past medical history HTN, and hx of mechanical fall complicated by brain bleed 10/2023, coming with complaints of mechanical fall again today.  Patient reports he was walking when he tripped, hit his right side of face, denies LOC and not on AC.  Recalls entire event, no neck or back pain but complaining of pain to rt side of head and rt eye.  Patient is accompanied by son who reports that he needs placement for his father and escalation of care.  Pt has trauma to Rt side of head s/p suture by ED and RT eye contusion.   Pt denies chest pain, dyspnea, and loc.  Pt does admit to inability to open RT eye due  to swelling and discomfort from fall.  Denies any other complaints.  Presently, pt is NAD.  pt states fall happened early afternoon, approx 12Noon per pt.      PAST MEDICAL & SURGICAL HISTORY:  HTN (hypertension)      Neurogenic bladder      Waldron catheter in place  2016      No significant past surgical history          MEDICATIONS  (STANDING):  ibuprofen  Tablet. 400 milliGRAM(s) Oral every 6 hours  lactated ringers. 1000 milliLiter(s) (70 mL/Hr) IV Continuous <Continuous>  lisinopril 5 milliGRAM(s) Oral daily    MEDICATIONS  (PRN):  acetaminophen     Tablet .. 650 milliGRAM(s) Oral every 6 hours PRN Temp greater or equal to 38C (100.4F), Mild Pain (1 - 3)      Allergies    No Known Allergies    Intolerances          Vital Signs Last 24 Hrs  T(C): 36.1 (04 Mar 2024 19:20), Max: 36.1 (04 Mar 2024 19:20)  T(F): 96.9 (04 Mar 2024 19:20), Max: 96.9 (04 Mar 2024 19:20)  HR: 111 (04 Mar 2024 19:20) (110 - 121)  BP: 147/81 (04 Mar 2024 19:20) (147/81 - 184/88)  BP(mean): --  RR: 20 (04 Mar 2024 19:20) (18 - 20)  SpO2: 99% (04 Mar 2024 19:20) (98% - 99%)    Parameters below as of 04 Mar 2024 16:22  Patient On (Oxygen Delivery Method): room air              Physical Exam:  General: NAD  Abdominal: +BS, Soft, ND/NT, no organomegaly, no rebound, no guarding.  HEENT:  TTP RIGHT Orbit hematoma, sutures at eyebrow area, Right eye is essentially 95% closed due to swelling.  Right forehead swelling, TTP. Zygomatic arch to mid forehead swelling.  Left eye unremarkable.    Pulmonary: CTA-B  Cardiovascular: NSR.  Extremities: no clubbing/cyanosis/edema  Neuro: A/O x 3  Pulses: palpable distal pulses        LABS:                        12.6   6.73  )-----------( 178      ( 04 Mar 2024 13:15 )             35.0     03-04    129<L>  |  92<L>  |  15  ----------------------------<  94  4.3   |  25  |  1.3    Ca    9.4      04 Mar 2024 13:15    TPro  7.3  /  Alb  4.4  /  TBili  0.5  /  DBili  x   /  AST  19  /  ALT  12  /  AlkPhos  96  03-04      Urinalysis Basic - ( 04 Mar 2024 13:15 )    Color: x / Appearance: x / SG: x / pH: x  Gluc: 94 mg/dL / Ketone: x  / Bili: x / Urobili: x   Blood: x / Protein: x / Nitrite: x   Leuk Esterase: x / RBC: x / WBC x   Sq Epi: x / Non Sq Epi: x / Bacteria: x      CAPILLARY BLOOD GLUCOSE        LIVER FUNCTIONS - ( 04 Mar 2024 13:15 )  Alb: 4.4 g/dL / Pro: 7.3 g/dL / ALK PHOS: 96 U/L / ALT: 12 U/L / AST: 19 U/L / GGT: x             Cultures:      RADIOLOGY & ADDITIONAL STUDIES:  < from: CT Head No Cont (03.04.24 @ 14:08) >    ACC: [id number] EXAM:  CT CERVICAL SPINE   ORDERED BY: TANVI WALLS     ACC: [id number] EXAM:  CT BRAIN   ORDERED BY: TANVI WALLS     PROCEDURE DATE:  03/04/2024          INTERPRETATION:  CLINICAL INDICATIONS:  Trauma code.    TECHNIQUE:  Noncontrast head and cervical spine CT was performed.    Multiple contiguous axial images were obtained from the skull base to the   vertex without the use of intravenous contrast. Reformatted coronal and   sagittal images were were subsequently obtained and reviewed.    Axial images were obtained through the cervical spine using multislice   helical technique.  Reformatted coronal and sagittal images were were   subsequently obtained and reviewed.    COMPARISON: CT head dated 10/27/2023. CTA of the neck dated 10/23/2023..    FINDINGS:    CT BRAIN:  There is a large right periorbital hematoma, partially visualized. There   is no underlying fracture or evidence for intraocular hemorrhage.    There is no evidence for acute intracranial hemorrhage, mass effect or   midline shift.    There is encephalomalacia involving the right occipital lobe related to   remote hemorrhage.    The basal cisterns are patent. There is no hydrocephalus.    There is no extra-axial collection.    The visualized orbits are unremarkable. There has been bilateral cataract   surgery.    The calvarium is intact, without depressed fracture.    The visualized paranasal sinuses and mastoid air cells are clear.    CT CERVICAL SPINE:  There is a mild compression fracture deformity noted of the T2 vertebral   body, not previously seen.    There is no prevertebral soft tissue swelling. There is no splaying of   the spinous processes. The occipital condyles are normal. Lateral masses   of C1 align normally with C2. No lucent fracture line is identified.   There is no spondylolisthesis.    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 Findings include marginal osteophytes, uncovertebral spurring,   loss of normal disc space height, endplate sclerosis, and facet joint   arthrosis.    At C5-C6 there is disc osteophyte complex, bilateral uncinate spurring   and facet arthrosis contributing to severe bilateral foraminal narrowing    At C6-C7 there is disc osteophyte complex, right greater than left   uncinate spurring and facet arthrosis contributing to severe right and   mild left foraminal narrowing.    The spinal canal contents are suboptimally evaluated inherent to CT   technique though grossly unremarkable.    The visualized lung apices are within normal limits.    Miscellaneous:  None.    IMPRESSION:    CT HEAD:  Large right periorbital hematoma without evidence for underlying fracture   or intracranial hemorrhage.    Encephalomalacia of the right occipital lobe related to remote hemorrhage.    CT CERVICAL SPINE:  Mild compression fracture deformity of the T2 vertebral body, not   previously seen.    No acute cervical fracture or subluxation.    Multilevel degenerative changes as detailed above.    --- End of Report ---    < end of copied text >            Recommendations:  -   -                                                                                         General Surgical Attending:  Dr Dahl      HPI:  88-year-old male with past medical history HTN, and hx of mechanical fall complicated by brain bleed 10/2023, coming with complaints of mechanical fall again today.  Patient reports he was walking in the basement when he tripped, hit his right side of face on a concrete wall. He denies LOC and not on AC.  Recalls entire event, no neck or back pain but complaining of pain to rt side of head and rt eye.  Patient is accompanied by son who reports that he needs placement for his father and escalation of care.  Pt has trauma to Rt side of head s/p suture by ED and RT eye contusion.   Pt denies chest pain, dyspnea, and loc.  Pt does admit to inability to open RT eye due  to swelling and discomfort from fall.  Denies any other complaints.  Presently, pt is NAD.  pt states fall happened early afternoon, approx 12Noon per pt.      PAST MEDICAL & SURGICAL HISTORY:  HTN (hypertension)      Neurogenic bladder      Waldron catheter in place  2016      No significant past surgical history          MEDICATIONS  (STANDING):  ibuprofen  Tablet. 400 milliGRAM(s) Oral every 6 hours  lactated ringers. 1000 milliLiter(s) (70 mL/Hr) IV Continuous <Continuous>  lisinopril 5 milliGRAM(s) Oral daily    MEDICATIONS  (PRN):  acetaminophen     Tablet .. 650 milliGRAM(s) Oral every 6 hours PRN Temp greater or equal to 38C (100.4F), Mild Pain (1 - 3)      Allergies    No Known Allergies    Intolerances          Vital Signs Last 24 Hrs  T(C): 36.1 (04 Mar 2024 19:20), Max: 36.1 (04 Mar 2024 19:20)  T(F): 96.9 (04 Mar 2024 19:20), Max: 96.9 (04 Mar 2024 19:20)  HR: 111 (04 Mar 2024 19:20) (110 - 121)  BP: 147/81 (04 Mar 2024 19:20) (147/81 - 184/88)  BP(mean): --  RR: 20 (04 Mar 2024 19:20) (18 - 20)  SpO2: 99% (04 Mar 2024 19:20) (98% - 99%)    Parameters below as of 04 Mar 2024 16:22  Patient On (Oxygen Delivery Method): room air              Physical Exam:  General: NAD  Abdominal: +BS, Soft, ND/NT, no organomegaly, no rebound, no guarding.  HEENT:  TTP RIGHT Orbit hematoma, sutures at eyebrow area, Right eye is essentially 95% closed due to swelling.  Right forehead swelling, TTP. Zygomatic arch to mid forehead swelling.  Left eye unremarkable.    Pulmonary: CTA-B  Cardiovascular: NSR.  Extremities: no clubbing/cyanosis/edema  Neuro: A/O x 3  Pulses: palpable distal pulses        LABS:                        12.6   6.73  )-----------( 178      ( 04 Mar 2024 13:15 )             35.0     03-04    129<L>  |  92<L>  |  15  ----------------------------<  94  4.3   |  25  |  1.3    Ca    9.4      04 Mar 2024 13:15    TPro  7.3  /  Alb  4.4  /  TBili  0.5  /  DBili  x   /  AST  19  /  ALT  12  /  AlkPhos  96  03-04      Urinalysis Basic - ( 04 Mar 2024 13:15 )    Color: x / Appearance: x / SG: x / pH: x  Gluc: 94 mg/dL / Ketone: x  / Bili: x / Urobili: x   Blood: x / Protein: x / Nitrite: x   Leuk Esterase: x / RBC: x / WBC x   Sq Epi: x / Non Sq Epi: x / Bacteria: x      CAPILLARY BLOOD GLUCOSE        LIVER FUNCTIONS - ( 04 Mar 2024 13:15 )  Alb: 4.4 g/dL / Pro: 7.3 g/dL / ALK PHOS: 96 U/L / ALT: 12 U/L / AST: 19 U/L / GGT: x             Cultures:      RADIOLOGY & ADDITIONAL STUDIES:  < from: CT Head No Cont (03.04.24 @ 14:08) >    ACC: [id number] EXAM:  CT CERVICAL SPINE   ORDERED BY: TANVI WALLS     ACC: [id number] EXAM:  CT BRAIN   ORDERED BY: TANVI WALLS     PROCEDURE DATE:  03/04/2024          INTERPRETATION:  CLINICAL INDICATIONS:  Trauma code.    TECHNIQUE:  Noncontrast head and cervical spine CT was performed.    Multiple contiguous axial images were obtained from the skull base to the   vertex without the use of intravenous contrast. Reformatted coronal and   sagittal images were were subsequently obtained and reviewed.    Axial images were obtained through the cervical spine using multislice   helical technique.  Reformatted coronal and sagittal images were were   subsequently obtained and reviewed.    COMPARISON: CT head dated 10/27/2023. CTA of the neck dated 10/23/2023..    FINDINGS:    CT BRAIN:  There is a large right periorbital hematoma, partially visualized. There   is no underlying fracture or evidence for intraocular hemorrhage.    There is no evidence for acute intracranial hemorrhage, mass effect or   midline shift.    There is encephalomalacia involving the right occipital lobe related to   remote hemorrhage.    The basal cisterns are patent. There is no hydrocephalus.    There is no extra-axial collection.    The visualized orbits are unremarkable. There has been bilateral cataract   surgery.    The calvarium is intact, without depressed fracture.    The visualized paranasal sinuses and mastoid air cells are clear.    CT CERVICAL SPINE:  There is a mild compression fracture deformity noted of the T2 vertebral   body, not previously seen.    There is no prevertebral soft tissue swelling. There is no splaying of   the spinous processes. The occipital condyles are normal. Lateral masses   of C1 align normally with C2. No lucent fracture line is identified.   There is no spondylolisthesis.    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 Findings include marginal osteophytes, uncovertebral spurring,   loss of normal disc space height, endplate sclerosis, and facet joint   arthrosis.    At C5-C6 there is disc osteophyte complex, bilateral uncinate spurring   and facet arthrosis contributing to severe bilateral foraminal narrowing    At C6-C7 there is disc osteophyte complex, right greater than left   uncinate spurring and facet arthrosis contributing to severe right and   mild left foraminal narrowing.    The spinal canal contents are suboptimally evaluated inherent to CT   technique though grossly unremarkable.    The visualized lung apices are within normal limits.    Miscellaneous:  None.    IMPRESSION:    CT HEAD:  Large right periorbital hematoma without evidence for underlying fracture   or intracranial hemorrhage.    Encephalomalacia of the right occipital lobe related to remote hemorrhage.    CT CERVICAL SPINE:  Mild compression fracture deformity of the T2 vertebral body, not   previously seen.    No acute cervical fracture or subluxation.    Multilevel degenerative changes as detailed above.    --- End of Report ---    < end of copied text >            Recommendations:  -   -

## 2024-03-04 NOTE — ED PROVIDER NOTE - TOBACCO USE
Patient Education     Understanding Lumbosacral Strain    Lumbosacral strain is a medical term for an injury that causes low back pain. The lumbosacral area (low back) is between the bottom of the ribcage and the top of the buttocks. A strain is tearing of muscles and tendons. These tears can be very small but still cause pain.  How a lumbosacral strain happens  Muscles and tendons connected to the spine can be strained in a number of ways:    Sitting or standing in the same position for long periods of time. This can harm the low back over time. Poor posture can make low back pain more likely.    Moving the muscles and tendons past their usual range of motion. This can cause a sudden injury. This can happen when you twist, bend over, or lift something heavy. Not using correct technique for sports or tasks like lifting can make back injury more likely.    Accidents or falls  Lumbosacral strain can be caused by other problems, but these are less common.  Symptoms of lumbosacral strain  Symptoms may include:    Pain in the back, often on one side    Pain that gets worse with movement and gets better with rest    Inability to move as freely as usual    Swelling, slight redness, and skin warmth in the painful area  Treatment for lumbosacral strain  Low back pain often goes away by itself within several weeks. But it often comes back. Treatment focuses on reducing pain and avoiding further injury. Bed rest is usually not recommended for low back pain. Treatments may include:    Avoiding or changing the action that caused the problem. This helps prevent injuring the tissues again.    Prescription or over-the-counter pain medicines. These help reduce inflammation, swelling, and pain.    Cold or heat packs. These help reduce pain and swelling.    Stretching and other exercises. These improve flexibility and strength.    Physical therapy. This usually includes exercises and other treatments.    Injections of medicine. This  may relieve symptoms.  If these treatments do not relieve symptoms, your healthcare provider may order imaging tests to learn more about the problem. Sometimes you may need surgery.  Possible complications of lumbosacral strain  If the cause of the pain is not addressed, symptoms may return or get worse. Follow your healthcare provider s instructions on lifestyle changes and treating your back.     When to call your healthcare provider  Call your healthcare provider right away if you have any of these:    Fever of 100.4 F (38 C) or higher, or as directed    Numbness, tingling, or weakness    Problems with bowel or bladder control, or problems having sex    Pain that does not go away, or gets worse    New symptoms   Date Last Reviewed: 3/10/2016    5262-4354 [employer]. [address] [place] [address], [place] [address]. All rights reserved. This information is not intended as a substitute for professional medical care. Always follow your healthcare professional's instructions.            Unknown if ever smoked

## 2024-03-04 NOTE — CONSULT NOTE ADULT - NSCONSULTADDITIONALINFOA_GEN_ALL_CORE
I saw and examined the patient. I agree with the above assessement and plan. Sutures may be removed by nursing at 5 days. treat wounds with soapy water and pat dry. Apply bacitracin and bandaid. f/u primary.

## 2024-03-04 NOTE — H&P ADULT - NSHPPHYSICALEXAM_GEN_ALL_CORE
GENERAL:  87 y/o Male NAD, resting comfortably.  HEAD:  Atraumatic, Normocephalic  EYES: unable to assess RT eye due to edema, and pain. Lt eye exam wnl.  Rt eye has sutured laceration, and ecchymosis   NECK: Supple, No JVD, no cervical lymphadenopathy, non-tender  CHEST/LUNG: Clear to auscultation bilaterally; No wheeze, rhonchi, or rales  HEART: Regular rate and rhythm; S1&S2  ABDOMEN: Soft, Nontender, Nondistended x 4 quadrants; Bowel sounds present  EXTREMITIES:   Peripheral Pulses Present, No clubbing, no cyanosis, or no edema, no calf tenderness  PSYCH: AAOx3, cooperative, appropriate  NEUROLOGY: WNL  SKIN: WNL

## 2024-03-05 LAB
ALBUMIN SERPL ELPH-MCNC: 4 G/DL — SIGNIFICANT CHANGE UP (ref 3.5–5.2)
ALP SERPL-CCNC: 76 U/L — SIGNIFICANT CHANGE UP (ref 30–115)
ALT FLD-CCNC: 10 U/L — SIGNIFICANT CHANGE UP (ref 0–41)
AMORPH SED URNS QL MICRO: PRESENT
ANION GAP SERPL CALC-SCNC: 10 MMOL/L — SIGNIFICANT CHANGE UP (ref 7–14)
ANION GAP SERPL CALC-SCNC: 11 MMOL/L — SIGNIFICANT CHANGE UP (ref 7–14)
APPEARANCE UR: CLEAR — SIGNIFICANT CHANGE UP
AST SERPL-CCNC: 26 U/L — SIGNIFICANT CHANGE UP (ref 0–41)
BACTERIA # UR AUTO: ABNORMAL /HPF
BILIRUB SERPL-MCNC: 0.9 MG/DL — SIGNIFICANT CHANGE UP (ref 0.2–1.2)
BILIRUB UR-MCNC: NEGATIVE — SIGNIFICANT CHANGE UP
BUN SERPL-MCNC: 13 MG/DL — SIGNIFICANT CHANGE UP (ref 10–20)
BUN SERPL-MCNC: 13 MG/DL — SIGNIFICANT CHANGE UP (ref 10–20)
CALCIUM SERPL-MCNC: 8.9 MG/DL — SIGNIFICANT CHANGE UP (ref 8.4–10.5)
CALCIUM SERPL-MCNC: 9 MG/DL — SIGNIFICANT CHANGE UP (ref 8.4–10.5)
CHLORIDE SERPL-SCNC: 94 MMOL/L — LOW (ref 98–110)
CHLORIDE SERPL-SCNC: 96 MMOL/L — LOW (ref 98–110)
CO2 SERPL-SCNC: 23 MMOL/L — SIGNIFICANT CHANGE UP (ref 17–32)
CO2 SERPL-SCNC: 24 MMOL/L — SIGNIFICANT CHANGE UP (ref 17–32)
COLOR SPEC: YELLOW — SIGNIFICANT CHANGE UP
CREAT SERPL-MCNC: 1.3 MG/DL — SIGNIFICANT CHANGE UP (ref 0.7–1.5)
CREAT SERPL-MCNC: 1.4 MG/DL — SIGNIFICANT CHANGE UP (ref 0.7–1.5)
DIFF PNL FLD: ABNORMAL
EGFR: 49 ML/MIN/1.73M2 — LOW
EGFR: 54 ML/MIN/1.73M2 — LOW
EPI CELLS # UR: PRESENT
GLUCOSE SERPL-MCNC: 109 MG/DL — HIGH (ref 70–99)
GLUCOSE SERPL-MCNC: 88 MG/DL — SIGNIFICANT CHANGE UP (ref 70–99)
GLUCOSE UR QL: NEGATIVE MG/DL — SIGNIFICANT CHANGE UP
HCT VFR BLD CALC: 31.1 % — LOW (ref 42–52)
HGB BLD-MCNC: 10.9 G/DL — LOW (ref 14–18)
KETONES UR-MCNC: NEGATIVE MG/DL — SIGNIFICANT CHANGE UP
LACTATE SERPL-SCNC: 0.9 MMOL/L — SIGNIFICANT CHANGE UP (ref 0.7–2)
LEUKOCYTE ESTERASE UR-ACNC: ABNORMAL
MAGNESIUM SERPL-MCNC: 1.8 MG/DL — SIGNIFICANT CHANGE UP (ref 1.8–2.4)
MCHC RBC-ENTMCNC: 30.3 PG — SIGNIFICANT CHANGE UP (ref 27–31)
MCHC RBC-ENTMCNC: 35 G/DL — SIGNIFICANT CHANGE UP (ref 32–37)
MCV RBC AUTO: 86.4 FL — SIGNIFICANT CHANGE UP (ref 80–94)
NITRITE UR-MCNC: NEGATIVE — SIGNIFICANT CHANGE UP
NRBC # BLD: 0 /100 WBCS — SIGNIFICANT CHANGE UP (ref 0–0)
PH UR: 7.5 — SIGNIFICANT CHANGE UP (ref 5–8)
PLATELET # BLD AUTO: 168 K/UL — SIGNIFICANT CHANGE UP (ref 130–400)
PMV BLD: 9 FL — SIGNIFICANT CHANGE UP (ref 7.4–10.4)
POTASSIUM SERPL-MCNC: 4.4 MMOL/L — SIGNIFICANT CHANGE UP (ref 3.5–5)
POTASSIUM SERPL-MCNC: 4.6 MMOL/L — SIGNIFICANT CHANGE UP (ref 3.5–5)
POTASSIUM SERPL-SCNC: 4.4 MMOL/L — SIGNIFICANT CHANGE UP (ref 3.5–5)
POTASSIUM SERPL-SCNC: 4.6 MMOL/L — SIGNIFICANT CHANGE UP (ref 3.5–5)
PROT SERPL-MCNC: 6.3 G/DL — SIGNIFICANT CHANGE UP (ref 6–8)
PROT UR-MCNC: SIGNIFICANT CHANGE UP MG/DL
RBC # BLD: 3.6 M/UL — LOW (ref 4.7–6.1)
RBC # FLD: 12.8 % — SIGNIFICANT CHANGE UP (ref 11.5–14.5)
RBC CASTS # UR COMP ASSIST: 2 /HPF — SIGNIFICANT CHANGE UP (ref 0–4)
SODIUM SERPL-SCNC: 128 MMOL/L — LOW (ref 135–146)
SODIUM SERPL-SCNC: 130 MMOL/L — LOW (ref 135–146)
SP GR SPEC: 1.01 — SIGNIFICANT CHANGE UP (ref 1–1.03)
SQUAMOUS # UR AUTO: 3 /HPF — SIGNIFICANT CHANGE UP (ref 0–5)
UROBILINOGEN FLD QL: 0.2 MG/DL — SIGNIFICANT CHANGE UP (ref 0.2–1)
WBC # BLD: 6.26 K/UL — SIGNIFICANT CHANGE UP (ref 4.8–10.8)
WBC # FLD AUTO: 6.26 K/UL — SIGNIFICANT CHANGE UP (ref 4.8–10.8)
WBC UR QL: 3 /HPF — SIGNIFICANT CHANGE UP (ref 0–5)

## 2024-03-05 PROCEDURE — 70450 CT HEAD/BRAIN W/O DYE: CPT | Mod: 26

## 2024-03-05 PROCEDURE — 70480 CT ORBIT/EAR/FOSSA W/O DYE: CPT | Mod: 26,XU

## 2024-03-05 PROCEDURE — 99233 SBSQ HOSP IP/OBS HIGH 50: CPT

## 2024-03-05 RX ORDER — SENNA PLUS 8.6 MG/1
2 TABLET ORAL AT BEDTIME
Refills: 0 | Status: DISCONTINUED | OUTPATIENT
Start: 2024-03-05 | End: 2024-03-09

## 2024-03-05 RX ORDER — SODIUM CHLORIDE 9 MG/ML
1000 INJECTION INTRAMUSCULAR; INTRAVENOUS; SUBCUTANEOUS
Refills: 0 | Status: DISCONTINUED | OUTPATIENT
Start: 2024-03-05 | End: 2024-03-09

## 2024-03-05 RX ADMIN — Medication 400 MILLIGRAM(S): at 11:48

## 2024-03-05 RX ADMIN — Medication 400 MILLIGRAM(S): at 12:22

## 2024-03-05 RX ADMIN — Medication 400 MILLIGRAM(S): at 05:13

## 2024-03-05 RX ADMIN — LISINOPRIL 5 MILLIGRAM(S): 2.5 TABLET ORAL at 05:13

## 2024-03-05 RX ADMIN — SODIUM CHLORIDE 50 MILLILITER(S): 9 INJECTION INTRAMUSCULAR; INTRAVENOUS; SUBCUTANEOUS at 13:54

## 2024-03-05 NOTE — PROGRESS NOTE ADULT - SUBJECTIVE AND OBJECTIVE BOX
SUBJECTIVE:    Patient is a 88y old Male who presents with a chief complaint of Fall (04 Mar 2024 22:44)    Currently admitted to medicine with the primary diagnosis of Unable to care for self       Today is hospital day 1d. This morning he is resting comfortably in bed and reports no new issues or overnight events.     ROS:   CONSTITUTIONAL: No weakness, fevers or chills   EYES/ENT: No visual changes; No vertigo or throat pain   NECK: No pain or stiffness   RESPIRATORY: No cough, wheezing, hemoptysis; No shortness of breath   CARDIOVASCULAR: No chest pain or palpitations   GASTROINTESTINAL: No abdominal or epigastric pain. No nausea, vomiting, or hematemesis; No diarrhea or constipation. No melena or hematochezia.  GENITOURINARY: No dysuria, frequency or hematuria  NEUROLOGICAL: No numbness or weakness  SKIN: No itching, rashes      PAST MEDICAL & SURGICAL HISTORY  HTN (hypertension)    Neurogenic bladder    Yañez catheter in place  2016    No significant past surgical history      SOCIAL HISTORY:    ALLERGIES:  No Known Allergies    MEDICATIONS:  STANDING MEDICATIONS  ibuprofen  Tablet. 400 milliGRAM(s) Oral every 6 hours  lisinopril 5 milliGRAM(s) Oral daily    PRN MEDICATIONS  acetaminophen     Tablet .. 650 milliGRAM(s) Oral every 6 hours PRN    VITALS:   T(F): 96.8  HR: 94  BP: 157/85  RR: 18  SpO2: 98%    LABS:  Negative for smoking/alcohol/drug use.                         10.9   6.26  )-----------( 168      ( 05 Mar 2024 09:36 )             31.1     03-05    130<L>  |  96<L>  |  13  ----------------------------<  88  4.4   |  24  |  1.3    Ca    9.0      05 Mar 2024 09:36  Mg     1.8     03-05    TPro  6.3  /  Alb  4.0  /  TBili  0.9  /  DBili  x   /  AST  26  /  ALT  10  /  AlkPhos  76  03-05      Urinalysis Basic - ( 05 Mar 2024 09:36 )    Color: x / Appearance: x / SG: x / pH: x  Gluc: 88 mg/dL / Ketone: x  / Bili: x / Urobili: x   Blood: x / Protein: x / Nitrite: x   Leuk Esterase: x / RBC: x / WBC x   Sq Epi: x / Non Sq Epi: x / Bacteria: x        Lactate, Blood: 0.9 mmol/L (03-05-24 @ 09:36)  Creatine Kinase, Serum: 143 U/L (03-04-24 @ 13:15)      CARDIAC MARKERS ( 04 Mar 2024 13:15 )  x     / x     / 143 U/L / x     / x          RADIOLOGY:    PHYSICAL EXAM:  GEN: No acute distress  HEENT: + right periorbital edema - no tenderness no vision changes no changes upon eye exam looking in any direction   LUNGS: Clear to auscultation bilaterally, no rales/wheezing/ rhonchi  HEART: S1/S2 present. RRR, no murmurs  ABD: Soft, non-tender, non-distended. Bowel sounds present  EXT: no edema, warm   NEURO: AAOX2, normal affect        ASSESSMENT AND PLAN:    88 yr old male with hx of HTN, BPH, Neurogenic Bladder w/ indwelling yañez, BL cataract surgery with recent admission of acute hemorrhage on [facility] presnted to ER for fall.     # Right periorbital hematoma sp lac repair - improved   # Mechanical Fall   # T2 compression fx (asymptomatic)   # H/O Fall in 10 /2023 and brain hemorrhage now w/encephalomalacia   - hemodynamically stable  - able to move eye in all fields of gaze , no pain   -CT HEAD: Large right periorbital hematoma without evidence for underlying fracture   or intracranial hemorrhage. Encephalomalacia of the right occipital lobe related to remote hemorrhage.  - CT CERVICAL SPINE: Mild compression fracture deformity of the T2 vertebral body, not   previously seen. No acute cervical fracture or subluxation.  - Fall and Aspiration precautions  - Physiatry eval/Physical therapy - STR  - ophthalmology eval  - (consult called in myself) - spoke to attending Dr. Mc Cole - plan for patient to be transported tomorrow for non-  urgent eval given patient able to move eye in all fields of gaze  , pictures sent to him on teams - recommended fu CT R orbit  ; spoke to  on 4I- to arrange transport in AM       # Hyponatremia, asymptomatic - improved   - patient was given LR , change to NS   - do not over-correct more than 8-10 meq in 24 hrs  - encourage solute intake   - monitor bmp    # HTN / HLD- c/w lisinopril and statin     # Neurogenic Bladder (refused self catheterization in the passed) w/ chronic yañez catheter   # Hx of TURP  # H/O CKD3 (Baseline Cr 1.3-1.5)  - avoid nephrotoxic medications   - monitor bmp     # H/O Mild cognitive impairement - repeat CTH today , given AAx2 today     # H/O Constipation - bowel regimen , monitor for bm     # dvt/ gi ppx/diet  # dispo: acute   # family discussion: attempted to update son Alejandro  on 3/5 , unable to reach - will try again   # handoff: ophthalmology eval on 3/6 / fu repeat CTH / CT R orbit          SUBJECTIVE:    Patient is a 88y old Male who presents with a chief complaint of Fall (04 Mar 2024 22:44)    Currently admitted to medicine with the primary diagnosis of Unable to care for self       Today is hospital day 1d. This morning he is resting comfortably in bed and reports no new issues or overnight events.     ROS:   CONSTITUTIONAL: No weakness, fevers or chills   EYES/ENT: No visual changes; No vertigo or throat pain   NECK: No pain or stiffness   RESPIRATORY: No cough, wheezing, hemoptysis; No shortness of breath   CARDIOVASCULAR: No chest pain or palpitations   GASTROINTESTINAL: No abdominal or epigastric pain. No nausea, vomiting, or hematemesis; No diarrhea or constipation. No melena or hematochezia.  GENITOURINARY: No dysuria, frequency or hematuria  NEUROLOGICAL: No numbness or weakness  SKIN: No itching, rashes      PAST MEDICAL & SURGICAL HISTORY  HTN (hypertension)    Neurogenic bladder    Yañez catheter in place  2016    No significant past surgical history      SOCIAL HISTORY:    ALLERGIES:  No Known Allergies    MEDICATIONS:  STANDING MEDICATIONS  ibuprofen  Tablet. 400 milliGRAM(s) Oral every 6 hours  lisinopril 5 milliGRAM(s) Oral daily    PRN MEDICATIONS  acetaminophen     Tablet .. 650 milliGRAM(s) Oral every 6 hours PRN    VITALS:   T(F): 96.8  HR: 94  BP: 157/85  RR: 18  SpO2: 98%    LABS:  Negative for smoking/alcohol/drug use.                         10.9   6.26  )-----------( 168      ( 05 Mar 2024 09:36 )             31.1     03-05    130<L>  |  96<L>  |  13  ----------------------------<  88  4.4   |  24  |  1.3    Ca    9.0      05 Mar 2024 09:36  Mg     1.8     03-05    TPro  6.3  /  Alb  4.0  /  TBili  0.9  /  DBili  x   /  AST  26  /  ALT  10  /  AlkPhos  76  03-05      Urinalysis Basic - ( 05 Mar 2024 09:36 )    Color: x / Appearance: x / SG: x / pH: x  Gluc: 88 mg/dL / Ketone: x  / Bili: x / Urobili: x   Blood: x / Protein: x / Nitrite: x   Leuk Esterase: x / RBC: x / WBC x   Sq Epi: x / Non Sq Epi: x / Bacteria: x        Lactate, Blood: 0.9 mmol/L (03-05-24 @ 09:36)  Creatine Kinase, Serum: 143 U/L (03-04-24 @ 13:15)      CARDIAC MARKERS ( 04 Mar 2024 13:15 )  x     / x     / 143 U/L / x     / x          RADIOLOGY:    PHYSICAL EXAM:  GEN: No acute distress  HEENT: + right periorbital edema - no tenderness no vision changes no changes upon eye exam looking in any direction   LUNGS: Clear to auscultation bilaterally, no rales/wheezing/ rhonchi  HEART: S1/S2 present. RRR, no murmurs  ABD: Soft, non-tender, non-distended. Bowel sounds present  EXT: no edema, warm   NEURO: AAOX2, normal affect        ASSESSMENT AND PLAN:    88 yr old male with hx of HTN, BPH, Neurogenic Bladder w/ indwelling yañez, BL cataract surgery with recent admission of acute hemorrhage on [facility] presnted to ER for fall.     # Right periorbital hematoma sp lac repair - improved   # Mechanical Fall   # T2 compression fx (asymptomatic)   # Acute on chronic normocytic anemia   # H/O Fall in 10 /2023 and brain hemorrhage now w/encephalomalacia   - hemodynamically stable  - able to move eye in all fields of gaze , no pain   -CT HEAD: Large right periorbital hematoma without evidence for underlying fracture   or intracranial hemorrhage. Encephalomalacia of the right occipital lobe related to remote hemorrhage.  - CT CERVICAL SPINE: Mild compression fracture deformity of the T2 vertebral body, not   previously seen. No acute cervical fracture or subluxation.  - Fall and Aspiration precautions  - Physiatry eval/Physical therapy - STR  - ophthalmology eval  - (consult called in myself) - spoke to attending Dr. Mc Cole - plan for patient to be transported tomorrow for non-  urgent eval given patient able to move eye in all fields of gaze  , pictures sent to him on teams - recommended fu CT R orbit  ; spoke to  on 4I- to arrange transport in AM   - monitor cbc     # Hyponatremia, asymptomatic - improved   - patient was given LR , change to NS   - do not over-correct more than 8-10 meq in 24 hrs  - encourage solute intake   - monitor bmp    # HTN / HLD- c/w lisinopril and statin     # Neurogenic Bladder (refused self catheterization in the passed) w/ chronic yañez catheter   # Hx of TURP  # H/O CKD3 (Baseline Cr 1.3-1.5)  - avoid nephrotoxic medications   - monitor bmp     # H/O Mild cognitive impairement - repeat CTH today , given AAx2 today     # H/O Constipation - bowel regimen , monitor for bm     # dvt/ gi ppx/diet  # dispo: acute   # family discussion: attempted to update son Alejandro  on 3/5 , unable to reach - will try again   # handoff: ophthalmology eval on 3/6 / fu repeat CTH / CT R orbit          SUBJECTIVE:    Patient is a 88y old Male who presents with a chief complaint of Fall (04 Mar 2024 22:44)    Currently admitted to medicine with the primary diagnosis of Unable to care for self       Today is hospital day 1d. This morning he is resting comfortably in bed and reports no new issues or overnight events.     ROS:   CONSTITUTIONAL: No weakness, fevers or chills   EYES/ENT: No visual changes; No vertigo or throat pain   NECK: No pain or stiffness   RESPIRATORY: No cough, wheezing, hemoptysis; No shortness of breath   CARDIOVASCULAR: No chest pain or palpitations   GASTROINTESTINAL: No abdominal or epigastric pain. No nausea, vomiting, or hematemesis; No diarrhea or constipation. No melena or hematochezia.  GENITOURINARY: No dysuria, frequency or hematuria  NEUROLOGICAL: No numbness or weakness  SKIN: No itching, rashes      PAST MEDICAL & SURGICAL HISTORY  HTN (hypertension)    Neurogenic bladder    Yañez catheter in place  2016    No significant past surgical history      SOCIAL HISTORY:    ALLERGIES:  No Known Allergies    MEDICATIONS:  STANDING MEDICATIONS  ibuprofen  Tablet. 400 milliGRAM(s) Oral every 6 hours  lisinopril 5 milliGRAM(s) Oral daily    PRN MEDICATIONS  acetaminophen     Tablet .. 650 milliGRAM(s) Oral every 6 hours PRN    VITALS:   T(F): 96.8  HR: 94  BP: 157/85  RR: 18  SpO2: 98%    LABS:  Negative for smoking/alcohol/drug use.                         10.9   6.26  )-----------( 168      ( 05 Mar 2024 09:36 )             31.1     03-05    130<L>  |  96<L>  |  13  ----------------------------<  88  4.4   |  24  |  1.3    Ca    9.0      05 Mar 2024 09:36  Mg     1.8     03-05    TPro  6.3  /  Alb  4.0  /  TBili  0.9  /  DBili  x   /  AST  26  /  ALT  10  /  AlkPhos  76  03-05      Urinalysis Basic - ( 05 Mar 2024 09:36 )    Color: x / Appearance: x / SG: x / pH: x  Gluc: 88 mg/dL / Ketone: x  / Bili: x / Urobili: x   Blood: x / Protein: x / Nitrite: x   Leuk Esterase: x / RBC: x / WBC x   Sq Epi: x / Non Sq Epi: x / Bacteria: x        Lactate, Blood: 0.9 mmol/L (03-05-24 @ 09:36)  Creatine Kinase, Serum: 143 U/L (03-04-24 @ 13:15)      CARDIAC MARKERS ( 04 Mar 2024 13:15 )  x     / x     / 143 U/L / x     / x          RADIOLOGY:    PHYSICAL EXAM:  GEN: No acute distress  HEENT: + right periorbital edema - no tenderness no vision changes no changes upon eye exam looking in any direction   LUNGS: Clear to auscultation bilaterally, no rales/wheezing/ rhonchi  HEART: S1/S2 present. RRR, no murmurs  ABD: Soft, non-tender, non-distended. Bowel sounds present  EXT: no edema, warm   NEURO: AAOX2, normal affect        ASSESSMENT AND PLAN:    88 yr old male with hx of HTN, BPH, Neurogenic Bladder w/ indwelling yañez, BL cataract surgery with recent admission of acute hemorrhage on [facility] presnted to ER for fall.     # Right periorbital hematoma sp lac repair - improved   # Mechanical Fall   # T2 compression fx (asymptomatic)   # Acute on chronic normocytic anemia   # H/O Fall in 10 /2023 and brain hemorrhage now w/encephalomalacia   - hemodynamically stable  - able to move eye in all fields of gaze , no pain   -CT HEAD: Large right periorbital hematoma without evidence for underlying fracture   or intracranial hemorrhage. Encephalomalacia of the right occipital lobe related to remote hemorrhage.  - CT CERVICAL SPINE: Mild compression fracture deformity of the T2 vertebral body, not   previously seen. No acute cervical fracture or subluxation.  - Fall and Aspiration precautions  - Physiatry eval/Physical therapy - STR  - ophthalmology eval  - (consult called in myself) - spoke to attending Dr. Mc Cole - plan for patient to be transported tomorrow for non-  urgent eval given patient able to move eye in all fields of gaze  , pictures sent to him on teams - recommended fu CT R orbit  ; spoke to  on 4I- to arrange transport in AM   - monitor cbc     # Hyponatremia, asymptomatic - improved   - patient was given LR , change to NS   - do not over-correct more than 8-10 meq in 24 hrs  - encourage solute intake   - monitor bmp    # HTN / HLD- c/w lisinopril and statin     # Neurogenic Bladder (refused self catheterization in the passed) w/ chronic yañez catheter   # Hx of TURP  # H/O CKD3 (Baseline Cr 1.3-1.5)  - avoid nephrotoxic medications   - monitor bmp     # H/O Mild cognitive impairement - repeat CTH today , given AAx2 today     # H/O Constipation - bowel regimen , monitor for bm     # dvt/ gi ppx/diet  # dispo: acute   # family discussion: updated son Alejandro on 3/5 , all questions answered and concerns addressed   # handoff: ophthalmology eval on 3/6 / fu repeat CTH / CT R orbit

## 2024-03-05 NOTE — PHYSICAL THERAPY INITIAL EVALUATION ADULT - ADDITIONAL COMMENTS
Per patient, he lives in a high [place] with 4-5 steps outside with no rails and 3-4 steps inside 2 with 2 rails to enter home; was not using any assistive device but has a cane at home and RW at home Which he was not using it .

## 2024-03-05 NOTE — PHYSICAL THERAPY INITIAL EVALUATION ADULT - GENERAL OBSERVATIONS, REHAB EVAL
10:56 - 11:20 Chart reviewed. Order received.  Patient is ok to be  seen for PT,confirmed with DR Reymundo BONILLA/ Eugenie Sarabia and  RN. pt encountered  Semi english in bed  denies pain, and agrees to participate in session, +  Heplock NAD.

## 2024-03-05 NOTE — PHYSICAL THERAPY INITIAL EVALUATION ADULT - SPECIFY REASON(S)
Attempted to see pt this AM for bedside PT, As per BRIGITTE Lay pt pendding Neuro sx Consult for T2 fracture , Requested to defer PT at this time ,  Will hold PT and F/u as appropriated.

## 2024-03-05 NOTE — CHART NOTE - NSCHARTNOTEFT_GEN_A_CORE
CT Head and Orbital noted     < from: CT Orbit No Cont (03.05.24 @ 14:35) >  IMPRESSION:  1.  Mildly displaced fracture at the base of the right nasal bone.  2.  Right forehead and periorbital soft tissue swelling with subcutaneous   hematoma.  --- End of Report ---    < from: CT Head No Cont (03.05.24 @ 14:35) >  IMPRESSION:  1.  No evidence of acute intracranial pathology. Stable exam since   3/4/2024.  2.  Stable right parietal/occipital encephalomalacia from chronic   hematoma and chronic microvascular changes.  3.  Right forehead / periorbital soft tissue swelling and subcutaneous   hematoma.  --- End of Report ---      Nasal fracture findings discussed w/ ENT BRIGITTE Harrison at [facility].  Recommended if patient breathing fine, does not need acute intervention for the nasal fracture and can FU outpatient in 2 weeks if patient wants cosmetic surgery.   She also recommended consulting maxillofacial trauma team vs plastics if needed    Above discussed w. Dr. Dwyer

## 2024-03-05 NOTE — PHYSICAL THERAPY INITIAL EVALUATION ADULT - WEIGHT-BEARING RESTRICTIONS: SIT/STAND, REHAB EVAL
Problem: Potential for Falls  Goal: Patient will remain free of falls  Description: INTERVENTIONS:  - Assess patient frequently for physical needs  -  Identify cognitive and physical deficits and behaviors that affect risk of falls    -  [place] fall precautions as indicated by assessment   - Educate patient/family on patient safety including physical limitations  - Instruct patient to call for assistance with activity based on assessment  - Modify environment to reduce risk of injury  - Consider OT/PT consult to assist with strengthening/mobility  Outcome: Progressing
W/ Abdominal Binder/full weight-bearing

## 2024-03-06 LAB
ALBUMIN SERPL ELPH-MCNC: 3.9 G/DL — SIGNIFICANT CHANGE UP (ref 3.5–5.2)
ALP SERPL-CCNC: 72 U/L — SIGNIFICANT CHANGE UP (ref 30–115)
ALT FLD-CCNC: 11 U/L — SIGNIFICANT CHANGE UP (ref 0–41)
ANION GAP SERPL CALC-SCNC: 12 MMOL/L — SIGNIFICANT CHANGE UP (ref 7–14)
AST SERPL-CCNC: 26 U/L — SIGNIFICANT CHANGE UP (ref 0–41)
BASOPHILS # BLD AUTO: 0.01 K/UL — SIGNIFICANT CHANGE UP (ref 0–0.2)
BASOPHILS NFR BLD AUTO: 0.2 % — SIGNIFICANT CHANGE UP (ref 0–1)
BILIRUB SERPL-MCNC: 0.7 MG/DL — SIGNIFICANT CHANGE UP (ref 0.2–1.2)
BLD GP AB SCN SERPL QL: SIGNIFICANT CHANGE UP
BUN SERPL-MCNC: 11 MG/DL — SIGNIFICANT CHANGE UP (ref 10–20)
CALCIUM SERPL-MCNC: 8.9 MG/DL — SIGNIFICANT CHANGE UP (ref 8.4–10.5)
CHLORIDE SERPL-SCNC: 96 MMOL/L — LOW (ref 98–110)
CO2 SERPL-SCNC: 23 MMOL/L — SIGNIFICANT CHANGE UP (ref 17–32)
CREAT SERPL-MCNC: 1.2 MG/DL — SIGNIFICANT CHANGE UP (ref 0.7–1.5)
EGFR: 59 ML/MIN/1.73M2 — LOW
EOSINOPHIL # BLD AUTO: 0.1 K/UL — SIGNIFICANT CHANGE UP (ref 0–0.7)
EOSINOPHIL NFR BLD AUTO: 1.9 % — SIGNIFICANT CHANGE UP (ref 0–8)
GLUCOSE SERPL-MCNC: 89 MG/DL — SIGNIFICANT CHANGE UP (ref 70–99)
HCT VFR BLD CALC: 30.4 % — LOW (ref 42–52)
HGB BLD-MCNC: 11.1 G/DL — LOW (ref 14–18)
IMM GRANULOCYTES NFR BLD AUTO: 0.6 % — HIGH (ref 0.1–0.3)
LYMPHOCYTES # BLD AUTO: 0.53 K/UL — LOW (ref 1.2–3.4)
LYMPHOCYTES # BLD AUTO: 10.3 % — LOW (ref 20.5–51.1)
MCHC RBC-ENTMCNC: 30.7 PG — SIGNIFICANT CHANGE UP (ref 27–31)
MCHC RBC-ENTMCNC: 36.5 G/DL — SIGNIFICANT CHANGE UP (ref 32–37)
MCV RBC AUTO: 84.2 FL — SIGNIFICANT CHANGE UP (ref 80–94)
MONOCYTES # BLD AUTO: 0.56 K/UL — SIGNIFICANT CHANGE UP (ref 0.1–0.6)
MONOCYTES NFR BLD AUTO: 10.9 % — HIGH (ref 1.7–9.3)
NEUTROPHILS # BLD AUTO: 3.9 K/UL — SIGNIFICANT CHANGE UP (ref 1.4–6.5)
NEUTROPHILS NFR BLD AUTO: 76.1 % — HIGH (ref 42.2–75.2)
NRBC # BLD: 0 /100 WBCS — SIGNIFICANT CHANGE UP (ref 0–0)
PLATELET # BLD AUTO: 155 K/UL — SIGNIFICANT CHANGE UP (ref 130–400)
PMV BLD: 9 FL — SIGNIFICANT CHANGE UP (ref 7.4–10.4)
POTASSIUM SERPL-MCNC: 4.3 MMOL/L — SIGNIFICANT CHANGE UP (ref 3.5–5)
POTASSIUM SERPL-SCNC: 4.3 MMOL/L — SIGNIFICANT CHANGE UP (ref 3.5–5)
PROT SERPL-MCNC: 6.4 G/DL — SIGNIFICANT CHANGE UP (ref 6–8)
RBC # BLD: 3.61 M/UL — LOW (ref 4.7–6.1)
RBC # FLD: 12.6 % — SIGNIFICANT CHANGE UP (ref 11.5–14.5)
SODIUM SERPL-SCNC: 131 MMOL/L — LOW (ref 135–146)
WBC # BLD: 5.13 K/UL — SIGNIFICANT CHANGE UP (ref 4.8–10.8)
WBC # FLD AUTO: 5.13 K/UL — SIGNIFICANT CHANGE UP (ref 4.8–10.8)

## 2024-03-06 PROCEDURE — 99232 SBSQ HOSP IP/OBS MODERATE 35: CPT

## 2024-03-06 RX ADMIN — SENNA PLUS 2 TABLET(S): 8.6 TABLET ORAL at 21:07

## 2024-03-06 RX ADMIN — LISINOPRIL 5 MILLIGRAM(S): 2.5 TABLET ORAL at 06:22

## 2024-03-06 NOTE — PROGRESS NOTE ADULT - SUBJECTIVE AND OBJECTIVE BOX
88 yr old male with hx of HTN, BPH, Neurogenic Bladder w/ indwelling yañez, BL cataract surgery with recent admission of acute hemorrhage on [facility] presnted to ER for fall.     Today:  Seen at bedside, no new complaints.  AAO x 3.              REVIEW OF SYSTEMS:  No new complaints    MEDICATIONS  (STANDING):  lisinopril 5 milliGRAM(s) Oral daily  senna 2 Tablet(s) Oral at bedtime  sodium chloride 0.9%. 1000 milliLiter(s) (50 mL/Hr) IV Continuous <Continuous>    MEDICATIONS  (PRN):  acetaminophen     Tablet .. 650 milliGRAM(s) Oral every 6 hours PRN Temp greater or equal to 38C (100.4F), Mild Pain (1 - 3)      Allergies  No Known Allergies          Vital Signs Last 24 Hrs  T(C): 35.9 (06 Mar 2024 06:31), Max: 36.5 (05 Mar 2024 21:09)  T(F): 96.7 (06 Mar 2024 06:31), Max: 97.7 (05 Mar 2024 21:09)  HR: 99 (06 Mar 2024 06:31) (75 - 110)  BP: 167/85 (06 Mar 2024 06:31) (126/60 - 167/85)  RR: 18 (05 Mar 2024 22:06) (18 - 18)  SpO2: 95% (05 Mar 2024 22:06) (95% - 96%)    Parameters below as of 05 Mar 2024 22:06  Patient On (Oxygen Delivery Method): room air        PHYSICAL EXAM:  GEN: No acute distress  HEENT: + right periorbital edema - no tenderness no vision changes no changes upon eye exam looking in any direction.  Pupils asymmetric.   LUNGS: Clear to auscultation bilaterally, no rales/wheezing/ rhonchi  HEART: S1/S2 present. RRR, no murmurs  ABD: Soft, non-tender, non-distended. Bowel sounds present  EXT: no edema, warm   NEURO: AAOX3, normal affect      LABS:                        11.1   5.13  )-----------( 155      ( 06 Mar 2024 07:06 )             30.4     03-06    131<L>  |  96<L>  |  11  ----------------------------<  89  4.3   |  23  |  1.2    Ca    8.9      06 Mar 2024 07:06  Mg     1.8     03-05    TPro  6.4  /  Alb  3.9  /  TBili  0.7  /  DBili  x   /  AST  26  /  ALT  11  /  AlkPhos  72  03-06      Urinalysis Basic - ( 06 Mar 2024 07:06 )    Color: x / Appearance: x / SG: x / pH: x  Gluc: 89 mg/dL / Ketone: x  / Bili: x / Urobili: x   Blood: x / Protein: x / Nitrite: x   Leuk Esterase: x / RBC: x / WBC x   Sq Epi: x / Non Sq Epi: x / Bacteria: x

## 2024-03-06 NOTE — CONSULT NOTE ADULT - ASSESSMENT
ASSESSMENT  1. Periorbital Ecchymosis OD>OS  2. Laceration RUL/Brow, s/p sutures   3. Subconjunctival Hemorrhage OD  4. Vitreous Prolapse OD  - likely from prior cataract extraction  5. Pseudophakia OU   6. Suspected Peripheral Field Deficit  - reduced IT visual field OS on confrontation fields     RECOMMENDATIONS  ASSESSMENT  1. Periorbital Ecchymosis OD>OS  2. Laceration RUL/Brow, s/p sutures   3. Subconjunctival Hemorrhage OD  4. Vitreous Prolapse OD  - likely from prior cataract extraction  5. Pseudophakia OU   6.     RECOMMENDATIONS   - RTO 2-3 weeks following discharge  - [facility] phone #: [phone] ASSESSMENT  1. Periorbital Ecchymosis OD>OS  2. Laceration RUL/Brow, s/p sutures   3. Subconjunctival Hemorrhage OD  4. Vitreous Prolapse OD  - likely from prior cataract extraction  5. 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 Pseudoexfoliation Syndrome       RECOMMENDATIONS   - Continue care as directed by primary team  - Edu pt on presence of retinal detachment OS that appears chronic (not acute/not a result of most recent fall). Edu importance of treatement with retina specialist. Pt to follow up as out patient at [facility] (phone #: [phone]) with Dr. Efrain Nunez for further retinal assessment/treatment.   - Please contact [facility] with any questions/to coordinate out patient appointment prior to discharge  - Reconsult as needed.     Discussed with and assessed in conjunction with Dr. Efrain Nunez.

## 2024-03-06 NOTE — CONSULT NOTE ADULT - SUBJECTIVE AND OBJECTIVE BOX
ALLERGIES: No Known Allergies      HEALTH ISSUES------------------------------------------------------  Other specified health status    No pertinent family history in first degree relatives    Handoff    MEWS Score    HTN (hypertension)    Neurogenic bladder    Waldron catheter in place    Unable to care for self    No significant past surgical history    FALL    90+    Fall    SysAdmin_VisitLink          PRESCRIPTIONS-----------------------------------------------------  cephalexin 500 mg oral tablet: 1 tab(s) orally 4 times a day  lactulose 10 g/15 mL oral syrup: 15 milliliter(s) orally once a day  lisinopril 5 mg oral tablet: 1 tab(s) orally once a day  senna leaf extract oral tablet: 2 tab(s) orally once a day (at bedtime)          VISIT-------------------------------------------------------------------        T(C): 35.9 (03-06-24 @ 06:31), Max: 36.5 (03-05-24 @ 21:09)  HR: 99 (03-06-24 @ 06:31) (75 - 110)  BP: 167/85 (03-06-24 @ 06:31) (126/60 - 167/85)  RR: 18 (03-05-24 @ 22:06) (18 - 18)  SpO2: 95% (03-05-24 @ 22:06) (95% - 96%)        EYE EXAM-----------------------------------------------------------    Chief Complaint: Pt walking on sidewalk, down sloping, pt feel and hit head during fall.     GAY: a few months ago, s/p CE. OD sees better than OS   - using readers prn   - h/o fall, issiue with peripheral vision?     Entrance Testing  VAsc: OD 20/25-2           OS 20/30-2, PH 20/25+1  Pupils: PERRL OU, (-)APD OD, OS   EOMs: full OD, OS  CF: full OD, OS    IOP taken via [employer] @  OD  OS    Anterior Segment, assessed via hand-held slit lamp / 20D+transilluminator   Adnexa:  Lids:  Conjunctiva:  Cornea:  AC:  Iris:    Dilation deferred. / Dilated with 1gtt tropicamide 1% OU and phenylephrine 2.5% OU    Posterior Segment, assessed via BIO+20D  Lens:   Vitreous:  Optic Nerve:  Macula:  Vessels:  Periphery:          ALLERGIES: No Known Allergies      HEALTH ISSUES------------------------------------------------------  Other specified health status    No pertinent family history in first degree relatives    Handoff    MEWS Score    HTN (hypertension)    Neurogenic bladder    Waldron catheter in place    Unable to care for self    No significant past surgical history    FALL    90+    Fall    SysAdmin_VisitLink          PRESCRIPTIONS-----------------------------------------------------  cephalexin 500 mg oral tablet: 1 tab(s) orally 4 times a day  lactulose 10 g/15 mL oral syrup: 15 milliliter(s) orally once a day  lisinopril 5 mg oral tablet: 1 tab(s) orally once a day  senna leaf extract oral tablet: 2 tab(s) orally once a day (at bedtime)          VISIT-------------------------------------------------------------------        T(C): 35.9 (03-06-24 @ 06:31), Max: 36.5 (03-05-24 @ 21:09)  HR: 99 (03-06-24 @ 06:31) (75 - 110)  BP: 167/85 (03-06-24 @ 06:31) (126/60 - 167/85)  RR: 18 (03-05-24 @ 22:06) (18 - 18)  SpO2: 95% (03-05-24 @ 22:06) (95% - 96%)        EYE EXAM-----------------------------------------------------------    Chief Complaint: Pt walking on sidewalk, down sloping, pt feel and hit head during fall.     GAY: a few months ago, s/p CE. OD sees better than OS   - using readers prn   - h/o fall, issiue with peripheral vision?     Entrance Testing  VAsc: OD 20/25-2           OS 20/30-2, PH 20/25+1  Pupils: round and reactive OD, reactive with irregular pupillary margin OS (likely post-surgical pupil), (-)APD OD, OS  EOMs: full OD, OS  CF: full OD, OS    IOP taken via GAT @1:40pm  OD 12 mmHg  OS 12 mmHg    Anterior Segment, assessed via hand-held slit lamp / 20D+transilluminator   Adnexa/Lids: diffuse ecchymosis of right periorbita/adenexa/RUL/RLL. Ecchymosis extending medially RANDALL/LLL extending to inferior adenxa   Conjunctiva: subconjunctival hemorrhage inf/temp/neena-limbal nasal OD, cl OS   Cornea: CCI temp OU   AC: vitreous prolapse OD, deep & quiet OS   Iris: WNL OD, diffuse atrophy OS     Dilated with 1gtt tropicamide 1% OU and phenylephrine 2.5% OU @ 1:41pm    Posterior Segment, assessed via BIO+20D  Lens:   Vitreous:  Optic Nerve:  Macula:  Vessels:  Periphery:          ALLERGIES: No Known Allergies      HEALTH ISSUES------------------------------------------------------  Other specified health status    No pertinent family history in first degree relatives    Handoff    MEWS Score    HTN (hypertension)    Neurogenic bladder    Waldron catheter in place    Unable to care for self    No significant past surgical history    FALL    90+    Fall    SysAdmin_VisitLink          PRESCRIPTIONS-----------------------------------------------------  cephalexin 500 mg oral tablet: 1 tab(s) orally 4 times a day  lactulose 10 g/15 mL oral syrup: 15 milliliter(s) orally once a day  lisinopril 5 mg oral tablet: 1 tab(s) orally once a day  senna leaf extract oral tablet: 2 tab(s) orally once a day (at bedtime)          VISIT-------------------------------------------------------------------        T(C): 35.9 (03-06-24 @ 06:31), Max: 36.5 (03-05-24 @ 21:09)  HR: 99 (03-06-24 @ 06:31) (75 - 110)  BP: 167/85 (03-06-24 @ 06:31) (126/60 - 167/85)  RR: 18 (03-05-24 @ 22:06) (18 - 18)  SpO2: 95% (03-05-24 @ 22:06) (95% - 96%)        EYE EXAM-----------------------------------------------------------    Chief Complaint: 88 year old male Pt presents s/p fall. Pt reports walking on sidewalk and encounter down sloping of side walk, despite using cane, pt reports he lost his balance/fell hit head to concrete during fall.     GAY: 4-6 months with Dr. Evangelista.   - s/p CE OU  - using readers prn   - h/o prior fall, pt's son per EMS reports issues with peripheral vision following last fall, however pt denies this      Entrance Testing  VAsc: OD 20/25-2           OS 20/30-2, PH 20/25+1  Pupils: round and reactive OD, reactive with irregular pupillary margin OS (likely post-surgical pupil), (-)APD OD, OS  EOMs: full OD, OS  CF: full OD, OS    IOP taken via GAT @1:40pm  OD 12 mmHg  OS 12 mmHg    Anterior Segment, assessed via hand-held slit lamp / 20D+transilluminator   Adnexa/Lids: diffuse ecchymosis of right periorbita/adenexa/RUL/RLL. Ecchymosis extending medially RANDALL/LLL extending to inferior adenxa   Conjunctiva: subconjunctival hemorrhage inf/temp/neena-limbal nasal OD, cl OS   Cornea: CCI temp OU   AC: vitreous prolapse OD, deep & quiet OS   Iris: WNL OD, diffuse atrophy OS     Dilated with 1gtt tropicamide 1% OU and phenylephrine 2.5% OU @ 1:41pm    Posterior Segment, assessed via BIO+20D  Lens:   Vitreous:  Optic Nerve:  Macula:  Vessels:  Periphery:       CT ORBITS IMPRESSION: (COPIED)  1.  Mildly displaced fracture at the base of the right nasal bone.  2.  Right forehead and periorbital soft tissue swelling with subcutaneous hematoma.       ALLERGIES: No Known Allergies      HEALTH ISSUES------------------------------------------------------  Other specified health status    No pertinent family history in first degree relatives    Handoff    MEWS Score    HTN (hypertension)    Neurogenic bladder    Waldron catheter in place    Unable to care for self    No significant past surgical history    FALL    90+    Fall    SysAdmin_VisitLink          PRESCRIPTIONS-----------------------------------------------------  cephalexin 500 mg oral tablet: 1 tab(s) orally 4 times a day  lactulose 10 g/15 mL oral syrup: 15 milliliter(s) orally once a day  lisinopril 5 mg oral tablet: 1 tab(s) orally once a day  senna leaf extract oral tablet: 2 tab(s) orally once a day (at bedtime)          VISIT-------------------------------------------------------------------        T(C): 35.9 (03-06-24 @ 06:31), Max: 36.5 (03-05-24 @ 21:09)  HR: 99 (03-06-24 @ 06:31) (75 - 110)  BP: 167/85 (03-06-24 @ 06:31) (126/60 - 167/85)  RR: 18 (03-05-24 @ 22:06) (18 - 18)  SpO2: 95% (03-05-24 @ 22:06) (95% - 96%)        EYE EXAM-----------------------------------------------------------    Chief Complaint: 88 year old male Pt presents s/p fall. Pt reports walking on sidewalk and encounter down sloping of side walk, despite using cane, pt reports he lost his balance/fell hit head to concrete during fall.     GYA: 4-6 months with Dr. Evangelista.   - s/p CE OU  - using readers prn   - h/o prior fall, pt's son per EMS reports issues with peripheral vision following last fall, however pt denies this      Entrance Testing  VAsc: OD 20/25-2           OS 20/30-2, PH 20/25+1  Pupils: round and reactive OD, reactive with irregular pupillary margin OS (likely post-surgical pupil), (-)APD OD, OS  EOMs: full OD, OS, (-)pain OU   CF: full OD, OS    IOP taken via GAT @1:40pm  OD 12 mmHg  OS 12 mmHg    Anterior Segment, assessed via hand-held slit lamp / 20D+transilluminator   Adnexa/Lids: diffuse ecchymosis of right periorbita/adenexa/RUL/RLL. Ecchymosis extending medially RANDALL/LLL extending to inferior adenxa   Conjunctiva: subconjunctival hemorrhage inf/temp/neena-limbal nasal OD, cl OS   Cornea: CCI temp OU   AC: vitreous prolapse OD, deep & quiet OS   Iris: WNL OD, diffuse atrophy OS     Dilated with 1gtt tropicamide 1% OU and phenylephrine 2.5% OU @ 1:41pm    Posterior Segment, assessed via BIO+20D  Lens:   Vitreous:  Optic Nerve:  Macula:  Vessels:  Periphery:       CT ORBITS IMPRESSION: (COPIED)  1.  Mildly displaced fracture at the base of the right nasal bone.  2.  Right forehead and periorbital soft tissue swelling with subcutaneous hematoma.       ALLERGIES: No Known Allergies      HEALTH ISSUES------------------------------------------------------  Other specified health status    No pertinent family history in first degree relatives    Handoff    MEWS Score    HTN (hypertension)    Neurogenic bladder    Waldron catheter in place    Unable to care for self    No significant past surgical history    FALL    90+    Fall    SysAdmin_VisitLink          PRESCRIPTIONS-----------------------------------------------------  cephalexin 500 mg oral tablet: 1 tab(s) orally 4 times a day  lactulose 10 g/15 mL oral syrup: 15 milliliter(s) orally once a day  lisinopril 5 mg oral tablet: 1 tab(s) orally once a day  senna leaf extract oral tablet: 2 tab(s) orally once a day (at bedtime)          VISIT-------------------------------------------------------------------        T(C): 35.9 (03-06-24 @ 06:31), Max: 36.5 (03-05-24 @ 21:09)  HR: 99 (03-06-24 @ 06:31) (75 - 110)  BP: 167/85 (03-06-24 @ 06:31) (126/60 - 167/85)  RR: 18 (03-05-24 @ 22:06) (18 - 18)  SpO2: 95% (03-05-24 @ 22:06) (95% - 96%)        EYE EXAM-----------------------------------------------------------    Chief Complaint: 88 year old male Pt presents s/p fall. Pt reports walking on sidewalk and encounter down sloping of side walk, despite using cane, pt reports he lost his balance/fell hit head to concrete during fall.     GAY: 4-6 months with Dr. Evangelista.   - s/p CE OU  - using readers prn   - h/o prior fall, pt's son per EMS reports issues with peripheral vision following last fall, however pt denies this      Entrance Testing  VAsc: OD 20/25-2           OS 20/30-2, PH 20/25+1  Pupils: round and reactive OD, reactive with irregular pupillary margin OS (likely post-surgical pupil), (-)APD OD, OS  EOMs: full OD, OS, (-)pain OU   CF: full OD, reduced IT OS     IOP taken via GAT @1:40pm  OD 12 mmHg  OS 12 mmHg    Anterior Segment, assessed via slit lamp   Adnexa/Lids: suture thru right brow. diffuse ecchymosis of right periorbita/adenexa/RUL/RLL. Ecchymosis extending medially RANDALL/LLL extending to inferior adenxa   Conjunctiva: subconjunctival hemorrhage inf/temp/neena-limbal nasal OD, cl OS   Cornea: CCI temp OU   AC: vitreous prolapse OD, deep & quiet OS   Iris: WNL OD, diffuse atrophy OS     Dilated with 1gtt tropicamide 1% OU and phenylephrine 2.5% OU @ 1:41pm    Posterior Segment, assessed via BIO+20D  Lens:   Vitreous:  Optic Nerve:  Macula:  Vessels:  Periphery:       CT ORBITS IMPRESSION: (COPIED)  1.  Mildly displaced fracture at the base of the right nasal bone.  2.  Right forehead and periorbital soft tissue swelling with subcutaneous hematoma.       ALLERGIES: No Known Allergies      HEALTH ISSUES------------------------------------------------------  Other specified health status    No pertinent family history in first degree relatives    Handoff    MEWS Score    HTN (hypertension)    Neurogenic bladder    Waldron catheter in place    Unable to care for self    No significant past surgical history    FALL    90+    Fall    SysAdmin_VisitLink          PRESCRIPTIONS-----------------------------------------------------  cephalexin 500 mg oral tablet: 1 tab(s) orally 4 times a day  lactulose 10 g/15 mL oral syrup: 15 milliliter(s) orally once a day  lisinopril 5 mg oral tablet: 1 tab(s) orally once a day  senna leaf extract oral tablet: 2 tab(s) orally once a day (at bedtime)          VISIT-------------------------------------------------------------------        T(C): 35.9 (03-06-24 @ 06:31), Max: 36.5 (03-05-24 @ 21:09)  HR: 99 (03-06-24 @ 06:31) (75 - 110)  BP: 167/85 (03-06-24 @ 06:31) (126/60 - 167/85)  RR: 18 (03-05-24 @ 22:06) (18 - 18)  SpO2: 95% (03-05-24 @ 22:06) (95% - 96%)        EYE EXAM-----------------------------------------------------------    Chief Complaint: 88 year old male Pt presents s/p fall. Pt reports walking on sidewalk and encounter down sloping of side walk, despite using cane, pt reports he lost his balance/fell hit head to concrete during fall.     GAY: 4-6 months with Dr. Evangelista.   - s/p CE OU  - using readers prn   - h/o prior fall, pt's son per EMS reports issues with peripheral vision following last fall, however pt denies this      Entrance Testing  VAsc: OD 20/25-2           OS 20/30-2, PH 20/25+1  Pupils: round and reactive OD, reactive with irregular pupillary margin OS (likely post-surgical pupil), (-)APD OD, OS  EOMs: full OD, OS, (-)pain OU   CF: full OD, reduced IT OS     IOP taken via GAT @1:40pm  OD 12 mmHg  OS 12 mmHg    Anterior Segment, assessed via slit lamp   Adnexa/Lids: suture thru right brow. diffuse ecchymosis of right periorbita/adenexa/RUL/RLL. Ecchymosis extending medially RANDALL/LLL extending to inferior adenxa   Conjunctiva: subconjunctival hemorrhage inf/temp/neena-limbal nasal OD, cl OS   Cornea: CCI temp OU   AC: vitreous prolapse with pigment cells OD, deep & quiet OS   Iris: WNL OD, diffuse atrophy OS     Dilated with 1gtt tropicamide 1% OU and phenylephrine 2.5% OU @ 1:41pm    Posterior Segment, assessed via BIO+20D  Lens: PCIOL, cl/centered OU   Vitreous: tr pigment cells OD, cl OS   Optic Nerve: 0.30 OD, 0.25 OS, distinct margins, well perfused OU   Macula: flat OU   Vessels:  Periphery:       CT ORBITS IMPRESSION: (COPIED)  1.  Mildly displaced fracture at the base of the right nasal bone.  2.  Right forehead and periorbital soft tissue swelling with subcutaneous hematoma.       ALLERGIES: No Known Allergies      HEALTH ISSUES------------------------------------------------------  Other specified health status    No pertinent family history in first degree relatives    Handoff    MEWS Score    HTN (hypertension)    Neurogenic bladder    Waldron catheter in place    Unable to care for self    No significant past surgical history    FALL    90+    Fall    SysAdmin_VisitLink          PRESCRIPTIONS-----------------------------------------------------  cephalexin 500 mg oral tablet: 1 tab(s) orally 4 times a day  lactulose 10 g/15 mL oral syrup: 15 milliliter(s) orally once a day  lisinopril 5 mg oral tablet: 1 tab(s) orally once a day  senna leaf extract oral tablet: 2 tab(s) orally once a day (at bedtime)          VISIT-------------------------------------------------------------------        T(C): 35.9 (03-06-24 @ 06:31), Max: 36.5 (03-05-24 @ 21:09)  HR: 99 (03-06-24 @ 06:31) (75 - 110)  BP: 167/85 (03-06-24 @ 06:31) (126/60 - 167/85)  RR: 18 (03-05-24 @ 22:06) (18 - 18)  SpO2: 95% (03-05-24 @ 22:06) (95% - 96%)        EYE EXAM-----------------------------------------------------------    Chief Complaint: 88 year old male Pt presents s/p fall. Pt reports walking on sidewalk and encounter down sloping of side walk, despite using cane, pt reports he lost his balance/fell hit head to concrete during fall.     GAY: 4-6 months with Dr. Evangelista.   - s/p CE OU  - using readers prn   - h/o prior fall, pt's son per EMS reports issues with peripheral vision following last fall, however pt denies this      Entrance Testing  VAsc: OD 20/25-2           OS 20/30-2, PH 20/25+1  Pupils: round and reactive OD, reactive with irregular pupillary margin OS (likely post-surgical pupil), (-)APD OD, OS  EOMs: full OD, OS, (-)pain OU   CF: full OD, reduced IT OS     IOP taken via GAT @1:40pm  OD 12 mmHg  OS 12 mmHg    Anterior Segment, assessed via slit lamp   Adnexa/Lids: suture thru right brow. diffuse ecchymosis of right periorbita/adenexa/RUL/RLL. Ecchymosis extending medially RANDALL/LLL extending to inferior adenxa   Conjunctiva: subconjunctival hemorrhage inf/temp/neena-limbal nasal OD, cl OS   Cornea: CCI temp OU   AC: vitreous prolapse with pigment cells OD, deep & quiet OS   Iris: WNL OD, diffuse atrophy OS     Dilated with 1gtt tropicamide 1% OU and phenylephrine 2.5% OU @ 1:41pm    Posterior Segment, assessed via BIO+20D  Lens: PCIOL, cl/centered OU   Vitreous: tr pigment cells OD, cl OS   Optic Nerve: 0.30 OD, 0.25 OS, distinct margins, well perfused OU   Macula: flat OU   Vessels:  Periphery: schsis temp OD      CT ORBITS IMPRESSION: (COPIED)  1.  Mildly displaced fracture at the base of the right nasal bone.  2.  Right forehead and periorbital soft tissue swelling with subcutaneous hematoma.       ALLERGIES: No Known Allergies      HEALTH ISSUES------------------------------------------------------  Other specified health status    No pertinent family history in first degree relatives    Handoff    MEWS Score    HTN (hypertension)    Neurogenic bladder    Waldron catheter in place    Unable to care for self    No significant past surgical history    FALL    90+    Fall    SysAdmin_VisitLink          PRESCRIPTIONS-----------------------------------------------------  cephalexin 500 mg oral tablet: 1 tab(s) orally 4 times a day  lactulose 10 g/15 mL oral syrup: 15 milliliter(s) orally once a day  lisinopril 5 mg oral tablet: 1 tab(s) orally once a day  senna leaf extract oral tablet: 2 tab(s) orally once a day (at bedtime)          VISIT-------------------------------------------------------------------        T(C): 35.9 (03-06-24 @ 06:31), Max: 36.5 (03-05-24 @ 21:09)  HR: 99 (03-06-24 @ 06:31) (75 - 110)  BP: 167/85 (03-06-24 @ 06:31) (126/60 - 167/85)  RR: 18 (03-05-24 @ 22:06) (18 - 18)  SpO2: 95% (03-05-24 @ 22:06) (95% - 96%)        EYE EXAM-----------------------------------------------------------  performed at [facility]    Chief Complaint: 88 year old male presents s/p fall. Pt reports walking on sidewalk and encounter down sloping of side walk, despite using cane, pt reports he lost his balance/fell hit head to concrete during fall. Ophthalmology consult requested secondary to right periorbital edema/ hemorrhage. Pt denies vision changes, denies eye pain, denies flashes/floaters.     GAY: 4-6 months with Dr. Evangelista.   - s/p CE OU  - using readers prn   - h/o prior fall, pt's son per EMS reports issues with peripheral vision following last fall, however pt denies this      Entrance Testing  VAsc: OD 20/25-2           OS 20/30-2, PH 20/25+1  Pupils: round and reactive OD, reactive with irregular pupillary margin OS (likely post-surgical pupil), (-)APD OD, OS  EOMs: full OD, OS, (-)pain OU   CF: full OD, reduced IN OS     IOP taken via GAT @1:40pm  OD 12 mmHg  OS 12 mmHg    Anterior Segment, assessed via slit lamp   Adnexa/Lids: suture thru right brow. diffuse ecchymosis of right periorbita/adenexa/RUL/RLL. Ecchymosis extending medially RANDALL/LLL extending to inferior adenxa   Conjunctiva: subconjunctival hemorrhage inf/temp/neena-limbal nasal OD, cl OS   Cornea: CCI temp OU   AC: vitreous prolapse with pigment cells OD, deep & quiet OS   Iris: pseudoexfoliative material along pupillary margin OU, round pupil OD, irregular pupillary margin with diffuse atrophy OS     Dilated with 1gtt tropicamide 1% OU and phenylephrine 2.5% OU @ 1:41pm    Posterior Segment, assessed via BIO+20D  Lens: PCIOL, pseudophacodenesis, cl/centered OU   Vitreous: tr pigment cells in ant vitreous spilling into AC OD, cl OS   Optic Nerve: 0.30 OD, 0.25 OS, distinct margins, well perfused OU   Macula: flat OU   Vessels: normal caliber  Periphery: scarring/pigment inferior @ 7:00 OD, retina flat & intact OD. 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    B scan: complete PVD OU, retina intact OD, retinal detachment ST OS      CT ORBITS IMPRESSION: (COPIED)  1.  Mildly displaced fracture at the base of the right nasal bone.  2.  Right forehead and periorbital soft tissue swelling with subcutaneous hematoma.

## 2024-03-07 PROCEDURE — 99232 SBSQ HOSP IP/OBS MODERATE 35: CPT

## 2024-03-07 RX ADMIN — LISINOPRIL 5 MILLIGRAM(S): 2.5 TABLET ORAL at 05:03

## 2024-03-07 RX ADMIN — SODIUM CHLORIDE 50 MILLILITER(S): 9 INJECTION INTRAMUSCULAR; INTRAVENOUS; SUBCUTANEOUS at 17:49

## 2024-03-07 NOTE — PROGRESS NOTE ADULT - ASSESSMENT
88 yr old male with hx of HTN, BPH, Neurogenic Bladder w/ indwelling yañez, BL cataract surgery with recent admission of acute hemorrhage on CTH presnted to ER for fall.     # Right periorbital hematoma sp lac repair - improved   # Mechanical Fall   # T2 compression fx (asymptomatic)   # Acute on chronic normocytic anemia   # H/O Fall in 10 /2023 and brain hemorrhage now w/encephalomalacia   - hemodynamically stable  - able to move eye in all fields of gaze , no pain   -CT HEAD: Large right periorbital hematoma without evidence for underlying fracture   or intracranial hemorrhage. Encephalomalacia of the right occipital lobe related to remote hemorrhage.  - CT CERVICAL SPINE: Mild compression fracture deformity of the T2 vertebral body, not   previously seen. No acute cervical fracture or subluxation.  - Fall and Aspiration precautions  - Physiatry eval/Physical therapy - STR  - ophthalmology eval  - non-  urgent eval given patient able to move eye in all fields of gaze  , pictures sent to him on teams - recommended fu CT R orbit  ; spoke to  on 4I- to arrange transport  - monitor cbc     # Hyponatremia, asymptomatic - improved   - patient was given LR , change to NS   - do not over-correct more than 8-10 meq in 24 hrs  - encourage solute intake   - monitor bmp    # HTN / HLD- c/w lisinopril and statin     # Neurogenic Bladder (refused self catheterization in the passed) w/ chronic yañez catheter   # Hx of TURP  # H/O CKD3 (Baseline Cr 1.3-1.5)  - avoid nephrotoxic medications   - monitor bmp     # H/O Mild cognitive impairement -  AAO x 3, stable repeat CT imaging of the head    # H/O Constipation - bowel regimen , monitor for bm     # dvt/ gi ppx/diet  # dispo: acute 
88 yr old male with hx of HTN, BPH, Neurogenic Bladder w/ indwelling yañez, BL cataract surgery with recent admission of acute hemorrhage on CTH presnted to ER for fall.     # Right periorbital hematoma sp lac repair - improved   # Mechanical Fall   # T2 compression fx (asymptomatic)   # Acute on chronic normocytic anemia   # H/O Fall in 10 /2023 and brain hemorrhage now w/encephalomalacia   - hemodynamically stable  - able to move eye in all fields of gaze , no pain   -CT HEAD: Large right periorbital hematoma without evidence for underlying fracture   or intracranial hemorrhage. Encephalomalacia of the right occipital lobe related to remote hemorrhage.  - CT CERVICAL SPINE: Mild compression fracture deformity of the T2 vertebral body, not   previously seen. No acute cervical fracture or subluxation.  - Fall and Aspiration precautions  - Physiatry eval/Physical therapy - STR  - ophthalmology eval  - no acute intervention, has chronic retinal detachment, can follow as outpatient  - monitor cbc     # Hyponatremia, asymptomatic - improved   - patient was given LR , change to NS   - do not over-correct more than 8-10 meq in 24 hrs  - encourage solute intake   - monitor bmp    # HTN / HLD- c/w lisinopril and statin     # Neurogenic Bladder (refused self catheterization in the passed) w/ chronic yañez catheter   # Hx of TURP  # H/O CKD3 (Baseline Cr 1.3-1.5)  - avoid nephrotoxic medications   - monitor bmp     # H/O Mild cognitive impairement -  AAO x 3, stable repeat CT imaging of the head    # H/O Constipation - bowel regimen , monitor for bm     # dvt/ gi ppx/diet  # dispo: STR rehab planning

## 2024-03-07 NOTE — PROGRESS NOTE ADULT - SUBJECTIVE AND OBJECTIVE BOX
88 yr old male with hx of HTN, BPH, Neurogenic Bladder w/ indwelling yañez, BL cataract surgery with recent admission of acute hemorrhage on CTH presnted to ER for fall.     Today:  Seen at bedside, no new complaints.          REVIEW OF SYSTEMS:  No new complaints      MEDICATIONS  (STANDING):  lisinopril 5 milliGRAM(s) Oral daily  senna 2 Tablet(s) Oral at bedtime  sodium chloride 0.9%. 1000 milliLiter(s) (50 mL/Hr) IV Continuous <Continuous>    MEDICATIONS  (PRN):  acetaminophen     Tablet .. 650 milliGRAM(s) Oral every 6 hours PRN Temp greater or equal to 38C (100.4F), Mild Pain (1 - 3)      Allergies  No Known Allergies          Vital Signs Last 24 Hrs  T(C): 36.5 (07 Mar 2024 05:41), Max: 36.5 (07 Mar 2024 05:41)  T(F): 97.7 (07 Mar 2024 05:41), Max: 97.7 (07 Mar 2024 05:41)  HR: 85 (07 Mar 2024 05:41) (85 - 105)  BP: 140/86 (07 Mar 2024 05:41) (140/86 - 174/93)  RR: 18 (07 Mar 2024 05:41) (18 - 18)  SpO2: 97% (07 Mar 2024 05:41) (97% - 99%)        PHYSICAL EXAM:  GEN: No acute distress  HEENT: + right periorbital edema - no tenderness no vision changes no changes upon eye exam looking in any direction.  Pupils asymmetric.   LUNGS: Clear to auscultation bilaterally, no rales/wheezing/ rhonchi  HEART: S1/S2 present. RRR, no murmurs  ABD: Soft, non-tender, non-distended. Bowel sounds present  EXT: no edema, warm   NEURO: AAOX3, normal affect      LABS:                        11.1   5.13  )-----------( 155      ( 06 Mar 2024 07:06 )             30.4     03-06    131<L>  |  96<L>  |  11  ----------------------------<  89  4.3   |  23  |  1.2    Ca    8.9      06 Mar 2024 07:06    TPro  6.4  /  Alb  3.9  /  TBili  0.7  /  DBili  x   /  AST  26  /  ALT  11  /  AlkPhos  72  03-06      Urinalysis Basic - ( 06 Mar 2024 07:06 )    Color: x / Appearance: x / SG: x / pH: x  Gluc: 89 mg/dL / Ketone: x  / Bili: x / Urobili: x   Blood: x / Protein: x / Nitrite: x   Leuk Esterase: x / RBC: x / WBC x   Sq Epi: x / Non Sq Epi: x / Bacteria: x

## 2024-03-08 ENCOUNTER — TRANSCRIPTION ENCOUNTER (OUTPATIENT)
Age: 89
End: 2024-03-08

## 2024-03-08 VITALS
RESPIRATION RATE: 18 BRPM | HEART RATE: 86 BPM | DIASTOLIC BLOOD PRESSURE: 77 MMHG | SYSTOLIC BLOOD PRESSURE: 153 MMHG | TEMPERATURE: 98 F

## 2024-03-08 LAB — SARS-COV-2 RNA SPEC QL NAA+PROBE: SIGNIFICANT CHANGE UP

## 2024-03-08 PROCEDURE — 99238 HOSP IP/OBS DSCHRG MGMT 30/<: CPT

## 2024-03-08 RX ORDER — CEPHALEXIN 500 MG
1 CAPSULE ORAL
Qty: 0 | Refills: 0 | DISCHARGE
End: 2023-11-06

## 2024-03-08 RX ADMIN — LISINOPRIL 5 MILLIGRAM(S): 2.5 TABLET ORAL at 05:01

## 2024-03-08 NOTE — DISCHARGE NOTE PROVIDER - HOSPITAL COURSE
88 yr old male with hx of HTN, BPH, Neurogenic Bladder w/ indwelling yañez, BL cataract surgery with recent admission of acute hemorrhage on CTH presnted to ER for fall.     # Right periorbital hematoma sp lac repair - improved   # Mechanical Fall   # T2 compression fx (asymptomatic)   # Acute on chronic normocytic anemia   # H/O Fall in 10 /2023 and brain hemorrhage now w/encephalomalacia   - hemodynamically stable  - able to move eye in all fields of gaze , no pain   -CT HEAD: Large right periorbital hematoma without evidence for underlying fracture   or intracranial hemorrhage. Encephalomalacia of the right occipital lobe related to remote hemorrhage.  - CT CERVICAL SPINE: Mild compression fracture deformity of the T2 vertebral body, not   previously seen. No acute cervical fracture or subluxation.  - Fall and Aspiration precautions  - Physiatry eval/Physical therapy - STR  - ophthalmology eval  - no acute intervention, has chronic retinal detachment, can follow as outpatient    # Hyponatremia, asymptomatic - improved   - encourage solute intake     # HTN / HLD- c/w lisinopril and statin     # Neurogenic Bladder (refused self catheterization in the passed) w/ chronic yañez catheter   # Hx of TURP  # H/O CKD3 (Baseline Cr 1.3-1.5)  - avoid nephrotoxic medications     # H/O Mild cognitive impairement -  AAO x 3, stable repeat CT imaging of the head    # H/O Constipation - bowel regimen    # dispo: STR rehab

## 2024-03-08 NOTE — DISCHARGE NOTE NURSING/CASE MANAGEMENT/SOCIAL WORK - PATIENT PORTAL LINK FT
You can access the FollowMyHealth Patient Portal offered by [facility] by registering at the following website: [email or web address]. By joining [facility]’s FollowMyHealth portal, you will also be able to view your health information using other applications (apps) compatible with our system.

## 2024-03-08 NOTE — DISCHARGE NOTE PROVIDER - CARE PROVIDER_API CALL
Kumar Aceves  Internal Medicine  [facility] [place] [address]  Phone: [phone]  Fax: [phone]  Follow Up Time:     Efrain Nunez  Ophthalmology  [address] [place] [address], [facility] [address]  [place] [address]  Phone: [phone]  Fax: [phone]  Follow Up Time:

## 2024-03-08 NOTE — DISCHARGE NOTE PROVIDER - NSDCCPCAREPLAN_GEN_ALL_CORE_FT
PRINCIPAL DISCHARGE DIAGNOSIS  Diagnosis: Chronic partial retinal detachment  Assessment and Plan of Treatment: You do not need any surgery right now.  Once out of rehab you can follow with an eye doctor.      SECONDARY DISCHARGE DIAGNOSES  Diagnosis: Fall  Assessment and Plan of Treatment: Work hard in rehab and follow with your PMD.

## 2024-03-08 NOTE — DISCHARGE NOTE PROVIDER - NSDCMRMEDTOKEN_GEN_ALL_CORE_FT
lactulose 10 g/15 mL oral syrup: 15 milliliter(s) orally once a day  lisinopril 5 mg oral tablet: 1 tab(s) orally once a day  senna leaf extract oral tablet: 2 tab(s) orally once a day (at bedtime)

## 2024-03-08 NOTE — DISCHARGE NOTE NURSING/CASE MANAGEMENT/SOCIAL WORK - NSDCPEFALRISK_GEN_ALL_CORE
For information on Fall & Injury Prevention, visit: [email or web address] OR  [email or web address] OR  https://www.cdc.gov/steadi/patient.html

## 2024-03-08 NOTE — DISCHARGE NOTE PROVIDER - NSDCFUSCHEDAPPT_GEN_ALL_CORE_FT
[facility] Physician [facility]  UROLOGY [address] [place] [address]  Scheduled Appointment: 03/12/2024

## 2024-03-08 NOTE — DISCHARGE NOTE NURSING/CASE MANAGEMENT/SOCIAL WORK - NSDCVIVACCINE_GEN_ALL_CORE_FT
Tdap; 04-Mar-2024 15:26; Holli Oden (RN); [employer]; [id number] (Exp. Date: 06-Dec-2025); IntraMuscular; Deltoid Right.; 0.5 milliLiter(s); VIS (VIS Published: 09-May-2013, VIS Presented: 04-Mar-2024);

## 2024-03-08 NOTE — DISCHARGE NOTE PROVIDER - CARE PROVIDERS DIRECT ADDRESSES
[email or web address]

## 2024-03-12 ENCOUNTER — APPOINTMENT (OUTPATIENT)
Dept: UROLOGY | Facility: CLINIC | Age: 89
End: 2024-03-12

## 2024-03-18 DIAGNOSIS — G31.84 MILD COGNITIVE IMPAIRMENT OF UNCERTAIN OR UNKNOWN ETIOLOGY: ICD-10-CM

## 2024-03-18 DIAGNOSIS — R00.0 TACHYCARDIA, UNSPECIFIED: ICD-10-CM
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
## 2024-03-18 DIAGNOSIS — S22.028A OTHER FRACTURE OF SECOND THORACIC VERTEBRA, INITIAL ENCOUNTER FOR CLOSED FRACTURE: ICD-10-CM

## 2024-03-18 DIAGNOSIS — S05.11XA CONTUSION OF EYEBALL AND ORBITAL TISSUES, RIGHT EYE, INITIAL ENCOUNTER: ICD-10-CM

## 2024-03-18 DIAGNOSIS — S02.2XXA FRACTURE OF NASAL BONES, INITIAL ENCOUNTER FOR CLOSED FRACTURE: ICD-10-CM

## 2024-03-18 DIAGNOSIS — Z90.79 ACQUIRED ABSENCE OF OTHER GENITAL ORGAN(S): ICD-10-CM
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
## 2024-03-18 DIAGNOSIS — H33.22 SEROUS RETINAL DETACHMENT, LEFT EYE: ICD-10-CM

## 2024-03-18 DIAGNOSIS — E86.0 DEHYDRATION: ICD-10-CM

## 2024-03-18 DIAGNOSIS — N31.9 NEUROMUSCULAR DYSFUNCTION OF BLADDER, UNSPECIFIED: ICD-10-CM
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
## 2024-03-18 DIAGNOSIS — G93.89 OTHER SPECIFIED DISORDERS OF BRAIN: ICD-10-CM

## 2024-03-18 DIAGNOSIS — H43.01 VITREOUS PROLAPSE, RIGHT EYE: ICD-10-CM

## 2024-03-18 DIAGNOSIS — H11.31 CONJUNCTIVAL HEMORRHAGE, RIGHT EYE: ICD-10-CM

## 2024-03-18 DIAGNOSIS — S01.111A LACERATION WITHOUT FOREIGN BODY OF RIGHT EYELID AND PERIOCULAR AREA, INITIAL ENCOUNTER: ICD-10-CM

## 2024-03-18 DIAGNOSIS — I12.9 HYPERTENSIVE CHRONIC KIDNEY DISEASE WITH STAGE 1 THROUGH STAGE 4 CHRONIC KIDNEY DISEASE, OR UNSPECIFIED CHRONIC KIDNEY DISEASE: ICD-10-CM

## 2024-03-18 DIAGNOSIS — Z11.52 ENCOUNTER FOR SCREENING FOR COVID-19: ICD-10-CM

## 2024-03-18 DIAGNOSIS — Z23 ENCOUNTER FOR IMMUNIZATION: ICD-10-CM

## 2024-03-18 DIAGNOSIS — Z79.2 LONG TERM (CURRENT) USE OF ANTIBIOTICS: ICD-10-CM

## 2024-03-18 DIAGNOSIS — N18.30 CHRONIC KIDNEY DISEASE, STAGE 3 UNSPECIFIED: ICD-10-CM

## 2024-03-18 DIAGNOSIS — D64.9 ANEMIA, UNSPECIFIED: ICD-10-CM

## 2024-03-18 DIAGNOSIS — E87.1 HYPO-OSMOLALITY AND HYPONATREMIA: ICD-10-CM

## 2024-03-18 DIAGNOSIS — Z79.899 OTHER LONG TERM (CURRENT) DRUG THERAPY: ICD-10-CM

## 2024-03-18 DIAGNOSIS — Z98.42 CATARACT EXTRACTION STATUS, LEFT EYE: ICD-10-CM
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
## 2024-03-18 DIAGNOSIS — Z91.81 HISTORY OF FALLING: ICD-10-CM

## 2024-03-18 DIAGNOSIS — Z96.1 PRESENCE OF INTRAOCULAR LENS: ICD-10-CM

## 2024-03-27 ENCOUNTER — INPATIENT (INPATIENT)
Facility: HOSPITAL | Age: 89
LOS: 11 days | Discharge: NURSING, REHAB OR LONG-TERM CARE | DRG: 522 | End: 2024-04-08
Attending: STUDENT IN AN ORGANIZED HEALTH CARE EDUCATION/TRAINING PROGRAM | Admitting: INTERNAL MEDICINE
Payer: MEDICARE

## 2024-03-27 VITALS
HEIGHT: 64 IN | DIASTOLIC BLOOD PRESSURE: 86 MMHG | TEMPERATURE: 98 F | RESPIRATION RATE: 17 BRPM | WEIGHT: 139.99 LBS | OXYGEN SATURATION: 97 % | HEART RATE: 83 BPM | SYSTOLIC BLOOD PRESSURE: 163 MMHG

## 2024-03-27 DIAGNOSIS — M84.459A PATHOLOGICAL FRACTURE, HIP, UNSPECIFIED, INITIAL ENCOUNTER FOR FRACTURE: ICD-10-CM

## 2024-03-27 LAB
ALBUMIN SERPL ELPH-MCNC: 3.3 G/DL — LOW (ref 3.5–5.2)
ALP SERPL-CCNC: 108 U/L — SIGNIFICANT CHANGE UP (ref 30–115)
ALT FLD-CCNC: 14 U/L — SIGNIFICANT CHANGE UP (ref 0–41)
ANION GAP SERPL CALC-SCNC: 9 MMOL/L — SIGNIFICANT CHANGE UP (ref 7–14)
APTT BLD: 31.7 SEC — SIGNIFICANT CHANGE UP (ref 27–39.2)
AST SERPL-CCNC: 20 U/L — SIGNIFICANT CHANGE UP (ref 0–41)
BASOPHILS # BLD AUTO: 0.03 K/UL — SIGNIFICANT CHANGE UP (ref 0–0.2)
BASOPHILS NFR BLD AUTO: 0.3 % — SIGNIFICANT CHANGE UP (ref 0–1)
BILIRUB SERPL-MCNC: 0.4 MG/DL — SIGNIFICANT CHANGE UP (ref 0.2–1.2)
BLD GP AB SCN SERPL QL: SIGNIFICANT CHANGE UP
BUN SERPL-MCNC: 14 MG/DL — SIGNIFICANT CHANGE UP (ref 10–20)
CALCIUM SERPL-MCNC: 9.3 MG/DL — SIGNIFICANT CHANGE UP (ref 8.4–10.5)
CHLORIDE SERPL-SCNC: 88 MMOL/L — LOW (ref 98–110)
CO2 SERPL-SCNC: 26 MMOL/L — SIGNIFICANT CHANGE UP (ref 17–32)
CREAT SERPL-MCNC: 1 MG/DL — SIGNIFICANT CHANGE UP (ref 0.7–1.5)
EGFR: 73 ML/MIN/1.73M2 — SIGNIFICANT CHANGE UP
EOSINOPHIL # BLD AUTO: 0.11 K/UL — SIGNIFICANT CHANGE UP (ref 0–0.7)
EOSINOPHIL NFR BLD AUTO: 1.2 % — SIGNIFICANT CHANGE UP (ref 0–8)
GLUCOSE SERPL-MCNC: 90 MG/DL — SIGNIFICANT CHANGE UP (ref 70–99)
HCT VFR BLD CALC: 31 % — LOW (ref 42–52)
HGB BLD-MCNC: 11.1 G/DL — LOW (ref 14–18)
IMM GRANULOCYTES NFR BLD AUTO: 1 % — HIGH (ref 0.1–0.3)
INR BLD: 1.15 RATIO — SIGNIFICANT CHANGE UP (ref 0.65–1.3)
LYMPHOCYTES # BLD AUTO: 0.66 K/UL — LOW (ref 1.2–3.4)
LYMPHOCYTES # BLD AUTO: 6.9 % — LOW (ref 20.5–51.1)
MCHC RBC-ENTMCNC: 29.6 PG — SIGNIFICANT CHANGE UP (ref 27–31)
MCHC RBC-ENTMCNC: 35.8 G/DL — SIGNIFICANT CHANGE UP (ref 32–37)
MCV RBC AUTO: 82.7 FL — SIGNIFICANT CHANGE UP (ref 80–94)
MONOCYTES # BLD AUTO: 0.8 K/UL — HIGH (ref 0.1–0.6)
MONOCYTES NFR BLD AUTO: 8.4 % — SIGNIFICANT CHANGE UP (ref 1.7–9.3)
NEUTROPHILS # BLD AUTO: 7.86 K/UL — HIGH (ref 1.4–6.5)
NEUTROPHILS NFR BLD AUTO: 82.2 % — HIGH (ref 42.2–75.2)
NRBC # BLD: 0 /100 WBCS — SIGNIFICANT CHANGE UP (ref 0–0)
PLATELET # BLD AUTO: 298 K/UL — SIGNIFICANT CHANGE UP (ref 130–400)
PMV BLD: 8.4 FL — SIGNIFICANT CHANGE UP (ref 7.4–10.4)
POTASSIUM SERPL-MCNC: 4.6 MMOL/L — SIGNIFICANT CHANGE UP (ref 3.5–5)
POTASSIUM SERPL-SCNC: 4.6 MMOL/L — SIGNIFICANT CHANGE UP (ref 3.5–5)
PROT SERPL-MCNC: 6.2 G/DL — SIGNIFICANT CHANGE UP (ref 6–8)
PROTHROM AB SERPL-ACNC: 13.1 SEC — HIGH (ref 9.95–12.87)
RBC # BLD: 3.75 M/UL — LOW (ref 4.7–6.1)
RBC # FLD: 12.4 % — SIGNIFICANT CHANGE UP (ref 11.5–14.5)
SODIUM SERPL-SCNC: 123 MMOL/L — LOW (ref 135–146)
WBC # BLD: 9.56 K/UL — SIGNIFICANT CHANGE UP (ref 4.8–10.8)
WBC # FLD AUTO: 9.56 K/UL — SIGNIFICANT CHANGE UP (ref 4.8–10.8)

## 2024-03-27 PROCEDURE — 85025 COMPLETE CBC W/AUTO DIFF WBC: CPT

## 2024-03-27 PROCEDURE — 93010 ELECTROCARDIOGRAM REPORT: CPT

## 2024-03-27 PROCEDURE — C1713: CPT

## 2024-03-27 PROCEDURE — 83735 ASSAY OF MAGNESIUM: CPT

## 2024-03-27 PROCEDURE — 83540 ASSAY OF IRON: CPT

## 2024-03-27 PROCEDURE — 36415 COLL VENOUS BLD VENIPUNCTURE: CPT

## 2024-03-27 PROCEDURE — 97530 THERAPEUTIC ACTIVITIES: CPT | Mod: GP

## 2024-03-27 PROCEDURE — C1889: CPT

## 2024-03-27 PROCEDURE — 83036 HEMOGLOBIN GLYCOSYLATED A1C: CPT

## 2024-03-27 PROCEDURE — 86901 BLOOD TYPING SEROLOGIC RH(D): CPT

## 2024-03-27 PROCEDURE — 83935 ASSAY OF URINE OSMOLALITY: CPT

## 2024-03-27 PROCEDURE — 80048 BASIC METABOLIC PNL TOTAL CA: CPT

## 2024-03-27 PROCEDURE — 73552 X-RAY EXAM OF FEMUR 2/>: CPT | Mod: LT

## 2024-03-27 PROCEDURE — 99222 1ST HOSP IP/OBS MODERATE 55: CPT

## 2024-03-27 PROCEDURE — 88311 DECALCIFY TISSUE: CPT

## 2024-03-27 PROCEDURE — 86900 BLOOD TYPING SEROLOGIC ABO: CPT

## 2024-03-27 PROCEDURE — 80053 COMPREHEN METABOLIC PANEL: CPT

## 2024-03-27 PROCEDURE — 97162 PT EVAL MOD COMPLEX 30 MIN: CPT | Mod: GP

## 2024-03-27 PROCEDURE — 82728 ASSAY OF FERRITIN: CPT

## 2024-03-27 PROCEDURE — 83930 ASSAY OF BLOOD OSMOLALITY: CPT

## 2024-03-27 PROCEDURE — 73502 X-RAY EXAM HIP UNI 2-3 VIEWS: CPT | Mod: LT

## 2024-03-27 PROCEDURE — C1776: CPT

## 2024-03-27 PROCEDURE — 73700 CT LOWER EXTREMITY W/O DYE: CPT | Mod: 26,LT,MC

## 2024-03-27 PROCEDURE — 97110 THERAPEUTIC EXERCISES: CPT | Mod: GP

## 2024-03-27 PROCEDURE — 93005 ELECTROCARDIOGRAM TRACING: CPT

## 2024-03-27 PROCEDURE — 82962 GLUCOSE BLOOD TEST: CPT

## 2024-03-27 PROCEDURE — 83550 IRON BINDING TEST: CPT

## 2024-03-27 PROCEDURE — 73562 X-RAY EXAM OF KNEE 3: CPT | Mod: LT

## 2024-03-27 PROCEDURE — 86850 RBC ANTIBODY SCREEN: CPT

## 2024-03-27 PROCEDURE — 72170 X-RAY EXAM OF PELVIS: CPT | Mod: 26

## 2024-03-27 PROCEDURE — 93970 EXTREMITY STUDY: CPT

## 2024-03-27 PROCEDURE — 84300 ASSAY OF URINE SODIUM: CPT

## 2024-03-27 PROCEDURE — 72170 X-RAY EXAM OF PELVIS: CPT

## 2024-03-27 PROCEDURE — 97116 GAIT TRAINING THERAPY: CPT | Mod: GP

## 2024-03-27 PROCEDURE — 99285 EMERGENCY DEPT VISIT HI MDM: CPT

## 2024-03-27 PROCEDURE — 71045 X-RAY EXAM CHEST 1 VIEW: CPT | Mod: 26

## 2024-03-27 PROCEDURE — 85027 COMPLETE CBC AUTOMATED: CPT

## 2024-03-27 PROCEDURE — 88305 TISSUE EXAM BY PATHOLOGIST: CPT

## 2024-03-27 RX ORDER — LISINOPRIL 2.5 MG/1
5 TABLET ORAL DAILY
Refills: 0 | Status: DISCONTINUED | OUTPATIENT
Start: 2024-03-27 | End: 2024-03-29

## 2024-03-27 RX ORDER — FERROUS FUMARATE 350(115)MG
1 TABLET ORAL
Refills: 0 | DISCHARGE

## 2024-03-27 RX ORDER — PANTOPRAZOLE SODIUM 20 MG/1
40 TABLET, DELAYED RELEASE ORAL
Refills: 0 | Status: DISCONTINUED | OUTPATIENT
Start: 2024-03-27 | End: 2024-03-29

## 2024-03-27 RX ORDER — ACETAMINOPHEN 500 MG
975 TABLET ORAL EVERY 8 HOURS
Refills: 0 | Status: DISCONTINUED | OUTPATIENT
Start: 2024-03-27 | End: 2024-03-29

## 2024-03-27 RX ORDER — ENOXAPARIN SODIUM 100 MG/ML
40 INJECTION SUBCUTANEOUS EVERY 24 HOURS
Refills: 0 | Status: DISCONTINUED | OUTPATIENT
Start: 2024-03-27 | End: 2024-03-29

## 2024-03-27 RX ORDER — SODIUM CHLORIDE 9 MG/ML
1000 INJECTION INTRAMUSCULAR; INTRAVENOUS; SUBCUTANEOUS
Refills: 0 | Status: DISCONTINUED | OUTPATIENT
Start: 2024-03-27 | End: 2024-03-28

## 2024-03-27 RX ADMIN — SODIUM CHLORIDE 50 MILLILITER(S): 9 INJECTION INTRAMUSCULAR; INTRAVENOUS; SUBCUTANEOUS at 21:22

## 2024-03-27 NOTE — ED ADULT TRIAGE NOTE - CHIEF COMPLAINT QUOTE
brought in by ems from [facility] , pt fell a couple weeks ago, x ray showed hairline fracture in R hip. sent in by  for CT scan.

## 2024-03-27 NOTE — ED PROVIDER NOTE - OBJECTIVE STATEMENT
89yo male with PMHx HTN, dementia, neurogenic bladder with indwelling yañez, R hip fracture s/p internal fixation, presents for evaluation s/p fall and questionable L hip fracture on outpatient SNF XR. Pt c/o L hip pain but has reported he has been walking. Unable to obtain any further clarification as patient has dementia. Discussed SNF RN supervisor Lin, pt had a mechanical fall on March12th, no LOC, was ambulatory immediately after. PT noticed one week prior patient's gait seemed different and he was c/o L hip pain. XR obtained of pelvis which demonstrated questionable L subcap fx. SNF MD sent to ED for evaluation with L hip CT.

## 2024-03-27 NOTE — H&P ADULT - NSHPREVIEWOFSYSTEMS_GEN_ALL_CORE

## 2024-03-27 NOTE — CONSULT NOTE ADULT - ASSESSMENT
88 y o M with left subcapital hip fx    - medicine evaluation and optimization for possible surgery tomorrow, will add to OR schedule  - pain control  - bedrest  - dvt, GI ppx  - am labs  - left hip/femur, knee x rays  - NPO after MN, IVF when NPO  - 2 units PRBC on hold for OR   - will follow

## 2024-03-27 NOTE — ED ADULT NURSE NOTE - CHIEF COMPLAINT QUOTE
brought in by ems from [facility] , pt fell a couple weeks ago, x ray showed hairline fracture in R hip. sent in by  for CT scan.

## 2024-03-27 NOTE — CONSULT NOTE ADULT - NS ATTEND AMEND GEN_ALL_CORE FT
I saw and evaluated the patient on 3/29/2024. I reviewed the pertinent results and imaging. I discussed with the resident/PA and I agree with the findings and plan of care as documented by the resident/PA with the following comments:     Left subacute closed displaced subcapital femoral neck fracture s/p ground level fall on 3/12/2024  Baseline ambulator, reportedly with no assistive devices per patient's son  Plan for Left hip cemented hemiarthroplasty  Preoperative medical optimization and risk stratification

## 2024-03-27 NOTE — ED PROVIDER NOTE - CLINICAL SUMMARY MEDICAL DECISION MAKING FREE TEXT BOX
89yo male with PMHx HTN, dementia, neurogenic bladder with chronic indwelling yañez, R hip fracture s/p internal fixation, presents for evaluation s/p fall and questionable L hip fracture on outpatient SNF XR. Pt c/o L hip pain but has reported he has been walking. Unable to obtain any further clarification as patient has dementia. As per SNF RN supervisor Lin, pt had a mechanical fall on March12th, no LOC, was ambulatory immediately after. PT noticed one week prior patient's gait seemed different and he was c/o L hip pain. XR obtained of pelvis which demonstrated questionable L subcap fx. SNF MD sent to ED for evaluation with L hip CT.  CT left hip shows subacute appearing subcapital left femoral neck fracture with superior displacement, varus angulation and external rotation of the femoral shaft from the femoral head.  Ortho consult, preop labs.  Pt admitted for further management

## 2024-03-27 NOTE — H&P ADULT - ATTENDING COMMENTS
*In the event of discrepancy, my note supersedes the resident note.  Date seen: 3/27/24   Agree with HPI above. ROS negative except per HPI.   I reviewed and edited the physical exam above.   Pertinent labs and radiology reviewed and as above.    Assessment/Plan:  87yo M w/ PMHx of HTN, dementia, neurogenic bladder with chronic indwelling yañez, R hip fracture s/p internal fixation, presents for previous fall and concern for left hip fracture. Admitted for left femoral neck fracture.     #Subcapital left femoral neck fracture  - CT left hip noted   - s/p eval by ortho in ED: possible add on for surgery tomorrow 3/28, npo after MN   - PT/OT/physiatry eval after surgery  - medical risk stratification      - CXR: normal (interpreted by me)      - ECG: normal, early repol abnormality (interpreted by me)      - RCRI 0, no need to assess METS      - moderate risk patient for moderate risk procedure; need to optimize Na prior to procedure, management as below     #Acute on chronic hyponatremia - asymptomatic   - Na 123   - trend bmp, get urine lytes, get serum osm; do not overcorrect > 8-10meq in 24hrs   - NS 65cc/hr     #HTN  #HLD  #neurogenic bladder w/ chronic yañez  #CKD 3   #mild cognitive impairment  - baseline mental status: AAOx3 but forgetful   - Cr at baseline  - c/w home meds     DVT ppx: lovenox  Code status: full code  Dispo: from [facility]; f/u ortho, trend Na, PT/OT/physiatry eval after surgery

## 2024-03-27 NOTE — H&P ADULT - ASSESSMENT
89yo male with PMHx HTN, dementia, neurogenic bladder with chronic indwelling yañez, R hip fracture s/p internal fixation, presents for evaluation s/p fall and questionable L hip fracture on outpatient SNF XR. Found to have subcapital left femoral neck fracture with superior displacement.     #Subcapital left femoral neck fracture:  - As per ortho: Plan for OR in AM 3/28, NPO after MN  - Medical clearance   - NS @ 50 for 24 hr   - Tylenol 975 Q8   - f/u Xray Left hip, femur and knee    #Acute on chronic hyponatremia  - c/w NS @ 50 for 24 and monitor     #Chronic normocytic anemia:  - Hg at baseline   - Monitor post op  - T&S active     # HTN / HLD  - c/w home lisinopril and statin     # Neurogenic Bladder (refused self catheterization in the passed) w/ chronic yañez catheter   #Hx of TURP  #Hx CKD3 (Baseline Cr 1.3-1.5)  - avoid nephrotoxic medications   - monitor bmp     #Hx Mild cognitive impairment  - AAO x 3    #Misc:  - DVT ppx: LVX 40 QD  - GI PPX: Pantoprazole 40 QD  - Diet: DASH, NPO after MN  - Code status: full code  - Dispo: Med/surg  87yo male with PMHx HTN, dementia, neurogenic bladder with chronic indwelling yañez, R hip fracture s/p internal fixation, presents for evaluation s/p fall and questionable L hip fracture on outpatient SNF XR. Found to have subcapital left femoral neck fracture with superior displacement.     #Subcapital left femoral neck fracture:  - As per ortho: Plan for OR in AM 3/28, NPO after MN  - Medical clearance   - NS @ 50 for 24 hr   - Tylenol 975 Q8   - f/u Xray Left hip, femur and knee    #Acute on chronic hyponatremia  - c/w NS @ 50 for 24 and monitor     #Chronic normocytic anemia:  - Hg at baseline   - Monitor post op  - T&S active     # HTN / HLD  - c/w home lisinopril    # Neurogenic Bladder (refused self catheterization in the passed) w/ chronic yañez catheter   #Hx of TURP  #Hx CKD3 (Baseline Cr 1.3-1.5)  - avoid nephrotoxic medications   - monitor bmp     #Hx Mild cognitive impairment  - AAO x 3    #Misc:  - DVT ppx: LVX 40 QD  - GI PPX: Pantoprazole 40 QD  - Diet: DASH, NPO after MN  - Code status: full code  - Dispo: Med/surg

## 2024-03-27 NOTE — ED PROVIDER NOTE - PHYSICAL EXAMINATION
CONST: Well appearing in NAD  EYES: PERRL, EOMI, Sclera and conjunctiva clear.   ENT: Oropharynx normal appearing, no erythema or exudates. Uvula midline.  NECK: Non-tender, no c-spine tenderness.  CARD: Normal S1 S2; Normal rate and rhythm  RESP: Equal BS B/L, No wheezes, rhonchi or rales. No distress  GI: Soft, non-tender, non-distended.  MS: Can raise both extremities minimally off bed. MIld tenderness L hip. No tenderness R hip  SKIN: Warm, dry, no acute rashes. Good turgor  NEURO: A&Ox2 (name and birthday, No focal deficits. Strength 5/5 with no sensory deficits.

## 2024-03-27 NOTE — CONSULT NOTE ADULT - SUBJECTIVE AND OBJECTIVE BOX
87yo male with PMHx HTN, dementia, neurogenic bladder with indwelling yañez, R hip fracture s/p internal fixation, presents for evaluation s/p fall and questionable L hip fracture on outpatient SNF XR. Pt c/o L hip pain but has reported he has been walking. Unable to obtain any further clarification as patient has dementia. Discussed SNF RN supervisor Lin, pt had a mechanical fall on March12th, no LOC, was ambulatory immediately after. PT noticed one week prior patient's gait seemed different and he was c/o L hip pain. XR obtained of pelvis which demonstrated questionable L subcap fx. SNF MD sent to ED for evaluation with L hip CT.    PAST MEDICAL & SURGICAL HISTORY:  HTN (hypertension)      Neurogenic bladder      Yañez catheter in place  2016      No significant past surgical history      Home Medications:  lactulose 10 g/15 mL oral syrup: 15 milliliter(s) orally once a day (01 Nov 2023 11:29)  senna leaf extract oral tablet: 2 tab(s) orally once a day (at bedtime) (01 Nov 2023 11:29)      Allergies    No Known Allergies    Intolerances    Pt s/e at bedside, c/o left hip pain    A&O x 2, NAD    Vital Signs Last 24 Hrs  T(C): 36.7 (27 Mar 2024 10:47), Max: 36.7 (27 Mar 2024 10:47)  T(F): 98.1 (27 Mar 2024 10:47), Max: 98.1 (27 Mar 2024 10:47)  HR: 83 (27 Mar 2024 10:47) (83 - 83)  BP: 163/86 (27 Mar 2024 10:47) (163/86 - 163/86)  BP(mean): --  RR: 17 (27 Mar 2024 10:47) (17 - 17)  SpO2: 97% (27 Mar 2024 10:47) (97% - 97%)    Parameters below as of 27 Mar 2024 10:47  Patient On (Oxygen Delivery Method): room air    PE:    left groin ttp  pain on leg rolling  pain on axial loading  NVID  foot wwp, skin intact                            11.1   9.56  )-----------( 298      ( 27 Mar 2024 12:30 )             31.0       03-27    123<L>  |  88<L>  |  14  ----------------------------<  90  4.6   |  26  |  1.0    Ca    9.3      27 Mar 2024 12:30    TPro  6.2  /  Alb  3.3<L>  /  TBili  0.4  /  DBili  x   /  AST  20  /  ALT  14  /  AlkPhos  108  03-27        PT/INR - ( 27 Mar 2024 12:30 )   PT: 13.10 sec;   INR: 1.15 ratio         PTT - ( 27 Mar 2024 12:30 )  PTT:31.7 sec      x ray, CT scan reviewed- subcapital left hip fx

## 2024-03-27 NOTE — ED PROVIDER NOTE - BIRTH SEX
Insert PICC line    Date/Time: 1/27/2023 10:18 AM  Performed by: Flower Gerard RN  Authorized by: Marcus Chaparro MD     Patient location:  Bedside  Consent:     Consent obtained:  Written    Consent given by:  Patient    Procedural risks discussed: consent obtained by physician  Raymond protocol:     Procedure explained and questions answered to patient or proxy's satisfaction: yes      Relevant documents present and verified: yes      Test results available and properly labeled: yes      Radiology Images displayed and confirmed  If images not available, report reviewed: yes      Required blood products, implants, devices, and special equipment available: yes      Site/side marked: yes      Immediately prior to procedure, a time out was called: yes      Patient identity confirmed:  Verbally with patient, arm band, provided demographic data and hospital-assigned identification number  Pre-procedure details:     Hand hygiene: Hand hygiene performed prior to insertion      Sterile barrier technique: All elements of maximal sterile technique followed      Skin preparation:  ChloraPrep    Skin preparation agent: Skin preparation agent completely dried prior to procedure    Indications:     PICC line indications: long term antibiotics and no peripheral vascular access    Anesthesia (see MAR for exact dosages):      Anesthesia method:  Local infiltration (3ml)    Local anesthetic:  Lidocaine 1% w/o epi  Procedure details:     Location:  Basilic    Vessel type: vein      Laterality:  Right    Approach: percutaneous technique used      Patient position:  Flat    Procedural supplies:  Double lumen    Catheter size:  5 Fr    Landmarks identified: yes      Ultrasound guidance: yes      Ultrasound image availability:  Not saved    Sterile ultrasound techniques: Sterile gel and sterile probe covers were used      Number of attempts:  1    Successful placement: yes      Vessel of catheter tip end:  Sherlock 3CG confirmed Male (samy 3cg procedure record confirmed placement sent to medical records, picc okay to be utilized )    Total catheter length (cm):  37    Catheter out on skin (cm):  0    Max flow rate:  999ml/hr    Arm circumference:  40cm  Post-procedure details:     Post-procedure:  Dressing applied and securement device placed    Assessment:  Blood return through all ports and free fluid flow (sherlock 3cg confirmed)    Post-procedure complications: none      Patient tolerance of procedure:   Tolerated well, no immediate complications  Comments:      ID confirmed pt cleared for picc insertion  Lot#[id number] 2024-02-29

## 2024-03-27 NOTE — H&P ADULT - HISTORY OF PRESENT ILLNESS
87yo male with PMHx HTN, dementia, neurogenic bladder with chronic indwelling yañez, R hip fracture s/p internal fixation, presents for evaluation s/p fall and questionable L hip fracture on outpatient SNF XR. Pt c/o L hip pain but has reported he has been walking. Unable to obtain any further clarification as patient has dementia. Discussed SNF RN supervisor Lin, pt had a mechanical fall on March12th, no LOC, was ambulatory immediately after. PT noticed one week prior patient's gait seemed different and he was c/o L hip pain. XR obtained of pelvis which demonstrated questionable L subcap fx. SNF MD sent to ED for evaluation with L hip CT.    89yo male with PMHx HTN, dementia, neurogenic bladder with chronic indwelling yañez, R hip fracture s/p internal fixation, presents for evaluation s/p fall and questionable L hip fracture on outpatient SNF XR. Pt c/o L hip pain but has reported he has been walking. Unable to obtain any further clarification as patient has dementia. As per SNF RN supervisor Lin, pt had a mechanical fall on , no LOC, was ambulatory immediately after. PT noticed one week prior patient's gait seemed different and he was c/o L hip pain. XR obtained of pelvis which demonstrated questionable L subcap fx. SNF MD sent to ED for evaluation with L hip CT.    In the ED: BP: 163/86, HR: 83, RR: 17, Temp: 98, SpO2: 97% on RA    Labs: WBC: 9.56, H.1 at baseline  - Na: 123, K: 4.6, crea 1    CT left hip:  Subacute appearing subcapital left femoral neck fracture with superior displacement, varus angulation and external rotation of the femoral shaft from the femoral head.    Pt admitted for further management

## 2024-03-27 NOTE — H&P ADULT - NSHPPHYSICALEXAM_GEN_ALL_CORE
CONST: Well appearing in NAD  EYES: PERRL, EOMI, Sclera and conjunctiva clear.   ENT: Oropharynx normal appearing, no erythema or exudates. Uvula midline.  NECK: Non-tender, no c-spine tenderness.  CARD: Normal S1 S2; Normal rate and rhythm  RESP: Equal BS B/L, No wheezes, rhonchi or rales. No distress  GI: Soft, non-tender, non-distended.  MS: Can raise both extremities minimally off bed. MIld tenderness L hip. No tenderness R hip  SKIN: Warm, dry, no acute rashes. Good turgor  NEURO: A&Ox2 (name and birthday, No focal deficits. Strength 5/5 with no sensory deficits

## 2024-03-27 NOTE — ED PROVIDER NOTE - PROGRESS NOTE DETAILS
Discussed SNF RN supervisor Lin, pt had a mechanical fall on March12th, no LOC, was ambulatory immediately after. PT noticed one week prior patient's gait seemed different and he was c/o L hip pain. XR obtained of pelvis which demonstrated questionable L subcap fx. SNF MD sent to ED for evaluation with L hip CT.

## 2024-03-28 LAB
ALBUMIN SERPL ELPH-MCNC: 3.4 G/DL — LOW (ref 3.5–5.2)
ALP SERPL-CCNC: 114 U/L — SIGNIFICANT CHANGE UP (ref 30–115)
ALT FLD-CCNC: 13 U/L — SIGNIFICANT CHANGE UP (ref 0–41)
ANION GAP SERPL CALC-SCNC: 11 MMOL/L — SIGNIFICANT CHANGE UP (ref 7–14)
ANION GAP SERPL CALC-SCNC: 13 MMOL/L — SIGNIFICANT CHANGE UP (ref 7–14)
AST SERPL-CCNC: 19 U/L — SIGNIFICANT CHANGE UP (ref 0–41)
BILIRUB SERPL-MCNC: 0.4 MG/DL — SIGNIFICANT CHANGE UP (ref 0.2–1.2)
BUN SERPL-MCNC: 13 MG/DL — SIGNIFICANT CHANGE UP (ref 10–20)
BUN SERPL-MCNC: 13 MG/DL — SIGNIFICANT CHANGE UP (ref 10–20)
CALCIUM SERPL-MCNC: 9.2 MG/DL — SIGNIFICANT CHANGE UP (ref 8.4–10.4)
CALCIUM SERPL-MCNC: 9.3 MG/DL — SIGNIFICANT CHANGE UP (ref 8.4–10.5)
CHLORIDE SERPL-SCNC: 87 MMOL/L — LOW (ref 98–110)
CHLORIDE SERPL-SCNC: 89 MMOL/L — LOW (ref 98–110)
CO2 SERPL-SCNC: 24 MMOL/L — SIGNIFICANT CHANGE UP (ref 17–32)
CO2 SERPL-SCNC: 24 MMOL/L — SIGNIFICANT CHANGE UP (ref 17–32)
CREAT SERPL-MCNC: 1 MG/DL — SIGNIFICANT CHANGE UP (ref 0.7–1.5)
CREAT SERPL-MCNC: 1 MG/DL — SIGNIFICANT CHANGE UP (ref 0.7–1.5)
EGFR: 73 ML/MIN/1.73M2 — SIGNIFICANT CHANGE UP
EGFR: 73 ML/MIN/1.73M2 — SIGNIFICANT CHANGE UP
GLUCOSE BLDC GLUCOMTR-MCNC: 101 MG/DL — HIGH (ref 70–99)
GLUCOSE BLDC GLUCOMTR-MCNC: 81 MG/DL — SIGNIFICANT CHANGE UP (ref 70–99)
GLUCOSE BLDC GLUCOMTR-MCNC: 91 MG/DL — SIGNIFICANT CHANGE UP (ref 70–99)
GLUCOSE SERPL-MCNC: 72 MG/DL — SIGNIFICANT CHANGE UP (ref 70–99)
GLUCOSE SERPL-MCNC: 80 MG/DL — SIGNIFICANT CHANGE UP (ref 70–99)
HCT VFR BLD CALC: 31.1 % — LOW (ref 42–52)
HGB BLD-MCNC: 11.3 G/DL — LOW (ref 14–18)
MAGNESIUM SERPL-MCNC: 1.8 MG/DL — SIGNIFICANT CHANGE UP (ref 1.8–2.4)
MCHC RBC-ENTMCNC: 29.9 PG — SIGNIFICANT CHANGE UP (ref 27–31)
MCHC RBC-ENTMCNC: 36.3 G/DL — SIGNIFICANT CHANGE UP (ref 32–37)
MCV RBC AUTO: 82.3 FL — SIGNIFICANT CHANGE UP (ref 80–94)
NRBC # BLD: 0 /100 WBCS — SIGNIFICANT CHANGE UP (ref 0–0)
OSMOLALITY SERPL: 257 MOS/KG — LOW (ref 280–301)
OSMOLALITY UR: 396 MOS/KG — SIGNIFICANT CHANGE UP (ref 50–1200)
PLATELET # BLD AUTO: 316 K/UL — SIGNIFICANT CHANGE UP (ref 130–400)
PMV BLD: 8.4 FL — SIGNIFICANT CHANGE UP (ref 7.4–10.4)
POTASSIUM SERPL-MCNC: 4.4 MMOL/L — SIGNIFICANT CHANGE UP (ref 3.5–5)
POTASSIUM SERPL-MCNC: 4.6 MMOL/L — SIGNIFICANT CHANGE UP (ref 3.5–5)
POTASSIUM SERPL-SCNC: 4.4 MMOL/L — SIGNIFICANT CHANGE UP (ref 3.5–5)
POTASSIUM SERPL-SCNC: 4.6 MMOL/L — SIGNIFICANT CHANGE UP (ref 3.5–5)
PROT SERPL-MCNC: 6.4 G/DL — SIGNIFICANT CHANGE UP (ref 6–8)
RBC # BLD: 3.78 M/UL — LOW (ref 4.7–6.1)
RBC # FLD: 12.5 % — SIGNIFICANT CHANGE UP (ref 11.5–14.5)
SODIUM SERPL-SCNC: 124 MMOL/L — LOW (ref 135–146)
SODIUM SERPL-SCNC: 124 MMOL/L — LOW (ref 135–146)
SODIUM UR-SCNC: 104 MMOL/L — SIGNIFICANT CHANGE UP
WBC # BLD: 9.61 K/UL — SIGNIFICANT CHANGE UP (ref 4.8–10.8)
WBC # FLD AUTO: 9.61 K/UL — SIGNIFICANT CHANGE UP (ref 4.8–10.8)

## 2024-03-28 PROCEDURE — 73562 X-RAY EXAM OF KNEE 3: CPT | Mod: 26,LT

## 2024-03-28 PROCEDURE — 72170 X-RAY EXAM OF PELVIS: CPT | Mod: 26

## 2024-03-28 PROCEDURE — 99232 SBSQ HOSP IP/OBS MODERATE 35: CPT

## 2024-03-28 PROCEDURE — 93970 EXTREMITY STUDY: CPT | Mod: 26

## 2024-03-28 PROCEDURE — 73552 X-RAY EXAM OF FEMUR 2/>: CPT | Mod: 26,LT

## 2024-03-28 RX ORDER — GLUCAGON INJECTION, SOLUTION 0.5 MG/.1ML
1 INJECTION, SOLUTION SUBCUTANEOUS ONCE
Refills: 0 | Status: DISCONTINUED | OUTPATIENT
Start: 2024-03-28 | End: 2024-03-29

## 2024-03-28 RX ORDER — DEXTROSE 50 % IN WATER 50 %
25 SYRINGE (ML) INTRAVENOUS ONCE
Refills: 0 | Status: DISCONTINUED | OUTPATIENT
Start: 2024-03-28 | End: 2024-03-29

## 2024-03-28 RX ORDER — SODIUM CHLORIDE 9 MG/ML
1 INJECTION INTRAMUSCULAR; INTRAVENOUS; SUBCUTANEOUS EVERY 12 HOURS
Refills: 0 | Status: DISCONTINUED | OUTPATIENT
Start: 2024-03-28 | End: 2024-03-29

## 2024-03-28 RX ORDER — SODIUM CHLORIDE 9 MG/ML
1000 INJECTION, SOLUTION INTRAVENOUS
Refills: 0 | Status: DISCONTINUED | OUTPATIENT
Start: 2024-03-28 | End: 2024-03-29

## 2024-03-28 RX ORDER — MORPHINE SULFATE 50 MG/1
1 CAPSULE, EXTENDED RELEASE ORAL EVERY 6 HOURS
Refills: 0 | Status: DISCONTINUED | OUTPATIENT
Start: 2024-03-28 | End: 2024-03-29

## 2024-03-28 RX ORDER — DEXTROSE 50 % IN WATER 50 %
12.5 SYRINGE (ML) INTRAVENOUS ONCE
Refills: 0 | Status: DISCONTINUED | OUTPATIENT
Start: 2024-03-28 | End: 2024-03-29

## 2024-03-28 RX ORDER — PETROLATUM,WHITE
1 JELLY (GRAM) TOPICAL
Refills: 0 | Status: DISCONTINUED | OUTPATIENT
Start: 2024-03-28 | End: 2024-03-28

## 2024-03-28 RX ORDER — DEXTROSE 50 % IN WATER 50 %
15 SYRINGE (ML) INTRAVENOUS ONCE
Refills: 0 | Status: DISCONTINUED | OUTPATIENT
Start: 2024-03-28 | End: 2024-03-29

## 2024-03-28 RX ORDER — CHLORHEXIDINE GLUCONATE 213 G/1000ML
1 SOLUTION TOPICAL DAILY
Refills: 0 | Status: DISCONTINUED | OUTPATIENT
Start: 2024-03-28 | End: 2024-03-29

## 2024-03-28 RX ORDER — SODIUM CHLORIDE 9 MG/ML
1000 INJECTION, SOLUTION INTRAVENOUS
Refills: 0 | Status: DISCONTINUED | OUTPATIENT
Start: 2024-03-28 | End: 2024-03-28

## 2024-03-28 RX ADMIN — Medication 975 MILLIGRAM(S): at 21:33

## 2024-03-28 RX ADMIN — LISINOPRIL 5 MILLIGRAM(S): 2.5 TABLET ORAL at 05:06

## 2024-03-28 RX ADMIN — PANTOPRAZOLE SODIUM 40 MILLIGRAM(S): 20 TABLET, DELAYED RELEASE ORAL at 05:06

## 2024-03-28 RX ADMIN — SODIUM CHLORIDE 1 GRAM(S): 9 INJECTION INTRAMUSCULAR; INTRAVENOUS; SUBCUTANEOUS at 18:39

## 2024-03-28 RX ADMIN — ENOXAPARIN SODIUM 40 MILLIGRAM(S): 100 INJECTION SUBCUTANEOUS at 05:06

## 2024-03-28 RX ADMIN — Medication 975 MILLIGRAM(S): at 13:13

## 2024-03-28 RX ADMIN — CHLORHEXIDINE GLUCONATE 1 APPLICATION(S): 213 SOLUTION TOPICAL at 13:13

## 2024-03-28 RX ADMIN — SODIUM CHLORIDE 65 MILLILITER(S): 9 INJECTION INTRAMUSCULAR; INTRAVENOUS; SUBCUTANEOUS at 02:09

## 2024-03-28 RX ADMIN — SODIUM CHLORIDE 75 MILLILITER(S): 9 INJECTION, SOLUTION INTRAVENOUS at 09:23

## 2024-03-28 NOTE — PRE PROCEDURE NOTE - PRE PROCEDURE EVALUATION
ORTHOPEDIC SURGERY PRE OP NOTE      Diagnosis: Left subacute femoral neck fracture    Planned Procedure: Left hip hemiarthroplasty    Consent: TO BE OBTAINED BY ATTENDING                   Risks/benefits/alternatives were discussed with the patient/family and they wish to proceed with surgery.       ANTICIPATED DATE OF PROCEDURE : 3/28/24  SCHEDULED CASE OR ADD-ON CASE: Add-on    Clearances:   [X] Medicine: Obtained     T(C): 36.4 (03-28-24 @ 04:55), Max: 37.1 (03-27-24 @ 20:40)  HR: 98 (03-28-24 @ 04:55) (83 - 98)  BP: 182/91 (03-28-24 @ 04:55) (140/73 - 200/95)  RR: 18 (03-28-24 @ 04:55) (17 - 19)  SpO2: 97% (03-28-24 @ 04:55) (96% - 98%)    Labs:                        11.1   9.56  )-----------( 298      ( 27 Mar 2024 12:30 )             31.0     03-28    124<L>  |  87<L>  |  13  ----------------------------<  80  4.6   |  24  |  1.0    Ca    9.3      28 Mar 2024 00:44    TPro  6.2  /  Alb  3.3<L>  /  TBili  0.4  /  DBili  x   /  AST  20  /  ALT  14  /  AlkPhos  108  03-27    PT/INR - ( 27 Mar 2024 12:30 )   PT: 13.10 sec;   INR: 1.15 ratio         PTT - ( 27 Mar 2024 12:30 )  PTT:31.7 sec  Type and Screen X 2:    COVID-19 PCR: NotDetec (08 Mar 2024 10:00)  COVID-19 PCR: NotDetec (30 Oct 2023 16:30)    [X]EKG: Chart  [X]CXR: Chart      DIET: NPO after midnight  IVF: per primary team      ANTICOAGULATION STATUS ( include name of anticoagulant) :  [X] CONTINUE                                       A/P: Patient is a 88y y/o Male Pending left hip hemiarthroplasty    [ ] -NPO and IVF  [ ]pain control/analgesia prn per primary team   [ ]Incentive Spirometry   [ ]F/U Pending Labs  [ ]Notify Ortho with any questions- spectra [id number]    [ ]DISCUSSED WITH PRIMARY TEAM MEMBER (name of team member): Coy  [ ]Date and Time DISCUSSED WITH PRIMARY TEAM MEMBER: 3/27/24 1800

## 2024-03-29 ENCOUNTER — RESULT REVIEW (OUTPATIENT)
Age: 89
End: 2024-03-29

## 2024-03-29 LAB
A1C WITH ESTIMATED AVERAGE GLUCOSE RESULT: 5.7 % — HIGH (ref 4–5.6)
ALBUMIN SERPL ELPH-MCNC: 3.4 G/DL — LOW (ref 3.5–5.2)
ALP SERPL-CCNC: 101 U/L — SIGNIFICANT CHANGE UP (ref 30–115)
ALT FLD-CCNC: 12 U/L — SIGNIFICANT CHANGE UP (ref 0–41)
ANION GAP SERPL CALC-SCNC: 15 MMOL/L — HIGH (ref 7–14)
AST SERPL-CCNC: 21 U/L — SIGNIFICANT CHANGE UP (ref 0–41)
BASOPHILS # BLD AUTO: 0.03 K/UL — SIGNIFICANT CHANGE UP (ref 0–0.2)
BASOPHILS NFR BLD AUTO: 0.4 % — SIGNIFICANT CHANGE UP (ref 0–1)
BILIRUB SERPL-MCNC: 0.5 MG/DL — SIGNIFICANT CHANGE UP (ref 0.2–1.2)
BUN SERPL-MCNC: 16 MG/DL — SIGNIFICANT CHANGE UP (ref 10–20)
CALCIUM SERPL-MCNC: 9.2 MG/DL — SIGNIFICANT CHANGE UP (ref 8.4–10.5)
CHLORIDE SERPL-SCNC: 95 MMOL/L — LOW (ref 98–110)
CO2 SERPL-SCNC: 20 MMOL/L — SIGNIFICANT CHANGE UP (ref 17–32)
CREAT SERPL-MCNC: 0.9 MG/DL — SIGNIFICANT CHANGE UP (ref 0.7–1.5)
EGFR: 83 ML/MIN/1.73M2 — SIGNIFICANT CHANGE UP
EOSINOPHIL # BLD AUTO: 0.12 K/UL — SIGNIFICANT CHANGE UP (ref 0–0.7)
EOSINOPHIL NFR BLD AUTO: 1.6 % — SIGNIFICANT CHANGE UP (ref 0–8)
ESTIMATED AVERAGE GLUCOSE: 117 MG/DL — HIGH (ref 68–114)
FERRITIN SERPL-MCNC: 404 NG/ML — HIGH (ref 30–400)
GLUCOSE BLDC GLUCOMTR-MCNC: 77 MG/DL — SIGNIFICANT CHANGE UP (ref 70–99)
GLUCOSE BLDC GLUCOMTR-MCNC: 89 MG/DL — SIGNIFICANT CHANGE UP (ref 70–99)
GLUCOSE SERPL-MCNC: 71 MG/DL — SIGNIFICANT CHANGE UP (ref 70–99)
HCT VFR BLD CALC: 31.8 % — LOW (ref 42–52)
HCT VFR BLD CALC: 33.6 % — LOW (ref 42–52)
HGB BLD-MCNC: 11.2 G/DL — LOW (ref 14–18)
HGB BLD-MCNC: 11.9 G/DL — LOW (ref 14–18)
IMM GRANULOCYTES NFR BLD AUTO: 0.8 % — HIGH (ref 0.1–0.3)
IRON SATN MFR SERPL: 26 % — SIGNIFICANT CHANGE UP (ref 15–50)
IRON SATN MFR SERPL: 44 UG/DL — SIGNIFICANT CHANGE UP (ref 35–150)
LYMPHOCYTES # BLD AUTO: 0.67 K/UL — LOW (ref 1.2–3.4)
LYMPHOCYTES # BLD AUTO: 8.7 % — LOW (ref 20.5–51.1)
MAGNESIUM SERPL-MCNC: 1.9 MG/DL — SIGNIFICANT CHANGE UP (ref 1.8–2.4)
MCHC RBC-ENTMCNC: 29.5 PG — SIGNIFICANT CHANGE UP (ref 27–31)
MCHC RBC-ENTMCNC: 29.9 PG — SIGNIFICANT CHANGE UP (ref 27–31)
MCHC RBC-ENTMCNC: 35.2 G/DL — SIGNIFICANT CHANGE UP (ref 32–37)
MCHC RBC-ENTMCNC: 35.4 G/DL — SIGNIFICANT CHANGE UP (ref 32–37)
MCV RBC AUTO: 83.4 FL — SIGNIFICANT CHANGE UP (ref 80–94)
MCV RBC AUTO: 84.8 FL — SIGNIFICANT CHANGE UP (ref 80–94)
MONOCYTES # BLD AUTO: 0.67 K/UL — HIGH (ref 0.1–0.6)
MONOCYTES NFR BLD AUTO: 8.7 % — SIGNIFICANT CHANGE UP (ref 1.7–9.3)
NEUTROPHILS # BLD AUTO: 6.13 K/UL — SIGNIFICANT CHANGE UP (ref 1.4–6.5)
NEUTROPHILS NFR BLD AUTO: 79.8 % — HIGH (ref 42.2–75.2)
NRBC # BLD: 0 /100 WBCS — SIGNIFICANT CHANGE UP (ref 0–0)
NRBC # BLD: 0 /100 WBCS — SIGNIFICANT CHANGE UP (ref 0–0)
PLATELET # BLD AUTO: 251 K/UL — SIGNIFICANT CHANGE UP (ref 130–400)
PLATELET # BLD AUTO: 271 K/UL — SIGNIFICANT CHANGE UP (ref 130–400)
PMV BLD: 7.9 FL — SIGNIFICANT CHANGE UP (ref 7.4–10.4)
PMV BLD: 8 FL — SIGNIFICANT CHANGE UP (ref 7.4–10.4)
POTASSIUM SERPL-MCNC: 4.6 MMOL/L — SIGNIFICANT CHANGE UP (ref 3.5–5)
POTASSIUM SERPL-SCNC: 4.6 MMOL/L — SIGNIFICANT CHANGE UP (ref 3.5–5)
PROT SERPL-MCNC: 6.2 G/DL — SIGNIFICANT CHANGE UP (ref 6–8)
RBC # BLD: 3.75 M/UL — LOW (ref 4.7–6.1)
RBC # BLD: 4.03 M/UL — LOW (ref 4.7–6.1)
RBC # FLD: 12.5 % — SIGNIFICANT CHANGE UP (ref 11.5–14.5)
RBC # FLD: 12.6 % — SIGNIFICANT CHANGE UP (ref 11.5–14.5)
SODIUM SERPL-SCNC: 130 MMOL/L — LOW (ref 135–146)
TIBC SERPL-MCNC: 167 UG/DL — LOW (ref 220–430)
UIBC SERPL-MCNC: 123 UG/DL — SIGNIFICANT CHANGE UP (ref 110–370)
WBC # BLD: 20.11 K/UL — HIGH (ref 4.8–10.8)
WBC # BLD: 7.68 K/UL — SIGNIFICANT CHANGE UP (ref 4.8–10.8)
WBC # FLD AUTO: 20.11 K/UL — HIGH (ref 4.8–10.8)
WBC # FLD AUTO: 7.68 K/UL — SIGNIFICANT CHANGE UP (ref 4.8–10.8)

## 2024-03-29 PROCEDURE — 88305 TISSUE EXAM BY PATHOLOGIST: CPT | Mod: 26

## 2024-03-29 PROCEDURE — 99232 SBSQ HOSP IP/OBS MODERATE 35: CPT

## 2024-03-29 PROCEDURE — 88311 DECALCIFY TISSUE: CPT | Mod: 26

## 2024-03-29 PROCEDURE — 27236 TREAT THIGH FRACTURE: CPT | Mod: LT

## 2024-03-29 RX ORDER — DEXTROSE 50 % IN WATER 50 %
12.5 SYRINGE (ML) INTRAVENOUS ONCE
Refills: 0 | Status: DISCONTINUED | OUTPATIENT
Start: 2024-03-29 | End: 2024-04-08

## 2024-03-29 RX ORDER — PANTOPRAZOLE SODIUM 20 MG/1
40 TABLET, DELAYED RELEASE ORAL
Refills: 0 | Status: DISCONTINUED | OUTPATIENT
Start: 2024-03-29 | End: 2024-04-08

## 2024-03-29 RX ORDER — ENOXAPARIN SODIUM 100 MG/ML
40 INJECTION SUBCUTANEOUS EVERY 24 HOURS
Refills: 0 | Status: DISCONTINUED | OUTPATIENT
Start: 2024-03-30 | End: 2024-04-08

## 2024-03-29 RX ORDER — SODIUM CHLORIDE 9 MG/ML
1000 INJECTION, SOLUTION INTRAVENOUS
Refills: 0 | Status: DISCONTINUED | OUTPATIENT
Start: 2024-03-29 | End: 2024-04-08

## 2024-03-29 RX ORDER — CEFAZOLIN SODIUM 1 G
2000 VIAL (EA) INJECTION EVERY 8 HOURS
Refills: 0 | Status: COMPLETED | OUTPATIENT
Start: 2024-03-29 | End: 2024-03-30

## 2024-03-29 RX ORDER — DEXTROSE 50 % IN WATER 50 %
25 SYRINGE (ML) INTRAVENOUS ONCE
Refills: 0 | Status: DISCONTINUED | OUTPATIENT
Start: 2024-03-29 | End: 2024-04-08

## 2024-03-29 RX ORDER — LISINOPRIL 2.5 MG/1
5 TABLET ORAL DAILY
Refills: 0 | Status: DISCONTINUED | OUTPATIENT
Start: 2024-03-29 | End: 2024-04-08

## 2024-03-29 RX ORDER — ONDANSETRON 8 MG/1
4 TABLET, FILM COATED ORAL ONCE
Refills: 0 | Status: DISCONTINUED | OUTPATIENT
Start: 2024-03-29 | End: 2024-03-29

## 2024-03-29 RX ORDER — MORPHINE SULFATE 50 MG/1
1 CAPSULE, EXTENDED RELEASE ORAL EVERY 6 HOURS
Refills: 0 | Status: DISCONTINUED | OUTPATIENT
Start: 2024-03-29 | End: 2024-03-29

## 2024-03-29 RX ORDER — GLUCAGON INJECTION, SOLUTION 0.5 MG/.1ML
1 INJECTION, SOLUTION SUBCUTANEOUS ONCE
Refills: 0 | Status: DISCONTINUED | OUTPATIENT
Start: 2024-03-29 | End: 2024-04-08

## 2024-03-29 RX ORDER — HYDROMORPHONE HYDROCHLORIDE 2 MG/ML
0.5 INJECTION INTRAMUSCULAR; INTRAVENOUS; SUBCUTANEOUS
Refills: 0 | Status: DISCONTINUED | OUTPATIENT
Start: 2024-03-29 | End: 2024-03-29

## 2024-03-29 RX ORDER — SODIUM CHLORIDE 9 MG/ML
1000 INJECTION, SOLUTION INTRAVENOUS
Refills: 0 | Status: DISCONTINUED | OUTPATIENT
Start: 2024-03-29 | End: 2024-03-29

## 2024-03-29 RX ORDER — CHLORHEXIDINE GLUCONATE 213 G/1000ML
1 SOLUTION TOPICAL DAILY
Refills: 0 | Status: DISCONTINUED | OUTPATIENT
Start: 2024-03-29 | End: 2024-04-08

## 2024-03-29 RX ORDER — SODIUM CHLORIDE 9 MG/ML
1 INJECTION INTRAMUSCULAR; INTRAVENOUS; SUBCUTANEOUS EVERY 12 HOURS
Refills: 0 | Status: DISCONTINUED | OUTPATIENT
Start: 2024-03-29 | End: 2024-04-08

## 2024-03-29 RX ORDER — OXYCODONE AND ACETAMINOPHEN 5; 325 MG/1; MG/1
1 TABLET ORAL ONCE
Refills: 0 | Status: DISCONTINUED | OUTPATIENT
Start: 2024-03-29 | End: 2024-03-29

## 2024-03-29 RX ORDER — DEXTROSE 50 % IN WATER 50 %
15 SYRINGE (ML) INTRAVENOUS ONCE
Refills: 0 | Status: DISCONTINUED | OUTPATIENT
Start: 2024-03-29 | End: 2024-04-08

## 2024-03-29 RX ORDER — ACETAMINOPHEN 500 MG
975 TABLET ORAL EVERY 8 HOURS
Refills: 0 | Status: COMPLETED | OUTPATIENT
Start: 2024-03-29 | End: 2024-04-03

## 2024-03-29 RX ADMIN — LISINOPRIL 5 MILLIGRAM(S): 2.5 TABLET ORAL at 05:06

## 2024-03-29 RX ADMIN — Medication 975 MILLIGRAM(S): at 05:06

## 2024-03-29 RX ADMIN — Medication 100 MILLIGRAM(S): at 15:07

## 2024-03-29 RX ADMIN — SODIUM CHLORIDE 1 GRAM(S): 9 INJECTION INTRAMUSCULAR; INTRAVENOUS; SUBCUTANEOUS at 18:55

## 2024-03-29 RX ADMIN — LISINOPRIL 5 MILLIGRAM(S): 2.5 TABLET ORAL at 13:58

## 2024-03-29 RX ADMIN — Medication 975 MILLIGRAM(S): at 15:07

## 2024-03-29 RX ADMIN — SODIUM CHLORIDE 1 GRAM(S): 9 INJECTION INTRAMUSCULAR; INTRAVENOUS; SUBCUTANEOUS at 05:06

## 2024-03-29 RX ADMIN — Medication 975 MILLIGRAM(S): at 21:53

## 2024-03-29 RX ADMIN — Medication 100 MILLIGRAM(S): at 21:54

## 2024-03-29 NOTE — PROGRESS NOTE ADULT - ATTENDING COMMENTS
I saw and evaluated the patient on 3/30/2024. I reviewed the pertinent results and imaging. I discussed with the resident/PA and I agree with the findings and plan of care as documented by the resident/PA with the following comments:     POD0 s/p left hip cemented hemiarthroplasty for subacute femoral neck fracture  Doing well. Pain controlled  Hemodynamically stable  Left lower extremity: Dressing clean, dry and intact. +TA/GS motor intact. SILT s/t/s/dp/sp. Warm and well perfused  No postop labs    Multimodal pain regimen  WBAT left lower extremity with ROM as tolerated  24 hours of perioperative Ancef  Trend H&H until stable  Lovenox/SCDs for VTE prophlaxis, can transition to ASA 81 twice daily with PPI on discharge to complete 6 week postoperative course  PT for mobilization and discharge recommendations  Remainder of care and dispo planning per primary team  Follow up with me in the office at 2-3 weeks after surgery at [address] [facility]. Please call [phone] to schedule an appointment I saw and evaluated the patient on 3/29/2024. I reviewed the pertinent results and imaging. I discussed with the resident/PA and I agree with the findings and plan of care as documented by the resident/PA with the following comments:     POD0 s/p left hip cemented hemiarthroplasty for subacute femoral neck fracture  Doing well. Pain controlled  Hemodynamically stable  Left lower extremity: Dressing clean, dry and intact. +TA/GS motor intact. SILT s/t/s/dp/sp. Warm and well perfused  No postop labs    Multimodal pain regimen  WBAT left lower extremity with ROM as tolerated  24 hours of perioperative Ancef  Trend H&H until stable  Lovenox/SCDs for VTE prophlaxis, can transition to ASA 81 twice daily with PPI on discharge to complete 6 week postoperative course  PT for mobilization and discharge recommendations  Remainder of care and dispo planning per primary team  Follow up with me in the office at 2-3 weeks after surgery at [address] [facility]. Please call [phone] to schedule an appointment

## 2024-03-29 NOTE — PRE-ANESTHESIA EVALUATION ADULT - NSANTHLABRESULTSFT_GEN_ALL_CORE
[facility] Holding area 7.40/45/16  Na 125, K 5.2 HB 12.7 VB Holding area 7.40/45/16  Na 125, K 5.2 HB 12.7    Repeat Sodium this AM is 130

## 2024-03-29 NOTE — CHART NOTE - NSCHARTNOTESELECT_GEN_ALL_CORE
Dr. Serrano with Cardiology notified of small amount of blood suctioned per ETT.  No new orders.   PACU admission note

## 2024-03-29 NOTE — BRIEF OPERATIVE NOTE - NSICDXBRIEFPROCEDURE_GEN_ALL_CORE_FT
PROCEDURES:  Open treatment, fracture, femur, neck, with prosthesis 29-Mar-2024 11:58:29  Bridget Bass

## 2024-03-29 NOTE — CHART NOTE - NSCHARTNOTEFT_GEN_A_CORE
PACU ANESTHESIA ADMISSION NOTE      Procedure: ORIF left hip  Post op diagnosis:  left hip fx    ____  Intubated  TV:______       Rate: ______      FiO2: ______    _x___  Patent Airway    _x___  Full return of protective reflexes    _x___  Full recovery from anesthesia / back to baseline status    Vitals:    see anesthesia record    Mental Status:  _x___ Awake   _____ Alert   _____ Drowsy   _____ Sedated    Nausea/Vomiting:  _x___  NO       ______Yes,   See Post - Op Orders         Pain Scale (0-10):  _____    Treatment: ____ None    __x__ See Post - Op/PCA Orders    Post - Operative Fluids:   ____ Oral   ___x_ See Post - Op Orders    Plan: Discharge:   ____Home       _x____Floor     _____Critical Care    _____  Other:_________________    Comments:  No anesthesia issues or complications noted.  Discharge when criteria met.

## 2024-03-29 NOTE — PROGRESS NOTE ADULT - ATTENDING COMMENTS
Patient is an 87 y/o Male w/ PMHx of HTN, dementia, neurogenic bladder with chronic indwelling yañez, R hip fracture s/p internal fixation, presents for recurrent  fall with acute  left hip fracture. Admitted for left femoral neck fracture.     #Subcapital left femoral neck fracture  - CT left hip noted   - s/p eval by ortho in ED:  surgery on 3/29  - PT/OT/physiatry eval after surgery  #Pre-operative optimization:     - CXR: normal (interpreted by me)      - ECG: early repol abnormality (interpreted by me)    patient is intermediate risk for intermediate risk surgery (left hip surgical repair of fracture).  May proceed with acceptable risks since benefits outweigh risks.   patient is medically cleared for surgery.     - RCRI 0  - need to optimize Na prior to procedure, management as below   - bsfs monitoring meanwhile pt. is npo,  hypoglycemia  protocol to be activated if needed.   - pain management    #Acute on chronic hyponatremia - asymptomatic   - Na 130 stable level  - trend bmp daily, IVF  meanwhile NPO    # Normocytic anemia- normal  Iron levels, monitor H/H, maintain active type and screen      #HTN/#HLD  #neurogenic bladder w/ chronic yañez  #CKD 3   #mild cognitive impairment  - Cr at baseline  - c/w home meds     DVT ppx: lovenox( hold preop)  Code status: full code    #Progress Note Handoff: Pending left hip Surgical repair, monitor daily CBC, BMP, Hyponatremia work up pending. pain management  Family discussion: yes, medical team Disposition: SNF once medically stable    Total time spent to complete patient's bedside assessment, review medical chart, discuss medical plan of care with covering medical team was more than 35 minutes with >50% of time spent face to face with patient, discussion with patient/family and/or coordination of care

## 2024-03-30 LAB
ALBUMIN SERPL ELPH-MCNC: 3 G/DL — LOW (ref 3.5–5.2)
ALP SERPL-CCNC: 93 U/L — SIGNIFICANT CHANGE UP (ref 30–115)
ALT FLD-CCNC: 10 U/L — SIGNIFICANT CHANGE UP (ref 0–41)
ANION GAP SERPL CALC-SCNC: 13 MMOL/L — SIGNIFICANT CHANGE UP (ref 7–14)
AST SERPL-CCNC: 31 U/L — SIGNIFICANT CHANGE UP (ref 0–41)
BASOPHILS # BLD AUTO: 0.03 K/UL — SIGNIFICANT CHANGE UP (ref 0–0.2)
BASOPHILS NFR BLD AUTO: 0.3 % — SIGNIFICANT CHANGE UP (ref 0–1)
BILIRUB SERPL-MCNC: 0.2 MG/DL — SIGNIFICANT CHANGE UP (ref 0.2–1.2)
BUN SERPL-MCNC: 16 MG/DL — SIGNIFICANT CHANGE UP (ref 10–20)
CALCIUM SERPL-MCNC: 8.4 MG/DL — SIGNIFICANT CHANGE UP (ref 8.4–10.4)
CHLORIDE SERPL-SCNC: 95 MMOL/L — LOW (ref 98–110)
CO2 SERPL-SCNC: 21 MMOL/L — SIGNIFICANT CHANGE UP (ref 17–32)
CREAT SERPL-MCNC: 1.1 MG/DL — SIGNIFICANT CHANGE UP (ref 0.7–1.5)
EGFR: 65 ML/MIN/1.73M2 — SIGNIFICANT CHANGE UP
EOSINOPHIL # BLD AUTO: 0.1 K/UL — SIGNIFICANT CHANGE UP (ref 0–0.7)
EOSINOPHIL NFR BLD AUTO: 0.9 % — SIGNIFICANT CHANGE UP (ref 0–8)
GLUCOSE BLDC GLUCOMTR-MCNC: 102 MG/DL — HIGH (ref 70–99)
GLUCOSE BLDC GLUCOMTR-MCNC: 124 MG/DL — HIGH (ref 70–99)
GLUCOSE BLDC GLUCOMTR-MCNC: 130 MG/DL — HIGH (ref 70–99)
GLUCOSE SERPL-MCNC: 89 MG/DL — SIGNIFICANT CHANGE UP (ref 70–99)
HCT VFR BLD CALC: 27.9 % — LOW (ref 42–52)
HGB BLD-MCNC: 10 G/DL — LOW (ref 14–18)
IMM GRANULOCYTES NFR BLD AUTO: 0.7 % — HIGH (ref 0.1–0.3)
LYMPHOCYTES # BLD AUTO: 0.66 K/UL — LOW (ref 1.2–3.4)
LYMPHOCYTES # BLD AUTO: 6 % — LOW (ref 20.5–51.1)
MAGNESIUM SERPL-MCNC: 1.6 MG/DL — LOW (ref 1.8–2.4)
MCHC RBC-ENTMCNC: 29.8 PG — SIGNIFICANT CHANGE UP (ref 27–31)
MCHC RBC-ENTMCNC: 35.8 G/DL — SIGNIFICANT CHANGE UP (ref 32–37)
MCV RBC AUTO: 83 FL — SIGNIFICANT CHANGE UP (ref 80–94)
MONOCYTES # BLD AUTO: 0.87 K/UL — HIGH (ref 0.1–0.6)
MONOCYTES NFR BLD AUTO: 7.9 % — SIGNIFICANT CHANGE UP (ref 1.7–9.3)
NEUTROPHILS # BLD AUTO: 9.21 K/UL — HIGH (ref 1.4–6.5)
NEUTROPHILS NFR BLD AUTO: 84.2 % — HIGH (ref 42.2–75.2)
NRBC # BLD: 0 /100 WBCS — SIGNIFICANT CHANGE UP (ref 0–0)
PLATELET # BLD AUTO: 225 K/UL — SIGNIFICANT CHANGE UP (ref 130–400)
PMV BLD: 8.5 FL — SIGNIFICANT CHANGE UP (ref 7.4–10.4)
POTASSIUM SERPL-MCNC: 4.1 MMOL/L — SIGNIFICANT CHANGE UP (ref 3.5–5)
POTASSIUM SERPL-SCNC: 4.1 MMOL/L — SIGNIFICANT CHANGE UP (ref 3.5–5)
PROT SERPL-MCNC: 5.4 G/DL — LOW (ref 6–8)
RBC # BLD: 3.36 M/UL — LOW (ref 4.7–6.1)
RBC # FLD: 12.8 % — SIGNIFICANT CHANGE UP (ref 11.5–14.5)
SODIUM SERPL-SCNC: 129 MMOL/L — LOW (ref 135–146)
WBC # BLD: 10.95 K/UL — HIGH (ref 4.8–10.8)
WBC # FLD AUTO: 10.95 K/UL — HIGH (ref 4.8–10.8)

## 2024-03-30 PROCEDURE — 99232 SBSQ HOSP IP/OBS MODERATE 35: CPT

## 2024-03-30 RX ORDER — MAGNESIUM SULFATE 500 MG/ML
2 VIAL (ML) INJECTION
Refills: 0 | Status: COMPLETED | OUTPATIENT
Start: 2024-03-30 | End: 2024-03-30

## 2024-03-30 RX ORDER — METOPROLOL TARTRATE 50 MG
25 TABLET ORAL
Refills: 0 | Status: DISCONTINUED | OUTPATIENT
Start: 2024-03-30 | End: 2024-04-08

## 2024-03-30 RX ADMIN — LISINOPRIL 5 MILLIGRAM(S): 2.5 TABLET ORAL at 06:05

## 2024-03-30 RX ADMIN — CHLORHEXIDINE GLUCONATE 1 APPLICATION(S): 213 SOLUTION TOPICAL at 13:00

## 2024-03-30 RX ADMIN — SODIUM CHLORIDE 1 GRAM(S): 9 INJECTION INTRAMUSCULAR; INTRAVENOUS; SUBCUTANEOUS at 18:06

## 2024-03-30 RX ADMIN — Medication 25 MILLIGRAM(S): at 14:02

## 2024-03-30 RX ADMIN — Medication 25 GRAM(S): at 14:00

## 2024-03-30 RX ADMIN — Medication 25 MILLIGRAM(S): at 18:06

## 2024-03-30 RX ADMIN — Medication 100 MILLIGRAM(S): at 06:07

## 2024-03-30 RX ADMIN — PANTOPRAZOLE SODIUM 40 MILLIGRAM(S): 20 TABLET, DELAYED RELEASE ORAL at 06:07

## 2024-03-30 RX ADMIN — Medication 975 MILLIGRAM(S): at 21:22

## 2024-03-30 RX ADMIN — Medication 975 MILLIGRAM(S): at 13:03

## 2024-03-30 RX ADMIN — ENOXAPARIN SODIUM 40 MILLIGRAM(S): 100 INJECTION SUBCUTANEOUS at 06:05

## 2024-03-30 RX ADMIN — SODIUM CHLORIDE 1 GRAM(S): 9 INJECTION INTRAMUSCULAR; INTRAVENOUS; SUBCUTANEOUS at 06:05

## 2024-03-30 RX ADMIN — Medication 25 GRAM(S): at 13:02

## 2024-03-30 NOTE — PROGRESS NOTE ADULT - ATTENDING COMMENTS
I saw and evaluated the patient on 3/30/2024. I reviewed the pertinent results and imaging. I discussed with the resident/PA and I agree with the findings and plan of care as documented by the resident/PA with the following comments:     POD1 s/p left hip cemented hemiarthroplasty for subacute femoral neck fracture    Doing well. Pain controlled.  Afebrile, vital signs stable    No acute distress with normal work of breathing  Left lower extremity: Dressing clean, dry and intact. +TA/GS motor intact. SILT s/t/s/dp/sp. Warm and well perfused    AM labs:  Hgb 10  WBC 10.9  Plt 225  Na 129  Remainder of BMP wnl    -Multimodal pain regimen  -WBAT left lower extremity with ROM as tolerated  -24 hours of perioperative Ancef  -Trend H&H until stable  -Lovenox/SCDs for VTE prophlaxis, can transition to ASA 81 twice daily with PPI on discharge to complete 6 week postoperative course  -PT for mobilization and discharge recommendations  -Remainder of care and dispo planning per primary team  -Follow up with me in the office at 2-3 weeks after surgery at [address] [place] [address]. Please call [phone] to schedule an appointment.

## 2024-03-31 LAB
ALBUMIN SERPL ELPH-MCNC: 3 G/DL — LOW (ref 3.5–5.2)
ALP SERPL-CCNC: 92 U/L — SIGNIFICANT CHANGE UP (ref 30–115)
ALT FLD-CCNC: 5 U/L — SIGNIFICANT CHANGE UP (ref 0–41)
ANION GAP SERPL CALC-SCNC: 9 MMOL/L — SIGNIFICANT CHANGE UP (ref 7–14)
AST SERPL-CCNC: 25 U/L — SIGNIFICANT CHANGE UP (ref 0–41)
BASOPHILS # BLD AUTO: 0.04 K/UL — SIGNIFICANT CHANGE UP (ref 0–0.2)
BASOPHILS NFR BLD AUTO: 0.3 % — SIGNIFICANT CHANGE UP (ref 0–1)
BILIRUB SERPL-MCNC: 0.5 MG/DL — SIGNIFICANT CHANGE UP (ref 0.2–1.2)
BLD GP AB SCN SERPL QL: SIGNIFICANT CHANGE UP
BUN SERPL-MCNC: 18 MG/DL — SIGNIFICANT CHANGE UP (ref 10–20)
CALCIUM SERPL-MCNC: 8.5 MG/DL — SIGNIFICANT CHANGE UP (ref 8.4–10.5)
CHLORIDE SERPL-SCNC: 92 MMOL/L — LOW (ref 98–110)
CO2 SERPL-SCNC: 25 MMOL/L — SIGNIFICANT CHANGE UP (ref 17–32)
CREAT SERPL-MCNC: 0.8 MG/DL — SIGNIFICANT CHANGE UP (ref 0.7–1.5)
EGFR: 86 ML/MIN/1.73M2 — SIGNIFICANT CHANGE UP
EOSINOPHIL # BLD AUTO: 0.27 K/UL — SIGNIFICANT CHANGE UP (ref 0–0.7)
EOSINOPHIL NFR BLD AUTO: 2.3 % — SIGNIFICANT CHANGE UP (ref 0–8)
GLUCOSE BLDC GLUCOMTR-MCNC: 118 MG/DL — HIGH (ref 70–99)
GLUCOSE BLDC GLUCOMTR-MCNC: 128 MG/DL — HIGH (ref 70–99)
GLUCOSE BLDC GLUCOMTR-MCNC: 130 MG/DL — HIGH (ref 70–99)
GLUCOSE BLDC GLUCOMTR-MCNC: 94 MG/DL — SIGNIFICANT CHANGE UP (ref 70–99)
GLUCOSE SERPL-MCNC: 82 MG/DL — SIGNIFICANT CHANGE UP (ref 70–99)
HCT VFR BLD CALC: 26.6 % — LOW (ref 42–52)
HGB BLD-MCNC: 9.5 G/DL — LOW (ref 14–18)
IMM GRANULOCYTES NFR BLD AUTO: 0.7 % — HIGH (ref 0.1–0.3)
LYMPHOCYTES # BLD AUTO: 1.15 K/UL — LOW (ref 1.2–3.4)
LYMPHOCYTES # BLD AUTO: 9.9 % — LOW (ref 20.5–51.1)
MAGNESIUM SERPL-MCNC: 2.1 MG/DL — SIGNIFICANT CHANGE UP (ref 1.8–2.4)
MCHC RBC-ENTMCNC: 29.7 PG — SIGNIFICANT CHANGE UP (ref 27–31)
MCHC RBC-ENTMCNC: 35.7 G/DL — SIGNIFICANT CHANGE UP (ref 32–37)
MCV RBC AUTO: 83.1 FL — SIGNIFICANT CHANGE UP (ref 80–94)
MONOCYTES # BLD AUTO: 1.22 K/UL — HIGH (ref 0.1–0.6)
MONOCYTES NFR BLD AUTO: 10.5 % — HIGH (ref 1.7–9.3)
NEUTROPHILS # BLD AUTO: 8.84 K/UL — HIGH (ref 1.4–6.5)
NEUTROPHILS NFR BLD AUTO: 76.3 % — HIGH (ref 42.2–75.2)
NRBC # BLD: 0 /100 WBCS — SIGNIFICANT CHANGE UP (ref 0–0)
PLATELET # BLD AUTO: 204 K/UL — SIGNIFICANT CHANGE UP (ref 130–400)
PMV BLD: 8.3 FL — SIGNIFICANT CHANGE UP (ref 7.4–10.4)
POTASSIUM SERPL-MCNC: 4 MMOL/L — SIGNIFICANT CHANGE UP (ref 3.5–5)
POTASSIUM SERPL-SCNC: 4 MMOL/L — SIGNIFICANT CHANGE UP (ref 3.5–5)
PROT SERPL-MCNC: 5.4 G/DL — LOW (ref 6–8)
RBC # BLD: 3.2 M/UL — LOW (ref 4.7–6.1)
RBC # FLD: 12.8 % — SIGNIFICANT CHANGE UP (ref 11.5–14.5)
SODIUM SERPL-SCNC: 126 MMOL/L — LOW (ref 135–146)
WBC # BLD: 11.6 K/UL — HIGH (ref 4.8–10.8)
WBC # FLD AUTO: 11.6 K/UL — HIGH (ref 4.8–10.8)

## 2024-03-31 PROCEDURE — 99232 SBSQ HOSP IP/OBS MODERATE 35: CPT

## 2024-03-31 RX ADMIN — SODIUM CHLORIDE 1 GRAM(S): 9 INJECTION INTRAMUSCULAR; INTRAVENOUS; SUBCUTANEOUS at 18:32

## 2024-03-31 RX ADMIN — LISINOPRIL 5 MILLIGRAM(S): 2.5 TABLET ORAL at 05:56

## 2024-03-31 RX ADMIN — CHLORHEXIDINE GLUCONATE 1 APPLICATION(S): 213 SOLUTION TOPICAL at 13:46

## 2024-03-31 RX ADMIN — Medication 975 MILLIGRAM(S): at 05:57

## 2024-03-31 RX ADMIN — Medication 25 MILLIGRAM(S): at 05:56

## 2024-03-31 RX ADMIN — ENOXAPARIN SODIUM 40 MILLIGRAM(S): 100 INJECTION SUBCUTANEOUS at 05:59

## 2024-03-31 RX ADMIN — PANTOPRAZOLE SODIUM 40 MILLIGRAM(S): 20 TABLET, DELAYED RELEASE ORAL at 06:00

## 2024-03-31 RX ADMIN — Medication 25 MILLIGRAM(S): at 18:33

## 2024-03-31 RX ADMIN — SODIUM CHLORIDE 1 GRAM(S): 9 INJECTION INTRAMUSCULAR; INTRAVENOUS; SUBCUTANEOUS at 05:57

## 2024-03-31 NOTE — PHYSICAL THERAPY INITIAL EVALUATION ADULT - NSACTIVITYREC_GEN_A_PT
ther ex's reviewed to perform t/o day as tolerated for B LE's: heel slides, ankle pumps, 10 reps or as tolerated. Recommend OOB to chair using Gilda Steady at this time for safety.

## 2024-03-31 NOTE — PHYSICAL THERAPY INITIAL EVALUATION ADULT - PERTINENT HX OF CURRENT PROBLEM, REHAB EVAL
h 87yo male with PMHx HTN, dementia, neurogenic bladder with chronic indwelling yañez, R hip fracture s/p internal fixation, presents for evaluation s/p fall and questionable L hip fracture on outpatient SNF XR. Pt c/o L hip pain but has reported he has been walking. Unable to obtain any further clarification as patient has dementia. As per SNF RN supervisor Lin, pt had a mechanical fall on March12th, no LOC, was ambulatory immediately after. PT noticed one week prior patient's gait seemed different and he was c/o L hip pain. XR obtained of pelvis which demonstrated questionable L subcap fx. SNF MD sent to ED for evaluation with L hip CT.

## 2024-03-31 NOTE — PHYSICAL THERAPY INITIAL EVALUATION ADULT - MANUAL MUSCLE TESTING RESULTS, REHAB EVAL
B UE's at least 4/5. R LE 3-/5 hip flex, knee flex/ext 3/5, ankle DF 3/5. L LE 3-/5 hip and knee flex/ext, ankle DF 3/5

## 2024-03-31 NOTE — PHYSICAL THERAPY INITIAL EVALUATION ADULT - GENERAL OBSERVATIONS, REHAB EVAL
15:25-15:42. Pt encountered semifowler in bed sleeping in NAD, + yañez, + dressing L lateral clean and intact. Pt awoken by PT for PT and OOB. Pt was agreeable to PT Eval but did not want to sit in chair, stated he cannot get any sleep.

## 2024-03-31 NOTE — PROGRESS NOTE ADULT - ATTENDING COMMENTS
Patient is an 87 y/o Male w/ PMHx of HTN, dementia, neurogenic bladder with chronic indwelling yañez, R hip fracture s/p internal fixation, presents for recurrent  fall with acute  left hip fracture. Admitted for left femoral neck fracture.     #Subcapital left femoral neck fracture  - CT left hip noted   - s/p eval by ortho in ED:  post op on  3/29  - post op care as per Ortho team rec. WBAT, PT/OT daily tx. as tolerated    - pain management, bowel regimen    #Acute on chronic hyponatremia - asymptomatic   - Na 130 stable level  - started on Salt tabs.     # Normocytic anemia- normal  Iron levels, stable h/h      #HTN/#HLD  #neurogenic bladder w/ chronic yañez  #CKD 3   #mild cognitive impairment  - Cr at baseline  -[facility]. HTN/tachycardic- improved HR on Lopressor 25 mg po twice daily ,  c/w home meds     DVT ppx: lovenox( hold preop)  Code status: full code    #Progress Note Handoff: Post op care, WBAT, PT/OT as tolerated, repeat  CBC, BMP, MG in am  Family discussion: yes, medical team Disposition: SNF once bed is available     Total time spent to complete patient's bedside assessment, review medical chart, discuss medical plan of care with covering medical team was more than 35 minutes with >50% of time spent face to face with patient, discussion with patient/family and/or coordination of care .

## 2024-03-31 NOTE — PHYSICAL THERAPY INITIAL EVALUATION ADULT - RANGE OF MOTION EXAMINATION, REHAB EVAL
B UE's grossly WFL. R LE grossly WFL AAROM. L LE grossly WFL AAROM/PROM with c/o pain upon mobiliity expressed vocally

## 2024-03-31 NOTE — PHYSICAL THERAPY INITIAL EVALUATION ADULT - LEVEL OF CONSCIOUSNESS, REHAB EVAL
? confusion. Pt appeared hazy. When asked if he felt confused, stated he cannot get good sleep/alert

## 2024-03-31 NOTE — PHYSICAL THERAPY INITIAL EVALUATION ADULT - WEIGHT-BEARING RESTRICTIONS: STAND/SIT, REHAB EVAL
Patient was seen at [facility] this am for complaints of abdominal pain for one week and back pain that radiates to the left lower abdomen. Patient was noted to have an elevated white count and sent here for follow up. Patient also reports diarrhea. Sepsis Screening completed    (  )Patient meets SIRS criteria. ( x )Patient does not meet SIRS criteria.       SIRS Criteria is achieved when two or more of the following are present   Temperature < 96.8°F (36°C) or > 100.9°F (38.3°C)   Heart Rate > 90 beats per minute   Respiratory Rate > 20 breaths per minute   WBC count > 12,000 or <4,000 or > 10% bands
L LE/weight-bearing as tolerated

## 2024-04-01 ENCOUNTER — TRANSCRIPTION ENCOUNTER (OUTPATIENT)
Age: 89
End: 2024-04-01

## 2024-04-01 LAB
ALBUMIN SERPL ELPH-MCNC: 2.8 G/DL — LOW (ref 3.5–5.2)
ALP SERPL-CCNC: 89 U/L — SIGNIFICANT CHANGE UP (ref 30–115)
ALT FLD-CCNC: 8 U/L — SIGNIFICANT CHANGE UP (ref 0–41)
ANION GAP SERPL CALC-SCNC: 13 MMOL/L — SIGNIFICANT CHANGE UP (ref 7–14)
AST SERPL-CCNC: 29 U/L — SIGNIFICANT CHANGE UP (ref 0–41)
BASOPHILS # BLD AUTO: 0.03 K/UL — SIGNIFICANT CHANGE UP (ref 0–0.2)
BASOPHILS NFR BLD AUTO: 0.3 % — SIGNIFICANT CHANGE UP (ref 0–1)
BILIRUB SERPL-MCNC: 0.6 MG/DL — SIGNIFICANT CHANGE UP (ref 0.2–1.2)
BLD GP AB SCN SERPL QL: SIGNIFICANT CHANGE UP
BUN SERPL-MCNC: 19 MG/DL — SIGNIFICANT CHANGE UP (ref 10–20)
CALCIUM SERPL-MCNC: 8.3 MG/DL — LOW (ref 8.4–10.5)
CHLORIDE SERPL-SCNC: 91 MMOL/L — LOW (ref 98–110)
CO2 SERPL-SCNC: 22 MMOL/L — SIGNIFICANT CHANGE UP (ref 17–32)
CREAT SERPL-MCNC: 0.9 MG/DL — SIGNIFICANT CHANGE UP (ref 0.7–1.5)
EGFR: 83 ML/MIN/1.73M2 — SIGNIFICANT CHANGE UP
EOSINOPHIL # BLD AUTO: 0.15 K/UL — SIGNIFICANT CHANGE UP (ref 0–0.7)
EOSINOPHIL NFR BLD AUTO: 1.4 % — SIGNIFICANT CHANGE UP (ref 0–8)
GLUCOSE SERPL-MCNC: 86 MG/DL — SIGNIFICANT CHANGE UP (ref 70–99)
HCT VFR BLD CALC: 25.2 % — LOW (ref 42–52)
HGB BLD-MCNC: 9 G/DL — LOW (ref 14–18)
IMM GRANULOCYTES NFR BLD AUTO: 0.7 % — HIGH (ref 0.1–0.3)
LYMPHOCYTES # BLD AUTO: 0.88 K/UL — LOW (ref 1.2–3.4)
LYMPHOCYTES # BLD AUTO: 8 % — LOW (ref 20.5–51.1)
MAGNESIUM SERPL-MCNC: 1.8 MG/DL — SIGNIFICANT CHANGE UP (ref 1.8–2.4)
MCHC RBC-ENTMCNC: 29.3 PG — SIGNIFICANT CHANGE UP (ref 27–31)
MCHC RBC-ENTMCNC: 35.7 G/DL — SIGNIFICANT CHANGE UP (ref 32–37)
MCV RBC AUTO: 82.1 FL — SIGNIFICANT CHANGE UP (ref 80–94)
MONOCYTES # BLD AUTO: 0.99 K/UL — HIGH (ref 0.1–0.6)
MONOCYTES NFR BLD AUTO: 9 % — SIGNIFICANT CHANGE UP (ref 1.7–9.3)
NEUTROPHILS # BLD AUTO: 8.89 K/UL — HIGH (ref 1.4–6.5)
NEUTROPHILS NFR BLD AUTO: 80.6 % — HIGH (ref 42.2–75.2)
NRBC # BLD: 0 /100 WBCS — SIGNIFICANT CHANGE UP (ref 0–0)
PLATELET # BLD AUTO: 204 K/UL — SIGNIFICANT CHANGE UP (ref 130–400)
PMV BLD: 8.5 FL — SIGNIFICANT CHANGE UP (ref 7.4–10.4)
POTASSIUM SERPL-MCNC: 3.9 MMOL/L — SIGNIFICANT CHANGE UP (ref 3.5–5)
POTASSIUM SERPL-SCNC: 3.9 MMOL/L — SIGNIFICANT CHANGE UP (ref 3.5–5)
PROT SERPL-MCNC: 5.2 G/DL — LOW (ref 6–8)
RBC # BLD: 3.07 M/UL — LOW (ref 4.7–6.1)
RBC # FLD: 13 % — SIGNIFICANT CHANGE UP (ref 11.5–14.5)
SODIUM SERPL-SCNC: 126 MMOL/L — LOW (ref 135–146)
WBC # BLD: 11.02 K/UL — HIGH (ref 4.8–10.8)
WBC # FLD AUTO: 11.02 K/UL — HIGH (ref 4.8–10.8)

## 2024-04-01 PROCEDURE — 99232 SBSQ HOSP IP/OBS MODERATE 35: CPT

## 2024-04-01 RX ADMIN — Medication 975 MILLIGRAM(S): at 22:02

## 2024-04-01 RX ADMIN — LISINOPRIL 5 MILLIGRAM(S): 2.5 TABLET ORAL at 05:23

## 2024-04-01 RX ADMIN — ENOXAPARIN SODIUM 40 MILLIGRAM(S): 100 INJECTION SUBCUTANEOUS at 05:23

## 2024-04-01 RX ADMIN — PANTOPRAZOLE SODIUM 40 MILLIGRAM(S): 20 TABLET, DELAYED RELEASE ORAL at 05:23

## 2024-04-01 RX ADMIN — SODIUM CHLORIDE 1 GRAM(S): 9 INJECTION INTRAMUSCULAR; INTRAVENOUS; SUBCUTANEOUS at 18:36

## 2024-04-01 RX ADMIN — CHLORHEXIDINE GLUCONATE 1 APPLICATION(S): 213 SOLUTION TOPICAL at 12:20

## 2024-04-01 RX ADMIN — SODIUM CHLORIDE 1 GRAM(S): 9 INJECTION INTRAMUSCULAR; INTRAVENOUS; SUBCUTANEOUS at 05:23

## 2024-04-01 RX ADMIN — Medication 25 MILLIGRAM(S): at 05:23

## 2024-04-01 RX ADMIN — Medication 975 MILLIGRAM(S): at 14:06

## 2024-04-01 RX ADMIN — Medication 25 MILLIGRAM(S): at 18:32

## 2024-04-01 NOTE — DISCHARGE NOTE PROVIDER - PROVIDER TOKENS
PROVIDER:[TOKEN:[[id number]],FOLLOWUP:[2 weeks]] PROVIDER:[TOKEN:[[id number]],FOLLOWUP:[2 weeks]],PROVIDER:[TOKEN:[[id number]]]

## 2024-04-01 NOTE — DISCHARGE NOTE PROVIDER - CARE PROVIDERS DIRECT ADDRESSES
,DirectAddress_Unknown ,DirectAddress_Unknown,[email or web address]

## 2024-04-01 NOTE — DISCHARGE NOTE PROVIDER - NSDCCPCAREPLAN_GEN_ALL_CORE_FT
PRINCIPAL DISCHARGE DIAGNOSIS  Diagnosis: Hip fracture  Assessment and Plan of Treatment: A fracture is a break in one of your bones. This can occur from a variety of injuries, especially traumatic ones. Symptoms include pain, bruising, or swelling. You were seen by the orthopedist and had your hip replaced. Please follow up with the rehab doctors and orthopedist. You will need DVT prophylaxis for 30 days post op. Keep dressing clean and dry. If increased pain fever swelling or discharge call MD office. Suture removal in office 3 weeks post op. Please follow up with dr shayna reese [address] [facility]   call [phone] for appointment   SEEK IMMEDIATE MEDICAL CARE IF YOU HAVE ANY OF THE FOLLOWING SYMPTOMS: numbness, tingling, increasing pain, or weakness in any part of the injured limb.        SECONDARY DISCHARGE DIAGNOSES  Diagnosis: Hyponatremia  Assessment and Plan of Treatment:

## 2024-04-01 NOTE — DISCHARGE NOTE PROVIDER - ATTENDING DISCHARGE PHYSICAL EXAMINATION:
VITALS:  Vital Signs Last 24 Hrs  T(C): 36.3 (08 Apr 2024 05:42), Max: 36.3 (07 Apr 2024 20:33)  T(F): 97.4 (08 Apr 2024 05:42), Max: 97.4 (07 Apr 2024 20:33)  HR: 83 (08 Apr 2024 11:45) (79 - 89)  BP: 99/54 (08 Apr 2024 11:45) (99/54 - 142/65)  RR: 15 (08 Apr 2024 11:45) (15 - 18)  SpO2: 97% (08 Apr 2024 11:45) (97% - 99%)    Parameters below as of 08 Apr 2024 05:42  Patient On (Oxygen Delivery Method): room air      PHYSICAL EXAM:  GENERAL: NAD  CHEST/LUNG: CTAB; No wheeze  HEART: RRR; S1/S2  ABDOMEN: Soft, NT/ND; BS present  EXTREMITIES:  No cyanosis, or edema  NEUROLOGY: AAOx3  SKIN: No rashes or lesions on visible areas

## 2024-04-01 NOTE — DISCHARGE NOTE PROVIDER - NSDCMRMEDTOKEN_GEN_ALL_CORE_FT
ferrous fumarate 325 mg (106 mg elemental iron) oral tablet: 1 tab(s) orally once a day  lisinopril 5 mg oral tablet: 1 tab(s) orally once a day  pantoprazole 40 mg oral delayed release tablet: 1 tab(s) orally once a day  Tylenol 325 mg oral tablet: 2 tab(s) orally every 6 hours as needed for  pain   acetaminophen 325 mg oral tablet: 3 tab(s) orally every 8 hours  ferrous fumarate 325 mg (106 mg elemental iron) oral tablet: 1 tab(s) orally once a day  lisinopril 5 mg oral tablet: 1 tab(s) orally once a day  Lovenox 40 mg/0.4 mL injectable solution: 40 milligram(s) injectable once a day subcutaneous, for 6 weeks post op for DVT prophylaxis  morphine: 1 milligram(s) orally every 6 hours as needed for  severe pain  pantoprazole 40 mg oral delayed release tablet: 1 tab(s) orally once a day (before a meal)  polyethylene glycol 3350 oral powder for reconstitution: 17 gram(s) orally once a day As needed Constipation  senna leaf extract oral tablet: 2 tab(s) orally once a day (at bedtime) as needed for  constipation   acetaminophen 325 mg oral tablet: 3 tab(s) orally every 8 hours  ferrous fumarate 325 mg (106 mg elemental iron) oral tablet: 1 tab(s) orally once a day  lisinopril 5 mg oral tablet: 1 tab(s) orally once a day  Lovenox 40 mg/0.4 mL injectable solution: 40 milligram(s) injectable once a day subcutaneous, for 6 weeks post op for DVT prophylaxis  metoprolol tartrate 25 mg oral tablet: 1 tab(s) orally 2 times a day  pantoprazole 40 mg oral delayed release tablet: 1 tab(s) orally once a day (before a meal)  polyethylene glycol 3350 oral powder for reconstitution: 17 gram(s) orally once a day As needed Constipation  senna leaf extract oral tablet: 2 tab(s) orally once a day (at bedtime) as needed for  constipation  sodium chloride 1 g oral tablet: 1 tab(s) orally 3 times a day

## 2024-04-01 NOTE — DISCHARGE NOTE PROVIDER - CARE PROVIDER_API CALL
Bridget Bass  Orthopaedic Surgery  [address] [place] [address]  Phone: [phone]  Fax: [phone]  Follow Up Time: 2 weeks   Bridget Bass  Orthopaedic Surgery  [address] [place] [address]  Phone: [phone]  Fax: [phone]  Follow Up Time: 2 weeks    Kumar Aceves  Internal Medicine  [address] [place] [address]  Phone: [phone]  Fax: [phone]  Follow Up Time:

## 2024-04-01 NOTE — DISCHARGE NOTE PROVIDER - HOSPITAL COURSE
89yo male with PMHx HTN, dementia, neurogenic bladder with chronic indwelling yañez, R hip fracture s/p internal fixation, presents for evaluation s/p fall and questionable L hip fracture on outpatient SNF XR. Pt c/o L hip pain but has reported he has been walking. Unable to obtain any further clarification as patient has dementia. As per SNF RN supervisor Lin, pt had a mechanical fall on March12th, no LOC, was ambulatory immediately after. PT noticed one week prior patient's gait seemed different and he was c/o L hip pain. XR obtained of pelvis which demonstrated questionable L subcap fx. SNF MD sent to ED for evaluation with L hip CT. CT hip done showing Subacute appearing subcapital left femoral neck fracture with superior displacement, varus angulation and external rotation of the femoral shaft from the femoral head. Evaluated by ortho, s/p left hip hemiarthoplasty on 3/29. Patient seen by PT, recommend STR. 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 Pt c/o L hip pain but has reported he has been walking. Unable to obtain any further clarification as patient has dementia. As per SNF RN supervisor Lin, pt had a mechanical fall on March12th, no LOC, was ambulatory immediately after. PT noticed one week prior patient's gait seemed different and he was c/o L hip pain. XR obtained of pelvis which demonstrated questionable L subcap fx. SNF MD sent to ED for evaluation with L hip CT. CT hip done showing Subacute appearing subcapital left femoral neck fracture with superior displacement, varus angulation and external rotation of the femoral shaft from the femoral head. Evaluated by ortho, s/p left hip hemiarthoplasty on 3/29. Patient seen by PT, recommend STR.     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  89yo male with PMHx HTN, dementia, neurogenic bladder with chronic indwelling yañez, R hip fracture s/p internal fixation, presents for evaluation s/p fall and questionable L hip fracture on outpatient SNF XR. Pt c/o L hip pain but has reported he has been walking. Unable to obtain any further clarification as patient has dementia. As per SNF RN supervisor Lin, pt had a mechanical fall on March12th, no LOC, was ambulatory immediately after. PT noticed one week prior patient's gait seemed different and he was c/o L hip pain. XR obtained of pelvis which demonstrated questionable L subcap fx. SNF MD sent to ED for evaluation with L hip CT. CT hip done showing Subacute appearing subcapital left femoral neck fracture with superior displacement, varus angulation and external rotation of the femoral shaft from the femoral head. Evaluated by ortho, s/p left hip hemiarthoplasty on 3/29. Patient seen by PT, recommend STR.     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

## 2024-04-01 NOTE — DISCHARGE NOTE PROVIDER - NSDCFUADDINST_GEN_ALL_CORE_FT
Bilateral gluteal fold friction wound  - Clean wound with soap and water, pat dry.  - Apply Triad cream twice daily and PRN for soiling.

## 2024-04-02 LAB
ALBUMIN SERPL ELPH-MCNC: 2.7 G/DL — LOW (ref 3.5–5.2)
ALP SERPL-CCNC: 88 U/L — SIGNIFICANT CHANGE UP (ref 30–115)
ALT FLD-CCNC: 8 U/L — SIGNIFICANT CHANGE UP (ref 0–41)
ANION GAP SERPL CALC-SCNC: 13 MMOL/L — SIGNIFICANT CHANGE UP (ref 7–14)
AST SERPL-CCNC: 24 U/L — SIGNIFICANT CHANGE UP (ref 0–41)
BASOPHILS # BLD AUTO: 0.02 K/UL — SIGNIFICANT CHANGE UP (ref 0–0.2)
BASOPHILS NFR BLD AUTO: 0.3 % — SIGNIFICANT CHANGE UP (ref 0–1)
BILIRUB SERPL-MCNC: 0.5 MG/DL — SIGNIFICANT CHANGE UP (ref 0.2–1.2)
BUN SERPL-MCNC: 21 MG/DL — HIGH (ref 10–20)
CALCIUM SERPL-MCNC: 8.4 MG/DL — SIGNIFICANT CHANGE UP (ref 8.4–10.4)
CHLORIDE SERPL-SCNC: 94 MMOL/L — LOW (ref 98–110)
CO2 SERPL-SCNC: 22 MMOL/L — SIGNIFICANT CHANGE UP (ref 17–32)
CREAT SERPL-MCNC: 0.9 MG/DL — SIGNIFICANT CHANGE UP (ref 0.7–1.5)
EGFR: 83 ML/MIN/1.73M2 — SIGNIFICANT CHANGE UP
EOSINOPHIL # BLD AUTO: 0.2 K/UL — SIGNIFICANT CHANGE UP (ref 0–0.7)
EOSINOPHIL NFR BLD AUTO: 2.6 % — SIGNIFICANT CHANGE UP (ref 0–8)
GLUCOSE SERPL-MCNC: 90 MG/DL — SIGNIFICANT CHANGE UP (ref 70–99)
HCT VFR BLD CALC: 23.7 % — LOW (ref 42–52)
HGB BLD-MCNC: 8.5 G/DL — LOW (ref 14–18)
IMM GRANULOCYTES NFR BLD AUTO: 1 % — HIGH (ref 0.1–0.3)
LYMPHOCYTES # BLD AUTO: 0.78 K/UL — LOW (ref 1.2–3.4)
LYMPHOCYTES # BLD AUTO: 10.1 % — LOW (ref 20.5–51.1)
MAGNESIUM SERPL-MCNC: 1.8 MG/DL — SIGNIFICANT CHANGE UP (ref 1.8–2.4)
MCHC RBC-ENTMCNC: 29.5 PG — SIGNIFICANT CHANGE UP (ref 27–31)
MCHC RBC-ENTMCNC: 35.9 G/DL — SIGNIFICANT CHANGE UP (ref 32–37)
MCV RBC AUTO: 82.3 FL — SIGNIFICANT CHANGE UP (ref 80–94)
MONOCYTES # BLD AUTO: 0.74 K/UL — HIGH (ref 0.1–0.6)
MONOCYTES NFR BLD AUTO: 9.6 % — HIGH (ref 1.7–9.3)
NEUTROPHILS # BLD AUTO: 5.88 K/UL — SIGNIFICANT CHANGE UP (ref 1.4–6.5)
NEUTROPHILS NFR BLD AUTO: 76.4 % — HIGH (ref 42.2–75.2)
NRBC # BLD: 0 /100 WBCS — SIGNIFICANT CHANGE UP (ref 0–0)
PLATELET # BLD AUTO: 197 K/UL — SIGNIFICANT CHANGE UP (ref 130–400)
PMV BLD: 8.6 FL — SIGNIFICANT CHANGE UP (ref 7.4–10.4)
POTASSIUM SERPL-MCNC: 3.7 MMOL/L — SIGNIFICANT CHANGE UP (ref 3.5–5)
POTASSIUM SERPL-SCNC: 3.7 MMOL/L — SIGNIFICANT CHANGE UP (ref 3.5–5)
PROT SERPL-MCNC: 5.1 G/DL — LOW (ref 6–8)
RBC # BLD: 2.88 M/UL — LOW (ref 4.7–6.1)
RBC # FLD: 12.8 % — SIGNIFICANT CHANGE UP (ref 11.5–14.5)
SODIUM SERPL-SCNC: 129 MMOL/L — LOW (ref 135–146)
WBC # BLD: 7.7 K/UL — SIGNIFICANT CHANGE UP (ref 4.8–10.8)
WBC # FLD AUTO: 7.7 K/UL — SIGNIFICANT CHANGE UP (ref 4.8–10.8)

## 2024-04-02 PROCEDURE — 99232 SBSQ HOSP IP/OBS MODERATE 35: CPT

## 2024-04-02 RX ORDER — POLYETHYLENE GLYCOL 3350 17 G/17G
17 POWDER, FOR SOLUTION ORAL
Qty: 0 | Refills: 0 | DISCHARGE
Start: 2024-04-02

## 2024-04-02 RX ORDER — ENOXAPARIN SODIUM 100 MG/ML
40 INJECTION SUBCUTANEOUS
Qty: 42 | Refills: 0
Start: 2024-04-02 | End: 2024-05-13

## 2024-04-02 RX ORDER — POLYETHYLENE GLYCOL 3350 17 G/17G
17 POWDER, FOR SOLUTION ORAL DAILY
Refills: 0 | Status: DISCONTINUED | OUTPATIENT
Start: 2024-04-02 | End: 2024-04-08

## 2024-04-02 RX ORDER — ACETAMINOPHEN 500 MG
3 TABLET ORAL
Qty: 0 | Refills: 0 | DISCHARGE
Start: 2024-04-02

## 2024-04-02 RX ORDER — LISINOPRIL 2.5 MG/1
1 TABLET ORAL
Qty: 0 | Refills: 0 | DISCHARGE
Start: 2024-04-02

## 2024-04-02 RX ORDER — MORPHINE SULFATE 50 MG/1
1 CAPSULE, EXTENDED RELEASE ORAL
Qty: 0 | Refills: 0 | DISCHARGE
Start: 2024-04-02

## 2024-04-02 RX ORDER — PANTOPRAZOLE SODIUM 20 MG/1
1 TABLET, DELAYED RELEASE ORAL
Qty: 0 | Refills: 0 | DISCHARGE
Start: 2024-04-02

## 2024-04-02 RX ORDER — ACETAMINOPHEN 500 MG
2 TABLET ORAL
Refills: 0 | DISCHARGE

## 2024-04-02 RX ORDER — PANTOPRAZOLE SODIUM 20 MG/1
1 TABLET, DELAYED RELEASE ORAL
Refills: 0 | DISCHARGE

## 2024-04-02 RX ORDER — SENNA PLUS 8.6 MG/1
2 TABLET ORAL AT BEDTIME
Refills: 0 | Status: DISCONTINUED | OUTPATIENT
Start: 2024-04-02 | End: 2024-04-08

## 2024-04-02 RX ORDER — SENNA PLUS 8.6 MG/1
2 TABLET ORAL
Qty: 0 | Refills: 0 | DISCHARGE
Start: 2024-04-02

## 2024-04-02 RX ADMIN — Medication 975 MILLIGRAM(S): at 14:02

## 2024-04-02 RX ADMIN — Medication 25 MILLIGRAM(S): at 06:01

## 2024-04-02 RX ADMIN — SENNA PLUS 2 TABLET(S): 8.6 TABLET ORAL at 21:44

## 2024-04-02 RX ADMIN — Medication 975 MILLIGRAM(S): at 13:34

## 2024-04-02 RX ADMIN — SODIUM CHLORIDE 1 GRAM(S): 9 INJECTION INTRAMUSCULAR; INTRAVENOUS; SUBCUTANEOUS at 06:01

## 2024-04-02 RX ADMIN — ENOXAPARIN SODIUM 40 MILLIGRAM(S): 100 INJECTION SUBCUTANEOUS at 06:00

## 2024-04-02 RX ADMIN — LISINOPRIL 5 MILLIGRAM(S): 2.5 TABLET ORAL at 06:00

## 2024-04-02 RX ADMIN — Medication 25 MILLIGRAM(S): at 19:11

## 2024-04-02 RX ADMIN — SODIUM CHLORIDE 1 GRAM(S): 9 INJECTION INTRAMUSCULAR; INTRAVENOUS; SUBCUTANEOUS at 19:11

## 2024-04-02 RX ADMIN — CHLORHEXIDINE GLUCONATE 1 APPLICATION(S): 213 SOLUTION TOPICAL at 12:39

## 2024-04-02 RX ADMIN — PANTOPRAZOLE SODIUM 40 MILLIGRAM(S): 20 TABLET, DELAYED RELEASE ORAL at 06:01

## 2024-04-03 LAB
ANION GAP SERPL CALC-SCNC: 11 MMOL/L — SIGNIFICANT CHANGE UP (ref 7–14)
BASOPHILS # BLD AUTO: 0.02 K/UL — SIGNIFICANT CHANGE UP (ref 0–0.2)
BASOPHILS NFR BLD AUTO: 0.3 % — SIGNIFICANT CHANGE UP (ref 0–1)
BUN SERPL-MCNC: 21 MG/DL — HIGH (ref 10–20)
CALCIUM SERPL-MCNC: 8.7 MG/DL — SIGNIFICANT CHANGE UP (ref 8.4–10.5)
CHLORIDE SERPL-SCNC: 91 MMOL/L — LOW (ref 98–110)
CO2 SERPL-SCNC: 25 MMOL/L — SIGNIFICANT CHANGE UP (ref 17–32)
CREAT SERPL-MCNC: 0.9 MG/DL — SIGNIFICANT CHANGE UP (ref 0.7–1.5)
EGFR: 83 ML/MIN/1.73M2 — SIGNIFICANT CHANGE UP
EOSINOPHIL # BLD AUTO: 0.17 K/UL — SIGNIFICANT CHANGE UP (ref 0–0.7)
EOSINOPHIL NFR BLD AUTO: 2.1 % — SIGNIFICANT CHANGE UP (ref 0–8)
GLUCOSE SERPL-MCNC: 82 MG/DL — SIGNIFICANT CHANGE UP (ref 70–99)
HCT VFR BLD CALC: 25.1 % — LOW (ref 42–52)
HGB BLD-MCNC: 8.9 G/DL — LOW (ref 14–18)
IMM GRANULOCYTES NFR BLD AUTO: 0.8 % — HIGH (ref 0.1–0.3)
LYMPHOCYTES # BLD AUTO: 1.01 K/UL — LOW (ref 1.2–3.4)
LYMPHOCYTES # BLD AUTO: 12.7 % — LOW (ref 20.5–51.1)
MAGNESIUM SERPL-MCNC: 1.8 MG/DL — SIGNIFICANT CHANGE UP (ref 1.8–2.4)
MCHC RBC-ENTMCNC: 29.5 PG — SIGNIFICANT CHANGE UP (ref 27–31)
MCHC RBC-ENTMCNC: 35.5 G/DL — SIGNIFICANT CHANGE UP (ref 32–37)
MCV RBC AUTO: 83.1 FL — SIGNIFICANT CHANGE UP (ref 80–94)
MONOCYTES # BLD AUTO: 0.81 K/UL — HIGH (ref 0.1–0.6)
MONOCYTES NFR BLD AUTO: 10.2 % — HIGH (ref 1.7–9.3)
NEUTROPHILS # BLD AUTO: 5.87 K/UL — SIGNIFICANT CHANGE UP (ref 1.4–6.5)
NEUTROPHILS NFR BLD AUTO: 73.9 % — SIGNIFICANT CHANGE UP (ref 42.2–75.2)
NRBC # BLD: 0 /100 WBCS — SIGNIFICANT CHANGE UP (ref 0–0)
PLATELET # BLD AUTO: 264 K/UL — SIGNIFICANT CHANGE UP (ref 130–400)
PMV BLD: 8.8 FL — SIGNIFICANT CHANGE UP (ref 7.4–10.4)
POTASSIUM SERPL-MCNC: 4 MMOL/L — SIGNIFICANT CHANGE UP (ref 3.5–5)
POTASSIUM SERPL-SCNC: 4 MMOL/L — SIGNIFICANT CHANGE UP (ref 3.5–5)
RBC # BLD: 3.02 M/UL — LOW (ref 4.7–6.1)
RBC # FLD: 13 % — SIGNIFICANT CHANGE UP (ref 11.5–14.5)
SODIUM SERPL-SCNC: 127 MMOL/L — LOW (ref 135–146)
WBC # BLD: 7.94 K/UL — SIGNIFICANT CHANGE UP (ref 4.8–10.8)
WBC # FLD AUTO: 7.94 K/UL — SIGNIFICANT CHANGE UP (ref 4.8–10.8)

## 2024-04-03 PROCEDURE — 99232 SBSQ HOSP IP/OBS MODERATE 35: CPT

## 2024-04-03 RX ADMIN — ENOXAPARIN SODIUM 40 MILLIGRAM(S): 100 INJECTION SUBCUTANEOUS at 05:55

## 2024-04-03 RX ADMIN — Medication 25 MILLIGRAM(S): at 17:15

## 2024-04-03 RX ADMIN — CHLORHEXIDINE GLUCONATE 1 APPLICATION(S): 213 SOLUTION TOPICAL at 12:22

## 2024-04-03 RX ADMIN — SODIUM CHLORIDE 1 GRAM(S): 9 INJECTION INTRAMUSCULAR; INTRAVENOUS; SUBCUTANEOUS at 17:16

## 2024-04-03 RX ADMIN — Medication 25 MILLIGRAM(S): at 05:55

## 2024-04-03 RX ADMIN — SENNA PLUS 2 TABLET(S): 8.6 TABLET ORAL at 21:25

## 2024-04-03 RX ADMIN — PANTOPRAZOLE SODIUM 40 MILLIGRAM(S): 20 TABLET, DELAYED RELEASE ORAL at 05:56

## 2024-04-03 RX ADMIN — SODIUM CHLORIDE 1 GRAM(S): 9 INJECTION INTRAMUSCULAR; INTRAVENOUS; SUBCUTANEOUS at 05:55

## 2024-04-03 RX ADMIN — LISINOPRIL 5 MILLIGRAM(S): 2.5 TABLET ORAL at 05:55

## 2024-04-03 NOTE — PROGRESS NOTE ADULT - ATTENDING COMMENTS
Left Hip fracture s/p Sx  Hyponatremia  stable  HTN    Plan    awaiting authorization for Rehab placement

## 2024-04-04 LAB
ANION GAP SERPL CALC-SCNC: 13 MMOL/L — SIGNIFICANT CHANGE UP (ref 7–14)
BUN SERPL-MCNC: 19 MG/DL — SIGNIFICANT CHANGE UP (ref 10–20)
CALCIUM SERPL-MCNC: 8.6 MG/DL — SIGNIFICANT CHANGE UP (ref 8.4–10.4)
CHLORIDE SERPL-SCNC: 92 MMOL/L — LOW (ref 98–110)
CO2 SERPL-SCNC: 22 MMOL/L — SIGNIFICANT CHANGE UP (ref 17–32)
CREAT SERPL-MCNC: 0.9 MG/DL — SIGNIFICANT CHANGE UP (ref 0.7–1.5)
EGFR: 83 ML/MIN/1.73M2 — SIGNIFICANT CHANGE UP
GLUCOSE BLDC GLUCOMTR-MCNC: 110 MG/DL — HIGH (ref 70–99)
GLUCOSE BLDC GLUCOMTR-MCNC: 116 MG/DL — HIGH (ref 70–99)
GLUCOSE BLDC GLUCOMTR-MCNC: 94 MG/DL — SIGNIFICANT CHANGE UP (ref 70–99)
GLUCOSE BLDC GLUCOMTR-MCNC: 97 MG/DL — SIGNIFICANT CHANGE UP (ref 70–99)
GLUCOSE SERPL-MCNC: 78 MG/DL — SIGNIFICANT CHANGE UP (ref 70–99)
HCT VFR BLD CALC: 26.4 % — LOW (ref 42–52)
HGB BLD-MCNC: 9.1 G/DL — LOW (ref 14–18)
MCHC RBC-ENTMCNC: 28.8 PG — SIGNIFICANT CHANGE UP (ref 27–31)
MCHC RBC-ENTMCNC: 34.5 G/DL — SIGNIFICANT CHANGE UP (ref 32–37)
MCV RBC AUTO: 83.5 FL — SIGNIFICANT CHANGE UP (ref 80–94)
NRBC # BLD: 0 /100 WBCS — SIGNIFICANT CHANGE UP (ref 0–0)
PLATELET # BLD AUTO: 227 K/UL — SIGNIFICANT CHANGE UP (ref 130–400)
PMV BLD: 8.7 FL — SIGNIFICANT CHANGE UP (ref 7.4–10.4)
POTASSIUM SERPL-MCNC: 4 MMOL/L — SIGNIFICANT CHANGE UP (ref 3.5–5)
POTASSIUM SERPL-SCNC: 4 MMOL/L — SIGNIFICANT CHANGE UP (ref 3.5–5)
RBC # BLD: 3.16 M/UL — LOW (ref 4.7–6.1)
RBC # FLD: 13.2 % — SIGNIFICANT CHANGE UP (ref 11.5–14.5)
SODIUM SERPL-SCNC: 127 MMOL/L — LOW (ref 135–146)
WBC # BLD: 6.29 K/UL — SIGNIFICANT CHANGE UP (ref 4.8–10.8)
WBC # FLD AUTO: 6.29 K/UL — SIGNIFICANT CHANGE UP (ref 4.8–10.8)

## 2024-04-04 PROCEDURE — 99231 SBSQ HOSP IP/OBS SF/LOW 25: CPT

## 2024-04-04 RX ADMIN — SODIUM CHLORIDE 1 GRAM(S): 9 INJECTION INTRAMUSCULAR; INTRAVENOUS; SUBCUTANEOUS at 17:46

## 2024-04-04 RX ADMIN — Medication 25 MILLIGRAM(S): at 17:46

## 2024-04-04 RX ADMIN — LISINOPRIL 5 MILLIGRAM(S): 2.5 TABLET ORAL at 05:57

## 2024-04-04 RX ADMIN — Medication 25 MILLIGRAM(S): at 05:58

## 2024-04-04 RX ADMIN — ENOXAPARIN SODIUM 40 MILLIGRAM(S): 100 INJECTION SUBCUTANEOUS at 05:58

## 2024-04-04 RX ADMIN — PANTOPRAZOLE SODIUM 40 MILLIGRAM(S): 20 TABLET, DELAYED RELEASE ORAL at 06:04

## 2024-04-04 RX ADMIN — SENNA PLUS 2 TABLET(S): 8.6 TABLET ORAL at 21:23

## 2024-04-04 RX ADMIN — CHLORHEXIDINE GLUCONATE 1 APPLICATION(S): 213 SOLUTION TOPICAL at 11:32

## 2024-04-04 RX ADMIN — Medication 10 MILLIGRAM(S): at 17:46

## 2024-04-04 RX ADMIN — POLYETHYLENE GLYCOL 3350 17 GRAM(S): 17 POWDER, FOR SOLUTION ORAL at 11:36

## 2024-04-04 RX ADMIN — SODIUM CHLORIDE 1 GRAM(S): 9 INJECTION INTRAMUSCULAR; INTRAVENOUS; SUBCUTANEOUS at 05:57

## 2024-04-04 NOTE — PROGRESS NOTE ADULT - ATTENDING COMMENTS
Left femoral neck fracture s/p Surgery  Chronic hyponatremia  HTN    cont current management  Awaiting placement in rehab Spontaneous, unlabored and symmetrical

## 2024-04-05 LAB
GLUCOSE BLDC GLUCOMTR-MCNC: 101 MG/DL — HIGH (ref 70–99)
GLUCOSE BLDC GLUCOMTR-MCNC: 106 MG/DL — HIGH (ref 70–99)
GLUCOSE BLDC GLUCOMTR-MCNC: 108 MG/DL — HIGH (ref 70–99)
GLUCOSE BLDC GLUCOMTR-MCNC: 98 MG/DL — SIGNIFICANT CHANGE UP (ref 70–99)

## 2024-04-05 PROCEDURE — 99232 SBSQ HOSP IP/OBS MODERATE 35: CPT

## 2024-04-05 RX ORDER — NIFEDIPINE 30 MG
30 TABLET, EXTENDED RELEASE 24 HR ORAL ONCE
Refills: 0 | Status: COMPLETED | OUTPATIENT
Start: 2024-04-05 | End: 2024-04-05

## 2024-04-05 RX ORDER — NIFEDIPINE 30 MG
30 TABLET, EXTENDED RELEASE 24 HR ORAL ONCE
Refills: 0 | Status: DISCONTINUED | OUTPATIENT
Start: 2024-04-05 | End: 2024-04-05

## 2024-04-05 RX ORDER — LACTULOSE 10 G/15ML
15 SOLUTION ORAL DAILY
Refills: 0 | Status: DISCONTINUED | OUTPATIENT
Start: 2024-04-05 | End: 2024-04-08

## 2024-04-05 RX ADMIN — SODIUM CHLORIDE 1 GRAM(S): 9 INJECTION INTRAMUSCULAR; INTRAVENOUS; SUBCUTANEOUS at 17:39

## 2024-04-05 RX ADMIN — LISINOPRIL 5 MILLIGRAM(S): 2.5 TABLET ORAL at 05:52

## 2024-04-05 RX ADMIN — SENNA PLUS 2 TABLET(S): 8.6 TABLET ORAL at 21:19

## 2024-04-05 RX ADMIN — Medication 25 MILLIGRAM(S): at 17:39

## 2024-04-05 RX ADMIN — PANTOPRAZOLE SODIUM 40 MILLIGRAM(S): 20 TABLET, DELAYED RELEASE ORAL at 05:51

## 2024-04-05 RX ADMIN — Medication 30 MILLIGRAM(S): at 22:09

## 2024-04-05 RX ADMIN — ENOXAPARIN SODIUM 40 MILLIGRAM(S): 100 INJECTION SUBCUTANEOUS at 05:51

## 2024-04-05 RX ADMIN — Medication 25 MILLIGRAM(S): at 05:51

## 2024-04-05 RX ADMIN — SODIUM CHLORIDE 1 GRAM(S): 9 INJECTION INTRAMUSCULAR; INTRAVENOUS; SUBCUTANEOUS at 05:51

## 2024-04-05 RX ADMIN — CHLORHEXIDINE GLUCONATE 1 APPLICATION(S): 213 SOLUTION TOPICAL at 11:38

## 2024-04-05 RX ADMIN — LACTULOSE 15 GRAM(S): 10 SOLUTION ORAL at 11:39

## 2024-04-05 NOTE — PROGRESS NOTE ADULT - ATTENDING COMMENTS
Left femoral neck fracture s/p Surgery  Chronic hyponatremia  HTN    cont current management  Awaiting placement in rehab .

## 2024-04-06 LAB
ANION GAP SERPL CALC-SCNC: 12 MMOL/L — SIGNIFICANT CHANGE UP (ref 7–14)
BASOPHILS # BLD AUTO: 0.02 K/UL — SIGNIFICANT CHANGE UP (ref 0–0.2)
BASOPHILS NFR BLD AUTO: 0.2 % — SIGNIFICANT CHANGE UP (ref 0–1)
BUN SERPL-MCNC: 14 MG/DL — SIGNIFICANT CHANGE UP (ref 10–20)
CALCIUM SERPL-MCNC: 8.8 MG/DL — SIGNIFICANT CHANGE UP (ref 8.4–10.5)
CHLORIDE SERPL-SCNC: 88 MMOL/L — LOW (ref 98–110)
CO2 SERPL-SCNC: 25 MMOL/L — SIGNIFICANT CHANGE UP (ref 17–32)
CREAT SERPL-MCNC: 0.9 MG/DL — SIGNIFICANT CHANGE UP (ref 0.7–1.5)
EGFR: 83 ML/MIN/1.73M2 — SIGNIFICANT CHANGE UP
EOSINOPHIL # BLD AUTO: 0.1 K/UL — SIGNIFICANT CHANGE UP (ref 0–0.7)
EOSINOPHIL NFR BLD AUTO: 1.1 % — SIGNIFICANT CHANGE UP (ref 0–8)
GLUCOSE SERPL-MCNC: 79 MG/DL — SIGNIFICANT CHANGE UP (ref 70–99)
HCT VFR BLD CALC: 27.6 % — LOW (ref 42–52)
HGB BLD-MCNC: 9.4 G/DL — LOW (ref 14–18)
IMM GRANULOCYTES NFR BLD AUTO: 0.8 % — HIGH (ref 0.1–0.3)
LYMPHOCYTES # BLD AUTO: 0.88 K/UL — LOW (ref 1.2–3.4)
LYMPHOCYTES # BLD AUTO: 9.5 % — LOW (ref 20.5–51.1)
MAGNESIUM SERPL-MCNC: 1.7 MG/DL — LOW (ref 1.8–2.4)
MCHC RBC-ENTMCNC: 28.7 PG — SIGNIFICANT CHANGE UP (ref 27–31)
MCHC RBC-ENTMCNC: 34.1 G/DL — SIGNIFICANT CHANGE UP (ref 32–37)
MCV RBC AUTO: 84.1 FL — SIGNIFICANT CHANGE UP (ref 80–94)
MONOCYTES # BLD AUTO: 0.86 K/UL — HIGH (ref 0.1–0.6)
MONOCYTES NFR BLD AUTO: 9.2 % — SIGNIFICANT CHANGE UP (ref 1.7–9.3)
NEUTROPHILS # BLD AUTO: 7.38 K/UL — HIGH (ref 1.4–6.5)
NEUTROPHILS NFR BLD AUTO: 79.2 % — HIGH (ref 42.2–75.2)
NRBC # BLD: 0 /100 WBCS — SIGNIFICANT CHANGE UP (ref 0–0)
PLATELET # BLD AUTO: 279 K/UL — SIGNIFICANT CHANGE UP (ref 130–400)
PMV BLD: 8.5 FL — SIGNIFICANT CHANGE UP (ref 7.4–10.4)
POTASSIUM SERPL-MCNC: 3.9 MMOL/L — SIGNIFICANT CHANGE UP (ref 3.5–5)
POTASSIUM SERPL-SCNC: 3.9 MMOL/L — SIGNIFICANT CHANGE UP (ref 3.5–5)
RBC # BLD: 3.28 M/UL — LOW (ref 4.7–6.1)
RBC # FLD: 13.1 % — SIGNIFICANT CHANGE UP (ref 11.5–14.5)
SODIUM SERPL-SCNC: 125 MMOL/L — LOW (ref 135–146)
WBC # BLD: 9.31 K/UL — SIGNIFICANT CHANGE UP (ref 4.8–10.8)
WBC # FLD AUTO: 9.31 K/UL — SIGNIFICANT CHANGE UP (ref 4.8–10.8)

## 2024-04-06 PROCEDURE — 99232 SBSQ HOSP IP/OBS MODERATE 35: CPT

## 2024-04-06 RX ORDER — MAGNESIUM OXIDE 400 MG ORAL TABLET 241.3 MG
400 TABLET ORAL
Refills: 0 | Status: DISCONTINUED | OUTPATIENT
Start: 2024-04-06 | End: 2024-04-08

## 2024-04-06 RX ADMIN — ENOXAPARIN SODIUM 40 MILLIGRAM(S): 100 INJECTION SUBCUTANEOUS at 05:08

## 2024-04-06 RX ADMIN — PANTOPRAZOLE SODIUM 40 MILLIGRAM(S): 20 TABLET, DELAYED RELEASE ORAL at 05:07

## 2024-04-06 RX ADMIN — Medication 25 MILLIGRAM(S): at 18:32

## 2024-04-06 RX ADMIN — MAGNESIUM OXIDE 400 MG ORAL TABLET 400 MILLIGRAM(S): 241.3 TABLET ORAL at 16:42

## 2024-04-06 RX ADMIN — MAGNESIUM OXIDE 400 MG ORAL TABLET 400 MILLIGRAM(S): 241.3 TABLET ORAL at 13:08

## 2024-04-06 RX ADMIN — LACTULOSE 15 GRAM(S): 10 SOLUTION ORAL at 13:08

## 2024-04-06 RX ADMIN — SODIUM CHLORIDE 1 GRAM(S): 9 INJECTION INTRAMUSCULAR; INTRAVENOUS; SUBCUTANEOUS at 18:33

## 2024-04-06 RX ADMIN — CHLORHEXIDINE GLUCONATE 1 APPLICATION(S): 213 SOLUTION TOPICAL at 13:11

## 2024-04-06 RX ADMIN — SODIUM CHLORIDE 1 GRAM(S): 9 INJECTION INTRAMUSCULAR; INTRAVENOUS; SUBCUTANEOUS at 05:12

## 2024-04-06 RX ADMIN — Medication 25 MILLIGRAM(S): at 05:08

## 2024-04-06 RX ADMIN — SENNA PLUS 2 TABLET(S): 8.6 TABLET ORAL at 21:03

## 2024-04-07 PROCEDURE — 99231 SBSQ HOSP IP/OBS SF/LOW 25: CPT

## 2024-04-07 RX ADMIN — LISINOPRIL 5 MILLIGRAM(S): 2.5 TABLET ORAL at 06:09

## 2024-04-07 RX ADMIN — SODIUM CHLORIDE 1 GRAM(S): 9 INJECTION INTRAMUSCULAR; INTRAVENOUS; SUBCUTANEOUS at 06:10

## 2024-04-07 RX ADMIN — ENOXAPARIN SODIUM 40 MILLIGRAM(S): 100 INJECTION SUBCUTANEOUS at 06:10

## 2024-04-07 RX ADMIN — SODIUM CHLORIDE 1 GRAM(S): 9 INJECTION INTRAMUSCULAR; INTRAVENOUS; SUBCUTANEOUS at 18:40

## 2024-04-07 RX ADMIN — Medication 25 MILLIGRAM(S): at 06:10

## 2024-04-07 RX ADMIN — LACTULOSE 15 GRAM(S): 10 SOLUTION ORAL at 11:31

## 2024-04-07 RX ADMIN — CHLORHEXIDINE GLUCONATE 1 APPLICATION(S): 213 SOLUTION TOPICAL at 11:30

## 2024-04-07 RX ADMIN — SENNA PLUS 2 TABLET(S): 8.6 TABLET ORAL at 22:03

## 2024-04-07 RX ADMIN — PANTOPRAZOLE SODIUM 40 MILLIGRAM(S): 20 TABLET, DELAYED RELEASE ORAL at 06:10

## 2024-04-07 NOTE — PROGRESS NOTE ADULT - SUBJECTIVE AND OBJECTIVE BOX
JEMAL RANGEL  [facility]-N 3A 016 B (SI-N 3A)        Patient was evaluated and examined  by bedside, no active events over night  as per covering nurse report        REVIEW OF SYSTEMS: unable to obtain, patient is a poor historian         T(C): , Max: 36.9 (03-30-24 @ 05:00)  HR: 114 (03-30-24 @ 05:00)  BP: 159/81 (03-30-24 @ 05:00)  RR: 18 (03-30-24 @ 05:00)  SpO2: 95% (03-30-24 @ 05:00)  CAPILLARY BLOOD GLUCOSE      POCT Blood Glucose.: 102 mg/dL (30 Mar 2024 08:12)  POCT Blood Glucose.: 89 mg/dL (29 Mar 2024 21:14)  POCT Blood Glucose.: 77 mg/dL (29 Mar 2024 17:04)      PHYSICAL EXAM:  General: NAD, Awake, patient is laying comfortably in bed  HEENT: AT, NC, Supple, NO JVD, NO CB  Lungs: CTA B/L, no wheezing, no rhonchi  CVS: normal S1, S2, RRR, NO M/G/R  Abdomen: soft, bowel sounds present, non-tender, non-distended  Extremities: left hip post op changes present , covered with dressing, no edema, no clubbing, no cyanosis, positive peripheral pulses b/l  Neuro: no acute focal neurological deficits, sensory intact  Skin: no rash, no ecchymosis      LAB  CBC  Date: 03-30-24 @ 07:55  Mean cell Jpxiutgoxt68.8  Mean cell Hemoglobin Conc35.8  Mean cell Volum 83.0  Platelet count-Automate 225  RBC Count 3.36  Red Cell Distrib Width12.8  WBC Count10.95  % Albumin, Urine--  Hematocrit 27.9  Hemoglobin 10.0  CBC  Date: 03-29-24 @ 13:12  Mean cell Jqdhqysctc83.9  Mean cell Hemoglobin Conc35.2  Mean cell Volum 84.8  Platelet count-Automate 271  RBC Count 3.75  Red Cell Distrib Width12.6  WBC Count20.11  % Albumin, Urine--  Hematocrit 31.8  Hemoglobin 11.2  CBC  Date: 03-29-24 @ 07:12  Mean cell Slljimmjgm67.5  Mean cell Hemoglobin Conc35.4  Mean cell Volum 83.4  Platelet count-Automate 251  RBC Count 4.03  Red Cell Distrib Width12.5  WBC Count7.68  % Albumin, Urine--  Hematocrit 33.6  Hemoglobin 11.9  CBC  Date: 03-28-24 @ 07:32  Mean cell Kwkmkgbkhx62.9  Mean cell Hemoglobin Conc36.3  Mean cell Volum 82.3  Platelet count-Automate 316  RBC Count 3.78  Red Cell Distrib Width12.5  WBC Count9.61  % Albumin, Urine--  Hematocrit 31.1  Hemoglobin 11.3  CBC  Date: 03-27-24 @ 12:30  Mean cell Zdreajnwpa52.6  Mean cell Hemoglobin Conc35.8  Mean cell Volum 82.7  Platelet count-Automate 298  RBC Count 3.75  Red Cell Distrib Width12.4  WBC Count9.56  % Albumin, Urine--  Hematocrit 31.0  Hemoglobin 11.1    [facility]  03-30-24 @ 07:55  Blood Gas Arterial-Calcium,Ionized--  Blood Urea Nitrogen, Serum 16 mg/dL [10 - 20]  Carbon Dioxide, Serum21 mmol/L [17 - 32]  Chloride, Serum95 mmol/L<L> [98 - 110]  Creatinie, Serum1.1 mg/dL [0.7 - 1.5]  Glucose, Serum89 mg/dL [70 - 99]  Potassium, Serum4.1 mmol/L [3.5 - 5.0]  Sodium, Serum 129 mmol/L<L> [135 - 146]  [facility]  03-29-24 @ 07:12  Blood Gas Arterial-Calcium,Ionized--  Blood Urea Nitrogen, Serum 16 mg/dL [10 - 20]  Carbon Dioxide, Serum20 mmol/L [17 - 32]  Chloride, Serum95 mmol/L<L> [98 - 110]  Creatinie, Serum0.9 mg/dL [0.7 - 1.5]  Glucose, Serum71 mg/dL [70 - 99]  Potassium, Serum4.6 mmol/L [3.5 - 5.0]  Sodium, Serum 130 mmol/L<L> [135 - 146]  [facility]  03-28-24 @ 07:32  Blood Gas Arterial-Calcium,Ionized--  Blood Urea Nitrogen, Serum 13 mg/dL [10 - 20]  Carbon Dioxide, Serum24 mmol/L [17 - 32]  Chloride, Serum89 mmol/L<L> [98 - 110]  Creatinie, Serum1.0 mg/dL [0.7 - 1.5]  Glucose, Serum72 mg/dL [70 - 99]  Potassium, Serum4.4 mmol/L [3.5 - 5.0]  Sodium, Serum 124 mmol/L<L> [135 - 146]  BMP  03-28-24 @ 00:44  Blood Gas Arterial-Calcium,Ionized--  Blood Urea Nitrogen, Serum 13 mg/dL [10 - 20]  Carbon Dioxide, Serum24 mmol/L [17 - 32]  Chloride, Serum87 mmol/L<L> [98 - 110]  Creatinie, Serum1.0 mg/dL [0.7 - 1.5]  Glucose, Serum80 mg/dL [70 - 99]  Potassium, Serum4.6 mmol/L [3.5 - 5.0]  Sodium, Serum 124 mmol/L<L> [135 - 146]  BMP  03-27-24 @ 12:30  Blood Gas Arterial-Calcium,Ionized--  Blood Urea Nitrogen, Serum 14 mg/dL [10 - 20]  Carbon Dioxide, Serum26 mmol/L [17 - 32]  Chloride, Serum88 mmol/L<L> [98 - 110]  Creatinie, Serum1.0 mg/dL [0.7 - 1.5]  Glucose, Serum90 mg/dL [70 - 99]  Potassium, Serum4.6 mmol/L [3.5 - 5.0]  Sodium, Serum 123 mmol/L<L> [135 - 146]          Medications:  acetaminophen     Tablet .. 975 milliGRAM(s) Oral every 8 hours  chlorhexidine 2% Cloths 1 Application(s) Topical daily  dextrose 5%. 1000 milliLiter(s) IV Continuous <Continuous>  dextrose 5%. 1000 milliLiter(s) IV Continuous <Continuous>  dextrose 50% Injectable 12.5 Gram(s) IV Push once  dextrose 50% Injectable 25 Gram(s) IV Push once  dextrose 50% Injectable 25 Gram(s) IV Push once  dextrose Oral Gel 15 Gram(s) Oral once PRN  enoxaparin Injectable 40 milliGRAM(s) SubCutaneous every 24 hours  glucagon  Injectable 1 milliGRAM(s) IntraMuscular once  lisinopril 5 milliGRAM(s) Oral daily  magnesium sulfate  IVPB 2 Gram(s) IV Intermittent every 2 hours  metoprolol tartrate 25 milliGRAM(s) Oral two times a day  morphine  - Injectable 1 milliGRAM(s) IV Push every 6 hours PRN  pantoprazole    Tablet 40 milliGRAM(s) Oral before breakfast  sodium chloride 1 Gram(s) Oral every 12 hours        Assessment and Plan:  Patient is an 89 y/o Male w/ PMHx of HTN, dementia, neurogenic bladder with chronic indwelling yañez, R hip fracture s/p internal fixation, presents for recurrent  fall with acute  left hip fracture. Admitted for left femoral neck fracture.     #Subcapital left femoral neck fracture  - CT left hip noted   - s/p eval by ortho in ED:  post op on  3/29  - post op care as per Ortho team rec. WBAT, PT/OT daily tx. as tolerated    - pain management, bowel regimen    #Acute on chronic hyponatremia - asymptomatic   - Na 130 stable level  - started on Salt tabs.     # Normocytic anemia- normal  Iron levels, stable h/h      #HTN/#HLD  #neurogenic bladder w/ chronic yañez  #CKD 3   #mild cognitive impairment  - Cr at baseline  -[facility]. HTN/tachycardic- added on Lopressor 25 mg po twice daily ,  c/w home meds     DVT ppx: lovenox( hold preop)  Code status: full code    #Progress Note Handoff: Post op care, WBAT, PT/OT eval, repeat  CBC, BMP, MG in am, [facility]. HTN/tachy- added on Lopressor 25 mg po twice daily, MG supplemented  Family discussion: yes, medical team Disposition: SNF in 24 to 48 hours    Total time spent to complete patient's bedside assessment, review medical chart, discuss medical plan of care with covering medical team was more than 35 minutes with >50% of time spent face to face with patient, discussion with patient/family and/or coordination of care .  
24H events:    Patient is a 88y old Male who presents with a chief complaint of fall (03 Apr 2024 09:00)    Primary diagnosis of Hip fracture      Today is 8d of hospitalization. This morning patient was seen and examined at bedside, resting comfortably in bed.  No acute or major events overnight.     Code Status: Full      PAST MEDICAL & SURGICAL HISTORY  HTN (hypertension)    Neurogenic bladder    Waldron catheter in place  2016    Dementia    No significant past surgical history  Right Hip ORIF      SOCIAL HISTORY:  Social History:      ALLERGIES:  No Known Allergies    MEDICATIONS:  STANDING MEDICATIONS  chlorhexidine 2% Cloths 1 Application(s) Topical daily  dextrose 5%. 1000 milliLiter(s) IV Continuous <Continuous>  dextrose 5%. 1000 milliLiter(s) IV Continuous <Continuous>  dextrose 50% Injectable 12.5 Gram(s) IV Push once  dextrose 50% Injectable 25 Gram(s) IV Push once  dextrose 50% Injectable 25 Gram(s) IV Push once  enoxaparin Injectable 40 milliGRAM(s) SubCutaneous every 24 hours  glucagon  Injectable 1 milliGRAM(s) IntraMuscular once  lisinopril 5 milliGRAM(s) Oral daily  metoprolol tartrate 25 milliGRAM(s) Oral two times a day  pantoprazole    Tablet 40 milliGRAM(s) Oral before breakfast  senna 2 Tablet(s) Oral at bedtime  sodium chloride 1 Gram(s) Oral every 12 hours    PRN MEDICATIONS  bisacodyl Suppository 10 milliGRAM(s) Rectal daily PRN  dextrose Oral Gel 15 Gram(s) Oral once PRN  morphine  - Injectable 1 milliGRAM(s) IV Push every 6 hours PRN  polyethylene glycol 3350 17 Gram(s) Oral daily PRN    VITALS:   T(F): 97.9  HR: 91  BP: 154/84  RR: 18  SpO2: 97%    PHYSICAL EXAM:  GENERAL:   ( x) NAD, lying in bed comfortably     (  ) obtunded     (  ) lethargic     (  ) somnolent    HEAD:   ( x) Atraumatic     (  ) hematoma     (  ) laceration (specify location:       )     NECK:  (x) Supple     (  ) neck stiffness     (  ) nuchal rigidity     (  )  no JVD     (  ) JVD present ( -- cm)    HEART:  Rate -->     (x) normal rate     (  ) bradycardic     (  ) tachycardic  Rhythm -->     (x) regular     (  ) regularly irregular     (  ) irregularly irregular  Murmurs -->     (x) normal s1s2     (  ) systolic murmur     (  ) diastolic murmur     (  ) continuous murmur      (  ) S3 present     (  ) S4 present    LUNGS:   ( x)Unlabored respirations     (  ) tachypnea  ( x) B/L air entry     (  ) decreased breath sounds in:  (location     )    ( x) no adventitious sound     (  ) crackles     (  ) wheezing      (  ) rhonchi      (specify location:       )  (  ) chest wall tenderness (specify location:       )    ABDOMEN:   ( x) Soft     (  ) tense   |   (X  ) nondistended     (  ) distended   |   ( X ) +BS     (  ) hypoactive bowel sounds     (  ) hyperactive bowel sounds  ( x) nontender     (  ) RUQ tenderness     (  ) RLQ tenderness     (  ) LLQ tenderness     (  ) epigastric tenderness     (  ) diffuse tenderness  (  ) Splenomegaly      (  ) Hepatomegaly      (  ) Jaundice     (  ) ecchymosis     EXTREMITIES: dressing clean dry intact over left hip  ( x) Normal     (  ) Rash     (  ) ecchymosis     (  ) varicose veins      (  ) pitting edema     (  ) non-pitting edema   (  ) ulceration     (  ) gangrene:     (location:     )    NERVOUS SYSTEM:    ( x) A&Ox2     (  ) confused     (  ) lethargic  CN II-XII:     (  ) Intact     (  ) deficits found     (Specify:     )   Upper extremities:     (  ) no sensorimotor deficits     (  ) weakness     (  ) loss of proprioception/vibration     (  ) loss of touch/temperature (specify:    )  Lower extremities:     (  ) no sensorimotor deficits     (  ) weakness     (  ) loss of proprioception/vibration     (  ) loss of touch/temperature (specify:    )    SKIN:   ( X ) No rashes or lesions     (  ) maculopapular rash     (  ) pustules     (  ) vesicles     (  ) ulcer     (  ) ecchymosis     (specify location:     )      LABS:                        9.1    6.29  )-----------( 227      ( 04 Apr 2024 06:18 )             26.4     04-04    127<L>  |  92<L>  |  19  ----------------------------<  78  4.0   |  22  |  0.9    Ca    8.6      04 Apr 2024 06:18  Mg     1.8     04-03        Urinalysis Basic - ( 04 Apr 2024 06:18 )    Color: x / Appearance: x / SG: x / pH: x  Gluc: 78 mg/dL / Ketone: x  / Bili: x / Urobili: x   Blood: x / Protein: x / Nitrite: x   Leuk Esterase: x / RBC: x / WBC x   Sq Epi: x / Non Sq Epi: x / Bacteria: x                RADIOLOGY:                
24H events:    Patient is a 88y old Male who presents with a chief complaint of hip fracture (01 Apr 2024 13:22)    Primary diagnosis of Hip fracture      Today is 5d of hospitalization. This morning patient was seen and examined at bedside, resting comfortably in bed.   No acute or major events overnight. S/p left hip hemiarthroplasty now POD 3    Code Status: Full      PAST MEDICAL & SURGICAL HISTORY  HTN (hypertension)    Neurogenic bladder    Waldron catheter in place  2016    Dementia    No significant past surgical history  Right Hip ORIF      SOCIAL HISTORY:  Social History:      ALLERGIES:  No Known Allergies    MEDICATIONS:  STANDING MEDICATIONS  acetaminophen     Tablet .. 975 milliGRAM(s) Oral every 8 hours  chlorhexidine 2% Cloths 1 Application(s) Topical daily  dextrose 5%. 1000 milliLiter(s) IV Continuous <Continuous>  dextrose 5%. 1000 milliLiter(s) IV Continuous <Continuous>  dextrose 50% Injectable 25 Gram(s) IV Push once  dextrose 50% Injectable 25 Gram(s) IV Push once  dextrose 50% Injectable 12.5 Gram(s) IV Push once  enoxaparin Injectable 40 milliGRAM(s) SubCutaneous every 24 hours  glucagon  Injectable 1 milliGRAM(s) IntraMuscular once  lisinopril 5 milliGRAM(s) Oral daily  metoprolol tartrate 25 milliGRAM(s) Oral two times a day  pantoprazole    Tablet 40 milliGRAM(s) Oral before breakfast  sodium chloride 1 Gram(s) Oral every 12 hours    PRN MEDICATIONS  dextrose Oral Gel 15 Gram(s) Oral once PRN  morphine  - Injectable 1 milliGRAM(s) IV Push every 6 hours PRN    VITALS:   T(F): 97.9  HR: 89  BP: 138/70  RR: 19  SpO2: 98%    PHYSICAL EXAM:  GENERAL:   ( x) NAD, lying in bed comfortably     (  ) obtunded     (  ) lethargic     (  ) somnolent    HEAD:   ( x) Atraumatic     (  ) hematoma     (  ) laceration (specify location:       )     NECK:  (x) Supple     (  ) neck stiffness     (  ) nuchal rigidity     (  )  no JVD     (  ) JVD present ( -- cm)    HEART:  Rate -->     (x) normal rate     (  ) bradycardic     (  ) tachycardic  Rhythm -->     (x) regular     (  ) regularly irregular     (  ) irregularly irregular  Murmurs -->     (x) normal s1s2     (  ) systolic murmur     (  ) diastolic murmur     (  ) continuous murmur      (  ) S3 present     (  ) S4 present    LUNGS:   ( x)Unlabored respirations     (  ) tachypnea  ( x) B/L air entry     (  ) decreased breath sounds in:  (location     )    ( x) no adventitious sound     (  ) crackles     (  ) wheezing      (  ) rhonchi      (specify location:       )  (  ) chest wall tenderness (specify location:       )    ABDOMEN:   ( x) Soft     (  ) tense   |   (X  ) nondistended     (  ) distended   |   ( X ) +BS     (  ) hypoactive bowel sounds     (  ) hyperactive bowel sounds  ( x) nontender     (  ) RUQ tenderness     (  ) RLQ tenderness     (  ) LLQ tenderness     (  ) epigastric tenderness     (  ) diffuse tenderness  (  ) Splenomegaly      (  ) Hepatomegaly      (  ) Jaundice     (  ) ecchymosis     EXTREMITIES: left hip dressing CDI  ( x) Normal     (  ) Rash     (  ) ecchymosis     (  ) varicose veins      (  ) pitting edema     (  ) non-pitting edema   (  ) ulceration     (  ) gangrene:     (location:     )    NERVOUS SYSTEM:    ( x) A&Ox3     (  ) confused     (  ) lethargic  CN II-XII:     (  ) Intact     (  ) deficits found     (Specify:     )   Upper extremities:     (  ) no sensorimotor deficits     (  ) weakness     (  ) loss of proprioception/vibration     (  ) loss of touch/temperature (specify:    )  Lower extremities:     (  ) no sensorimotor deficits     (  ) weakness     (  ) loss of proprioception/vibration     (  ) loss of touch/temperature (specify:    )    SKIN:   ( X ) No rashes or lesions     (  ) maculopapular rash     (  ) pustules     (  ) vesicles     (  ) ulcer     (  ) ecchymosis     (specify location:     )      LABS:                        9.0    11.02 )-----------( 204      ( 01 Apr 2024 07:42 )             25.2     04-01    126<L>  |  91<L>  |  19  ----------------------------<  86  3.9   |  22  |  0.9    Ca    8.3<L>      01 Apr 2024 07:42  Mg     1.8     04-01    TPro  5.2<L>  /  Alb  2.8<L>  /  TBili  0.6  /  DBili  x   /  AST  29  /  ALT  8   /  AlkPhos  89  04-01      Urinalysis Basic - ( 01 Apr 2024 07:42 )    Color: x / Appearance: x / SG: x / pH: x  Gluc: 86 mg/dL / Ketone: x  / Bili: x / Urobili: x   Blood: x / Protein: x / Nitrite: x   Leuk Esterase: x / RBC: x / WBC x   Sq Epi: x / Non Sq Epi: x / Bacteria: x                RADIOLOGY:                
Orthopaedic Surgery Progress Note    S&E at bedside this AM. Pain well controlled. No other complaints    T(C): 36.9 (03-30-24 @ 05:00), Max: 36.9 (03-30-24 @ 05:00)  HR: 114 (03-30-24 @ 05:00) (88 - 114)  BP: 159/81 (03-30-24 @ 05:00) (141/73 - 183/96)  RR: 18 (03-30-24 @ 05:00) (16 - 20)  SpO2: 95% (03-30-24 @ 05:00) (94% - 100%)    P/E:  NAD  Nonlabored breathing    LLE  Dressing in place, c/d/i  SILT sp/dp/t/sural/saph  Firing ta/ehl/fhl/gs  Foot WWP, 2+ DP pulse    Labs                        11.2   20.11 )-----------( 271      ( 29 Mar 2024 13:12 )             31.8     03-29    130<L>  |  95<L>  |  16  ----------------------------<  71  4.6   |  20  |  0.9    Ca    9.2      29 Mar 2024 07:12  Mg     1.9     03-29    TPro  6.2  /  Alb  3.4<L>  /  TBili  0.5  /  DBili  x   /  AST  21  /  ALT  12  /  AlkPhos  101  03-29    LIVER FUNCTIONS - ( 29 Mar 2024 07:12 )  Alb: 3.4 g/dL / Pro: 6.2 g/dL / ALK PHOS: 101 U/L / ALT: 12 U/L / AST: 21 U/L / GGT: x           A/P:   87yo Male s/p left hip hemiarthroplasty on 3/29/24.    WBAT LLE  DVT ppx: SCD, chemical ppx  Abx: Ancef 24 hours  Pain control  Incentive spirometer  Home meds  Trend labs  PT/OT/Rehab  Dispo: Pending PT recommendations    - If discharged, patient should follow up with Dr. Bass at [address] [facility] phone [phone].  - Patient should be discharged on 6 weeks (from surgery date) of Aspirin 325mg daily for DVT prophylaxis as their mobility/function/ambulation will be decreased due to lower extremity orthopaedic surgery.          
  JEMAL RANGEL  [facility]-N 3A 016 B (SI-N 3A)      Patient was evaluated and examined  by bedside, no active events over night as per covering nurse report      REVIEW OF SYSTEMS: unable to obtain, patient remains confused         T(C): , Max: 37.1 (03-27-24 @ 20:40)  HR: 80 (03-28-24 @ 11:54)  BP: 155/84 (03-28-24 @ 11:54)  RR: 18 (03-28-24 @ 04:55)  SpO2: 97% (03-28-24 @ 04:55)  CAPILLARY BLOOD GLUCOSE      POCT Blood Glucose.: 91 mg/dL (28 Mar 2024 11:54)  POCT Blood Glucose.: 81 mg/dL (28 Mar 2024 09:01)      PHYSICAL EXAM:  General: NAD, confused , patient is laying comfortably in bed  HEENT: AT, NC, Supple, NO JVD, NO CB  Lungs: CTA B/L, no wheezing, no rhonchi  CVS: normal S1, S2, RRR, NO M/G/R  Abdomen: soft, bowel sounds present, non-tender, non-distended  Extremities: no edema, no clubbing, no cyanosis, positive peripheral pulses b/l  Neuro: confused state  Skin: no rash, no ecchymosis      LAB  CBC  Date: 03-28-24 @ 07:32  Mean cell Qfyitvrtym61.9  Mean cell Hemoglobin Conc36.3  Mean cell Volum 82.3  Platelet count-Automate 316  RBC Count 3.78  Red Cell Distrib Width12.5  WBC Count9.61  % Albumin, Urine--  Hematocrit 31.1  Hemoglobin 11.3  CBC  Date: 03-27-24 @ 12:30  Mean cell Xyhttzlqvj89.6  Mean cell Hemoglobin Conc35.8  Mean cell Volum 82.7  Platelet count-Automate 298  RBC Count 3.75  Red Cell Distrib Width12.4  WBC Count9.56  % Albumin, Urine--  Hematocrit 31.0  Hemoglobin 11.1    BMP  03-28-24 @ 07:32  Blood Gas Arterial-Calcium,Ionized--  Blood Urea Nitrogen, Serum 13 mg/dL [10 - 20]  Carbon Dioxide, Serum24 mmol/L [17 - 32]  Chloride, Serum89 mmol/L<L> [98 - 110]  Creatinie, Serum1.0 mg/dL [0.7 - 1.5]  Glucose, Serum72 mg/dL [70 - 99]  Potassium, Serum4.4 mmol/L [3.5 - 5.0]  Sodium, Serum 124 mmol/L<L> [135 - 146]  BMP  03-28-24 @ 00:44  Blood Gas Arterial-Calcium,Ionized--  Blood Urea Nitrogen, Serum 13 mg/dL [10 - 20]  Carbon Dioxide, Serum24 mmol/L [17 - 32]  Chloride, Serum87 mmol/L<L> [98 - 110]  Creatinie, Serum1.0 mg/dL [0.7 - 1.5]  Glucose, Serum80 mg/dL [70 - 99]  Potassium, Serum4.6 mmol/L [3.5 - 5.0]  Sodium, Serum 124 mmol/L<L> [135 - 146]  BMP  03-27-24 @ 12:30  Blood Gas Arterial-Calcium,Ionized--  Blood Urea Nitrogen, Serum 14 mg/dL [10 - 20]  Carbon Dioxide, Serum26 mmol/L [17 - 32]  Chloride, Serum88 mmol/L<L> [98 - 110]  Creatinie, Serum1.0 mg/dL [0.7 - 1.5]  Glucose, Serum90 mg/dL [70 - 99]  Potassium, Serum4.6 mmol/L [3.5 - 5.0]  Sodium, Serum 123 mmol/L<L> [135 - 146]        PT/INR - ( 27 Mar 2024 12:30 )   PT: 13.10 sec;   INR: 1.15 ratio         PTT - ( 27 Mar 2024 12:30 )  PTT:31.7 sec      Medications:  acetaminophen     Tablet .. 975 milliGRAM(s) Oral every 8 hours  chlorhexidine 2% Cloths 1 Application(s) Topical daily  dextrose 5% + sodium chloride 0.9%. 1000 milliLiter(s) IV Continuous <Continuous>  dextrose 5%. 1000 milliLiter(s) IV Continuous <Continuous>  dextrose 5%. 1000 milliLiter(s) IV Continuous <Continuous>  dextrose 50% Injectable 25 Gram(s) IV Push once  dextrose 50% Injectable 12.5 Gram(s) IV Push once  dextrose 50% Injectable 25 Gram(s) IV Push once  dextrose Oral Gel 15 Gram(s) Oral once PRN  enoxaparin Injectable 40 milliGRAM(s) SubCutaneous every 24 hours  glucagon  Injectable 1 milliGRAM(s) IntraMuscular once  lisinopril 5 milliGRAM(s) Oral daily  morphine  - Injectable 1 milliGRAM(s) IV Push every 6 hours PRN  pantoprazole    Tablet 40 milliGRAM(s) Oral before breakfast        Assessment and Plan:  Patient is an 87 y/o Male w/ PMHx of HTN, dementia, neurogenic bladder with chronic indwelling yañez, R hip fracture s/p internal fixation, presents for recurrent  fall with acute  left hip fracture. Admitted for left femoral neck fracture.     #Subcapital left femoral neck fracture  - CT left hip noted   - s/p eval by ortho in ED: possible add on for surgery today  3/28, npo   - PT/OT/physiatry eval after surgery  #Pre-operative optimization:     - CXR: normal (interpreted by me)      - ECG: early repol abnormality (interpreted by me)    patient is intermediate risk for intermediate risk surgery (left hip surgical repair of fracture).  May proceed with acceptable risks since benefits outweigh risks.   patient is medically cleared for surgery.     - RCRI 0  - need to optimize Na prior to procedure, management as below   - bsfs monitoring meanwhile pt. is npo,  hypoglycemia  protocol to be activated if needed.   - pain management    #Acute on chronic hyponatremia - asymptomatic   - Na 124  - trend bmp daily, IVF with D5NS due to borderline low glucose level,     # Normocytic anemia- obtain Iron /TIBC/ferritin levels, monitor H/H, maintain active type and screen      #HTN/#HLD  #neurogenic bladder w/ chronic yañez  #CKD 3   #mild cognitive impairment  - Cr at baseline  - c/w home meds     DVT ppx: lovenox( hold preop)  Code status: full code    #Progress Note Handoff: Pending left hip Surgical repair, monitor daily CBC, BMP, Hyponatremia work up pending. pain management  Family discussion: yes, medical team Disposition: SNF once medically stable    Total time spent to complete patient's bedside assessment, review medical chart, discuss medical plan of care with covering medical team was more than 35 minutes with >50% of time spent face to face with patient, discussion with patient/family and/or coordination of care      
24H events:    Patient is a 88y old Male who presents with a chief complaint of   Primary diagnosis of Hip fracture  Today is hospital day 1d. This morning patient was seen and examined at bedside, resting comfortably in bed.    No acute or major events overnight.    Code Status:    Family communication:  Contact date:  Name of person contacted:  Relationship to patient:  Communication details:  What matters most:    PAST MEDICAL & SURGICAL HISTORY  HTN (hypertension)    Neurogenic bladder    Waldron catheter in place  2016    Dementia    No significant past surgical history  Right Hip ORIF      SOCIAL HISTORY:  Social History:      ALLERGIES:  No Known Allergies    MEDICATIONS:  STANDING MEDICATIONS  acetaminophen     Tablet .. 975 milliGRAM(s) Oral every 8 hours  chlorhexidine 2% Cloths 1 Application(s) Topical daily  dextrose 5% + sodium chloride 0.9%. 1000 milliLiter(s) IV Continuous <Continuous>  dextrose 5%. 1000 milliLiter(s) IV Continuous <Continuous>  dextrose 5%. 1000 milliLiter(s) IV Continuous <Continuous>  dextrose 50% Injectable 25 Gram(s) IV Push once  dextrose 50% Injectable 12.5 Gram(s) IV Push once  dextrose 50% Injectable 25 Gram(s) IV Push once  enoxaparin Injectable 40 milliGRAM(s) SubCutaneous every 24 hours  glucagon  Injectable 1 milliGRAM(s) IntraMuscular once  lisinopril 5 milliGRAM(s) Oral daily  pantoprazole    Tablet 40 milliGRAM(s) Oral before breakfast    PRN MEDICATIONS  dextrose Oral Gel 15 Gram(s) Oral once PRN  morphine  - Injectable 1 milliGRAM(s) IV Push every 6 hours PRN    VITALS:   T(F): 97.6  HR: 80  BP: 155/84  RR: 18  SpO2: 97%    PHYSICAL EXAM:     CONST: Well appearing in NAD  EYES: PERRL, EOMI, Sclera and conjunctiva clear.   ENT: Oropharynx normal appearing, no erythema or exudates. Uvula midline.  NECK: Non-tender, no c-spine tenderness.  CARD: Normal S1 S2; Normal rate and rhythm  RESP: Equal BS B/L, No wheezes, rhonchi or rales. No distress  GI: Soft, non-tender, non-distended.  MS: Can raise both extremities minimally off bed. MIld tenderness L hip. No tenderness R hip  SKIN: Warm, dry, no acute rashes. Good turgor  NEURO: A&Ox1 (name, No focal deficits. Strength 5/5 with no sensory deficits        (  ) Indwelling Waldron Catheter:   Date insterted:    Reason (  ) Critical illness     (  ) urinary retention    (  ) Accurate Ins/Outs Monitoring     (  ) CMO patient    (  ) Central Line:   Date inserted:  Location: (  ) Right IJ     (  ) Left IJ     (  ) Right Fem     (  ) Left Fem    (  ) SPC        (  ) pigtail       (  ) PEG tube       (  ) colostomy       (  ) jejunostomy  (  ) U-Dall    LABS:                        11.3   9.61  )-----------( 316      ( 28 Mar 2024 07:32 )             31.1     03-28    124<L>  |  89<L>  |  13  ----------------------------<  72  4.4   |  24  |  1.0    Ca    9.2      28 Mar 2024 07:32  Mg     1.8     03-28    TPro  6.4  /  Alb  3.4<L>  /  TBili  0.4  /  DBili  x   /  AST  19  /  ALT  13  /  AlkPhos  114  03-28    PT/INR - ( 27 Mar 2024 12:30 )   PT: 13.10 sec;   INR: 1.15 ratio         PTT - ( 27 Mar 2024 12:30 )  PTT:31.7 sec  Urinalysis Basic - ( 28 Mar 2024 07:32 )    Color: x / Appearance: x / SG: x / pH: x  Gluc: 72 mg/dL / Ketone: x  / Bili: x / Urobili: x   Blood: x / Protein: x / Nitrite: x   Leuk Esterase: x / RBC: x / WBC x   Sq Epi: x / Non Sq Epi: x / Bacteria: x                RADIOLOGY:          
24H events:    Patient is a 88y old Male who presents with a chief complaint of Fall, hip fracture (02 Apr 2024 11:25)    Primary diagnosis of Hip fracture      Today is 7d of hospitalization. This morning patient was seen and examined at bedside, resting comfortably in bed.  No acute or major events overnight.     Code Status: Full    Family communication:  Contact date: 4/2  Name of person contacted: Alejandro Milner  Relationship to patient: Son  Communication details: Care plan, son and patient agreeable for STR, afterwards son concerned about being taking care of patient whereas patient wants to take care of himself, will see how he progresses with STR    PAST MEDICAL & SURGICAL HISTORY  HTN (hypertension)    Neurogenic bladder    Waldron catheter in place  2016    Dementia    No significant past surgical history  Right Hip ORIF      SOCIAL HISTORY:  Social History:      ALLERGIES:  No Known Allergies    MEDICATIONS:  STANDING MEDICATIONS  chlorhexidine 2% Cloths 1 Application(s) Topical daily  dextrose 5%. 1000 milliLiter(s) IV Continuous <Continuous>  dextrose 5%. 1000 milliLiter(s) IV Continuous <Continuous>  dextrose 50% Injectable 25 Gram(s) IV Push once  dextrose 50% Injectable 25 Gram(s) IV Push once  dextrose 50% Injectable 12.5 Gram(s) IV Push once  enoxaparin Injectable 40 milliGRAM(s) SubCutaneous every 24 hours  glucagon  Injectable 1 milliGRAM(s) IntraMuscular once  lisinopril 5 milliGRAM(s) Oral daily  metoprolol tartrate 25 milliGRAM(s) Oral two times a day  pantoprazole    Tablet 40 milliGRAM(s) Oral before breakfast  senna 2 Tablet(s) Oral at bedtime  sodium chloride 1 Gram(s) Oral every 12 hours    PRN MEDICATIONS  dextrose Oral Gel 15 Gram(s) Oral once PRN  morphine  - Injectable 1 milliGRAM(s) IV Push every 6 hours PRN  polyethylene glycol 3350 17 Gram(s) Oral daily PRN    VITALS:   T(F): 95  HR: 79  BP: 170/77  RR: 18  SpO2: 95%    PHYSICAL EXAM:  GENERAL:   ( x) NAD, lying in bed comfortably     (  ) obtunded     (  ) lethargic     (  ) somnolent    HEAD:   ( x) Atraumatic     (  ) hematoma     (  ) laceration (specify location:       )     NECK:  (x) Supple     (  ) neck stiffness     (  ) nuchal rigidity     (  )  no JVD     (  ) JVD present ( -- cm)    HEART:  Rate -->     (x) normal rate     (  ) bradycardic     (  ) tachycardic  Rhythm -->     (x) regular     (  ) regularly irregular     (  ) irregularly irregular  Murmurs -->     (x) normal s1s2     (  ) systolic murmur     (  ) diastolic murmur     (  ) continuous murmur      (  ) S3 present     (  ) S4 present    LUNGS:   ( x)Unlabored respirations     (  ) tachypnea  ( x) B/L air entry     (  ) decreased breath sounds in:  (location     )    ( x) no adventitious sound     (  ) crackles     (  ) wheezing      (  ) rhonchi      (specify location:       )  (  ) chest wall tenderness (specify location:       )    ABDOMEN:   ( x) Soft     (  ) tense   |   (X  ) nondistended     (  ) distended   |   ( X ) +BS     (  ) hypoactive bowel sounds     (  ) hyperactive bowel sounds  ( x) nontender     (  ) RUQ tenderness     (  ) RLQ tenderness     (  ) LLQ tenderness     (  ) epigastric tenderness     (  ) diffuse tenderness  (  ) Splenomegaly      (  ) Hepatomegaly      (  ) Jaundice     (  ) ecchymosis     EXTREMITIES: dressing clean dry intact over left hip  ( x) Normal     (  ) Rash     (  ) ecchymosis     (  ) varicose veins      (  ) pitting edema     (  ) non-pitting edema   (  ) ulceration     (  ) gangrene:     (location:     )    NERVOUS SYSTEM:    ( x) A&Ox2     (  ) confused     (  ) lethargic  CN II-XII:     (  ) Intact     (  ) deficits found     (Specify:     )   Upper extremities:     (  ) no sensorimotor deficits     (  ) weakness     (  ) loss of proprioception/vibration     (  ) loss of touch/temperature (specify:    )  Lower extremities:     (  ) no sensorimotor deficits     (  ) weakness     (  ) loss of proprioception/vibration     (  ) loss of touch/temperature (specify:    )    SKIN:   ( X ) No rashes or lesions     (  ) maculopapular rash     (  ) pustules     (  ) vesicles     (  ) ulcer     (  ) ecchymosis     (specify location:     )      LABS:                        8.5    7.70  )-----------( 197      ( 02 Apr 2024 07:39 )             23.7     04-02    129<L>  |  94<L>  |  21<H>  ----------------------------<  90  3.7   |  22  |  0.9    Ca    8.4      02 Apr 2024 07:39  Mg     1.8     04-02    TPro  5.1<L>  /  Alb  2.7<L>  /  TBili  0.5  /  DBili  x   /  AST  24  /  ALT  8   /  AlkPhos  88  04-02      Urinalysis Basic - ( 02 Apr 2024 07:39 )    Color: x / Appearance: x / SG: x / pH: x  Gluc: 90 mg/dL / Ketone: x  / Bili: x / Urobili: x   Blood: x / Protein: x / Nitrite: x   Leuk Esterase: x / RBC: x / WBC x   Sq Epi: x / Non Sq Epi: x / Bacteria: x                RADIOLOGY:                
24H events:    Patient is a 88y old Male who presents with a chief complaint of fall (04 Apr 2024 12:38)    Primary diagnosis of Hip fracture      Today is 9d of hospitalization. This morning patient was seen and examined at bedside, resting comfortably in bed.  No acute or major events overnight.     Code Status: Full      PAST MEDICAL & SURGICAL HISTORY  HTN (hypertension)    Neurogenic bladder    Waldron catheter in place  2016    Dementia    No significant past surgical history  Right Hip ORIF      SOCIAL HISTORY:  Social History:      ALLERGIES:  No Known Allergies    MEDICATIONS:  STANDING MEDICATIONS  chlorhexidine 2% Cloths 1 Application(s) Topical daily  dextrose 5%. 1000 milliLiter(s) IV Continuous <Continuous>  dextrose 5%. 1000 milliLiter(s) IV Continuous <Continuous>  dextrose 50% Injectable 12.5 Gram(s) IV Push once  dextrose 50% Injectable 25 Gram(s) IV Push once  dextrose 50% Injectable 25 Gram(s) IV Push once  enoxaparin Injectable 40 milliGRAM(s) SubCutaneous every 24 hours  glucagon  Injectable 1 milliGRAM(s) IntraMuscular once  lisinopril 5 milliGRAM(s) Oral daily  metoprolol tartrate 25 milliGRAM(s) Oral two times a day  pantoprazole    Tablet 40 milliGRAM(s) Oral before breakfast  senna 2 Tablet(s) Oral at bedtime  sodium chloride 1 Gram(s) Oral every 12 hours    PRN MEDICATIONS  bisacodyl Suppository 10 milliGRAM(s) Rectal daily PRN  dextrose Oral Gel 15 Gram(s) Oral once PRN  morphine  - Injectable 1 milliGRAM(s) IV Push every 6 hours PRN  polyethylene glycol 3350 17 Gram(s) Oral daily PRN    VITALS:   T(F): 97.6  HR: 84  BP: 160/85  RR: 18  SpO2: 98%    PHYSICAL EXAM:  GENERAL:   ( x) NAD, lying in bed comfortably     (  ) obtunded     (  ) lethargic     (  ) somnolent    HEAD:   ( x) Atraumatic     (  ) hematoma     (  ) laceration (specify location:       )     NECK:  (x) Supple     (  ) neck stiffness     (  ) nuchal rigidity     (  )  no JVD     (  ) JVD present ( -- cm)    HEART:  Rate -->     (x) normal rate     (  ) bradycardic     (  ) tachycardic  Rhythm -->     (x) regular     (  ) regularly irregular     (  ) irregularly irregular  Murmurs -->     (x) normal s1s2     (  ) systolic murmur     (  ) diastolic murmur     (  ) continuous murmur      (  ) S3 present     (  ) S4 present    LUNGS:   ( x)Unlabored respirations     (  ) tachypnea  ( x) B/L air entry     (  ) decreased breath sounds in:  (location     )    ( x) no adventitious sound     (  ) crackles     (  ) wheezing      (  ) rhonchi      (specify location:       )  (  ) chest wall tenderness (specify location:       )    ABDOMEN:   ( x) Soft     (  ) tense   |   (X  ) nondistended     (  ) distended   |   ( X ) +BS     (  ) hypoactive bowel sounds     (  ) hyperactive bowel sounds  ( x) nontender     (  ) RUQ tenderness     (  ) RLQ tenderness     (  ) LLQ tenderness     (  ) epigastric tenderness     (  ) diffuse tenderness  (  ) Splenomegaly      (  ) Hepatomegaly      (  ) Jaundice     (  ) ecchymosis     EXTREMITIES: dressing clean dry intact over left hip  ( x) Normal     (  ) Rash     (  ) ecchymosis     (  ) varicose veins      (  ) pitting edema     (  ) non-pitting edema   (  ) ulceration     (  ) gangrene:     (location:     )    NERVOUS SYSTEM:    ( x) A&Ox2     (  ) confused     (  ) lethargic  CN II-XII:     (  ) Intact     (  ) deficits found     (Specify:     )   Upper extremities:     (  ) no sensorimotor deficits     (  ) weakness     (  ) loss of proprioception/vibration     (  ) loss of touch/temperature (specify:    )  Lower extremities:     (  ) no sensorimotor deficits     (  ) weakness     (  ) loss of proprioception/vibration     (  ) loss of touch/temperature (specify:    )    SKIN:   ( X ) No rashes or lesions     (  ) maculopapular rash     (  ) pustules     (  ) vesicles     (  ) ulcer     (  ) ecchymosis     (specify location:     )      LABS:                        9.1    6.29  )-----------( 227      ( 04 Apr 2024 06:18 )             26.4     04-04    127<L>  |  92<L>  |  19  ----------------------------<  78  4.0   |  22  |  0.9    Ca    8.6      04 Apr 2024 06:18        Urinalysis Basic - ( 04 Apr 2024 06:18 )    Color: x / Appearance: x / SG: x / pH: x  Gluc: 78 mg/dL / Ketone: x  / Bili: x / Urobili: x   Blood: x / Protein: x / Nitrite: x   Leuk Esterase: x / RBC: x / WBC x   Sq Epi: x / Non Sq Epi: x / Bacteria: x                RADIOLOGY:                
24H events:    Patient is a 88y old Male who presents with a chief complaint of s/p fall (28 Mar 2024 13:11)    Primary diagnosis of Hip fracture    Today is hospital day 2d. This morning patient was seen and examined at bedside, resting comfortably in bed.    No acute or major events overnight.    Code Status:    Family communication:  Contact date:  Name of person contacted:  Relationship to patient:  Communication details:  What matters most:    PAST MEDICAL & SURGICAL HISTORY  HTN (hypertension)    Neurogenic bladder    Waldron catheter in place  2016    Dementia    No significant past surgical history  Right Hip ORIF      SOCIAL HISTORY:  Social History:      ALLERGIES:  No Known Allergies    MEDICATIONS:  STANDING MEDICATIONS  acetaminophen     Tablet .. 975 milliGRAM(s) Oral every 8 hours  chlorhexidine 2% Cloths 1 Application(s) Topical daily  dextrose 5%. 1000 milliLiter(s) IV Continuous <Continuous>  dextrose 5%. 1000 milliLiter(s) IV Continuous <Continuous>  dextrose 50% Injectable 25 Gram(s) IV Push once  dextrose 50% Injectable 12.5 Gram(s) IV Push once  dextrose 50% Injectable 25 Gram(s) IV Push once  enoxaparin Injectable 40 milliGRAM(s) SubCutaneous every 24 hours  glucagon  Injectable 1 milliGRAM(s) IntraMuscular once  lisinopril 5 milliGRAM(s) Oral daily  pantoprazole    Tablet 40 milliGRAM(s) Oral before breakfast  sodium chloride 1 Gram(s) Oral every 12 hours    PRN MEDICATIONS  dextrose Oral Gel 15 Gram(s) Oral once PRN  morphine  - Injectable 1 milliGRAM(s) IV Push every 6 hours PRN    VITALS:   T(F): 98  HR: 94  BP: 164/83  RR: 18  SpO2: 97%    PHYSICAL EXAM:     CONST: Well appearing in NAD  EYES: PERRL, EOMI, Sclera and conjunctiva clear.   ENT: Oropharynx normal appearing, no erythema or exudates. Uvula midline.  NECK: Non-tender, no c-spine tenderness.  CARD: Normal S1 S2; Normal rate and rhythm  RESP: Equal BS B/L, No wheezes, rhonchi or rales. No distress  GI: Soft, non-tender, non-distended.  MS: Can raise both extremities minimally off bed. MIld tenderness L hip. No tenderness R hip  SKIN: Warm, dry, no acute rashes. Good turgor  NEURO: A&Ox1 (name, No focal deficits. Strength 5/5 with no sensory deficits            (  ) Indwelling Waldron Catheter:   Date insterted:    Reason (  ) Critical illness     (  ) urinary retention    (  ) Accurate Ins/Outs Monitoring     (  ) CMO patient    (  ) Central Line:   Date inserted:  Location: (  ) Right IJ     (  ) Left IJ     (  ) Right Fem     (  ) Left Fem    (  ) SPC        (  ) pigtail       (  ) PEG tube       (  ) colostomy       (  ) jejunostomy  (  ) U-Dall    LABS:                        11.9   7.68  )-----------( 251      ( 29 Mar 2024 07:12 )             33.6     03-29    130<L>  |  95<L>  |  16  ----------------------------<  71  4.6   |  20  |  0.9    Ca    9.2      29 Mar 2024 07:12  Mg     1.9     03-29    TPro  6.2  /  Alb  3.4<L>  /  TBili  0.5  /  DBili  x   /  AST  21  /  ALT  12  /  AlkPhos  101  03-29    PT/INR - ( 27 Mar 2024 12:30 )   PT: 13.10 sec;   INR: 1.15 ratio         PTT - ( 27 Mar 2024 12:30 )  PTT:31.7 sec  Urinalysis Basic - ( 29 Mar 2024 07:12 )    Color: x / Appearance: x / SG: x / pH: x  Gluc: 71 mg/dL / Ketone: x  / Bili: x / Urobili: x   Blood: x / Protein: x / Nitrite: x   Leuk Esterase: x / RBC: x / WBC x   Sq Epi: x / Non Sq Epi: x / Bacteria: x                RADIOLOGY:          
24H events:    Patient is a 88y old Male who presents with a chief complaint of s/p fall (30 Mar 2024 11:25)    Primary diagnosis of Hip fracture    Today is hospital day 4d. This morning patient was seen and examined at bedside, resting comfortably in bed.    No acute or major events overnight.    Code Status:    Family communication:  Contact date:  Name of person contacted:  Relationship to patient:  Communication details:  What matters most:    PAST MEDICAL & SURGICAL HISTORY  HTN (hypertension)    Neurogenic bladder    Waldron catheter in place  2016    Dementia    No significant past surgical history  Right Hip ORIF      SOCIAL HISTORY:  Social History:      ALLERGIES:  No Known Allergies    MEDICATIONS:  STANDING MEDICATIONS  acetaminophen     Tablet .. 975 milliGRAM(s) Oral every 8 hours  chlorhexidine 2% Cloths 1 Application(s) Topical daily  dextrose 5%. 1000 milliLiter(s) IV Continuous <Continuous>  dextrose 5%. 1000 milliLiter(s) IV Continuous <Continuous>  dextrose 50% Injectable 25 Gram(s) IV Push once  dextrose 50% Injectable 12.5 Gram(s) IV Push once  dextrose 50% Injectable 25 Gram(s) IV Push once  enoxaparin Injectable 40 milliGRAM(s) SubCutaneous every 24 hours  glucagon  Injectable 1 milliGRAM(s) IntraMuscular once  lisinopril 5 milliGRAM(s) Oral daily  metoprolol tartrate 25 milliGRAM(s) Oral two times a day  pantoprazole    Tablet 40 milliGRAM(s) Oral before breakfast  sodium chloride 1 Gram(s) Oral every 12 hours    PRN MEDICATIONS  dextrose Oral Gel 15 Gram(s) Oral once PRN  morphine  - Injectable 1 milliGRAM(s) IV Push every 6 hours PRN    VITALS:   T(F): 97.6  HR: 80  BP: 134/61  RR: 18  SpO2: 98%    PHYSICAL EXAM:    CONST: Well appearing in NAD  EYES: PERRL, EOMI, Sclera and conjunctiva clear.   ENT: Oropharynx normal appearing, no erythema or exudates. Uvula midline.  NECK: Non-tender, no c-spine tenderness.  CARD: Normal S1 S2; Normal rate and rhythm  RESP: Equal BS B/L, No wheezes, rhonchi or rales. No distress  GI: Soft, non-tender, non-distended.  MS: Can raise both extremities minimally off bed. MIld tenderness L hip. No tenderness R hip  SKIN: Warm, dry, no acute rashes. Good turgor  NEURO: A&Ox1 (name, No focal deficits. Strength 5/5 with no sensory deficits          (  ) Indwelling Waldron Catheter:   Date insterted:    Reason (  ) Critical illness     (  ) urinary retention    (  ) Accurate Ins/Outs Monitoring     (  ) CMO patient    (  ) Central Line:   Date inserted:  Location: (  ) Right IJ     (  ) Left IJ     (  ) Right Fem     (  ) Left Fem    (  ) SPC        (  ) pigtail       (  ) PEG tube       (  ) colostomy       (  ) jejunostomy  (  ) U-Dall    LABS:                        10.0   10.95 )-----------( 225      ( 30 Mar 2024 07:55 )             27.9     03-30    129<L>  |  95<L>  |  16  ----------------------------<  89  4.1   |  21  |  1.1    Ca    8.4      30 Mar 2024 07:55  Mg     1.6     03-30    TPro  5.4<L>  /  Alb  3.0<L>  /  TBili  0.2  /  DBili  x   /  AST  31  /  ALT  10  /  AlkPhos  93  03-30      Urinalysis Basic - ( 30 Mar 2024 07:55 )    Color: x / Appearance: x / SG: x / pH: x  Gluc: 89 mg/dL / Ketone: x  / Bili: x / Urobili: x   Blood: x / Protein: x / Nitrite: x   Leuk Esterase: x / RBC: x / WBC x   Sq Epi: x / Non Sq Epi: x / Bacteria: x                RADIOLOGY:          
INTERVAL HPI/ OVERNIGHT EVENTS:    Patient is an 89 y/o Male w/ PMHx of HTN, dementia, neurogenic bladder with chronic indwelling yañez, R hip fracture s/p internal fixation, presents for recurrent  fall with acute  left hip fracture. Admitted for left femoral neck fracture.     REVIEW OF SYSTEMS:  CONSTITUTIONAL: No fever, weight loss, or fatigue  EYES: No eye pain, visual disturbances, or discharge  ENMT:  No difficulty hearing, tinnitus, vertigo; No sinus or throat pain  NECK: No pain or stiffness  BREASTS: No pain, masses, or nipple discharge  RESPIRATORY: No cough, wheezing, chills or hemoptysis; No shortness of breath  CARDIOVASCULAR: No chest pain, palpitations, dizziness, or leg swelling  GASTROINTESTINAL: No abdominal or epigastric pain. No nausea, vomiting, or hematemesis; No diarrhea or constipation. No melena or hematochezia.  GENITOURINARY: No dysuria, frequency, hematuria, or incontinence  NEUROLOGICAL: No headaches, memory loss, loss of strength, numbness, or tremors  SKIN: No itching, burning, rashes, or lesions   LYMPH NODES: No enlarged glands  ENDOCRINE: No heat or cold intolerance; No hair loss  MUSCULOSKELETAL: left leg pain at sx site  PSYCHIATRIC: No depression, anxiety, mood swings, or difficulty sleeping  HEME/LYMPH: No easy bruising, or bleeding gums  ALLERY AND IMMUNOLOGIC: No hives or eczema    VITALS:  T(F): 97.1, Max: 98.2 (04-01-24 @ 21:18)  HR: 89  BP: 167/79  RR: 18  SpO2: 98%    PHYSICAL EXAM:  GENERAL: NAD,   HEAD:  Atraumatic, Normocephalic  EYES: EOMI, PERRLA, conjunctiva and sclera clear  ENMT: No tonsillar erythema, exudates, or enlargement; Moist mucous membranes, Good dentition, No lesions  NECK: Supple, No JVD, Normal thyroid  NERVOUS SYSTEM:  Alert & Oriented X3  CHEST/LUNG: Clear to percussion bilaterally; No rales, rhonchi, wheezing, or rubs  HEART: Regular rate and rhythm; No murmurs, rubs, or gallops  ABDOMEN: Soft, Nontender, Nondistended; Bowel sounds present  EXTREMITIES:  no edema  LYMPH: No lymphadenopathy noted  SKIN: No rashes or lesions      LABS:  Urinalysis Basic - ( 01 Apr 2024 07:42 )    Color: x / Appearance: x / SG: x / pH: x  Gluc: 86 mg/dL / Ketone: x  / Bili: x / Urobili: x   Blood: x / Protein: x / Nitrite: x   Leuk Esterase: x / RBC: x / WBC x   Sq Epi: x / Non Sq Epi: x / Bacteria: x      04-01    126<L>  |  91<L>  |  19  ----------------------------<  86  3.9   |  22  |  0.9    Ca    8.3<L>      01 Apr 2024 07:42  Mg     1.8     04-01    TPro  5.2<L>  /  Alb  2.8<L>  /  TBili  0.6  /  DBili  x   /  AST  29  /  ALT  8   /  AlkPhos  89  04-01                          8.5    7.70  )-----------( 197      ( 02 Apr 2024 07:39 )             23.7           RADIOLOGY & ADDITIONAL TESTS:    Imaging Personally Reviewed:  [ ] YES  [ ] NO    MEDICATIONS:     MEDICATIONS  (STANDING):  acetaminophen     Tablet .. 975 milliGRAM(s) Oral every 8 hours  chlorhexidine 2% Cloths 1 Application(s) Topical daily  dextrose 5%. 1000 milliLiter(s) (100 mL/Hr) IV Continuous <Continuous>  dextrose 5%. 1000 milliLiter(s) (50 mL/Hr) IV Continuous <Continuous>  dextrose 50% Injectable 25 Gram(s) IV Push once  dextrose 50% Injectable 25 Gram(s) IV Push once  dextrose 50% Injectable 12.5 Gram(s) IV Push once  enoxaparin Injectable 40 milliGRAM(s) SubCutaneous every 24 hours  glucagon  Injectable 1 milliGRAM(s) IntraMuscular once  lisinopril 5 milliGRAM(s) Oral daily  metoprolol tartrate 25 milliGRAM(s) Oral two times a day  pantoprazole    Tablet 40 milliGRAM(s) Oral before breakfast  sodium chloride 1 Gram(s) Oral every 12 hours    MEDICATIONS  (PRN):  dextrose Oral Gel 15 Gram(s) Oral once PRN Blood Glucose LESS THAN 70 milliGRAM(s)/deciliter  morphine  - Injectable 1 milliGRAM(s) IV Push every 6 hours PRN Severe Pain (7 - 10)      
ORTHOPEDIC POST-OP CHECK    Subjective: POD0 s/p L hip roosevelt arthroplasty. Seen and examined at bedside. Resting comfortably, no acute complaints.     MEDICATIONS  (STANDING):  acetaminophen     Tablet .. 975 milliGRAM(s) Oral every 8 hours  ceFAZolin   IVPB 2000 milliGRAM(s) IV Intermittent every 8 hours  chlorhexidine 2% Cloths 1 Application(s) Topical daily  dextrose 5%. 1000 milliLiter(s) (100 mL/Hr) IV Continuous <Continuous>  dextrose 5%. 1000 milliLiter(s) (50 mL/Hr) IV Continuous <Continuous>  dextrose 50% Injectable 12.5 Gram(s) IV Push once  dextrose 50% Injectable 25 Gram(s) IV Push once  dextrose 50% Injectable 25 Gram(s) IV Push once  glucagon  Injectable 1 milliGRAM(s) IntraMuscular once  lisinopril 5 milliGRAM(s) Oral daily  pantoprazole    Tablet 40 milliGRAM(s) Oral before breakfast  sodium chloride 1 Gram(s) Oral every 12 hours    MEDICATIONS  (PRN):  dextrose Oral Gel 15 Gram(s) Oral once PRN Blood Glucose LESS THAN 70 milliGRAM(s)/deciliter  morphine  - Injectable 1 milliGRAM(s) IV Push every 6 hours PRN Severe Pain (7 - 10)    Objective:  T(C): 36.3 (03-29-24 @ 12:45), Max: 36.7 (03-29-24 @ 06:20)  HR: 99 (03-29-24 @ 14:50) (77 - 103)  BP: 141/73 (03-29-24 @ 14:50) (141/73 - 183/96)  RR: 16 (03-29-24 @ 14:50) (16 - 20)  SpO2: 95% (03-29-24 @ 14:50) (94% - 100%)    Physical Exam:    LLE:   Dressing c/d/i  SILT s/s/sp/dp/t  Motor intact TA/GS/FHL/EHL  Digits [facility]    Labs:                        11.2   20.11 )-----------( 271      ( 29 Mar 2024 13:12 )             31.8     03-29    130<L>  |  95<L>  |  16  ----------------------------<  71  4.6   |  20  |  0.9    Ca    9.2      29 Mar 2024 07:12  Mg     1.9     03-29    TPro  6.2  /  Alb  3.4<L>  /  TBili  0.5  /  DBili  x   /  AST  21  /  ALT  12  /  AlkPhos  101  03-29      A/P: 88yMale POD0 s/p L hip roosevelt arthroplasty on 3/29, doing well.    - Activity: WBAT LLE  - Abx: Ancef q8hr for a total of 24hrs post-operatively  - DVT PPx: LVX/SQH per primary team  - Pain control  - IS encouraged  - AM labs  - PT/Rehab  - D/C planning    - If discharged, patient should follow up with Dr. Bass at [address] [place] [address]. Phone number [phone] for scheduling/appointment.  - Patient should be discharged on 6 weeks (from injury/surgery date) of appropriate DVT prophylaxis due to their decreased functional/ambulation status after lower extremity surgery/ injury. Orthopaedic preference would be Aspirin 81 mg BID  if there are no contraindications. If patient is already on home anticoagulation, they may continue home anticoagulation medication instead of aspirin. If patient is going to inpatient rehab/nursing facility, and they plan for DVT PPx with SQH/LVX, they may be placed on that instead of aspirin.
Orthopaedic Surgery Progress Note    S&E at bedside this AM. Pain well controlled.    T(C): 36.6 (03-31-24 @ 05:00), Max: 37.4 (03-30-24 @ 13:35)  HR: 89 (03-31-24 @ 05:00) (80 - 101)  BP: 154/67 (03-31-24 @ 05:00) (134/61 - 169/73)  RR: 18 (03-31-24 @ 05:00) (18 - 18)  SpO2: 98% (03-30-24 @ 20:43) (98% - 98%)    P/E:  NAD  Nonlabored breathing    LLE  Dressing in place, c/d/i  SILT sp/dp/t/sural/saph  Firing ta/ehl/fhl/gs  Foot WWP, 2+ DP pulse    Labs                        10.0   10.95 )-----------( 225      ( 30 Mar 2024 07:55 )             27.9     03-30    129<L>  |  95<L>  |  16  ----------------------------<  89  4.1   |  21  |  1.1    Ca    8.4      30 Mar 2024 07:55  Mg     1.6     03-30    TPro  5.4<L>  /  Alb  3.0<L>  /  TBili  0.2  /  DBili  x   /  AST  31  /  ALT  10  /  AlkPhos  93  03-30    LIVER FUNCTIONS - ( 30 Mar 2024 07:55 )  Alb: 3.0 g/dL / Pro: 5.4 g/dL / ALK PHOS: 93 U/L / ALT: 10 U/L / AST: 31 U/L / GGT: x           A/P:   87yo Male s/p left hip hemiarthroplasty on 3/29/24.    Weightbearing: WBAT LLE  DVT ppx: SCD, chemical ppx  Abx: Ancef 24 hours complete  Pain control  Incentive spirometer  Home meds  Trend labs  PT/OT/Rehab    - If discharged, patient should follow up with Dr. Bass at [address] [facility] phone [phone].  - Patient should be discharged on 6 weeks (from surgery date) of Aspirin 325mg daily for DVT prophylaxis as their mobility/function/ambulation will be decreased due to lower extremity orthopaedic surgery.          
The patient is an 88 year old male with a past medical history of dementia, hypertension, neurogenic bladder with chronic indwelling Waldron catheter, prior right hip fracture in 2008 s/p CMN, who presented from his skilled nursing facility on 3/27/2024 for evaluation of the left hip after a ground level fall on 3/12/2024. Per his SNF, he was ambulatory after the fall, but PT noticed a change in his gait about 1 week prior to presentation and an XR at his SNF demonstrated a possible left femoral neck fracture. He was sent to the emergency room for further evaluation. Evaluation in the emergency department demonstrated a left displaced subcapital femoral neck fracture. At baseline, the patient's son reports that the patient ambulates independently with no assistive devices.    On my exam in the preoperative holding area, the patient was resting comfortably in a hospital bed. In no acute distress with normal work of breathing. Examination of the left lower extremity demonstrated intact skin with a short and externally rotated leg. +Pain with log roll. He was unable to straight leg raise. +TA/GS motor intact. SILT s/t/s/dp/sp. Warm and well perfused.    AM labs:  Hgb 11.9  WBC 7.6  Plt 251  Na 130  Remainder of BMP wnl  Glucose 71  Admission coags wnl    Pelvis, left femur, and left knee xrays were reviewed, demonstrating a left displaced subcapital femoral neck fracture. s/p prior right hip cephalomedullary nailing (incompletely imaged) with healed fracture    Left hip CT obtained by the ED was reviewed, again demonstrating a displaced subcapital femoral neck fracture    Bilateral lower extremity Dopplers demonstrated no evidence of DVT    #Left subacute closed displaced subcapital femoral neck fracture    For this injury, the patient is indicated for surgical stabilization with left hip cemented hemiarthroplasty. The patient did not have capacity to provide informed consent and I discussed the risks, benefits, and alternatives of the proposed surgical procedure with the patient's son. The risks included but were not limited to bleeding, infection, injury to nearby nerves/vessels/structures, leg length discrepancy, hip instability, implant failure, functional impairment, wound healing complications, anesthesia related complications, need for reoperation, blood clots, stroke, cardiac arrest, loss of limb, and even death. I discussed the postoperative recovery and rehabilitation. The patient's son understood and all questions were answered. He elected to proceed with the proposed surgical procedure.    Preoperative medical optimization and risk stratification were completed.    Plan to proceed with Left hip cemented hemiarthroplasty
post op check    resting   seen with team   dressing intact  plan OOB in AM
24H events:    Patient is a 88y old Male who presents with a chief complaint of Fall, hip fracture (02 Apr 2024 11:25)    Primary diagnosis of Hip fracture      Today is 6d of hospitalization. This morning patient was seen and examined at bedside, resting comfortably in bed.  No acute or major events overnight.     Code Status: Full      PAST MEDICAL & SURGICAL HISTORY  HTN (hypertension)    Neurogenic bladder    Waldron catheter in place  2016    Dementia    No significant past surgical history  Right Hip ORIF      SOCIAL HISTORY:  Social History:      ALLERGIES:  No Known Allergies    MEDICATIONS:  STANDING MEDICATIONS  acetaminophen     Tablet .. 975 milliGRAM(s) Oral every 8 hours  chlorhexidine 2% Cloths 1 Application(s) Topical daily  dextrose 5%. 1000 milliLiter(s) IV Continuous <Continuous>  dextrose 5%. 1000 milliLiter(s) IV Continuous <Continuous>  dextrose 50% Injectable 12.5 Gram(s) IV Push once  dextrose 50% Injectable 25 Gram(s) IV Push once  dextrose 50% Injectable 25 Gram(s) IV Push once  enoxaparin Injectable 40 milliGRAM(s) SubCutaneous every 24 hours  glucagon  Injectable 1 milliGRAM(s) IntraMuscular once  lisinopril 5 milliGRAM(s) Oral daily  metoprolol tartrate 25 milliGRAM(s) Oral two times a day  pantoprazole    Tablet 40 milliGRAM(s) Oral before breakfast  senna 2 Tablet(s) Oral at bedtime  sodium chloride 1 Gram(s) Oral every 12 hours    PRN MEDICATIONS  dextrose Oral Gel 15 Gram(s) Oral once PRN  morphine  - Injectable 1 milliGRAM(s) IV Push every 6 hours PRN  polyethylene glycol 3350 17 Gram(s) Oral daily PRN    VITALS:   T(F): 98  HR: 85  BP: 141/74  RR: 18  SpO2: 97%    PHYSICAL EXAM:  GENERAL:   ( x) NAD, lying in bed comfortably     (  ) obtunded     (  ) lethargic     (  ) somnolent    HEAD:   ( x) Atraumatic     (  ) hematoma     (  ) laceration (specify location:       )     NECK:  (x) Supple     (  ) neck stiffness     (  ) nuchal rigidity     (  )  no JVD     (  ) JVD present ( -- cm)    HEART:  Rate -->     (x) normal rate     (  ) bradycardic     (  ) tachycardic  Rhythm -->     (x) regular     (  ) regularly irregular     (  ) irregularly irregular  Murmurs -->     (x) normal s1s2     (  ) systolic murmur     (  ) diastolic murmur     (  ) continuous murmur      (  ) S3 present     (  ) S4 present    LUNGS:   ( x)Unlabored respirations     (  ) tachypnea  ( x) B/L air entry     (  ) decreased breath sounds in:  (location     )    ( x) no adventitious sound     (  ) crackles     (  ) wheezing      (  ) rhonchi      (specify location:       )  (  ) chest wall tenderness (specify location:       )    ABDOMEN:   ( x) Soft     (  ) tense   |   (X  ) nondistended     (  ) distended   |   ( X ) +BS     (  ) hypoactive bowel sounds     (  ) hyperactive bowel sounds  ( x) nontender     (  ) RUQ tenderness     (  ) RLQ tenderness     (  ) LLQ tenderness     (  ) epigastric tenderness     (  ) diffuse tenderness  (  ) Splenomegaly      (  ) Hepatomegaly      (  ) Jaundice     (  ) ecchymosis     EXTREMITIES: dressing clean dry intact over left hip  ( x) Normal     (  ) Rash     (  ) ecchymosis     (  ) varicose veins      (  ) pitting edema     (  ) non-pitting edema   (  ) ulceration     (  ) gangrene:     (location:     )    NERVOUS SYSTEM:    ( x) A&Ox1-2     (  ) confused     (  ) lethargic  CN II-XII:     (  ) Intact     (  ) deficits found     (Specify:     )   Upper extremities:     (  ) no sensorimotor deficits     (  ) weakness     (  ) loss of proprioception/vibration     (  ) loss of touch/temperature (specify:    )  Lower extremities:     (  ) no sensorimotor deficits     (  ) weakness     (  ) loss of proprioception/vibration     (  ) loss of touch/temperature (specify:    )    SKIN:   ( X ) No rashes or lesions     (  ) maculopapular rash     (  ) pustules     (  ) vesicles     (  ) ulcer     (  ) ecchymosis     (specify location:     )      LABS:                        8.5    7.70  )-----------( 197      ( 02 Apr 2024 07:39 )             23.7     04-02    129<L>  |  94<L>  |  21<H>  ----------------------------<  90  3.7   |  22  |  0.9    Ca    8.4      02 Apr 2024 07:39  Mg     1.8     04-02    TPro  5.1<L>  /  Alb  2.7<L>  /  TBili  0.5  /  DBili  x   /  AST  24  /  ALT  8   /  AlkPhos  88  04-02      Urinalysis Basic - ( 02 Apr 2024 07:39 )    Color: x / Appearance: x / SG: x / pH: x  Gluc: 90 mg/dL / Ketone: x  / Bili: x / Urobili: x   Blood: x / Protein: x / Nitrite: x   Leuk Esterase: x / RBC: x / WBC x   Sq Epi: x / Non Sq Epi: x / Bacteria: x                RADIOLOGY:                
24H events:    Patient is a 88y old Male who presents with a chief complaint of fall (04 Apr 2024 12:38)    Primary diagnosis of Hip fracture      Today is 10d of hospitalization. This morning patient was seen and examined at bedside, resting comfortably in bed.  No acute or major events overnight.     Code Status: Full      PAST MEDICAL & SURGICAL HISTORY  HTN (hypertension)    Neurogenic bladder    Waldron catheter in place  2016    Dementia    No significant past surgical history  Right Hip ORIF      SOCIAL HISTORY:  Social History:      ALLERGIES:  No Known Allergies    MEDICATIONS:  STANDING MEDICATIONS  chlorhexidine 2% Cloths 1 Application(s) Topical daily  dextrose 5%. 1000 milliLiter(s) IV Continuous <Continuous>  dextrose 5%. 1000 milliLiter(s) IV Continuous <Continuous>  dextrose 50% Injectable 25 Gram(s) IV Push once  dextrose 50% Injectable 25 Gram(s) IV Push once  dextrose 50% Injectable 12.5 Gram(s) IV Push once  enoxaparin Injectable 40 milliGRAM(s) SubCutaneous every 24 hours  glucagon  Injectable 1 milliGRAM(s) IntraMuscular once  lactulose Syrup 15 Gram(s) Oral daily  lisinopril 5 milliGRAM(s) Oral daily  metoprolol tartrate 25 milliGRAM(s) Oral two times a day  pantoprazole    Tablet 40 milliGRAM(s) Oral before breakfast  senna 2 Tablet(s) Oral at bedtime  sodium chloride 1 Gram(s) Oral every 12 hours    PRN MEDICATIONS  bisacodyl Suppository 10 milliGRAM(s) Rectal daily PRN  dextrose Oral Gel 15 Gram(s) Oral once PRN  polyethylene glycol 3350 17 Gram(s) Oral daily PRN    VITALS:   T(F): 96.9  HR: 89  BP: 118/62  RR: 18  SpO2: 97%    PHYSICAL EXAM:  GENERAL:   ( x) NAD, lying in bed comfortably     (  ) obtunded     (  ) lethargic     (  ) somnolent    HEAD:   ( x) Atraumatic     (  ) hematoma     (  ) laceration (specify location:       )     NECK:  (x) Supple     (  ) neck stiffness     (  ) nuchal rigidity     (  )  no JVD     (  ) JVD present ( -- cm)    HEART:  Rate -->     (x) normal rate     (  ) bradycardic     (  ) tachycardic  Rhythm -->     (x) regular     (  ) regularly irregular     (  ) irregularly irregular  Murmurs -->     (x) normal s1s2     (  ) systolic murmur     (  ) diastolic murmur     (  ) continuous murmur      (  ) S3 present     (  ) S4 present    LUNGS:   ( x)Unlabored respirations     (  ) tachypnea  ( x) B/L air entry     (  ) decreased breath sounds in:  (location     )    ( x) no adventitious sound     (  ) crackles     (  ) wheezing      (  ) rhonchi      (specify location:       )  (  ) chest wall tenderness (specify location:       )    ABDOMEN:   ( x) Soft     (  ) tense   |   (X  ) nondistended     (  ) distended   |   ( X ) +BS     (  ) hypoactive bowel sounds     (  ) hyperactive bowel sounds  ( x) nontender     (  ) RUQ tenderness     (  ) RLQ tenderness     (  ) LLQ tenderness     (  ) epigastric tenderness     (  ) diffuse tenderness  (  ) Splenomegaly      (  ) Hepatomegaly      (  ) Jaundice     (  ) ecchymosis     EXTREMITIES: dressing clean dry intact over left hip  ( x) Normal     (  ) Rash     (  ) ecchymosis     (  ) varicose veins      (  ) pitting edema     (  ) non-pitting edema   (  ) ulceration     (  ) gangrene:     (location:     )    NERVOUS SYSTEM:    ( x) A&Ox2     (  ) confused     (  ) lethargic  CN II-XII:     (  ) Intact     (  ) deficits found     (Specify:     )   Upper extremities:     (  ) no sensorimotor deficits     (  ) weakness     (  ) loss of proprioception/vibration     (  ) loss of touch/temperature (specify:    )  Lower extremities:     (  ) no sensorimotor deficits     (  ) weakness     (  ) loss of proprioception/vibration     (  ) loss of touch/temperature (specify:    )    SKIN:   ( X ) No rashes or lesions     (  ) maculopapular rash     (  ) pustules     (  ) vesicles     (  ) ulcer     (  ) ecchymosis     (specify location:     )    LABS:                        9.4    9.31  )-----------( 279      ( 06 Apr 2024 08:00 )             27.6     04-06    125<L>  |  88<L>  |  14  ----------------------------<  79  3.9   |  25  |  0.9    Ca    8.8      06 Apr 2024 08:00  Mg     1.7     04-06        Urinalysis Basic - ( 06 Apr 2024 08:00 )    Color: x / Appearance: x / SG: x / pH: x  Gluc: 79 mg/dL / Ketone: x  / Bili: x / Urobili: x   Blood: x / Protein: x / Nitrite: x   Leuk Esterase: x / RBC: x / WBC x   Sq Epi: x / Non Sq Epi: x / Bacteria: x                RADIOLOGY:                
Hospital Day:  11d    Subjective:    Patient is a 88y old  Male who presents with a chief complaint of Fall (06 Apr 2024 10:55)    This morning patient is lying in bed comfortably. he reports no new complaints, including SOB, chest pain, abdominal pain, nausea, vomiting, dysuria, constipation, or diarrhea.      Past Medical Hx:   HTN (hypertension)    Neurogenic bladder    Waldron catheter in place    Dementia      Past Sx:  No significant past surgical history      Allergies:  No Known Allergies    Current Meds:   Standng Meds:  chlorhexidine 2% Cloths 1 Application(s) Topical daily  dextrose 5%. 1000 milliLiter(s) (100 mL/Hr) IV Continuous <Continuous>  dextrose 5%. 1000 milliLiter(s) (50 mL/Hr) IV Continuous <Continuous>  dextrose 50% Injectable 25 Gram(s) IV Push once  dextrose 50% Injectable 25 Gram(s) IV Push once  dextrose 50% Injectable 12.5 Gram(s) IV Push once  enoxaparin Injectable 40 milliGRAM(s) SubCutaneous every 24 hours  glucagon  Injectable 1 milliGRAM(s) IntraMuscular once  lactulose Syrup 15 Gram(s) Oral daily  lisinopril 5 milliGRAM(s) Oral daily  magnesium oxide 400 milliGRAM(s) Oral three times a day with meals  metoprolol tartrate 25 milliGRAM(s) Oral two times a day  pantoprazole    Tablet 40 milliGRAM(s) Oral before breakfast  senna 2 Tablet(s) Oral at bedtime  sodium chloride 1 Gram(s) Oral every 12 hours    PRN Meds:  bisacodyl Suppository 10 milliGRAM(s) Rectal daily PRN Constipation  dextrose Oral Gel 15 Gram(s) Oral once PRN Blood Glucose LESS THAN 70 milliGRAM(s)/deciliter  polyethylene glycol 3350 17 Gram(s) Oral daily PRN Constipation    HOME MEDICATIONS:  acetaminophen 325 mg oral tablet: 3 tab(s) orally every 8 hours  ferrous fumarate 325 mg (106 mg elemental iron) oral tablet: 1 tab(s) orally once a day  lisinopril 5 mg oral tablet: 1 tab(s) orally once a day  morphine: 1 milligram(s) orally every 6 hours as needed for  severe pain  pantoprazole 40 mg oral delayed release tablet: 1 tab(s) orally once a day (before a meal)  polyethylene glycol 3350 oral powder for reconstitution: 17 gram(s) orally once a day As needed Constipation  senna leaf extract oral tablet: 2 tab(s) orally once a day (at bedtime) as needed for  constipation      Vital Signs:   T(F): 96.5 (04-07-24 @ 06:06), Max: 97 (04-06-24 @ 21:01)  HR: 92 (04-07-24 @ 06:06) (86 - 98)  BP: 108/60 (04-07-24 @ 06:06) (103/57 - 108/60)  RR: 18 (04-07-24 @ 06:06) (17 - 18)  SpO2: 97% (04-07-24 @ 06:06) (97% - 98%)      04-06-24 @ 07:01  -  04-07-24 @ 07:00  --------------------------------------------------------  IN: 220 mL / OUT: 675 mL / NET: -455 mL        Physical Exam:   GENERAL: NAD  HEENT: NCAT  CHEST/LUNG: CTAB  HEART: Regular rate and rhythm; s1 s2 appreciated, No murmurs, rubs, or gallops  ABDOMEN: Soft, Nontender, Nondistended; Bowel sounds present  EXTREMITIES: No LE edema b/l  SKIN: no rashes, no new lesions  NERVOUS SYSTEM:  Alert & Oriented X3  LINES/CATHETERS:        Labs:                         9.4    9.31  )-----------( 279      ( 06 Apr 2024 08:00 )             27.6       06 Apr 2024 08:00    125    |  88     |  14     ----------------------------<  79     3.9     |  25     |  0.9      Ca    8.8        06 Apr 2024 08:00  Mg     1.7       06 Apr 2024 08:00                      Urinalysis Basic - ( 06 Apr 2024 08:00 )    Color: x / Appearance: x / SG: x / pH: x  Gluc: 79 mg/dL / Ketone: x  / Bili: x / Urobili: x   Blood: x / Protein: x / Nitrite: x   Leuk Esterase: x / RBC: x / WBC x   Sq Epi: x / Non Sq Epi: x / Bacteria: x                Assessment and Plan:   
s/p left hip hemiarthroplasty pod 3  pt seen at bedside.   no complaints.   pain controlled     Vital Signs Last 24 Hrs  T(C): 36.8 (01 Apr 2024 05:00), Max: 36.8 (31 Mar 2024 21:00)  T(F): 98.3 (01 Apr 2024 05:00), Max: 98.3 (31 Mar 2024 21:00)  HR: 95 (01 Apr 2024 05:00) (89 - 103)  BP: 188/92 (01 Apr 2024 05:00) (139/81 - 188/92)  BP(mean): --  RR: 18 (01 Apr 2024 05:00) (18 - 18)  SpO2: 97% (31 Mar 2024 21:00) (97% - 98%)                          9.5    11.60 )-----------( 204      ( 31 Mar 2024 09:18 )             26.6       left hip:     dressing in place c/d/i   nvid   calf soft nt   df/pf intact 
INTERVAL HPI/OVERNIGHT EVENTS:  Patient is an 89 y/o Male w/ PMHx of HTN, dementia, neurogenic bladder with chronic indwelling yañez, R hip fracture s/p internal fixation, presents for recurrent  fall with acute  left hip fracture. Admitted for left femoral neck fracture.   s/p surgery  c/o mild pain at surgery site      REVIEW OF SYSTEMS:  CONSTITUTIONAL: No fever, weight loss, or fatigue  EYES: No eye pain, visual disturbances, or discharge  ENMT:  No difficulty hearing, tinnitus, vertigo; No sinus or throat pain  NECK: No pain or stiffness  BREASTS: No pain, masses, or nipple discharge  RESPIRATORY: No cough, wheezing, chills or hemoptysis; No shortness of breath  CARDIOVASCULAR: No chest pain, palpitations, dizziness, or leg swelling  GASTROINTESTINAL: No abdominal or epigastric pain. No nausea, vomiting, or hematemesis; No diarrhea or constipation. No melena or hematochezia.  GENITOURINARY: No dysuria, frequency, hematuria, or incontinence  NEUROLOGICAL: No headaches, memory loss, loss of strength, numbness, or tremors  SKIN: No itching, burning, rashes, or lesions   LYMPH NODES: No enlarged glands  ENDOCRINE: No heat or cold intolerance; No hair loss  MUSCULOSKELETAL: Left leg pain  PSYCHIATRIC: No depression, anxiety, mood swings, or difficulty sleeping  HEME/LYMPH: No easy bruising, or bleeding gums  ALLERY AND IMMUNOLOGIC: No hives or eczema    VITALS:  T(F): 97.9, Max: 98.3 (03-31-24 @ 21:00)  HR: 89  BP: 138/70  RR: 19  SpO2: 98%    PHYSICAL EXAM:  GENERAL: NAD  HEAD:  Atraumatic, Normocephalic  EYES: EOMI, PERRLA, conjunctiva and sclera clear  ENMT: No tonsillar erythema, exudates,   NECK: Supple, No JVD, Normal thyroid  NERVOUS SYSTEM:  Alert & Oriented X3,   CHEST/LUNG: Clear to percussion bilaterally; No rales, rhonchi, wheezing, or rubs  HEART: Regular rate and rhythm; No murmurs, rubs, or gallops  ABDOMEN: Soft, Nontender, Nondistended; Bowel sounds present  EXTREMITIES: no edema  LYMPH: No lymphadenopathy noted  SKIN: No rashes or lesions      LABS:  Urinalysis Basic - ( 01 Apr 2024 07:42 )    Color: x / Appearance: x / SG: x / pH: x  Gluc: 86 mg/dL / Ketone: x  / Bili: x / Urobili: x   Blood: x / Protein: x / Nitrite: x   Leuk Esterase: x / RBC: x / WBC x   Sq Epi: x / Non Sq Epi: x / Bacteria: x      04-01    126<L>  |  91<L>  |  19  ----------------------------<  86  3.9   |  22  |  0.9    Ca    8.3<L>      01 Apr 2024 07:42  Mg     1.8     04-01    TPro  5.2<L>  /  Alb  2.8<L>  /  TBili  0.6  /  DBili  x   /  AST  29  /  ALT  8   /  AlkPhos  89  04-01                          9.0    11.02 )-----------( 204      ( 01 Apr 2024 07:42 )             25.2           RADIOLOGY & ADDITIONAL TESTS:    Imaging Personally Reviewed:  [ ] YES  [ ] NO    MEDICATIONS:     MEDICATIONS  (STANDING):  acetaminophen     Tablet .. 975 milliGRAM(s) Oral every 8 hours  chlorhexidine 2% Cloths 1 Application(s) Topical daily  dextrose 5%. 1000 milliLiter(s) (100 mL/Hr) IV Continuous <Continuous>  dextrose 5%. 1000 milliLiter(s) (50 mL/Hr) IV Continuous <Continuous>  dextrose 50% Injectable 25 Gram(s) IV Push once  dextrose 50% Injectable 25 Gram(s) IV Push once  dextrose 50% Injectable 12.5 Gram(s) IV Push once  enoxaparin Injectable 40 milliGRAM(s) SubCutaneous every 24 hours  glucagon  Injectable 1 milliGRAM(s) IntraMuscular once  lisinopril 5 milliGRAM(s) Oral daily  metoprolol tartrate 25 milliGRAM(s) Oral two times a day  pantoprazole    Tablet 40 milliGRAM(s) Oral before breakfast  sodium chloride 1 Gram(s) Oral every 12 hours    MEDICATIONS  (PRN):  dextrose Oral Gel 15 Gram(s) Oral once PRN Blood Glucose LESS THAN 70 milliGRAM(s)/deciliter  morphine  - Injectable 1 milliGRAM(s) IV Push every 6 hours PRN Severe Pain (7 - 10)

## 2024-04-07 NOTE — PROGRESS NOTE ADULT - PROVIDER SPECIALTY LIST ADULT
Hospitalist
Internal Medicine
Orthopedics
Hospitalist
Hospitalist
Internal Medicine
Orthopedics
Hospitalist
Internal Medicine
Hospitalist

## 2024-04-07 NOTE — PROGRESS NOTE ADULT - ASSESSMENT
87 y/o Male w/ PMHx of HTN, dementia, neurogenic bladder with chronic indwelling yañez, R hip fracture s/p internal fixation, presents for recurrent  fall found to have acute left hip fracture s/p hemiarthroplasty.     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  
87 y/o Male w/ PMHx of HTN, dementia, neurogenic bladder with chronic indwelling yañez, R hip fracture s/p internal fixation, presents for recurrent  fall found to have acute left hip fracture s/p hemiarthroplasty.     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  
87yo male with PMHx HTN, dementia, neurogenic bladder with chronic indwelling yañez, R hip fracture s/p internal fixation, presents for evaluation s/p fall and questionable L hip fracture on outpatient SNF XR. Pt c/o L hip pain but has reported he has been walking. Unable to obtain any further clarification as patient has dementia. As per SNF RN supervisor Lin, pt had a mechanical fall on , no LOC, was ambulatory immediately after. PT noticed one week prior patient's gait seemed different and he was c/o L hip pain. XR obtained of pelvis which demonstrated questionable L subcap fx. SNF MD sent to ED for evaluation with L hip CT.    In the ED: BP: 163/86, HR: 83, RR: 17, Temp: 98, SpO2: 97% on RA    Labs: WBC: 9.56, H.1 at baseline  - Na: 123    CT left hip: Subacute appearing subcapital left femoral neck fracture with superior displacement, varus angulation and external rotation of the femoral shaft from the femoral head.      Pt admitted for further management    #Recurrent Falls  #Subcapital left femoral neck fracture:  - Patient has recent two falls 3/04, 3/12...  - For fall on 3/4, pt was seen in [facility]. no acute fracture was noted at that visit. Patient was discharged to [facility] at that visit.   - Patient came again to the [facility] on 3/27 for evaluation of  a fall that happened on 3/12   - CT left hip: 3/27: Subacute appearing subcapital left femoral neck fracture with superior displacement, varus angulation and external rotation of the femoral shaft from the femoral head.  - Xray  left femur and knee -- no acute fracture   - As per ortho: Plan for OR in AM 3/28, NPO after MN  - s/p left hip hemiarthroplasty on 3/29/24.  - morphine 1mg q6hr prn for severe pain       #Acute on chronic hyponatremia  - Previous reading between 125-130  - d5 NS 75 ml/hr for 5 hours and monitor   - start on po Nacl 1gm q12  - send urine studies for hyponatremia      #Chronic normocytic anemia:  - Hg at baseline   - Monitor post op  - T&S active     # HTN / HLD  - c/w home lisinopril    # Neurogenic Bladder (refused self catheterization in the passed) w/ chronic yañez catheter   #Hx of TURP  #Hx CKD3 (Baseline Cr 1.3-1.5)  - avoid nephrotoxic medications   - monitor bmp       DVT prophylaxis: LOVENOX   Activity:  Diet: dash  Dispo:  Code: full     Patient son updated at bedside..  Dispo: Pending PT recommendations
89yo male with PMHx HTN, dementia, neurogenic bladder with chronic indwelling yañez, R hip fracture s/p internal fixation, presents for evaluation s/p fall and questionable L hip fracture on outpatient SNF XR. Pt c/o L hip pain but has reported he has been walking. Unable to obtain any further clarification as patient has dementia. As per SNF RN supervisor Lin, pt had a mechanical fall on , no LOC, was ambulatory immediately after. PT noticed one week prior patient's gait seemed different and he was c/o L hip pain. XR obtained of pelvis which demonstrated questionable L subcap fx. SNF MD sent to ED for evaluation with L hip CT.    In the ED: BP: 163/86, HR: 83, RR: 17, Temp: 98, SpO2: 97% on RA    Labs: WBC: 9.56, H.1 at baseline  - Na: 123    CT left hip: Subacute appearing subcapital left femoral neck fracture with superior displacement, varus angulation and external rotation of the femoral shaft from the femoral head.      Pt admitted for further management    #Recurrent Falls  #Subcapital left femoral neck fracture:  - Patient has recent two falls 3/04, 3/12...  - For fall on 3/4, pt was seen in [facility]. no acute fracture was noted at that visit. Patient was discharged to [facility] at that visit.   - Patient came again to the [facility] on 3/27 for evaluation of  a fall that happened on 3/12   - CT left hip: 3/27: Subacute appearing subcapital left femoral neck fracture with superior displacement, varus angulation and external rotation of the femoral shaft from the femoral head.  - Xray  left femur and knee -- no acute fracture   - As per ortho: Plan for OR in AM 3/28, NPO after MN  - Medical clearance   - morphine 1mg q6hr prn for severe pain       #Acute on chronic hyponatremia  - Previous reading between 125-130  - d5 NS 75 ml/hr for 5 hours and monitor   - send urine studies for hyponatremia      #Chronic normocytic anemia:  - Hg at baseline   - Monitor post op  - T&S active     # HTN / HLD  - c/w home lisinopril    # Neurogenic Bladder (refused self catheterization in the passed) w/ chronic yañez catheter   #Hx of TURP  #Hx CKD3 (Baseline Cr 1.3-1.5)  - avoid nephrotoxic medications   - monitor bmp         DVT prophylaxis: LOVENOX   Activity:  Diet: NPO   Dispo:  Code: full     Patient son updated at bedside.     Pending:  pending OR.. if surgery cancelled today put npo midnight.... 
Patient is an 89 y/o Male w/ PMHx of HTN, dementia, neurogenic bladder with chronic indwelling yañez, R hip fracture s/p internal fixation, presents for recurrent  fall with acute  left hip fracture. Admitted for left femoral neck fracture.     #Subcapital left femoral neck fracture  - CT left hip noted   - s/p eval by ortho in ED:  post op on  3/29  - post op care as per Ortho team rec. WBAT, PT/OT daily tx. as tolerated    - pain management, bowel regimen    #Acute on chronic hyponatremia - asymptomatic   likely SIADH  free water restriction to 1 Litre per day    # Normocytic anemia- normal  Iron levels, stable h/h    #HTN/#HLD  #neurogenic bladder w/ chronic yañez  #CKD 3   - Cr at baseline  -[facility]. HTN/tachycardic- improved HR on Lopressor 25 mg po twice daily ,  c/w home meds     DVT ppx: lovenox  Code status: full code    discharge planning to Rehab
87 y/o Male w/ PMHx of HTN, dementia, neurogenic bladder with chronic indwelling yañez, R hip fracture s/p internal fixation, presents for recurrent  fall found to have acute left hip fracture s/p hemiarthroplasty.     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
89 y/o Male w/ PMHx of HTN, dementia, neurogenic bladder with chronic indwelling yañez, R hip fracture s/p internal fixation, presents for recurrent  fall found to have acute left hip fracture s/p hemiarthroplasty.     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    
s/p left hip hemiarthroplasty pod 3    pain control  dvt prophylaxis please continue for 30 days post op  PT REHAB   MEDICAL MANAGEMENT   keep dressing clean and dry   if increased pain fever swelling or discharge call md office   suture removal in office 3 weeks post op   please follow up with dr shayna reese [phone]   call [phone] for appointment   
87 y/o Male w/ PMHx of HTN, dementia, neurogenic bladder with chronic indwelling yañez, R hip fracture s/p internal fixation, presents for recurrent  fall found to have acute left hip fracture s/p hemiarthroplasty.     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  
89 y/o Male w/ PMHx of HTN, dementia, neurogenic bladder with chronic indwelling yañez, R hip fracture s/p internal fixation, presents for recurrent  fall found to have acute left hip fracture s/p hemiarthroplasty.     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    
89 y/o Male w/ PMHx of HTN, dementia, neurogenic bladder with chronic indwelling yañze, R hip fracture s/p internal fixation, presents for recurrent  fall found to have acute left hip fracture s/p hemiarthroplasty.     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  
89yo male with PMHx HTN, dementia, neurogenic bladder with chronic indwelling yañez, R hip fracture s/p internal fixation, presents for evaluation s/p fall and questionable L hip fracture on outpatient SNF XR. Pt c/o L hip pain but has reported he has been walking. Unable to obtain any further clarification as patient has dementia. As per SNF RN supervisor Lin, pt had a mechanical fall on , no LOC, was ambulatory immediately after. PT noticed one week prior patient's gait seemed different and he was c/o L hip pain. XR obtained of pelvis which demonstrated questionable L subcap fx. SNF MD sent to ED for evaluation with L hip CT.    In the ED: BP: 163/86, HR: 83, RR: 17, Temp: 98, SpO2: 97% on RA    Labs: WBC: 9.56, H.1 at baseline  - Na: 123    CT left hip: Subacute appearing subcapital left femoral neck fracture with superior displacement, varus angulation and external rotation of the femoral shaft from the femoral head.      Pt admitted for further management    #Recurrent Falls  #Subcapital left femoral neck fracture:  - Patient has recent two falls 3/04, 3/12...  - For fall on 3/4, pt was seen in [facility]. no acute fracture was noted at that visit. Patient was discharged to [facility] at that visit.   - Patient came again to the [facility] on 3/27 for evaluation of  a fall that happened on 3/12   - CT left hip: 3/27: Subacute appearing subcapital left femoral neck fracture with superior displacement, varus angulation and external rotation of the femoral shaft from the femoral head.  - Xray  left femur and knee -- no acute fracture   - As per ortho: Plan for OR in AM 3/28, NPO after MN  - Medical clearance   - morphine 1mg q6hr prn for severe pain       #Acute on chronic hyponatremia  - Previous reading between 125-130  - d5 NS 75 ml/hr for 5 hours and monitor   - start on po Nacl 1gm q12  - send urine studies for hyponatremia      #Chronic normocytic anemia:  - Hg at baseline   - Monitor post op  - T&S active     # HTN / HLD  - c/w home lisinopril    # Neurogenic Bladder (refused self catheterization in the passed) w/ chronic yañez catheter   #Hx of TURP  #Hx CKD3 (Baseline Cr 1.3-1.5)  - avoid nephrotoxic medications   - monitor bmp       DVT prophylaxis: LOVENOX   Activity:  Diet: NPO   Dispo:  Code: full     Patient son updated at bedside...    Pending:  pending OR..
Patient is an 89 y/o Male w/ PMHx of HTN, dementia, neurogenic bladder with chronic indwelling yañez, R hip fracture s/p internal fixation, presents for recurrent  fall with acute  left hip fracture. Admitted for left femoral neck fracture.     #Subcapital left femoral neck fracture  - CT left hip noted   - s/p eval by ortho in ED:  post op on  3/29  - post op care as per Ortho team rec. WBAT, PT/OT daily tx. as tolerated    - pain management, bowel regimen    #Acute on chronic hyponatremia - asymptomatic   likely SIADH  free water restriction to 1 Litre per day    # Normocytic anemia- normal  Iron levels, stable h/h      #HTN/#HLD  #neurogenic bladder w/ chronic yañez  #CKD 3   #mild cognitive impairment  - Cr at baseline  -[facility] hospital. HTN/tachycardic- improved HR on Lopressor 25 mg po twice daily ,  c/w home meds     DVT ppx: lovenox  Code status: full code    discharge planning to Rehab

## 2024-04-08 ENCOUNTER — TRANSCRIPTION ENCOUNTER (OUTPATIENT)
Age: 89
End: 2024-04-08

## 2024-04-08 VITALS
HEART RATE: 83 BPM | OXYGEN SATURATION: 97 % | DIASTOLIC BLOOD PRESSURE: 54 MMHG | RESPIRATION RATE: 15 BRPM | SYSTOLIC BLOOD PRESSURE: 99 MMHG

## 2024-04-08 LAB
ANION GAP SERPL CALC-SCNC: 9 MMOL/L — SIGNIFICANT CHANGE UP (ref 7–14)
BASOPHILS # BLD AUTO: 0.03 K/UL — SIGNIFICANT CHANGE UP (ref 0–0.2)
BASOPHILS NFR BLD AUTO: 0.4 % — SIGNIFICANT CHANGE UP (ref 0–1)
BUN SERPL-MCNC: 18 MG/DL — SIGNIFICANT CHANGE UP (ref 10–20)
CALCIUM SERPL-MCNC: 8.8 MG/DL — SIGNIFICANT CHANGE UP (ref 8.4–10.5)
CHLORIDE SERPL-SCNC: 94 MMOL/L — LOW (ref 98–110)
CO2 SERPL-SCNC: 26 MMOL/L — SIGNIFICANT CHANGE UP (ref 17–32)
CREAT SERPL-MCNC: 1 MG/DL — SIGNIFICANT CHANGE UP (ref 0.7–1.5)
EGFR: 73 ML/MIN/1.73M2 — SIGNIFICANT CHANGE UP
EOSINOPHIL # BLD AUTO: 0.18 K/UL — SIGNIFICANT CHANGE UP (ref 0–0.7)
EOSINOPHIL NFR BLD AUTO: 2.5 % — SIGNIFICANT CHANGE UP (ref 0–8)
GLUCOSE SERPL-MCNC: 83 MG/DL — SIGNIFICANT CHANGE UP (ref 70–99)
HCT VFR BLD CALC: 24.2 % — LOW (ref 42–52)
HGB BLD-MCNC: 8.4 G/DL — LOW (ref 14–18)
IMM GRANULOCYTES NFR BLD AUTO: 0.7 % — HIGH (ref 0.1–0.3)
LYMPHOCYTES # BLD AUTO: 0.99 K/UL — LOW (ref 1.2–3.4)
LYMPHOCYTES # BLD AUTO: 13.7 % — LOW (ref 20.5–51.1)
MAGNESIUM SERPL-MCNC: 2 MG/DL — SIGNIFICANT CHANGE UP (ref 1.8–2.4)
MCHC RBC-ENTMCNC: 29 PG — SIGNIFICANT CHANGE UP (ref 27–31)
MCHC RBC-ENTMCNC: 34.7 G/DL — SIGNIFICANT CHANGE UP (ref 32–37)
MCV RBC AUTO: 83.4 FL — SIGNIFICANT CHANGE UP (ref 80–94)
MONOCYTES # BLD AUTO: 0.79 K/UL — HIGH (ref 0.1–0.6)
MONOCYTES NFR BLD AUTO: 10.9 % — HIGH (ref 1.7–9.3)
NEUTROPHILS # BLD AUTO: 5.2 K/UL — SIGNIFICANT CHANGE UP (ref 1.4–6.5)
NEUTROPHILS NFR BLD AUTO: 71.8 % — SIGNIFICANT CHANGE UP (ref 42.2–75.2)
NRBC # BLD: 0 /100 WBCS — SIGNIFICANT CHANGE UP (ref 0–0)
PLATELET # BLD AUTO: 294 K/UL — SIGNIFICANT CHANGE UP (ref 130–400)
PMV BLD: 8.6 FL — SIGNIFICANT CHANGE UP (ref 7.4–10.4)
POTASSIUM SERPL-MCNC: 4.6 MMOL/L — SIGNIFICANT CHANGE UP (ref 3.5–5)
POTASSIUM SERPL-SCNC: 4.6 MMOL/L — SIGNIFICANT CHANGE UP (ref 3.5–5)
RBC # BLD: 2.9 M/UL — LOW (ref 4.7–6.1)
RBC # FLD: 13.3 % — SIGNIFICANT CHANGE UP (ref 11.5–14.5)
SODIUM SERPL-SCNC: 129 MMOL/L — LOW (ref 135–146)
WBC # BLD: 7.24 K/UL — SIGNIFICANT CHANGE UP (ref 4.8–10.8)
WBC # FLD AUTO: 7.24 K/UL — SIGNIFICANT CHANGE UP (ref 4.8–10.8)

## 2024-04-08 PROCEDURE — 99239 HOSP IP/OBS DSCHRG MGMT >30: CPT

## 2024-04-08 RX ORDER — SODIUM CHLORIDE 9 MG/ML
1 INJECTION INTRAMUSCULAR; INTRAVENOUS; SUBCUTANEOUS THREE TIMES A DAY
Refills: 0 | Status: DISCONTINUED | OUTPATIENT
Start: 2024-04-08 | End: 2024-04-08

## 2024-04-08 RX ORDER — SODIUM CHLORIDE 9 MG/ML
1 INJECTION INTRAMUSCULAR; INTRAVENOUS; SUBCUTANEOUS
Qty: 0 | Refills: 0 | DISCHARGE
Start: 2024-04-08

## 2024-04-08 RX ORDER — METOPROLOL TARTRATE 50 MG
1 TABLET ORAL
Qty: 0 | Refills: 0 | DISCHARGE
Start: 2024-04-08

## 2024-04-08 RX ADMIN — SODIUM CHLORIDE 1 GRAM(S): 9 INJECTION INTRAMUSCULAR; INTRAVENOUS; SUBCUTANEOUS at 13:08

## 2024-04-08 RX ADMIN — Medication 25 MILLIGRAM(S): at 05:08

## 2024-04-08 RX ADMIN — CHLORHEXIDINE GLUCONATE 1 APPLICATION(S): 213 SOLUTION TOPICAL at 13:08

## 2024-04-08 RX ADMIN — SODIUM CHLORIDE 1 GRAM(S): 9 INJECTION INTRAMUSCULAR; INTRAVENOUS; SUBCUTANEOUS at 05:08

## 2024-04-08 RX ADMIN — LACTULOSE 15 GRAM(S): 10 SOLUTION ORAL at 13:06

## 2024-04-08 RX ADMIN — ENOXAPARIN SODIUM 40 MILLIGRAM(S): 100 INJECTION SUBCUTANEOUS at 05:08

## 2024-04-08 RX ADMIN — PANTOPRAZOLE SODIUM 40 MILLIGRAM(S): 20 TABLET, DELAYED RELEASE ORAL at 05:08

## 2024-04-08 RX ADMIN — LISINOPRIL 5 MILLIGRAM(S): 2.5 TABLET ORAL at 05:08

## 2024-04-08 NOTE — DISCHARGE NOTE NURSING/CASE MANAGEMENT/SOCIAL WORK - NSDCVIVACCINE_GEN_ALL_CORE_FT
Tdap; 04-Mar-2024 15:26; Holli Oden (RN); [employer]; [id number] (Exp. Date: 06-Dec-2025); IntraMuscular; Deltoid Right.; 0.5 milliLiter(s); VIS (VIS Published: 09-May-2013, VIS Presented: 04-Mar-2024);

## 2024-04-08 NOTE — DISCHARGE NOTE NURSING/CASE MANAGEMENT/SOCIAL WORK - PATIENT PORTAL LINK FT
You can access the FollowMyHealth Patient Portal offered by [facility] by registering at the following website: [email or web address]. By joining [employer]’s FollowMyHealth portal, you will also be able to view your health information using other applications (apps) compatible with our system.

## 2024-04-08 NOTE — DISCHARGE NOTE NURSING/CASE MANAGEMENT/SOCIAL WORK - NSDCPEFALRISK_GEN_ALL_CORE
For information on Fall & Injury Prevention, visit: [email or web address] OR  [email or web address] OR  https://www.cdc.gov/steadi/patient.html

## 2024-04-08 NOTE — ADVANCED PRACTICE NURSE CONSULT - RECOMMEDATIONS
Clean wound with soap and water, pat dry.  Apply Triad cream twice daily and PRN for soiling.  Skin and incontinence care.  Pressure Injury Prevention.  Assess wound and inform primary provider of any changes.   Case discussed with primary RN.  Wound/ ostomy specialist to f/u as needed.   Other Recs. per Primary team.

## 2024-04-08 NOTE — ADVANCED PRACTICE NURSE CONSULT - ASSESSMENT
History of Present Illness:    87yo male with PMHx HTN, dementia, neurogenic bladder with chronic indwelling yañez, R hip fracture s/p internal fixation, presents for evaluation s/p fall and questionable L hip fracture on outpatient SNF XR. Pt c/o L hip pain but has reported he has been walking. Unable to obtain any further clarification as patient has dementia. As per SNF RN supervisor Lin, pt had a mechanical fall on March12th, no LOC, was ambulatory immediately after. PT noticed one week prior patient's gait seemed different and he was c/o L hip pain. XR obtained of pelvis which demonstrated questionable L subcap fx. SNF MD sent to ED for evaluation with L hip CT.  Allergies and Intolerances:        Allergies:  	No Known Allergies:     Home Medications:   * Patient Currently Takes Medications as of 27-Mar-2024 18:44 documented in Structured Notes  •?	lisinopril 5 mg oral tablet: Last Dose Taken:  , 1 tab(s) orally once a day  •?	Tylenol 325 mg oral tablet: Last Dose Taken:  , 2 tab(s) orally every 6 hours as needed for  pain  •?	ferrous fumarate 325 mg (106 mg elemental iron) oral tablet: Last Dose Taken:  , 1 tab(s) orally once a day  •?	pantoprazole 40 mg oral delayed release tablet: Last Dose Taken:  , 1 tab(s) orally once a day    Patient received in chair, limited mobility, high risk for pressure injury development or progression.  Left hip has bandage from surgery.    Wound  Type & Location: Bilateral gluteal fold friction wounds  Size: Left buttock has a denuded (pink,yellow tissue) open area ~[id number]  Tissue Description: Hyperpigmented skin and lichenifiation to bilateral buttocks    Wound Exudate: None  Wound Edge: Intact  Periwound Condition: Intact

## 2024-04-11 PROBLEM — F03.90 UNSPECIFIED DEMENTIA, UNSPECIFIED SEVERITY, WITHOUT BEHAVIORAL DISTURBANCE, PSYCHOTIC DISTURBANCE, MOOD DISTURBANCE, AND ANXIETY: Chronic | Status: ACTIVE | Noted: 2024-03-27

## 2024-04-19 DIAGNOSIS — E78.5 HYPERLIPIDEMIA, UNSPECIFIED: ICD-10-CM

## 2024-04-19 DIAGNOSIS — N31.9 NEUROMUSCULAR DYSFUNCTION OF BLADDER, UNSPECIFIED: ICD-10-CM
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
## 2024-04-19 DIAGNOSIS — R29.6 REPEATED FALLS: ICD-10-CM

## 2024-04-19 DIAGNOSIS — I12.9 HYPERTENSIVE CHRONIC KIDNEY DISEASE WITH STAGE 1 THROUGH STAGE 4 CHRONIC KIDNEY DISEASE, OR UNSPECIFIED CHRONIC KIDNEY DISEASE: ICD-10-CM

## 2024-04-19 DIAGNOSIS — E83.42 HYPOMAGNESEMIA: ICD-10-CM
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
## 2024-04-19 DIAGNOSIS — E22.2 SYNDROME OF INAPPROPRIATE SECRETION OF ANTIDIURETIC HORMONE: ICD-10-CM

## 2024-04-19 DIAGNOSIS — N18.30 CHRONIC KIDNEY DISEASE, STAGE 3 UNSPECIFIED: ICD-10-CM

## 2024-04-19 DIAGNOSIS — K59.00 CONSTIPATION, UNSPECIFIED: ICD-10-CM

## 2024-04-19 DIAGNOSIS — S72.012A UNSPECIFIED INTRACAPSULAR FRACTURE OF LEFT FEMUR, INITIAL ENCOUNTER FOR CLOSED FRACTURE: ICD-10-CM
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
## 2024-04-19 DIAGNOSIS — D63.1 ANEMIA IN CHRONIC KIDNEY DISEASE: ICD-10-CM

## 2024-04-19 DIAGNOSIS — G31.84 MILD COGNITIVE IMPAIRMENT OF UNCERTAIN OR UNKNOWN ETIOLOGY: ICD-10-CM

## 2024-04-29 ENCOUNTER — APPOINTMENT (OUTPATIENT)
Dept: ORTHOPEDIC SURGERY | Facility: CLINIC | Age: 89
End: 2024-04-29
Payer: MEDICARE

## 2024-04-29 PROCEDURE — 99024 POSTOP FOLLOW-UP VISIT: CPT

## 2024-04-29 PROCEDURE — 73502 X-RAY EXAM HIP UNI 2-3 VIEWS: CPT

## 2024-04-29 NOTE — REASON FOR VISIT
[FreeTextEntry2] : Injury left displaced femoral neck fracture Procedure: 3/29/24 left hip cemented hemiarthroplasty

## 2024-04-29 NOTE — ASSESSMENT
[FreeTextEntry1] : Assessment: 4-1/2 weeks status post left hip cemented hemiarthroplasty.  Doing well.  Plan: 1.  Clinical and radiographic findings were reviewed with the patient 2.  Continue weightbearing as tolerated on the left lower extremity with range of motion and strengthening as tolerated 3.  Continue physical therapy for mobilization and strengthening, fall prevention 4.  Continue VTE prophylaxis for another 2 weeks to complete a 6-week postoperative course.  Currently on Lovenox.  Per skilled nursing facility. 5.  I would like to see the patient back for follow-up in about 6 to 8 weeks with repeat x-rays of the pelvis and left hip.  Plan of care was discussed with the patient and he is in agreement.  All questions were answered.  I provided the above recommendations to the skilled nursing facility via the provided form.

## 2024-04-29 NOTE — HISTORY OF PRESENT ILLNESS
[de-identified] : The patient is an 88-year-old male who is now approximately 4-1/2 weeks status post a left hip cemented hemiarthroplasty for displaced femoral neck fracture.  He is currently at a skilled nursing facility.  He is accompanied by an aide who is not familiar with the patient.  He reports that he has been working with physical therapy.  Reports that he has been able to ambulate with a walker and sometimes without a walker.  He denies pain in the left hip.  No wound issues.  No fevers or chills.  He is on Lovenox for VTE prophylaxis.  The patient is well-appearing.  He is sitting comfortably in a wheelchair.  He is able to stand up from sitting without assistance.  Examination of the left hip demonstrates a well-healed surgical incision.  He has no pain with passive hip flexion, internal and external rotation.  He has good hip flexion strength.  Neurovascularly intact distally.  Left hip x-rays taken in the office today were personally reviewed, demonstrating a left hip cemented hemiarthroplasty with components in appropriate position.

## 2024-06-24 ENCOUNTER — INPATIENT (INPATIENT)
Facility: HOSPITAL | Age: 89
LOS: 14 days | Discharge: NURSING, REHAB OR LONG-TERM CARE | DRG: 579 | End: 2024-07-09
Attending: HOSPITALIST | Admitting: INTERNAL MEDICINE
Payer: MEDICARE

## 2024-06-24 ENCOUNTER — APPOINTMENT (OUTPATIENT)
Dept: ORTHOPEDIC SURGERY | Facility: CLINIC | Age: 89
End: 2024-06-24
Payer: MEDICARE

## 2024-06-24 VITALS
SYSTOLIC BLOOD PRESSURE: 151 MMHG | RESPIRATION RATE: 17 BRPM | TEMPERATURE: 98 F | WEIGHT: 119.93 LBS | HEART RATE: 92 BPM | DIASTOLIC BLOOD PRESSURE: 88 MMHG | OXYGEN SATURATION: 96 %

## 2024-06-24 DIAGNOSIS — I21.3 ST ELEVATION (STEMI) MYOCARDIAL INFARCTION OF UNSPECIFIED SITE: ICD-10-CM

## 2024-06-24 DIAGNOSIS — S72.002A FRACTURE OF UNSPECIFIED PART OF NECK OF LEFT FEMUR, INITIAL ENCOUNTER FOR CLOSED FRACTURE: ICD-10-CM

## 2024-06-24 LAB
ALBUMIN SERPL ELPH-MCNC: 3.5 G/DL — SIGNIFICANT CHANGE UP (ref 3.5–5.2)
ALP SERPL-CCNC: 125 U/L — HIGH (ref 30–115)
ALT FLD-CCNC: 12 U/L — SIGNIFICANT CHANGE UP (ref 0–41)
ANION GAP SERPL CALC-SCNC: 11 MMOL/L — SIGNIFICANT CHANGE UP (ref 7–14)
APPEARANCE UR: CLEAR — SIGNIFICANT CHANGE UP
APTT BLD: 26.4 SEC — LOW (ref 27–39.2)
AST SERPL-CCNC: 23 U/L — SIGNIFICANT CHANGE UP (ref 0–41)
BASE EXCESS BLDV CALC-SCNC: -1.6 MMOL/L — SIGNIFICANT CHANGE UP (ref -2–3)
BASOPHILS # BLD AUTO: 0.03 K/UL — SIGNIFICANT CHANGE UP (ref 0–0.2)
BASOPHILS NFR BLD AUTO: 0.3 % — SIGNIFICANT CHANGE UP (ref 0–1)
BILIRUB SERPL-MCNC: 0.3 MG/DL — SIGNIFICANT CHANGE UP (ref 0.2–1.2)
BILIRUB UR-MCNC: NEGATIVE — SIGNIFICANT CHANGE UP
BUN SERPL-MCNC: 19 MG/DL — SIGNIFICANT CHANGE UP (ref 10–20)
CA-I SERPL-SCNC: 1.28 MMOL/L — SIGNIFICANT CHANGE UP (ref 1.15–1.33)
CALCIUM SERPL-MCNC: 9.6 MG/DL — SIGNIFICANT CHANGE UP (ref 8.4–10.4)
CHLORIDE SERPL-SCNC: 91 MMOL/L — LOW (ref 98–110)
CK SERPL-CCNC: 46 U/L — SIGNIFICANT CHANGE UP (ref 0–225)
CO2 SERPL-SCNC: 24 MMOL/L — SIGNIFICANT CHANGE UP (ref 17–32)
COLOR SPEC: YELLOW — SIGNIFICANT CHANGE UP
CREAT SERPL-MCNC: 1.2 MG/DL — SIGNIFICANT CHANGE UP (ref 0.7–1.5)
DIFF PNL FLD: NEGATIVE — SIGNIFICANT CHANGE UP
EGFR: 59 ML/MIN/1.73M2 — LOW
EOSINOPHIL # BLD AUTO: 0.36 K/UL — SIGNIFICANT CHANGE UP (ref 0–0.7)
EOSINOPHIL NFR BLD AUTO: 4 % — SIGNIFICANT CHANGE UP (ref 0–8)
ETHANOL SERPL-MCNC: <10 MG/DL — SIGNIFICANT CHANGE UP
GAS PNL BLDV: 126 MMOL/L — LOW (ref 136–145)
GAS PNL BLDV: SIGNIFICANT CHANGE UP
GLUCOSE BLDC GLUCOMTR-MCNC: 113 MG/DL — HIGH (ref 70–99)
GLUCOSE SERPL-MCNC: 113 MG/DL — HIGH (ref 70–99)
GLUCOSE UR QL: NEGATIVE MG/DL — SIGNIFICANT CHANGE UP
HCO3 BLDV-SCNC: 25 MMOL/L — SIGNIFICANT CHANGE UP (ref 22–29)
HCT VFR BLD CALC: 28.1 % — LOW (ref 42–52)
HCT VFR BLDA CALC: 26 % — LOW (ref 39–51)
HGB BLD CALC-MCNC: 8.6 G/DL — LOW (ref 12.6–17.4)
HGB BLD-MCNC: 9.4 G/DL — LOW (ref 14–18)
IMM GRANULOCYTES NFR BLD AUTO: 0.7 % — HIGH (ref 0.1–0.3)
INR BLD: 1.03 RATIO — SIGNIFICANT CHANGE UP (ref 0.65–1.3)
KETONES UR-MCNC: NEGATIVE MG/DL — SIGNIFICANT CHANGE UP
LACTATE BLDV-MCNC: 2.5 MMOL/L — HIGH (ref 0.5–2)
LACTATE SERPL-SCNC: 1.9 MMOL/L — SIGNIFICANT CHANGE UP (ref 0.7–2)
LEUKOCYTE ESTERASE UR-ACNC: ABNORMAL
LIDOCAIN IGE QN: 30 U/L — SIGNIFICANT CHANGE UP (ref 7–60)
LYMPHOCYTES # BLD AUTO: 1.88 K/UL — SIGNIFICANT CHANGE UP (ref 1.2–3.4)
LYMPHOCYTES # BLD AUTO: 20.8 % — SIGNIFICANT CHANGE UP (ref 20.5–51.1)
MCHC RBC-ENTMCNC: 28.8 PG — SIGNIFICANT CHANGE UP (ref 27–31)
MCHC RBC-ENTMCNC: 33.5 G/DL — SIGNIFICANT CHANGE UP (ref 32–37)
MCV RBC AUTO: 86.2 FL — SIGNIFICANT CHANGE UP (ref 80–94)
MONOCYTES # BLD AUTO: 0.67 K/UL — HIGH (ref 0.1–0.6)
MONOCYTES NFR BLD AUTO: 7.4 % — SIGNIFICANT CHANGE UP (ref 1.7–9.3)
NEUTROPHILS # BLD AUTO: 6.03 K/UL — SIGNIFICANT CHANGE UP (ref 1.4–6.5)
NEUTROPHILS NFR BLD AUTO: 66.8 % — SIGNIFICANT CHANGE UP (ref 42.2–75.2)
NITRITE UR-MCNC: NEGATIVE — SIGNIFICANT CHANGE UP
NRBC # BLD: 0 /100 WBCS — SIGNIFICANT CHANGE UP (ref 0–0)
PCO2 BLDV: 54 MMHG — SIGNIFICANT CHANGE UP (ref 42–55)
PH BLDV: 7.28 — LOW (ref 7.32–7.43)
PH UR: 7 — SIGNIFICANT CHANGE UP (ref 5–8)
PLATELET # BLD AUTO: 267 K/UL — SIGNIFICANT CHANGE UP (ref 130–400)
PMV BLD: 8.8 FL — SIGNIFICANT CHANGE UP (ref 7.4–10.4)
PO2 BLDV: 21 MMHG — LOW (ref 25–45)
POTASSIUM BLDV-SCNC: 4.5 MMOL/L — SIGNIFICANT CHANGE UP (ref 3.5–5.1)
POTASSIUM SERPL-MCNC: 4.9 MMOL/L — SIGNIFICANT CHANGE UP (ref 3.5–5)
POTASSIUM SERPL-SCNC: 4.9 MMOL/L — SIGNIFICANT CHANGE UP (ref 3.5–5)
PROT SERPL-MCNC: 6.7 G/DL — SIGNIFICANT CHANGE UP (ref 6–8)
PROT UR-MCNC: NEGATIVE MG/DL — SIGNIFICANT CHANGE UP
PROTHROM AB SERPL-ACNC: 11.7 SEC — SIGNIFICANT CHANGE UP (ref 9.95–12.87)
RBC # BLD: 3.26 M/UL — LOW (ref 4.7–6.1)
RBC # FLD: 16.4 % — HIGH (ref 11.5–14.5)
SAO2 % BLDV: 24.6 % — LOW (ref 67–88)
SODIUM SERPL-SCNC: 126 MMOL/L — LOW (ref 135–146)
SP GR SPEC: 1.03 — SIGNIFICANT CHANGE UP (ref 1–1.03)
TROPONIN T, HIGH SENSITIVITY RESULT: 136 NG/L — CRITICAL HIGH (ref 6–21)
TROPONIN T, HIGH SENSITIVITY RESULT: 59 NG/L — CRITICAL HIGH (ref 6–21)
UROBILINOGEN FLD QL: 0.2 MG/DL — SIGNIFICANT CHANGE UP (ref 0.2–1)
WBC # BLD: 9.03 K/UL — SIGNIFICANT CHANGE UP (ref 4.8–10.8)
WBC # FLD AUTO: 9.03 K/UL — SIGNIFICANT CHANGE UP (ref 4.8–10.8)

## 2024-06-24 PROCEDURE — 83935 ASSAY OF URINE OSMOLALITY: CPT

## 2024-06-24 PROCEDURE — 85730 THROMBOPLASTIN TIME PARTIAL: CPT

## 2024-06-24 PROCEDURE — 70450 CT HEAD/BRAIN W/O DYE: CPT | Mod: 26,MC

## 2024-06-24 PROCEDURE — 71045 X-RAY EXAM CHEST 1 VIEW: CPT

## 2024-06-24 PROCEDURE — 80061 LIPID PANEL: CPT

## 2024-06-24 PROCEDURE — 93005 ELECTROCARDIOGRAM TRACING: CPT

## 2024-06-24 PROCEDURE — 84295 ASSAY OF SERUM SODIUM: CPT

## 2024-06-24 PROCEDURE — 97116 GAIT TRAINING THERAPY: CPT | Mod: GP

## 2024-06-24 PROCEDURE — 83735 ASSAY OF MAGNESIUM: CPT

## 2024-06-24 PROCEDURE — 85027 COMPLETE CBC AUTOMATED: CPT

## 2024-06-24 PROCEDURE — 86923 COMPATIBILITY TEST ELECTRIC: CPT

## 2024-06-24 PROCEDURE — 80053 COMPREHEN METABOLIC PANEL: CPT

## 2024-06-24 PROCEDURE — 99024 POSTOP FOLLOW-UP VISIT: CPT

## 2024-06-24 PROCEDURE — 71260 CT THORAX DX C+: CPT | Mod: 26,MC

## 2024-06-24 PROCEDURE — 86900 BLOOD TYPING SEROLOGIC ABO: CPT

## 2024-06-24 PROCEDURE — 36415 COLL VENOUS BLD VENIPUNCTURE: CPT

## 2024-06-24 PROCEDURE — 97530 THERAPEUTIC ACTIVITIES: CPT | Mod: GP

## 2024-06-24 PROCEDURE — 72125 CT NECK SPINE W/O DYE: CPT | Mod: 26,MC

## 2024-06-24 PROCEDURE — 86901 BLOOD TYPING SEROLOGIC RH(D): CPT

## 2024-06-24 PROCEDURE — 82553 CREATINE MB FRACTION: CPT

## 2024-06-24 PROCEDURE — 73502 X-RAY EXAM HIP UNI 2-3 VIEWS: CPT

## 2024-06-24 PROCEDURE — 83605 ASSAY OF LACTIC ACID: CPT

## 2024-06-24 PROCEDURE — 83036 HEMOGLOBIN GLYCOSYLATED A1C: CPT

## 2024-06-24 PROCEDURE — 82607 VITAMIN B-12: CPT

## 2024-06-24 PROCEDURE — P9016: CPT

## 2024-06-24 PROCEDURE — 99291 CRITICAL CARE FIRST HOUR: CPT

## 2024-06-24 PROCEDURE — 72170 X-RAY EXAM OF PELVIS: CPT | Mod: 26

## 2024-06-24 PROCEDURE — 97110 THERAPEUTIC EXERCISES: CPT | Mod: GP

## 2024-06-24 PROCEDURE — 85014 HEMATOCRIT: CPT

## 2024-06-24 PROCEDURE — 93307 TTE W/O DOPPLER COMPLETE: CPT

## 2024-06-24 PROCEDURE — 86850 RBC ANTIBODY SCREEN: CPT

## 2024-06-24 PROCEDURE — 93010 ELECTROCARDIOGRAM REPORT: CPT

## 2024-06-24 PROCEDURE — 97162 PT EVAL MOD COMPLEX 30 MIN: CPT | Mod: GP

## 2024-06-24 PROCEDURE — 84132 ASSAY OF SERUM POTASSIUM: CPT

## 2024-06-24 PROCEDURE — 82330 ASSAY OF CALCIUM: CPT

## 2024-06-24 PROCEDURE — 36430 TRANSFUSION BLD/BLD COMPNT: CPT

## 2024-06-24 PROCEDURE — 80048 BASIC METABOLIC PNL TOTAL CA: CPT

## 2024-06-24 PROCEDURE — 83930 ASSAY OF BLOOD OSMOLALITY: CPT

## 2024-06-24 PROCEDURE — 74177 CT ABD & PELVIS W/CONTRAST: CPT | Mod: 26,MC

## 2024-06-24 PROCEDURE — 99223 1ST HOSP IP/OBS HIGH 75: CPT

## 2024-06-24 PROCEDURE — 82550 ASSAY OF CK (CPK): CPT

## 2024-06-24 PROCEDURE — 83540 ASSAY OF IRON: CPT

## 2024-06-24 PROCEDURE — 84100 ASSAY OF PHOSPHORUS: CPT

## 2024-06-24 PROCEDURE — 84484 ASSAY OF TROPONIN QUANT: CPT

## 2024-06-24 PROCEDURE — 83550 IRON BINDING TEST: CPT

## 2024-06-24 PROCEDURE — 82728 ASSAY OF FERRITIN: CPT

## 2024-06-24 PROCEDURE — 85018 HEMOGLOBIN: CPT

## 2024-06-24 PROCEDURE — 84443 ASSAY THYROID STIM HORMONE: CPT

## 2024-06-24 PROCEDURE — 82803 BLOOD GASES ANY COMBINATION: CPT

## 2024-06-24 PROCEDURE — 82746 ASSAY OF FOLIC ACID SERUM: CPT

## 2024-06-24 PROCEDURE — 84300 ASSAY OF URINE SODIUM: CPT

## 2024-06-24 PROCEDURE — 82962 GLUCOSE BLOOD TEST: CPT

## 2024-06-24 PROCEDURE — 71045 X-RAY EXAM CHEST 1 VIEW: CPT | Mod: 26

## 2024-06-24 PROCEDURE — 85025 COMPLETE CBC W/AUTO DIFF WBC: CPT

## 2024-06-24 RX ORDER — HEPARIN SODIUM 50 [USP'U]/ML
INJECTION, SOLUTION INTRAVENOUS
Qty: 25000 | Refills: 0 | Status: DISCONTINUED | OUTPATIENT
Start: 2024-06-24 | End: 2024-06-25

## 2024-06-24 RX ORDER — ATORVASTATIN CALCIUM 20 MG/1
80 TABLET, FILM COATED ORAL AT BEDTIME
Refills: 0 | Status: DISCONTINUED | OUTPATIENT
Start: 2024-06-24 | End: 2024-07-09

## 2024-06-24 RX ORDER — BACITRACIN 500 UNIT/G
1 OINTMENT (GRAM) TOPICAL THREE TIMES A DAY
Refills: 0 | Status: DISCONTINUED | OUTPATIENT
Start: 2024-06-24 | End: 2024-07-09

## 2024-06-24 RX ORDER — FERROUS SULFATE 325(65) MG
325 TABLET ORAL DAILY
Refills: 0 | Status: DISCONTINUED | OUTPATIENT
Start: 2024-06-24 | End: 2024-07-07

## 2024-06-24 RX ORDER — MIRTAZAPINE 15 MG/1
15 TABLET, FILM COATED ORAL DAILY
Refills: 0 | Status: DISCONTINUED | OUTPATIENT
Start: 2024-06-24 | End: 2024-07-09

## 2024-06-24 RX ORDER — MORPHINE SULFATE 100 MG/1
4 TABLET, EXTENDED RELEASE ORAL ONCE
Refills: 0 | Status: DISCONTINUED | OUTPATIENT
Start: 2024-06-24 | End: 2024-06-24

## 2024-06-24 RX ORDER — TRANEXAMIC ACID 100 MG/ML
1000 INJECTION, SOLUTION INTRAVENOUS ONCE
Refills: 0 | Status: COMPLETED | OUTPATIENT
Start: 2024-06-24 | End: 2024-06-24

## 2024-06-24 RX ORDER — ASPIRIN 325 MG/1
81 TABLET, FILM COATED ORAL DAILY
Refills: 0 | Status: DISCONTINUED | OUTPATIENT
Start: 2024-06-25 | End: 2024-07-09

## 2024-06-24 RX ORDER — HEPARIN SODIUM 50 [USP'U]/ML
3200 INJECTION, SOLUTION INTRAVENOUS EVERY 6 HOURS
Refills: 0 | Status: DISCONTINUED | OUTPATIENT
Start: 2024-06-24 | End: 2024-06-25

## 2024-06-24 RX ORDER — DEXTROSE MONOHYDRATE AND SODIUM CHLORIDE 5; .3 G/100ML; G/100ML
1000 INJECTION, SOLUTION INTRAVENOUS ONCE
Refills: 0 | Status: COMPLETED | OUTPATIENT
Start: 2024-06-24 | End: 2024-06-24

## 2024-06-24 RX ORDER — ALENDRONATE SODIUM 35 MG/1
1 TABLET ORAL
Refills: 0 | DISCHARGE

## 2024-06-24 RX ORDER — METOPROLOL TARTRATE 50 MG
25 TABLET ORAL
Refills: 0 | Status: DISCONTINUED | OUTPATIENT
Start: 2024-06-24 | End: 2024-07-04

## 2024-06-24 RX ORDER — ACETAMINOPHEN 325 MG
650 TABLET ORAL EVERY 6 HOURS
Refills: 0 | Status: DISCONTINUED | OUTPATIENT
Start: 2024-06-24 | End: 2024-07-09

## 2024-06-24 RX ORDER — HEPARIN SODIUM 50 [USP'U]/ML
3200 INJECTION, SOLUTION INTRAVENOUS ONCE
Refills: 0 | Status: COMPLETED | OUTPATIENT
Start: 2024-06-24 | End: 2024-06-24

## 2024-06-24 RX ORDER — LISINOPRIL 5 MG/1
5 TABLET ORAL DAILY
Refills: 0 | Status: DISCONTINUED | OUTPATIENT
Start: 2024-06-24 | End: 2024-07-04

## 2024-06-24 RX ORDER — MIRTAZAPINE 15 MG/1
1 TABLET, FILM COATED ORAL
Refills: 0 | DISCHARGE

## 2024-06-24 RX ORDER — TETANUS TOXOID, REDUCED DIPHTHERIA TOXOID AND ACELLULAR PERTUSSIS VACCINE, ADSORBED 5; 2.5; 8; 8; 2.5 [IU]/.5ML; [IU]/.5ML; UG/.5ML; UG/.5ML; UG/.5ML
0.5 SUSPENSION INTRAMUSCULAR ONCE
Refills: 0 | Status: COMPLETED | OUTPATIENT
Start: 2024-06-24 | End: 2024-06-24

## 2024-06-24 RX ORDER — CEFAZOLIN 10 G/1
2000 INJECTION, POWDER, FOR SOLUTION INTRAVENOUS ONCE
Refills: 0 | Status: COMPLETED | OUTPATIENT
Start: 2024-06-24 | End: 2024-06-24

## 2024-06-24 RX ORDER — SENNOSIDES 8.6 MG
2 TABLET ORAL AT BEDTIME
Refills: 0 | Status: DISCONTINUED | OUTPATIENT
Start: 2024-06-24 | End: 2024-07-09

## 2024-06-24 RX ORDER — POLYETHYLENE GLYCOL 3350 1 G/G
17 POWDER ORAL DAILY
Refills: 0 | Status: DISCONTINUED | OUTPATIENT
Start: 2024-06-24 | End: 2024-07-09

## 2024-06-24 RX ORDER — PANTOPRAZOLE SODIUM 40 MG/10ML
40 INJECTION, POWDER, FOR SOLUTION INTRAVENOUS
Refills: 0 | Status: DISCONTINUED | OUTPATIENT
Start: 2024-06-24 | End: 2024-07-09

## 2024-06-24 RX ORDER — ASPIRIN 325 MG/1
325 TABLET, FILM COATED ORAL ONCE
Refills: 0 | Status: DISCONTINUED | OUTPATIENT
Start: 2024-06-24 | End: 2024-06-24

## 2024-06-24 RX ORDER — ASPIRIN 325 MG/1
324 TABLET, FILM COATED ORAL ONCE
Refills: 0 | Status: COMPLETED | OUTPATIENT
Start: 2024-06-24 | End: 2024-06-24

## 2024-06-24 RX ORDER — SODIUM CHLORIDE 0.9 % (FLUSH) 0.9 %
1 SYRINGE (ML) INJECTION THREE TIMES A DAY
Refills: 0 | Status: DISCONTINUED | OUTPATIENT
Start: 2024-06-24 | End: 2024-07-09

## 2024-06-24 RX ORDER — ONDANSETRON HYDROCHLORIDE 2 MG/ML
4 INJECTION INTRAMUSCULAR; INTRAVENOUS ONCE
Refills: 0 | Status: COMPLETED | OUTPATIENT
Start: 2024-06-24 | End: 2024-06-24

## 2024-06-24 RX ADMIN — Medication 1 APPLICATION(S): at 22:02

## 2024-06-24 RX ADMIN — CEFAZOLIN 100 MILLIGRAM(S): 10 INJECTION, POWDER, FOR SOLUTION INTRAVENOUS at 15:00

## 2024-06-24 RX ADMIN — MORPHINE SULFATE 4 MILLIGRAM(S): 100 TABLET, EXTENDED RELEASE ORAL at 17:36

## 2024-06-24 RX ADMIN — Medication 25 MILLIGRAM(S): at 22:16

## 2024-06-24 RX ADMIN — Medication 1 GRAM(S): at 22:22

## 2024-06-24 RX ADMIN — HEPARIN SODIUM 650 UNIT(S)/HR: 50 INJECTION, SOLUTION INTRAVENOUS at 20:03

## 2024-06-24 RX ADMIN — ATORVASTATIN CALCIUM 80 MILLIGRAM(S): 20 TABLET, FILM COATED ORAL at 22:16

## 2024-06-24 RX ADMIN — Medication 325 MILLIGRAM(S): at 22:16

## 2024-06-24 RX ADMIN — ONDANSETRON HYDROCHLORIDE 4 MILLIGRAM(S): 2 INJECTION INTRAMUSCULAR; INTRAVENOUS at 16:26

## 2024-06-24 RX ADMIN — ASPIRIN 324 MILLIGRAM(S): 325 TABLET, FILM COATED ORAL at 20:05

## 2024-06-24 RX ADMIN — TRANEXAMIC ACID 660 MILLIGRAM(S): 100 INJECTION, SOLUTION INTRAVENOUS at 15:25

## 2024-06-24 RX ADMIN — CEFAZOLIN 2000 MILLIGRAM(S): 10 INJECTION, POWDER, FOR SOLUTION INTRAVENOUS at 15:30

## 2024-06-24 RX ADMIN — DEXTROSE MONOHYDRATE AND SODIUM CHLORIDE 1000 MILLILITER(S): 5; .3 INJECTION, SOLUTION INTRAVENOUS at 16:00

## 2024-06-24 RX ADMIN — DEXTROSE MONOHYDRATE AND SODIUM CHLORIDE 1000 MILLILITER(S): 5; .3 INJECTION, SOLUTION INTRAVENOUS at 15:00

## 2024-06-24 RX ADMIN — PANTOPRAZOLE SODIUM 40 MILLIGRAM(S): 40 INJECTION, POWDER, FOR SOLUTION INTRAVENOUS at 22:16

## 2024-06-24 RX ADMIN — Medication 2 TABLET(S): at 22:00

## 2024-06-24 RX ADMIN — TETANUS TOXOID, REDUCED DIPHTHERIA TOXOID AND ACELLULAR PERTUSSIS VACCINE, ADSORBED 0.5 MILLILITER(S): 5; 2.5; 8; 8; 2.5 SUSPENSION INTRAMUSCULAR at 15:35

## 2024-06-24 RX ADMIN — MORPHINE SULFATE 4 MILLIGRAM(S): 100 TABLET, EXTENDED RELEASE ORAL at 17:59

## 2024-06-24 RX ADMIN — TRANEXAMIC ACID 1000 MILLIGRAM(S): 100 INJECTION, SOLUTION INTRAVENOUS at 16:00

## 2024-06-24 RX ADMIN — HEPARIN SODIUM 3200 UNIT(S): 50 INJECTION, SOLUTION INTRAVENOUS at 20:04
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
## 2024-06-24 NOTE — ED ADULT TRIAGE NOTE - CHIEF COMPLAINT QUOTE
BIBA from SNF, s/p fall in shower, unknown loc, pt with hx dementia at baseline, no anticoagulation. Per EMS has large laceration to scalp +active bleeding noted in triage

## 2024-06-24 NOTE — H&P ADULT - ATTENDING COMMENTS
Plan as outlined above.  Admit to CCU for inferior ST elevations noted on EKG.  Serial CE's.   Obtain TTE to assess biventricular function.  Medical management with ASA and heparin.

## 2024-06-24 NOTE — CHART NOTE - NSCHARTNOTEFT_GEN_A_CORE
Code STEMI was called in ED. I immediately responded and went to the ED. I examined and assessed the patient. EKG reviewed and case discussed with interventional cardiologist on call.     89 yo M with PMHX of Dementia, HTN, recent falls presented to ED after being found in bathroom at home, had unwitnessed fall complicated by scalp laceration, now stable.  EKG revealed ST elevations in inferior leads, pt denies any chest pain. Bedside echo with grossly normal EF.    Will admit to CCU for medical treatment of STEMI. Code STEMI was called in ED. I immediately responded and went to the ED. I examined and assessed the patient. EKG reviewed and case discussed with interventional cardiologist on call.     88 yo M with PMHX of Dementia, HTN, recent falls presented to ED after being found in bathroom at home, had unwitnessed fall complicated by scalp laceration, now stable.  EKG revealed ST elevations in inferior leads, pt denies any chest pain. Bedside echo with grossly normal EF.      Will admit to CCU for medical treatment of STEMI.

## 2024-06-24 NOTE — ED PROVIDER NOTE - ATTENDING CONTRIBUTION TO CARE
87 yo m male with PMHx HTN, dementia, neurogenic bladder with chronic indwelling yañez, R hip fracture s/p internal fixation  Patient presents for evaluation of unwitnessed fall in the shower with subsequent right forehead laceration.  Patient initially trauma alert and upgraded to code trauma due to arterial scalp bleed.  Patient is limited historian and cannot recall event.  Per nursing home papers patient on aspirin and no anticoagulation.        VS  A: intact, protected, no stridor  B: breath sounds equal b/l, no cyanosis  C: extremities warm and well perfused  D: GCS 15  E:    H: R forehead lac w/ pulsatile bleeding  N: no nasal septal hematoma  T: oropharynx clear  Card: RRR, nml s1s2  Pulm: CTAB, breath sounds equal  Abd: S, NT, ND  Spine: no C/T/L spine TTP  Pelvis: stable  Extremities: no gross deformities  Vasc- DP pulses 2+ b/l, radial pulses 2+ b/l  Neuro: Awake, alert, no gross focal neuro deficits, moving all extremities    Plan:  Trauma labs  CXR, Pelvis XR  CT panscan  abx/tdap  txa

## 2024-06-24 NOTE — ED ADULT NURSE NOTE - NSICDXPASTMEDICALHX_GEN_ALL_CORE_FT
PAST MEDICAL HISTORY:  Dementia     Waldron catheter in place 2016    HTN (hypertension)     Neurogenic bladder

## 2024-06-24 NOTE — PATIENT PROFILE ADULT - FALL HARM RISK - HARM RISK INTERVENTIONS
Assistance with ambulation/Assistance OOB with selected safe patient handling equipment/Communicate Risk of Fall with Harm to all staff/Discuss with provider need for PT consult/Monitor gait and stability/Reinforce activity limits and safety measures with patient and family/Tailored Fall Risk Interventions/Visual Cue: Yellow wristband and red socks/Bed in lowest position, wheels locked, appropriate side rails in place/Call bell, personal items and telephone in reach/Instruct patient to call for assistance before getting out of bed or chair/Non-slip footwear when patient is out of bed/[place] to call system/Physically safe environment - no spills, clutter or unnecessary equipment/Purposeful Proactive Rounding/Room/bathroom lighting operational, light cord in reach

## 2024-06-24 NOTE — PROCEDURE NOTE - ADDITIONAL PROCEDURE DETAILS
Right temporal scalp laceration 2 cm in length with actively bleeding superficial temporal artery s/p ligation with 2.0 prolene. Additional skin arteries ligated x2 with 2-0 prolene. Skin closed with 4-0 nylon. There are 4 sutures in total that will  need to be removed in 7 days. Right temporal scalp laceration 4 cm in length with actively bleeding superficial temporal artery s/p ligation with 2.0 prolene. Additional skin arteries ligated x2 with 2-0 prolene. Skin closed with 4-0 nylon. There are 4 sutures in total that will  need to be removed in 7 days.

## 2024-06-24 NOTE — CONSULT NOTE ADULT - ASSESSMENT
#Inferior STEMI  #s/p unwitnessed fall complicated by scalp laceration, T12 compression fracture  #Hx of Dementia  #HTN    Recommendations:  - Will admit to CCU for monitoring  - Given dementia, recent fall with head trauma, will treat medically for MI  - Trend troponin  - Load with Aspirin, c/w Aspirin 81 mg once daily   - Heparin drip ACS protocol  - Official TTE with contrast in AM   #Inferior STEMI  #s/p unwitnessed fall complicated by scalp laceration, T12 compression fracture  #Hx of Dementia  #HTN    Recommendations:  - Will admit to CCU for monitoring  - Given dementia, recent fall with head trauma, anemia and vertebral fracture, as well as no anginal pain, will treat medically for MI  - Trend troponin  - Load with Aspirin, c/w Aspirin 81 mg once daily   - Heparin drip ACS protocol  - Official TTE with contrast in AM

## 2024-06-24 NOTE — H&P ADULT - HISTORY OF PRESENT ILLNESS
Mr. Milner is an 88 yo M with a PMH of dementia, HTN, neurogenic bladder, and multiple falls presenting from NH after an unwitnessed fall w/ actively bleeding R temporal, later found to have evidence of inferior lead STEMI w/ RYAN in II, III and AvF with ST depressions in AvL and AvR. Patient is alert and oriented to name only and unable to recall events leading up to the fall.     Vitals on Admission  Temp: 97.7  HR: 92  BP: 151/88  O2: 96% on RA  RR: 17    Labs  HST: 59 ->136  Lactate: 1.9 (blood) -> 2.3 (VBG)  Na: 126  UA; Sterile pyuria    Imaging  Trauma pan-scan: New, age-indeterminate mild T12 vertebral body compression fracture without significant osseous retropulsion, possibly acute.  In the ED   Mr. Milner is an 88 yo M with a PMH of dementia, HTN, neurogenic bladder, L hip fx 3/2024 and multiple falls presenting from NH after being found down on the floor this morning after an unwitnessed fall w/ actively bleeding R temporal laceration, later found to have ST-elevations in II, III and avF and ST-depressions in avL and avR. Patient is alert and oriented to name only and unable to recall events leading up to the fall. He denies having chest pain. Patient is being admitted to CCU for medical management of suspected inferior STEMI and monitoring.    Vitals on Admission  Temp: 97.7  HR: 92  BP: 151/88  O2: 96% on RA  RR: 17    Labs  HST: 59 ->136  Lactate: 1.9 (blood) -> 2.3 (VBG)  Na: 126  UA; Sterile pyuria    Imaging  Trauma pan-scan: New, age-indeterminate mild T12 vertebral body compression fracture without significant osseous retropulsion, possibly acute.    In the ED  - ASA 324mg x1  - R temporal lac repair  - Cefazolin 2g IV x1  - Morphine 4mg IV x1  - Zofran 4mg IV x1 Mr. Milner is an 88 yo M with a PMH of dementia, HTN, neurogenic bladder, L hip fx 3/2024 and multiple falls presenting from NH after being found down on the floor this morning after an unwitnessed fall w/ actively bleeding R temporal laceration, later found to have ST-elevations in II, III and avF and ST-depressions in avL and avR. Patient is currently alert and oriented to name only and unable to recall events leading up to the fall. He denies having chest pain. Patient is being admitted to CCU for medical management of suspected inferior STEMI and monitoring.    Vitals on Admission  Temp: 97.7  HR: 92  BP: 151/88  O2: 96% on RA  RR: 17    Labs  HST: 59 ->136  Lactate: 1.9 (blood) -> 2.3 (VBG)  Na: 126  UA; Sterile pyuria    Imaging  Trauma Code Pan Scan: New, age-indeterminate mild T12 vertebral body compression fracture without significant osseous retropulsion, possibly acute. R scalp soft tissue swelling.    In the ED  - ASA 324mg x1  - R temporal lac repair  - Cefazolin 2g IV x1  - Morphine 4mg IV x1  - Zofran 4mg IV x1 Mr. Milner is an 89 yo M with a PMH of dementia, HTN, neurogenic bladder, L hip fx 3/2024 and multiple falls presenting from NH after being found down on the floor this morning after an unwitnessed fall w/ actively bleeding R temporal laceration, trauma code was called in ED and laceration was repaired. Trauma workup revealed a new age-indeterminate T12 fracture, possibly acute. EKG performed later in ED stay showed ST-elevations in II, III and avF and ST-depressions in avL and avR, code STEMI was then called. Patient is currently alert and oriented to name only and unable to recall events leading up to the fall. He denies having chest pain. Patient is being admitted to CCU for close monitoring and medical management of suspected inferior STEMI.    Vitals on Admission  Temp: 97.7  HR: 92  BP: 151/88  O2: 96% on RA  RR: 17    Labs  HST: 59 ->136  Lactate: 1.9 (blood) -> 2.3 (VBG)  Na: 126  UA; Sterile pyuria    Imaging  Trauma Code Pan Scan: New, age-indeterminate mild T12 vertebral body compression fracture without significant osseous retropulsion, possibly acute. R scalp soft tissue swelling.    In the ED  - ASA 324mg x1  - R temporal lac repair  - Cefazolin 2g IV x1  - Morphine 4mg IV x1  - Zofran 4mg IV x1

## 2024-06-24 NOTE — ED PROVIDER NOTE - CARE PLAN
1 Principal Discharge DX:	STEMI (ST elevation myocardial infarction)  Secondary Diagnosis:	Fall  Secondary Diagnosis:	Scalp laceration

## 2024-06-24 NOTE — CONSULT NOTE ADULT - SUBJECTIVE AND OBJECTIVE BOX
Neurosurgery  Consult    Patient is a 89y old  Male who presents with a chief complaint of STEMI/Chest pain    HPI: 89 yo M with PMHx of HTN, Dementia, recent falls brought in to ED after unwitnessed fall, was supposedly found down in bathroom, had unwitnessed fall with head trauma, no LOC. Found to have scalp laceration, no intracranial injury/bleeding on CT. Also has T12 compression fx. Currently denies any complaints, appears comfortable. Has ST elevations in inferior leads. Bedside echo with grossly normal EF.    Interval hx: Initially seen after a fall found to have a laceration bleeding from scalp, and Age indeterminant t12 compression fracture which appears sclerotic, and noted to have RYAN on EKG. Pt complaints of back pain.      PAST MEDICAL & SURGICAL HISTORY:  HTN (hypertension)  Neurogenic bladder  Waldron catheter in place  2016  Dementia      No significant past surgical history  Right Hip ORIF          FAMILY HISTORY:      Social History: (-) x 3    Allergies    No Known Allergies    Intolerances        MEDICATIONS  (STANDING):  heparin  Infusion.  Unit(s)/Hr (6.5 mL/Hr) IV Continuous <Continuous>    MEDICATIONS  (PRN):  heparin   Injectable 3200 Unit(s) IV Push every 6 hours PRN For aPTT less than 40      Review of systems:    Constitutional: No fever, weight loss or fatigue    Eyes: No eye pain or discharge  ENMT:  No difficulty hearing; No sinus or throat pain  Neck: No pain or stiffness  Respiratory: No cough, wheezing, chills or hemoptysis  Cardiovascular: No chest pain, palpitations, shortness of breath, dyspnea on exertion  Gastrointestinal: No abdominal pain, nausea, vomiting or hematemesis; No diarrhea or constipation.   Genitourinary: No dysuria, frequency, hematuria or incontinence  Neurological: As per HPI  Skin: No rashes or lesions   Endocrine: No heat or cold intolerance; No hair loss  Musculoskeletal: No joint pain or swelling  Psychiatric: No depression, anxiety, mood swings  Heme/Lymph: No easy bruising or bleeding gums    Vital Signs Last 24 Hrs  T(C): 36.6 (2024 17:56), Max: 36.6 (2024 17:56)  T(F): 97.8 (2024 17:56), Max: 97.8 (2024 17:56)  HR: 93 (2024 20:15) (88 - 93)  BP: 135/65 (2024 20:15) (135/65 - 151/88)  BP(mean): 93 (2024 20:15) (93 - 93)  RR: 19 (2024 20:15) (17 - 19)  SpO2: 100% (2024 20:15) (96% - 100%)    Parameters below as of 2024 20:15  Patient On (Oxygen Delivery Method): room air        Neurologic Examination:  General:  Appearance is consistent with chronologic age.  No abnormal facies.   General: The patient is Awake Alert and w/o complaint..  Nondysarthric.    Cranial nerves: intact VA, VFF.  EOMI w/o nystagmus, skew or reported double vision.  PERRL.  No ptosis/weakness of eyelid closure.  Facial sensation is normal with normal bite.  No facial asymmetry.  Hearing grossly intact b/l.  Palate elevates midline.    Tongue midline.  Motor examination:   Normal tone, bulk and range of motion.  No tenderness, twitching, tremors or involuntary movements.  Formal Muscle Strength Testing: (MRC grade R/L) 5/5 UE; 5/5 LE.  No observable drift.  Sensory examination:   Intact to light touch in all extremities.      Labs:   CBC Full  -  ( 2024 15:09 )  WBC Count : 9.03 K/uL  RBC Count : 3.26 M/uL  Hemoglobin : 9.4 g/dL  Hematocrit : 28.1 %  Platelet Count - Automated : 267 K/uL  Mean Cell Volume : 86.2 fL  Mean Cell Hemoglobin : 28.8 pg  Mean Cell Hemoglobin Concentration : 33.5 g/dL  Auto Neutrophil # : 6.03 K/uL  Auto Lymphocyte # : 1.88 K/uL  Auto Monocyte # : 0.67 K/uL  Auto Eosinophil # : 0.36 K/uL  Auto Basophil # : 0.03 K/uL  Auto Neutrophil % : 66.8 %  Auto Lymphocyte % : 20.8 %  Auto Monocyte % : 7.4 %  Auto Eosinophil % : 4.0 %  Auto Basophil % : 0.3 %    24    126<L>  |  91<L>  |  19  ----------------------------<  113<H>  4.9   |  24  |  1.2    Ca    9.6      2024 15:09    TPro  6.7  /  Alb  3.5  /  TBili  0.3  /  DBili  x   /  AST  23  /  ALT  12  /  AlkPhos  125<H>  06-24    LIVER FUNCTIONS - ( 2024 15:09 )  Alb: 3.5 g/dL / Pro: 6.7 g/dL / ALK PHOS: 125 U/L / ALT: 12 U/L / AST: 23 U/L / GGT: x           PT/INR - ( 2024 15:09 )   PT: 11.70 sec;   INR: 1.03 ratio         PTT - ( 2024 15:09 )  PTT:26.4 sec  Urinalysis Basic - ( 2024 17:08 )    Color: Yellow / Appearance: Clear / S.029 / pH: x  Gluc: x / Ketone: Negative mg/dL  / Bili: Negative / Urobili: 0.2 mg/dL   Blood: x / Protein: Negative mg/dL / Nitrite: Negative   Leuk Esterase: Small / RBC: 3 /HPF / WBC 12 /HPF   Sq Epi: x / Non Sq Epi: 0 /HPF / Bacteria: Negative /HPF          Neuroimaging:  NCHCT: CT Head No Cont:   ACC: [id number] EXAM:  CT CERVICAL SPINE   ORDERED BY: OCTAVIANO BARRERA     ACC: [id number] EXAM:  CT BRAIN   ORDERED BY: OCTAVIANO BARRERA     PROCEDURE DATE:  2024          INTERPRETATION:  CLINICAL INDICATIONS:  Trauma code.    TECHNIQUE:  Noncontrast head and cervical spine CT was performed.    Multiple contiguous axial images were obtained from the skull base to the   vertex without the use of intravenous contrast. Reformatted coronal and   sagittal images were were subsequently obtained and reviewed.    Axial images were obtained through the cervical spine using multislice   helical technique.  Reformatted coronal and sagittal images were were   subsequently obtained and reviewed.    COMPARISON: CT head dated 3/5/2024. CT cervical spine dated 3/4/2024    FINDINGS:    CT BRAIN:  There is right frontal scalp soft tissue swelling with skin laceration.  There is no evidence for acute intracranial hemorrhage, mass effect or   midline shift.    The basal cisterns are patent. There isno hydrocephalus.    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 There is periventricular   and subcortical white matter hypodensity consistent with moderate chronic   microvascular type changes.    There is no extra-axial collection.    The visualized orbits are unremarkable.. There has been bilateral   cataract surgery.    The calvarium is intact, without depressed fracture.    Well small air-fluid level within the right maxillary sinus.    CT CERVICAL SPINE:  There is mild chronic compression fracture deformity of T2.    There is no prevertebral soft tissue swelling. There is no splaying of   the spinous processes. The occipital condyles are normal. Lateral masses   of C1 align normally with C2. No lucent fracture line is identified.   There is no spondylolisthesis.    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 Findings include marginal osteophytes, uncovertebral spurring,   loss of normal disc space height, endplate sclerosis, and facet joint   arthrosis.    The spinal canal contents are suboptimally evaluated inherent to CT   technique though grossly unremarkable.    The visualized lung apices are within normal limits.    Miscellaneous:  None.    IMPRESSION:    CT HEAD:  Right frontalscalp soft tissue swelling with skin laceration without   underlying calvarial fracture or intracranial hemorrhage.    CT CERVICAL SPINE:  No acute fracture or subluxation.    < from: CT Chest w/ IV Cont (24 @ 16:13) >    IMPRESSION:    1. New, age-indeterminate mild T12 vertebral body compression fracture   without significant osseous retropulsion, possibly acute.    2. No additional evidence of acute traumatic thoracic or abdominal   pathology.    --- End of Report ---    < end of copied text >    CJ VERMA MD; Attending Radiologist  This document has been electronically signed. 2024  4:34PM (24 @ 16:11)        Assessment:  This is a 89y Male with h/o dementia, htn s/p fall in shower with age indeterminant T12 compression fracture and STEMI        Plan: No acute Surgical intervention warranted - Stable fracture          Recommend conservative treatment for now          ABdominal binder for support - TLSO when OOB          Pain management consult           PT/OT evaluation   -   24 @ 20:54

## 2024-06-24 NOTE — ED ADULT NURSE NOTE - NSFALLHARMRISKINTERV_ED_ALL_ED
Assistance OOB with selected safe patient handling equipment if applicable/Assistance with ambulation/Communicate risk of Fall with Harm to all staff, patient, and family/Monitor gait and stability/Monitor for mental status changes and reorient to person, place, and time, as needed/Provide visual cue: red socks, yellow wristband, yellow gown, etc/Reinforce activity limits and safety measures with patient and family/Use of alarms - bed, stretcher, chair and/or video monitoring/Bed in lowest position, wheels locked, appropriate side rails in place/Call bell, personal items and telephone in reach/Instruct patient to call for assistance before getting out of bed/chair/stretcher/Non-slip footwear applied when patient is off stretcher/[place] to call system/Physically safe environment - no spills, clutter or unnecessary equipment/Purposeful Proactive Rounding/Room/bathroom lighting operational, light cord in reach

## 2024-06-24 NOTE — CONSULT NOTE ADULT - ASSESSMENT
ASSESSMENT:  89M w/ PMH as above who presents initially as a trauma alert s/p unwitnessed fall at NH in shower, +HT, ?LOC, -AC, who was upgraded to a code trauma after noted to have scalp laceration with pulsatile bleeding in triage area, s/p suture repair in trauma bay, pending the following trauma workup:    Injuries identified:   -   -   -     PLAN:   - Trauma Labs: (CBC, BMP, Coags, T&S, UA, EtOH level)  Additional studies:  EKG  Utox    Trauma Imaging to include the following:  - CXR, Pelvic Xray  - CT Head,  CT C-spine, CT Max/Face, CT Chest, CT Abd/Pelvis  - Extremity films: None      Disposition pending results of above labs and imaging  Above plan discussed with Trauma attending, Dr. Mccarty  , patient, patient family, and ED team  --------------------------------------------------------------------------------------  06-24-24 @ 15:13    TRAUMA SENIOR SPECTRA: [id number]  TRAUMA TEAM SPECTRA: [id number] ASSESSMENT:  89M w/ PMH as above who presents initially as a trauma alert s/p unwitnessed fall at NH in shower, +HT, ?LOC, -AC, who was upgraded to a code trauma after noted to have scalp laceration with pulsatile bleeding in triage area, s/p suture repair in trauma bay, pending the following trauma workup:    Injuries identified:   - Age-indeterminate T12 compression fx  -   -     PLAN:   - No further traumatic workup indicated  - Sutures (4 total in right temporal scalp, 3 prolenes and 1 nylon) to be removed in 7 days. Bacitracin to wound 2x per day        Disposition per ED  Above plan discussed with Trauma attending, Dr. Mccarty  , patient, patient family, and ED team  --------------------------------------------------------------------------------------  06-24-24 @ 15:13    TRAUMA SENIOR SPECTRA: [id number]  TRAUMA TEAM SPECTRA: [id number]

## 2024-06-24 NOTE — H&P ADULT - NSHPREVIEWOFSYSTEMS_GEN_ALL_CORE
Limited by Dementia  Constitutional: No weakness, fevers, or chills  HEENT: Pain at site of R lac   Neck: No pain or stiffness  Respiratory: No cough, wheezing  Cardiovascular: No chest pain  Gastrointestinal: No abdominal or epigastric pain  Genitourinary: No  pain, chronic yañez  Musculoskeletal: Normal strength

## 2024-06-24 NOTE — ED ADULT NURSE REASSESSMENT NOTE - NS ED NURSE REASSESS COMMENT FT1
Pt A&Ox2; baseline mental status; EKG done; code STEMI called. Heparin infusion initiated. Report given to CCU RN.

## 2024-06-24 NOTE — ED PROVIDER NOTE - CONSIDERATION OF ADMISSION OBSERVATION
Consideration of Admission/Observation Patient s/p fall, found to have (+) STEMI on EKG - to be admitted for further management.

## 2024-06-24 NOTE — CONSULT NOTE ADULT - SUBJECTIVE AND OBJECTIVE BOX
Cardiology HPI:  88 yo M with PMHx of HTN, DEmentia, recent falls brought in to ED after unwitnessed fall, was supposedly found down in bathroom, had unwitnessed fall with head trauma, no LOC. Found to have scalp laceration, no intracranial injury/bleeding on CT. Also has T12 compression fx. Currently denies any complaints, appears comfortable. Has ST elevations in inferior leads. Bedside echo with grossly normal EF.    PAST MEDICAL & SURGICAL HISTORY  HTN (hypertension)    Neurogenic bladder    Waldron catheter in place  2016    Dementia    No significant past surgical history  Right Hip ORIF      ALLERGIES:  No Known Allergies      MEDICATIONS:  aspirin  chewable 324 milliGRAM(s) Oral once  heparin   Injectable 3200 Unit(s) IV Push once  heparin  Infusion.  Unit(s)/Hr (6.5 mL/Hr) IV Continuous <Continuous>    PRN:  heparin   Injectable 3200 Unit(s) IV Push every 6 hours PRN      HOME MEDICATIONS:  Home Medications:  acetaminophen 325 mg oral tablet: 3 tab(s) orally every 8 hours (02 Apr 2024 13:17)  ferrous fumarate 325 mg (106 mg elemental iron) oral tablet: 1 tab(s) orally once a day (27 Mar 2024 18:43)  lisinopril 5 mg oral tablet: 1 tab(s) orally once a day (02 Apr 2024 13:17)  metoprolol tartrate 25 mg oral tablet: 1 tab(s) orally 2 times a day (08 Apr 2024 13:56)  pantoprazole 40 mg oral delayed release tablet: 1 tab(s) orally once a day (before a meal) (02 Apr 2024 13:17)  polyethylene glycol 3350 oral powder for reconstitution: 17 gram(s) orally once a day As needed Constipation (02 Apr 2024 13:17)  senna leaf extract oral tablet: 2 tab(s) orally once a day (at bedtime) as needed for  constipation (02 Apr 2024 13:17)  sodium chloride 1 g oral tablet: 1 tab(s) orally 3 times a day (08 Apr 2024 13:56)      VITALS:   T(F): 97.8 (06-24 @ 17:56), Max: 97.8 (06-24 @ 17:56)  HR: 88 (06-24 @ 17:56) (88 - 92)  BP: 137/76 (06-24 @ 17:56) (137/76 - 151/88)  BP(mean): --  RR: 17 (06-24 @ 17:56) (17 - 17)  SpO2: 99% (06-24 @ 17:56) (96% - 99%)    I&O's Summary      PHYSICAL EXAM:  General: Not in distress.  Non-toxic appearing.   HEENT: Right eye ecchymosis, R sided scalp laceration  Cardio: regular, S1, S2  Pulm: Clear air entry bilaterally  Abdomen: Soft, non-tender, non-distended  Extremities: No edema in bilateral LE      LABS:                        9.4    9.03  )-----------( 267      ( 24 Jun 2024 15:09 )             28.1     06-24    126<L>  |  91<L>  |  19  ----------------------------<  113<H>  4.9   |  24  |  1.2    Ca    9.6      24 Jun 2024 15:09    TPro  6.7  /  Alb  3.5  /  TBili  0.3  /  DBili  x   /  AST  23  /  ALT  12  /  AlkPhos  125<H>  06-24    PT/INR - ( 24 Jun 2024 15:09 )   PT: 11.70 sec;   INR: 1.03 ratio         PTT - ( 24 Jun 2024 15:09 )  PTT:26.4 sec  Lactate, Blood: 1.9 mmol/L (06-24-24 @ 15:09)  Creatine Kinase, Serum: 46 U/L (06-24-24 @ 15:09)    CARDIAC MARKERS ( 24 Jun 2024 15:09 )  x     / x     / 46 U/L / x     / x            Troponin trend:        COVID-19 PCR: NotDetec (08 Mar 2024 10:00)    ECG:  NSR  Inferior ST elevations

## 2024-06-24 NOTE — CONSULT NOTE ADULT - SUBJECTIVE AND OBJECTIVE BOX
TRAUMA ACTIVATION LEVEL:  CODE / ALERT  / CONSULT  ACTIVATED BY: EMS**  /  ED**  INTUBATED: YES** / NO**    MECHANISM OF INJURY:   [] Blunt     [] MVC	  [x] Fall	  [] Pedestrian Struck	  [] Motorcycle     [] Assault     [] Bicycle collision    [] Sports injury    [] Penetrating    [] Gun Shot Wound      [] Stab Wound    GCS: 14 	E: 4	V: 4	M: 6    HPI:"BIBA from SNF, s/p fall in shower, unknown loc, pt with hx dementia at baseline, no anticoagulation. Per EMS has large laceration to scalp +active bleeding noted in triage"    Trauma assessment in ED: ABCs intact , GCS 14 , AAOx3.    Obvious external signs of injury: right temporal scalp laceration    PAST MEDICAL & SURGICAL HISTORY:  HTN (hypertension)      Neurogenic bladder      Waldron catheter in place  2016      Dementia      No significant past surgical history  Right Hip ORIF        Allergies    No Known Allergies    Intolerances      Home Medications:  acetaminophen 325 mg oral tablet: 3 tab(s) orally every 8 hours (02 Apr 2024 13:17)  ferrous fumarate 325 mg (106 mg elemental iron) oral tablet: 1 tab(s) orally once a day (27 Mar 2024 18:43)  lisinopril 5 mg oral tablet: 1 tab(s) orally once a day (02 Apr 2024 13:17)  metoprolol tartrate 25 mg oral tablet: 1 tab(s) orally 2 times a day (08 Apr 2024 13:56)  pantoprazole 40 mg oral delayed release tablet: 1 tab(s) orally once a day (before a meal) (02 Apr 2024 13:17)  polyethylene glycol 3350 oral powder for reconstitution: 17 gram(s) orally once a day As needed Constipation (02 Apr 2024 13:17)  senna leaf extract oral tablet: 2 tab(s) orally once a day (at bedtime) as needed for  constipation (02 Apr 2024 13:17)  sodium chloride 1 g oral tablet: 1 tab(s) orally 3 times a day (08 Apr 2024 13:56)      ROS: 10-system review is otherwise negative except HPI above.      Primary Survey:    A - airway intact  B - bilateral breath sounds and good chest rise  C - palpable pulses in all extremities  D - GCS 15 on arrival, ANDREA  Exposure obtained    Vital Signs Last 24 Hrs  T(C): 36.5 (24 Jun 2024 14:34), Max: 36.5 (24 Jun 2024 14:34)  T(F): 97.7 (24 Jun 2024 14:34), Max: 97.7 (24 Jun 2024 14:34)  HR: 92 (24 Jun 2024 14:34) (92 - 92)  BP: 151/88 (24 Jun 2024 14:34) (151/88 - 151/88)  BP(mean): --  RR: 17 (24 Jun 2024 14:34) (17 - 17)  SpO2: 96% (24 Jun 2024 14:34) (96% - 96%)    Parameters below as of 24 Jun 2024 14:34  Patient On (Oxygen Delivery Method): room air        Secondary Survey:   General: NAD  HEENT: Normocephalic, right temporal scalp laceration, EOMI, PEERLA.   Neck: Soft, midline trachea. no c-spine tenderness  Chest: No chest wall tenderness, no subcutaneous emphysema   Cardiac: RR  Respiratory: Bilateral breath sounds, clear and equal bilaterally  Abdomen: Soft, non-distended, non-tender, no rebound, no guarding.  Groin: Normal appearing, pelvis stable   Ext:  Moving b/l upper and lower extremities. Palpable Radial b/l UE, b/l DP palpable in LE.   Back: No T/L/S spine tenderness, No palpable runoff/stepoff/deformity      FAST:     Labs:  CAPILLARY BLOOD GLUCOSE      POCT Blood Glucose.: 106 mg/dL (24 Jun 2024 14:43)                  LFTs:         Coags:                        RADIOLOGY & ADDITIONAL STUDIES:  ---------------------------------------------------------------------------------------   TRAUMA ACTIVATION LEVEL:  CODE / ALERT  / CONSULT  ACTIVATED BY: EMS**  /  ED**  INTUBATED: YES** / NO**    MECHANISM OF INJURY:   [] Blunt     [] MVC	  [x] Fall	  [] Pedestrian Struck	  [] Motorcycle     [] Assault     [] Bicycle collision    [] Sports injury    [] Penetrating    [] Gun Shot Wound      [] Stab Wound    GCS: 14 	E: 4	V: 4	M: 6    HPI:"BIBA from SNF, s/p fall in shower, unknown loc, pt with hx dementia at baseline, no anticoagulation. Per EMS has large laceration to scalp +active bleeding noted in triage"    Trauma assessment in ED: ABCs intact , GCS 14 , AAOx1.    Obvious external signs of injury: right temporal scalp laceration    PAST MEDICAL & SURGICAL HISTORY:  HTN (hypertension)      Neurogenic bladder      Waldron catheter in place  2016      Dementia      No significant past surgical history  Right Hip ORIF        Allergies    No Known Allergies    Intolerances      Home Medications:  acetaminophen 325 mg oral tablet: 3 tab(s) orally every 8 hours (02 Apr 2024 13:17)  ferrous fumarate 325 mg (106 mg elemental iron) oral tablet: 1 tab(s) orally once a day (27 Mar 2024 18:43)  lisinopril 5 mg oral tablet: 1 tab(s) orally once a day (02 Apr 2024 13:17)  metoprolol tartrate 25 mg oral tablet: 1 tab(s) orally 2 times a day (08 Apr 2024 13:56)  pantoprazole 40 mg oral delayed release tablet: 1 tab(s) orally once a day (before a meal) (02 Apr 2024 13:17)  polyethylene glycol 3350 oral powder for reconstitution: 17 gram(s) orally once a day As needed Constipation (02 Apr 2024 13:17)  senna leaf extract oral tablet: 2 tab(s) orally once a day (at bedtime) as needed for  constipation (02 Apr 2024 13:17)  sodium chloride 1 g oral tablet: 1 tab(s) orally 3 times a day (08 Apr 2024 13:56)      ROS: 10-system review is otherwise negative except HPI above.      Primary Survey:    A - airway intact  B - bilateral breath sounds and good chest rise  C - palpable pulses in all extremities  D - GCS 15 on arrival, ANDREA  Exposure obtained    Vital Signs Last 24 Hrs  T(C): 36.5 (24 Jun 2024 14:34), Max: 36.5 (24 Jun 2024 14:34)  T(F): 97.7 (24 Jun 2024 14:34), Max: 97.7 (24 Jun 2024 14:34)  HR: 92 (24 Jun 2024 14:34) (92 - 92)  BP: 151/88 (24 Jun 2024 14:34) (151/88 - 151/88)  BP(mean): --  RR: 17 (24 Jun 2024 14:34) (17 - 17)  SpO2: 96% (24 Jun 2024 14:34) (96% - 96%)    Parameters below as of 24 Jun 2024 14:34  Patient On (Oxygen Delivery Method): room air        Secondary Survey:   General: NAD  HEENT: Normocephalic, right temporal scalp laceration 2 cm with active bleeding, EOMI, PEERLA.   Neck: Soft, midline trachea. no c-spine tenderness  Chest: No chest wall tenderness, no subcutaneous emphysema   Cardiac: RR  Respiratory: Bilateral breath sounds, clear and equal bilaterally  Abdomen: Soft, non-distended, non-tender, no rebound, no guarding.  Groin: Normal appearing, pelvis stable   Ext:  Moving b/l upper and lower extremities. Palpable Radial b/l UE, b/l DP palpable in LE.   Back: No T/L/S spine tenderness, No palpable runoff/stepoff/deformity      FAST:     Labs:  CAPILLARY BLOOD GLUCOSE      POCT Blood Glucose.: 106 mg/dL (24 Jun 2024 14:43)                  LFTs:         Coags:                        RADIOLOGY & ADDITIONAL STUDIES:  ---------------------------------------------------------------------------------------   TRAUMA ACTIVATION LEVEL:  CODE / ALERT  / CONSULT  ACTIVATED BY: EMS**  /  ED**  INTUBATED: YES** / NO**    MECHANISM OF INJURY:   [] Blunt     [] MVC	  [x] Fall	  [] Pedestrian Struck	  [] Motorcycle     [] Assault     [] Bicycle collision    [] Sports injury    [] Penetrating    [] Gun Shot Wound      [] Stab Wound    GCS: 14 	E: 4	V: 4	M: 6    HPI:"BIBA from SNF, s/p fall in shower, unknown loc, pt with hx dementia at baseline, no anticoagulation. Per EMS has large laceration to scalp +active bleeding noted in triage"    Trauma assessment in ED: ABCs intact , GCS 14 , AAOx1.    Obvious external signs of injury: right temporal scalp laceration    PAST MEDICAL & SURGICAL HISTORY:  HTN (hypertension)      Neurogenic bladder      Waldron catheter in place  2016      Dementia      No significant past surgical history  Right Hip ORIF        Allergies    No Known Allergies    Intolerances      Home Medications:  acetaminophen 325 mg oral tablet: 3 tab(s) orally every 8 hours (02 Apr 2024 13:17)  ferrous fumarate 325 mg (106 mg elemental iron) oral tablet: 1 tab(s) orally once a day (27 Mar 2024 18:43)  lisinopril 5 mg oral tablet: 1 tab(s) orally once a day (02 Apr 2024 13:17)  metoprolol tartrate 25 mg oral tablet: 1 tab(s) orally 2 times a day (08 Apr 2024 13:56)  pantoprazole 40 mg oral delayed release tablet: 1 tab(s) orally once a day (before a meal) (02 Apr 2024 13:17)  polyethylene glycol 3350 oral powder for reconstitution: 17 gram(s) orally once a day As needed Constipation (02 Apr 2024 13:17)  senna leaf extract oral tablet: 2 tab(s) orally once a day (at bedtime) as needed for  constipation (02 Apr 2024 13:17)  sodium chloride 1 g oral tablet: 1 tab(s) orally 3 times a day (08 Apr 2024 13:56)      ROS: 10-system review is otherwise negative except HPI above.      Primary Survey:    A - airway intact  B - bilateral breath sounds and good chest rise  C - palpable pulses in all extremities  D - GCS 15 on arrival, ANDREA  Exposure obtained    Vital Signs Last 24 Hrs  T(C): 36.5 (24 Jun 2024 14:34), Max: 36.5 (24 Jun 2024 14:34)  T(F): 97.7 (24 Jun 2024 14:34), Max: 97.7 (24 Jun 2024 14:34)  HR: 92 (24 Jun 2024 14:34) (92 - 92)  BP: 151/88 (24 Jun 2024 14:34) (151/88 - 151/88)  BP(mean): --  RR: 17 (24 Jun 2024 14:34) (17 - 17)  SpO2: 96% (24 Jun 2024 14:34) (96% - 96%)    Parameters below as of 24 Jun 2024 14:34  Patient On (Oxygen Delivery Method): room air        Secondary Survey:   General: NAD  HEENT: Normocephalic, right temporal scalp laceration 2 cm with active bleeding, EOMI, PEERLA.   Neck: Soft, midline trachea. no c-spine tenderness  Chest: No chest wall tenderness, no subcutaneous emphysema   Cardiac: RR  Respiratory: Bilateral breath sounds, clear and equal bilaterally  Abdomen: Soft, non-distended, non-tender, no rebound, no guarding.  Groin: Normal appearing, pelvis stable   Ext:  Moving b/l upper and lower extremities. Palpable Radial b/l UE, b/l DP palpable in LE.   Back: No T/L/S spine tenderness, No palpable runoff/stepoff/deformity      FAST:     Labs:  CAPILLARY BLOOD GLUCOSE      POCT Blood Glucose.: 106 mg/dL (24 Jun 2024 14:43)                  LFTs:         Coags:                        RADIOLOGY & ADDITIONAL STUDIES:  ---------------------------------------------------------------------------------------    < from: CT Chest w/ IV Cont (06.24.24 @ 16:13) >  IMPRESSION:    1. New, age-indeterminate mild T12 vertebral body compression fracture   without significant osseous retropulsion, possibly acute.    2. No additional evidence of acute traumatic thoracic or abdominal   pathology.    --- End of Report ---    < end of copied text >  < from: CT Cervical Spine No Cont (06.24.24 @ 16:12) >  CT HEAD:  Right frontalscalp soft tissue swelling with skin laceration without   underlying calvarial fracture or intracranial hemorrhage.    CT CERVICAL SPINE:  No acute fracture or subluxation.    --- End of Report ---    < end of copied text >   TRAUMA ACTIVATION LEVEL:  CODE / ALERT  / CONSULT  ACTIVATED BY: EMS**  /  ED**  INTUBATED: YES** / NO**    MECHANISM OF INJURY:   [] Blunt     [] MVC	  [x] Fall	  [] Pedestrian Struck	  [] Motorcycle     [] Assault     [] Bicycle collision    [] Sports injury    [] Penetrating    [] Gun Shot Wound      [] Stab Wound    GCS: 14 	E: 4	V: 4	M: 6    HPI: 89m HTN, dementia, neurogenic bladder with chronic indwelling yañez, R hip fracture s/p internal fixation BIBA from SNF, s/p unwitnessed fall in shower, unknown loc, pt with hx dementia at baseline (AAOx1-2), no anticoagulation. Per EMS has large laceration to scalp +active bleeding noted in triage. Patient remained HD stable, code trauma activated due to active pulsatile bleeding. Bleed was suture ligated in the trauma bay and patient was not found to have any other external signs of trauma. ROS unable to be obtained due to confusion, but patient denies any pain at time of examination.    Trauma assessment in ED: ABCs intact , GCS 14 , AAOx1.    Obvious external signs of injury: right temporal scalp laceration    PAST MEDICAL & SURGICAL HISTORY:  HTN (hypertension)      Neurogenic bladder      Yañez catheter in place  2016      Dementia      No significant past surgical history  Right Hip ORIF        Allergies    No Known Allergies    Intolerances      Home Medications:  acetaminophen 325 mg oral tablet: 3 tab(s) orally every 8 hours (02 Apr 2024 13:17)  ferrous fumarate 325 mg (106 mg elemental iron) oral tablet: 1 tab(s) orally once a day (27 Mar 2024 18:43)  lisinopril 5 mg oral tablet: 1 tab(s) orally once a day (02 Apr 2024 13:17)  metoprolol tartrate 25 mg oral tablet: 1 tab(s) orally 2 times a day (08 Apr 2024 13:56)  pantoprazole 40 mg oral delayed release tablet: 1 tab(s) orally once a day (before a meal) (02 Apr 2024 13:17)  polyethylene glycol 3350 oral powder for reconstitution: 17 gram(s) orally once a day As needed Constipation (02 Apr 2024 13:17)  senna leaf extract oral tablet: 2 tab(s) orally once a day (at bedtime) as needed for  constipation (02 Apr 2024 13:17)  sodium chloride 1 g oral tablet: 1 tab(s) orally 3 times a day (08 Apr 2024 13:56)      ROS: 10-system review is otherwise negative except HPI above.      Primary Survey:    A - airway intact  B - bilateral breath sounds and good chest rise  C - palpable pulses in all extremities  D - GCS 15 on arrival, ANDREA  Exposure obtained    Vital Signs Last 24 Hrs  T(C): 36.5 (24 Jun 2024 14:34), Max: 36.5 (24 Jun 2024 14:34)  T(F): 97.7 (24 Jun 2024 14:34), Max: 97.7 (24 Jun 2024 14:34)  HR: 92 (24 Jun 2024 14:34) (92 - 92)  BP: 151/88 (24 Jun 2024 14:34) (151/88 - 151/88)  BP(mean): --  RR: 17 (24 Jun 2024 14:34) (17 - 17)  SpO2: 96% (24 Jun 2024 14:34) (96% - 96%)    Parameters below as of 24 Jun 2024 14:34  Patient On (Oxygen Delivery Method): room air        Secondary Survey:   General: NAD  HEENT: Normocephalic, right temporal scalp laceration 2 cm with active bleeding, EOMI, PEERLA.   Neck: Soft, midline trachea. no c-spine tenderness  Chest: No chest wall tenderness, no subcutaneous emphysema   Cardiac: RR  Respiratory: Bilateral breath sounds, clear and equal bilaterally  Abdomen: Soft, non-distended, non-tender, no rebound, no guarding.  Groin: Normal appearing, pelvis stable   Ext:  Moving b/l upper and lower extremities. Palpable Radial b/l UE, b/l DP palpable in LE.   Back: No T/L/S spine tenderness, No palpable runoff/stepoff/deformity      FAST:     Labs:  CAPILLARY BLOOD GLUCOSE      POCT Blood Glucose.: 106 mg/dL (24 Jun 2024 14:43)                  LFTs:         Coags:                        RADIOLOGY & ADDITIONAL STUDIES:  ---------------------------------------------------------------------------------------    < from: CT Chest w/ IV Cont (06.24.24 @ 16:13) >  IMPRESSION:    1. New, age-indeterminate mild T12 vertebral body compression fracture   without significant osseous retropulsion, possibly acute.    2. No additional evidence of acute traumatic thoracic or abdominal   pathology.    --- End of Report ---    < end of copied text >  < from: CT Cervical Spine No Cont (06.24.24 @ 16:12) >  CT HEAD:  Right frontalscalp soft tissue swelling with skin laceration without   underlying calvarial fracture or intracranial hemorrhage.    CT CERVICAL SPINE:  No acute fracture or subluxation.    --- End of Report ---    < end of copied text >

## 2024-06-24 NOTE — H&P ADULT - ASSESSMENT
IMPRESSION:  #ST Elevations in II, III, avF - Suspected MI  #2 cm R Temporal Laceration w/ Arterial Exposure - Repaired  #Age Indeterminate T12 Compression Fracture - Possibly Acute  #Unwitnessed Fall  #Hypertension  #Dementia  #Neurogenic Bladder w/ Chronic Yañez  #Chronic Hyponatremia    PLAN:    CNS: Avoid CNS depressants. Continue home Mirtazapine.    HEENT: Oral care. Bacitracin for repaired R temporal laceration.    PULMONARY: Aspiration precautions. Monitor pulse ox.    CARDIOVASCULAR: Loaded w/ ASA 324mg. Heparin ACS protocol, ASA 81mg daily, continue home Metoprolol tartrate 25mg BID, start Atorvastatin 80mg QHS. Trend troponin and lactate. Repeat EKG in AM. Formal TTE in AM.    GI: Continue home PPI. NPO after midnight.    RENAL: Chronic yañez. Monitor sodium and lytes, continue home salt tabs    INFECTIOUS DISEASE: CHG wipes.    HEMATOLOGICAL: Heparin ACS protocol.    ENDOCRINE: HbA1C 5.7 % [03-29-24], 5.5 % [10-25-23]. Lipid profile in AM.    MUSCULOSKELETAL: T12 compression fracture, possibly acute. Seen by NSGY, no acute surgical intervention. Fall precautions. Will eventually need PT/OT    CODE STATUS: Full Code    DISPOSITION: CCU    LINES: PIV   IMPRESSION:  #ST Elevations in II, III, avF - Suspected MI  #2 cm R Temporal Laceration w/ Arterial Exposure - Repaired  #Age Indeterminate T12 Compression Fracture - Possibly Acute  #Unwitnessed Fall  #Hypertension  #Dementia  #Neurogenic Bladder w/ Chronic Yañez  #Chronic Hyponatremia    PLAN:    CNS: Avoid CNS depressants. Continue home Mirtazapine.    HEENT: Oral care. Bacitracin for repaired R temporal laceration.    PULMONARY: Aspiration precautions. Monitor pulse ox.    CARDIOVASCULAR: Loaded w/ ASA 324mg. Heparin ACS protocol, ASA 81mg daily, continue home Metoprolol tartrate 25mg BID, start Atorvastatin 80mg QHS. Trend troponin and lactate. Repeat EKG in AM. Formal TTE in AM.    GI: Continue home PPI. Keep NPO after midnight.    RENAL: Chronic yañez. Monitor sodium and lytes, continue home salt tabs    INFECTIOUS DISEASE: CHG wipes.    HEMATOLOGICAL: Heparin ACS protocol.    ENDOCRINE: HbA1C 5.7 % [03-29-24], 5.5 % [10-25-23]. Lipid profile in AM.    MUSCULOSKELETAL: T12 compression fracture, possibly acute. Seen by NSGY, no acute surgical intervention. Pain management consult Fall precautions. Will eventually need PT/OT.    CODE STATUS: Full Code    DISPOSITION: CCU    LINES: PIV   IMPRESSION:  #ST Elevations in II, III, avF - Suspected MI  #2 cm R Temporal Laceration w/ Arterial Bleed - Repaired  #New, Age Indeterminate T12 Compression Fracture - Possibly Acute  #Unwitnessed Fall  #Hypertension  #Dementia  #Neurogenic Bladder w/ Chronic Yañez  #Chronic Hyponatremia    PLAN:    CNS: Avoid CNS depressants. Continue home Mirtazapine.    HEENT: Oral care. Bacitracin for repaired R temporal laceration.    PULMONARY: Aspiration precautions. Monitor pulse ox.    CARDIOVASCULAR: Loaded w/ ASA 324mg. Heparin ACS protocol, ASA 81mg daily, continue home Metoprolol tartrate 25mg BID, start Atorvastatin 80mg QHS. Trend troponin and lactate. Repeat EKG in AM. Formal TTE in AM.    GI: Continue home PPI. Keep NPO after midnight.    RENAL: Chronic yañez. Monitor sodium and lytes, continue home salt tabs    INFECTIOUS DISEASE: CHG wipes.    HEMATOLOGICAL: Heparin ACS protocol.    ENDOCRINE: HbA1C 5.7 % [03-29-24], 5.5 % [10-25-23]. Lipid profile in AM.    MUSCULOSKELETAL: T12 compression fracture, possibly acute. Seen by NSGY, no acute surgical intervention. Pain management consult Fall precautions. Will eventually need PT/OT.    CODE STATUS: Full Code    DISPOSITION: CCU    LINES: PIV

## 2024-06-24 NOTE — H&P ADULT - CRITICAL CARE ATTENDING COMMENT
This included management of respiratory function, adjustment of vasopressor support, optimization of nutrition and glycemic control, management of antibiotics, review and adjustment of fluid balances, management of pain and sedation, review of radiographs and imaging studies, and communication with consultants.    (Date of Service: 06/24/2024)

## 2024-06-24 NOTE — ED PROVIDER NOTE - EKG/XRAY ADDITIONAL INFORMATION
Chest xray negative for pneumothorax, pneumonia, widened mediastinum, evidence of rib fractures, enlarged cardiac silhouette or any other emergent pathologies.    Pelvic xray without fracture

## 2024-06-24 NOTE — ED ADULT NURSE NOTE - OBJECTIVE STATEMENT
pt BIBA s/p unwitnessed fall in shower at NH. unknown LOC. pt with hx dementia at baseline, no anticoagulation. Per EMS has large laceration to scalp +active bleeding noted in triage

## 2024-06-24 NOTE — ED PROVIDER NOTE - OBJECTIVE STATEMENT
Mr. Milner is an 88 yo M with a PMH of dementia, HTN, neurogenic bladder, L hip fx 3/2024 and multiple falls presenting from NH after being found down on the floor this morning after an unwitnessed fall w/ actively bleeding R temporal laceration, trauma code was called in ED and laceration was repaired. Trauma workup revealed a new age-indeterminate T12 fracture, possibly acute. EKG showed ST-elevations in II, III and avF and ST-depressions in avL and avR, code STEMI was then called. Patient is currently alert and oriented to name only and unable to recall events leading up to the fall. He denies having chest pain.

## 2024-06-24 NOTE — ED ADULT NURSE NOTE - NSICDXPASTSURGICALHX_GEN_ALL_CORE_FT
Interval History: No acute overnight events     Medications:  Continuous Infusions:   sodium chloride 0.9% 75 mL/hr at 01/26/22 2349     Scheduled Meds:   amLODIPine  10 mg Oral Daily    atorvastatin  40 mg Oral Daily    gabapentin  100 mg Oral BID    heparin (porcine)  5,000 Units Subcutaneous Q8H    levETIRAcetam  500 mg Oral BID    multivitamin  1 tablet Oral Daily    mupirocin   Nasal BID    polyethylene glycol  17 g Oral Daily    senna-docusate 8.6-50 mg  1 tablet Oral BID    silodosin  4 mg Oral Daily     PRN Meds:acetaminophen, dexamethasone, dextrose 50%, glucagon (human recombinant), hydrALAZINE, insulin aspart U-100, labetalol, magnesium oxide, magnesium oxide, ondansetron, oxyCODONE, potassium bicarbonate, potassium bicarbonate, potassium bicarbonate, potassium, sodium phosphates, potassium, sodium phosphates, potassium, sodium phosphates, promethazine (PHENERGAN) IVPB, sodium chloride 0.9%     Review of Systems    Objective:     Weight: 93 kg (205 lb 0.4 oz)  Body mass index is 30.28 kg/m².  Vital Signs (Most Recent):  Temp: 99.1 °F (37.3 °C) (01/27/22 0302)  Pulse: 76 (01/27/22 0812)  Resp: (!) 21 (01/27/22 0812)  BP: (!) 117/56 (01/27/22 0602)  SpO2: 100 % (01/27/22 0812) Vital Signs (24h Range):  Temp:  [98 °F (36.7 °C)-99.5 °F (37.5 °C)] 99.1 °F (37.3 °C)  Pulse:  [51-87] 76  Resp:  [10-31] 21  SpO2:  [93 %-100 %] 100 %  BP: (104-151)/(52-72) 117/56                 Physical Exam:    Constitutional: No distress.     Eyes: Pupils are equal, round, and reactive to light. EOM are normal.     Cardiovascular: Normal rate and regular rhythm.     Abdominal: Soft.     Psych/Behavior: He is alert.     E4V5M6  AOx3  PERRL  EOMI  Face Symmetric  Tongue midline  BUE 5/5  BLE 5/5  No drift    Significant Labs:  Recent Labs   Lab 01/27/22  0457   *      K 4.3      CO2 23   BUN 33*   CREATININE 1.0   CALCIUM 8.9   MG 2.2     Recent Labs   Lab 01/27/22  0457   WBC 10.52   HGB 12.4*   HCT  35.6*        No results for input(s): LABPT, INR, APTT in the last 48 hours.  Microbiology Results (last 7 days)     ** No results found for the last 168 hours. **        All pertinent labs from the last 24 hours have been reviewed.    Significant Diagnostics:  I have reviewed and interpreted all pertinent imaging results/findings within the past 24 hours.   PAST SURGICAL HISTORY:  No significant past surgical history Right Hip ORIF

## 2024-06-24 NOTE — H&P ADULT - NSHPPHYSICALEXAM_GEN_ALL_CORE
Physical Examination  General: Appears tired  Head: R temporal lac 2cm, sutured, bruising over R eye  Neck: Supple, no JVD  Cardiac: Regular rate and rhythm  Pulmonary: CTAB  Abdominal: Soft, nontender, and nondistended  Extremities: No pitting edema  Neurologic: AOx1 to name only, typically AOx2-3 per son  Skin: Bruising above R eye

## 2024-06-24 NOTE — ED PROVIDER NOTE - CLINICAL SUMMARY MEDICAL DECISION MAKING FREE TEXT BOX
Patient presented status post unwitnessed fall as documented, sustaining laceration to the scalp.  Patient was called as trauma code due to pulsatile bleeding from the scalp.  Trauma evaluated patient in the ED and was able to repair laceration on the scalp and tamponade bleeding.  Trauma workup in ED as per trauma recommendation revealed and nonspecific T12 fracture with age-indeterminate, but no other acute traumatic injury.  Neurosurgery saw and cleared patient at that time.  Of note, patient also noted to have an elevated troponin in the labs.  Patient without chest pain.  Initial EKG showed a lot of artifact and was difficult to interpret, but subsequent EKG revealed inferior lateral STEMI and therefore code STEMI activated and cardiology evaluated the patient in the ED.  Per cardiology, will admit to CCU for medical management at this time.  Hemodynamically stable at time of admission.

## 2024-06-24 NOTE — ED PROVIDER NOTE - PHYSICAL EXAMINATION
CONSTITUTIONAL: Well-developed; well-nourished;  SKIN: warm, dry  HEAD: Normocephalic; 2cm laceration to the right eyebrow with arterial bleeding   EYES: PERRL, EOMI, no conjunctival erythema  ENT: No nasal discharge; airway clear.  NECK: Supple; non tender.  CARD: Regular rate and rhythm.   RESP: No wheezes, rales or rhonchi.  ABD: soft ntnd  EXT: Normal ROM.  No clubbing, cyanosis or edema.

## 2024-06-25 ENCOUNTER — RESULT REVIEW (OUTPATIENT)
Age: 89
End: 2024-06-25

## 2024-06-25 LAB
A1C WITH ESTIMATED AVERAGE GLUCOSE RESULT: 4.9 % — SIGNIFICANT CHANGE UP (ref 4–5.6)
ALBUMIN SERPL ELPH-MCNC: 3.2 G/DL — LOW (ref 3.5–5.2)
ALP SERPL-CCNC: 101 U/L — SIGNIFICANT CHANGE UP (ref 30–115)
ALT FLD-CCNC: 9 U/L — SIGNIFICANT CHANGE UP (ref 0–41)
ANION GAP SERPL CALC-SCNC: 12 MMOL/L — SIGNIFICANT CHANGE UP (ref 7–14)
ANION GAP SERPL CALC-SCNC: 14 MMOL/L — SIGNIFICANT CHANGE UP (ref 7–14)
APTT BLD: 68.8 SEC — HIGH (ref 27–39.2)
AST SERPL-CCNC: 29 U/L — SIGNIFICANT CHANGE UP (ref 0–41)
BASOPHILS # BLD AUTO: 0.02 K/UL — SIGNIFICANT CHANGE UP (ref 0–0.2)
BASOPHILS # BLD AUTO: 0.03 K/UL — SIGNIFICANT CHANGE UP (ref 0–0.2)
BASOPHILS NFR BLD AUTO: 0.2 % — SIGNIFICANT CHANGE UP (ref 0–1)
BASOPHILS NFR BLD AUTO: 0.5 % — SIGNIFICANT CHANGE UP (ref 0–1)
BILIRUB SERPL-MCNC: 0.3 MG/DL — SIGNIFICANT CHANGE UP (ref 0.2–1.2)
BUN SERPL-MCNC: 18 MG/DL — SIGNIFICANT CHANGE UP (ref 10–20)
BUN SERPL-MCNC: 18 MG/DL — SIGNIFICANT CHANGE UP (ref 10–20)
CALCIUM SERPL-MCNC: 9.2 MG/DL — SIGNIFICANT CHANGE UP (ref 8.4–10.4)
CALCIUM SERPL-MCNC: 9.3 MG/DL — SIGNIFICANT CHANGE UP (ref 8.4–10.5)
CHLORIDE SERPL-SCNC: 93 MMOL/L — LOW (ref 98–110)
CHLORIDE SERPL-SCNC: 95 MMOL/L — LOW (ref 98–110)
CHOLEST SERPL-MCNC: 179 MG/DL — SIGNIFICANT CHANGE UP
CK MB CFR SERPL CALC: 8.6 NG/ML — HIGH (ref 0.6–6.3)
CK SERPL-CCNC: 120 U/L — SIGNIFICANT CHANGE UP (ref 0–225)
CK SERPL-CCNC: 125 U/L — SIGNIFICANT CHANGE UP (ref 0–225)
CO2 SERPL-SCNC: 21 MMOL/L — SIGNIFICANT CHANGE UP (ref 17–32)
CO2 SERPL-SCNC: 23 MMOL/L — SIGNIFICANT CHANGE UP (ref 17–32)
CREAT SERPL-MCNC: 1.2 MG/DL — SIGNIFICANT CHANGE UP (ref 0.7–1.5)
CREAT SERPL-MCNC: 1.4 MG/DL — SIGNIFICANT CHANGE UP (ref 0.7–1.5)
EGFR: 49 ML/MIN/1.73M2 — LOW
EGFR: 59 ML/MIN/1.73M2 — LOW
EOSINOPHIL # BLD AUTO: 0.16 K/UL — SIGNIFICANT CHANGE UP (ref 0–0.7)
EOSINOPHIL # BLD AUTO: 0.17 K/UL — SIGNIFICANT CHANGE UP (ref 0–0.7)
EOSINOPHIL NFR BLD AUTO: 1.8 % — SIGNIFICANT CHANGE UP (ref 0–8)
EOSINOPHIL NFR BLD AUTO: 2.6 % — SIGNIFICANT CHANGE UP (ref 0–8)
ESTIMATED AVERAGE GLUCOSE: 94 MG/DL — SIGNIFICANT CHANGE UP (ref 68–114)
GLUCOSE SERPL-MCNC: 75 MG/DL — SIGNIFICANT CHANGE UP (ref 70–99)
GLUCOSE SERPL-MCNC: 87 MG/DL — SIGNIFICANT CHANGE UP (ref 70–99)
HCT VFR BLD CALC: 20.2 % — LOW (ref 42–52)
HCT VFR BLD CALC: 21.9 % — LOW (ref 42–52)
HCT VFR BLD CALC: 22.6 % — LOW (ref 42–52)
HCT VFR BLD CALC: 23.9 % — LOW (ref 42–52)
HCT VFR BLD CALC: 25.3 % — LOW (ref 42–52)
HDLC SERPL-MCNC: 48 MG/DL — SIGNIFICANT CHANGE UP
HGB BLD-MCNC: 6.9 G/DL — CRITICAL LOW (ref 14–18)
HGB BLD-MCNC: 7.5 G/DL — LOW (ref 14–18)
HGB BLD-MCNC: 7.8 G/DL — LOW (ref 14–18)
HGB BLD-MCNC: 8 G/DL — LOW (ref 14–18)
HGB BLD-MCNC: 8.5 G/DL — LOW (ref 14–18)
IMM GRANULOCYTES NFR BLD AUTO: 0.4 % — HIGH (ref 0.1–0.3)
IMM GRANULOCYTES NFR BLD AUTO: 0.9 % — HIGH (ref 0.1–0.3)
LACTATE SERPL-SCNC: 2 MMOL/L — SIGNIFICANT CHANGE UP (ref 0.7–2)
LACTATE SERPL-SCNC: 2.3 MMOL/L — HIGH (ref 0.7–2)
LIPID PNL WITH DIRECT LDL SERPL: 113 MG/DL — HIGH
LYMPHOCYTES # BLD AUTO: 1.22 K/UL — SIGNIFICANT CHANGE UP (ref 1.2–3.4)
LYMPHOCYTES # BLD AUTO: 1.62 K/UL — SIGNIFICANT CHANGE UP (ref 1.2–3.4)
LYMPHOCYTES # BLD AUTO: 17.9 % — LOW (ref 20.5–51.1)
LYMPHOCYTES # BLD AUTO: 18.9 % — LOW (ref 20.5–51.1)
MAGNESIUM SERPL-MCNC: 2.3 MG/DL — SIGNIFICANT CHANGE UP (ref 1.8–2.4)
MCHC RBC-ENTMCNC: 28.7 PG — SIGNIFICANT CHANGE UP (ref 27–31)
MCHC RBC-ENTMCNC: 28.8 PG — SIGNIFICANT CHANGE UP (ref 27–31)
MCHC RBC-ENTMCNC: 29.1 PG — SIGNIFICANT CHANGE UP (ref 27–31)
MCHC RBC-ENTMCNC: 29.4 PG — SIGNIFICANT CHANGE UP (ref 27–31)
MCHC RBC-ENTMCNC: 29.4 PG — SIGNIFICANT CHANGE UP (ref 27–31)
MCHC RBC-ENTMCNC: 33.5 G/DL — SIGNIFICANT CHANGE UP (ref 32–37)
MCHC RBC-ENTMCNC: 33.6 G/DL — SIGNIFICANT CHANGE UP (ref 32–37)
MCHC RBC-ENTMCNC: 34.2 G/DL — SIGNIFICANT CHANGE UP (ref 32–37)
MCHC RBC-ENTMCNC: 34.2 G/DL — SIGNIFICANT CHANGE UP (ref 32–37)
MCHC RBC-ENTMCNC: 34.5 G/DL — SIGNIFICANT CHANGE UP (ref 32–37)
MCV RBC AUTO: 84.9 FL — SIGNIFICANT CHANGE UP (ref 80–94)
MCV RBC AUTO: 85.3 FL — SIGNIFICANT CHANGE UP (ref 80–94)
MCV RBC AUTO: 85.5 FL — SIGNIFICANT CHANGE UP (ref 80–94)
MCV RBC AUTO: 86 FL — SIGNIFICANT CHANGE UP (ref 80–94)
MCV RBC AUTO: 86 FL — SIGNIFICANT CHANGE UP (ref 80–94)
MONOCYTES # BLD AUTO: 0.65 K/UL — HIGH (ref 0.1–0.6)
MONOCYTES # BLD AUTO: 0.75 K/UL — HIGH (ref 0.1–0.6)
MONOCYTES NFR BLD AUTO: 10.1 % — HIGH (ref 1.7–9.3)
MONOCYTES NFR BLD AUTO: 8.3 % — SIGNIFICANT CHANGE UP (ref 1.7–9.3)
NEUTROPHILS # BLD AUTO: 4.32 K/UL — SIGNIFICANT CHANGE UP (ref 1.4–6.5)
NEUTROPHILS # BLD AUTO: 6.48 K/UL — SIGNIFICANT CHANGE UP (ref 1.4–6.5)
NEUTROPHILS NFR BLD AUTO: 67 % — SIGNIFICANT CHANGE UP (ref 42.2–75.2)
NEUTROPHILS NFR BLD AUTO: 71.4 % — SIGNIFICANT CHANGE UP (ref 42.2–75.2)
NON HDL CHOLESTEROL: 131 MG/DL — HIGH
NRBC # BLD: 0 /100 WBCS — SIGNIFICANT CHANGE UP (ref 0–0)
OSMOLALITY SERPL: 278 MOS/KG — LOW (ref 280–301)
OSMOLALITY UR: 405 MOS/KG — SIGNIFICANT CHANGE UP (ref 50–1200)
PLATELET # BLD AUTO: 188 K/UL — SIGNIFICANT CHANGE UP (ref 130–400)
PLATELET # BLD AUTO: 191 K/UL — SIGNIFICANT CHANGE UP (ref 130–400)
PLATELET # BLD AUTO: 201 K/UL — SIGNIFICANT CHANGE UP (ref 130–400)
PLATELET # BLD AUTO: 206 K/UL — SIGNIFICANT CHANGE UP (ref 130–400)
PLATELET # BLD AUTO: 217 K/UL — SIGNIFICANT CHANGE UP (ref 130–400)
PMV BLD: 8.7 FL — SIGNIFICANT CHANGE UP (ref 7.4–10.4)
PMV BLD: 9.1 FL — SIGNIFICANT CHANGE UP (ref 7.4–10.4)
PMV BLD: 9.1 FL — SIGNIFICANT CHANGE UP (ref 7.4–10.4)
PMV BLD: 9.2 FL — SIGNIFICANT CHANGE UP (ref 7.4–10.4)
PMV BLD: 9.5 FL — SIGNIFICANT CHANGE UP (ref 7.4–10.4)
POTASSIUM SERPL-MCNC: 4.5 MMOL/L — SIGNIFICANT CHANGE UP (ref 3.5–5)
POTASSIUM SERPL-MCNC: 4.9 MMOL/L — SIGNIFICANT CHANGE UP (ref 3.5–5)
POTASSIUM SERPL-SCNC: 4.5 MMOL/L — SIGNIFICANT CHANGE UP (ref 3.5–5)
POTASSIUM SERPL-SCNC: 4.9 MMOL/L — SIGNIFICANT CHANGE UP (ref 3.5–5)
PROT SERPL-MCNC: 5.7 G/DL — LOW (ref 6–8)
RBC # BLD: 2.35 M/UL — LOW (ref 4.7–6.1)
RBC # BLD: 2.58 M/UL — LOW (ref 4.7–6.1)
RBC # BLD: 2.65 M/UL — LOW (ref 4.7–6.1)
RBC # BLD: 2.78 M/UL — LOW (ref 4.7–6.1)
RBC # BLD: 2.96 M/UL — LOW (ref 4.7–6.1)
RBC # FLD: 15.9 % — HIGH (ref 11.5–14.5)
RBC # FLD: 16.1 % — HIGH (ref 11.5–14.5)
RBC # FLD: 16.1 % — HIGH (ref 11.5–14.5)
RBC # FLD: 16.3 % — HIGH (ref 11.5–14.5)
RBC # FLD: 16.5 % — HIGH (ref 11.5–14.5)
SODIUM SERPL-SCNC: 128 MMOL/L — LOW (ref 135–146)
SODIUM SERPL-SCNC: 130 MMOL/L — LOW (ref 135–146)
SODIUM UR-SCNC: 38 MMOL/L — SIGNIFICANT CHANGE UP
TRIGL SERPL-MCNC: 88 MG/DL — SIGNIFICANT CHANGE UP
TROPONIN T, HIGH SENSITIVITY RESULT: 250 NG/L — CRITICAL HIGH (ref 6–21)
TROPONIN T, HIGH SENSITIVITY RESULT: 270 NG/L — CRITICAL HIGH (ref 6–21)
TROPONIN T, HIGH SENSITIVITY RESULT: 273 NG/L — CRITICAL HIGH (ref 6–21)
TSH SERPL-MCNC: 1.89 UIU/ML — SIGNIFICANT CHANGE UP (ref 0.27–4.2)
WBC # BLD: 11.47 K/UL — HIGH (ref 4.8–10.8)
WBC # BLD: 6.14 K/UL — SIGNIFICANT CHANGE UP (ref 4.8–10.8)
WBC # BLD: 6.2 K/UL — SIGNIFICANT CHANGE UP (ref 4.8–10.8)
WBC # BLD: 6.45 K/UL — SIGNIFICANT CHANGE UP (ref 4.8–10.8)
WBC # BLD: 9.07 K/UL — SIGNIFICANT CHANGE UP (ref 4.8–10.8)
WBC # FLD AUTO: 11.47 K/UL — HIGH (ref 4.8–10.8)
WBC # FLD AUTO: 6.14 K/UL — SIGNIFICANT CHANGE UP (ref 4.8–10.8)
WBC # FLD AUTO: 6.2 K/UL — SIGNIFICANT CHANGE UP (ref 4.8–10.8)
WBC # FLD AUTO: 6.45 K/UL — SIGNIFICANT CHANGE UP (ref 4.8–10.8)
WBC # FLD AUTO: 9.07 K/UL — SIGNIFICANT CHANGE UP (ref 4.8–10.8)

## 2024-06-25 PROCEDURE — 93312 ECHO TRANSESOPHAGEAL: CPT | Mod: 26

## 2024-06-25 PROCEDURE — 93010 ELECTROCARDIOGRAM REPORT: CPT | Mod: 59

## 2024-06-25 PROCEDURE — 99291 CRITICAL CARE FIRST HOUR: CPT

## 2024-06-25 PROCEDURE — 93325 DOPPLER ECHO COLOR FLOW MAPG: CPT | Mod: 26

## 2024-06-25 PROCEDURE — 93320 DOPPLER ECHO COMPLETE: CPT | Mod: 26

## 2024-06-25 PROCEDURE — 71045 X-RAY EXAM CHEST 1 VIEW: CPT | Mod: 26

## 2024-06-25 PROCEDURE — 12002 RPR S/N/AX/GEN/TRNK2.6-7.5CM: CPT

## 2024-06-25 RX ADMIN — Medication 1 APPLICATION(S): at 05:05

## 2024-06-25 RX ADMIN — ATORVASTATIN CALCIUM 80 MILLIGRAM(S): 20 TABLET, FILM COATED ORAL at 21:06

## 2024-06-25 RX ADMIN — PANTOPRAZOLE SODIUM 40 MILLIGRAM(S): 40 INJECTION, POWDER, FOR SOLUTION INTRAVENOUS at 05:09

## 2024-06-25 RX ADMIN — LISINOPRIL 5 MILLIGRAM(S): 5 TABLET ORAL at 05:05

## 2024-06-25 RX ADMIN — POLYETHYLENE GLYCOL 3350 17 GRAM(S): 1 POWDER ORAL at 17:28

## 2024-06-25 RX ADMIN — Medication 25 MILLIGRAM(S): at 05:06

## 2024-06-25 RX ADMIN — Medication 325 MILLIGRAM(S): at 12:00

## 2024-06-25 RX ADMIN — Medication 1 APPLICATION(S): at 21:06

## 2024-06-25 RX ADMIN — HEPARIN SODIUM 650 UNIT(S)/HR: 50 INJECTION, SOLUTION INTRAVENOUS at 03:05

## 2024-06-25 RX ADMIN — Medication 1 APPLICATION(S): at 12:00

## 2024-06-25 RX ADMIN — Medication 25 MILLIGRAM(S): at 17:29

## 2024-06-25 RX ADMIN — Medication 1 GRAM(S): at 13:28

## 2024-06-25 RX ADMIN — ASPIRIN 81 MILLIGRAM(S): 325 TABLET, FILM COATED ORAL at 11:59

## 2024-06-25 RX ADMIN — Medication 2 TABLET(S): at 21:06

## 2024-06-25 RX ADMIN — Medication 1 GRAM(S): at 05:05

## 2024-06-25 RX ADMIN — Medication 1 APPLICATION(S): at 13:28

## 2024-06-25 RX ADMIN — Medication 1 GRAM(S): at 21:06

## 2024-06-25 RX ADMIN — MIRTAZAPINE 15 MILLIGRAM(S): 15 TABLET, FILM COATED ORAL at 12:00

## 2024-06-25 NOTE — CHART NOTE - NSCHARTNOTEFT_GEN_A_CORE
· Reason for Admission	Fall      · Subjective and Objective:     Patient is a 89y old  Male who presents with a chief complaint of Fall (2024 09:10)    HPI:  Mr. Milner is an 88 yo M with a PMH of dementia, HTN, neurogenic bladder, L hip fx 3/2024 and multiple falls presenting from NH after being found down on the floor this morning after an unwitnessed fall w/ actively bleeding R temporal laceration, trauma code was called in ED and laceration was repaired. Trauma workup revealed a new age-indeterminate T12 fracture, possibly acute. EKG performed later in ED stay showed ST-elevations in II, III and avF and ST-depressions in avL and avR, code STEMI was then called. Patient is currently alert and oriented to name only and unable to recall events leading up to the fall. He denies having chest pain. Patient is being admitted to CCU for close monitoring and medical management of suspected inferior STEMI.    Vitals on Admission  Temp: 97.7  HR: 92  BP: 151/88  O2: 96% on RA  RR: 17    Labs  HST: 59 ->136  Lactate: 1.9 (blood) -> 2.3 (VBG)  Na: 126  UA; Sterile pyuria    Imaging  Trauma Code Pan Scan: New, age-indeterminate mild T12 vertebral body compression fracture without significant osseous retropulsion, possibly acute. R scalp soft tissue swelling.    In the ED  - ASA 324mg x1  - R temporal lac repair  - Cefazolin 2g IV x1  - Morphine 4mg IV x1  - Zofran 4mg IV x1 (2024 21:10)       INTERVAL HPI/OVERNIGHT EVENTS:   No overnight events   Afebrile, hemodynamically stable  Right temporal laceration no longer oozing blood  Heparin gtt stopped as Hgb dropped to 6.9 g/dL --> received 1 pRBC  TTE performed  Resumed feeding as no procedure is planned for today    Subjective:    ICU Vital Signs Last 24 Hrs  T(C): 36.2 (2024 12:00), Max: 37.1 (2024 05:00)  T(F): 97.2 (2024 12:00), Max: 98.7 (2024 05:00)  HR: 63 (2024 13:00) (62 - 99)  BP: 101/52 (2024 13:00) (91/46 - 151/88)  BP(mean): 71 (2024 13:00) (66 - 94)  ABP: --  ABP(mean): --  RR: 25 (2024 13:00) (12 - 46)  SpO2: 98% (2024 13:00) (93% - 100%)    O2 Parameters below as of 2024 14:00  Patient On (Oxygen Delivery Method): room air          I&O's Summary    2024 07:01  -  2024 07:00  --------------------------------------------------------  IN: 172 mL / OUT: 1100 mL / NET: -928 mL    2024 07:01  -  2024 14:03  --------------------------------------------------------  IN: 200 mL / OUT: 365 mL / NET: -165 mL          Daily Height in cm: 165.1 (2024 21:25)    Daily Weight in k.9 (2024 05:00)    Adult Advanced Hemodynamics Last 24 Hrs  CVP(mm Hg): --  CVP(cm H2O): --  CO: --  CI: --  PA: --  PA(mean): --  PCWP: --  SVR: --  SVRI: --  PVR: --  PVRI: --    EKG/Telemetry Events:    MEDICATIONS  (STANDING):  aspirin  chewable 81 milliGRAM(s) Oral daily  atorvastatin 80 milliGRAM(s) Oral at bedtime  bacitracin   Ointment 1 Application(s) Topical three times a day  chlorhexidine 2% Cloths 1 Application(s) Topical daily  ferrous    sulfate 325 milliGRAM(s) Oral daily  lisinopril 5 milliGRAM(s) Oral daily  metoprolol tartrate 25 milliGRAM(s) Oral two times a day  mirtazapine 15 milliGRAM(s) Oral daily  pantoprazole    Tablet 40 milliGRAM(s) Oral before breakfast  polyethylene glycol 3350 17 Gram(s) Oral daily  senna 2 Tablet(s) Oral at bedtime  sodium chloride 1 Gram(s) Oral three times a day    MEDICATIONS  (PRN):  acetaminophen     Tablet .. 650 milliGRAM(s) Oral every 6 hours PRN Temp greater or equal to 38C (100.4F), Mild Pain (1 - 3), Moderate Pain (4 - 6)      PHYSICAL EXAM:  GENERAL: NAD, alert and interactive  HEAD: Atraumatic, normocephalic  EYES: EOMI, conjunctiva and sclera clear  NECK: Supple, no JVD  CHEST/LUNG: Clear to auscultation bilaterally; no rales, rhonchi, or wheezing; normal WOB on RA  HEART: Regular rate and rhythm; S1 S2 normal, no S3; no murmurs, rubs, or gallops  ABDOMEN: Soft, nontender, nondistended; bowel sounds present  EXTREMITIES: No clubbing, cyanosis, or edema  NEURO: Grossly nonfocal  SKIN: No rashes or lesions    LABS:                        8.0    6.45  )-----------( 206      ( 2024 11:13 )             23.9     06-25    128<L>  |  93<L>  |  18  ----------------------------<  75  4.9   |  21  |  1.2    Ca    9.2      2024 06:00  Mg     2.3     06-25    TPro  5.7<L>  /  Alb  3.2<L>  /  TBili  0.3  /  DBili  x   /  AST  29  /  ALT  9   /  AlkPhos  101  06-25    LIVER FUNCTIONS - ( 2024 06:00 )  Alb: 3.2 g/dL / Pro: 5.7 g/dL / ALK PHOS: 101 U/L / ALT: 9 U/L / AST: 29 U/L / GGT: x           PT/INR - ( 2024 15:09 )   PT: 11.70 sec;   INR: 1.03 ratio         PTT - ( 2024 02:25 )  PTT:68.8 sec    CAPILLARY BLOOD GLUCOSE  POCT Blood Glucose.: 113 mg/dL (2024 21:17)  POCT Blood Glucose.: 106 mg/dL (2024 14:43)    CKMB Units: 8.6 ng/mL ( @ 11:13)  Creatine Kinase, Serum: 120 U/L ( @ 11:13)  Creatine Kinase, Serum: 125 U/L ( @ 06:00)  Creatine Kinase, Serum: 46 U/L ( @ 15:09)    CARDIAC MARKERS ( 2024 11:13 )  x     / x     / 120 U/L / x     / 8.6 ng/mL  CARDIAC MARKERS ( 2024 06:00 )  x     / x     / 125 U/L / x     / x      CARDIAC MARKERS ( 2024 15:09 )  x     / x     / 46 U/L / x     / x        Urinalysis Basic - ( 2024 06:00 )    Color: x / Appearance: x / SG: x / pH: x  Gluc: 75 mg/dL / Ketone: x  / Bili: x / Urobili: x   Blood: x / Protein: x / Nitrite: x   Leuk Esterase: x / RBC: x / WBC x   Sq Epi: x / Non Sq Epi: x / Bacteria: x      RADIOLOGY & ADDITIONAL TESTS:  ACC: [id number] EXAM:  XR CHEST PORTABLE ROUTINE 1V   ORDERED BY: AZAM ULLOA     PROCEDURE DATE:  2024        INTERPRETATION:  Reason for study : Follow up  Technique:  Frontal view the chest    Comparison: Chest x-ray2024    Findings/impression:    EKG leads overlie thorax, obscuring anatomy.    Support devices: None    Cardiac/ Mediastinum: unremarkable    Lungs/ Pleura: No consolidation, pleural effusions or pneumothorax.    Skeletal/ soft tissues: Stable    --- End of Report ---    ELATHA DYER MD; Attending Radiologist  This document has been electronically signed. 2024  9:03AM      Care Discussed with Consultants/Other Providers [ x] YES  [ ] NO      Assessment and Plan:   Case of 88 yo woman w/ Hx of dementia, chronic hyponatremia, neurogenic bladder on chronic Waldron, Hx left Hx fracture 3/24 presenting with right temporal laceration after an unwitnessed fall, found to have ST elevation in inferior leads with reciprocal ST depression in lateral leads.    1-: STEMI:  - In the setting of right temporal laceration and bleeding  - Changes resolved on EKG performed on   - Troponin peaked at 273  - Off chest pain  - Off heparin gtt  - TTE: Left ventricular ejection fraction, by visual estimation, is 60 to 65%. Normal global left ventricular systolic function. Impaired relaxation pattern of left  ventricular myocardial filling (Grade I diastolic dysfunction). Mildly enlarged left atrium.  - Loaded w/ ASA, c/w ASA 81 mg daily  - Atorvastatin 80 mg nightly  - C/w home lisinopril 5 mg daily and metoprolol tartrate 25 mg BID  - No plan for LHC  - HbA1c: 4.9  - Lipid panel: total cholesterol: 179; LDL: 113; HDL: 48; T  - Pending TSH    2- T12 compression fracture:  - Neurosurgery consulted  - No acute surgical intervention --> abdominal binder for support - TLSO when OOB. Will need PT/OT. Pain management consulted --> tylenol 975 mg q8h.    3- R temporal laceration:  - S/p suturing  - On Bacitracin    4- Dementia:  - Continue with home Mirtazapine  - Avoid CNS depressants    5- Hyponatremia:  - Continue with salt tablets    6- General measures:  - DVT ppx: was on heparin gtt  - GI ppx: pantoprazole  - T/D/L: chronic Waldron, PIV  - Code status: Full Code    Disposition:Telemetry · Reason for Admission	Fall      · Subjective and Objective:     Patient is a 89y old  Male who presents with a chief complaint of Fall (2024 09:10)    HPI:  Mr. Milner is an 88 yo M with a PMH of dementia, HTN, neurogenic bladder, L hip fx 3/2024 and multiple falls presenting from NH after being found down on the floor this morning after an unwitnessed fall w/ actively bleeding R temporal laceration, trauma code was called in ED and laceration was repaired. Trauma workup revealed a new age-indeterminate T12 fracture, possibly acute. EKG performed later in ED stay showed ST-elevations in II, III and avF and ST-depressions in avL and avR, code STEMI was then called. Patient is currently alert and oriented to name only and unable to recall events leading up to the fall. He denies having chest pain. Patient is being admitted to CCU for close monitoring and medical management of suspected inferior STEMI.    Vitals on Admission  Temp: 97.7  HR: 92  BP: 151/88  O2: 96% on RA  RR: 17    Labs  HST: 59 ->136  Lactate: 1.9 (blood) -> 2.3 (VBG)  Na: 126  UA; Sterile pyuria    Imaging  Trauma Code Pan Scan: New, age-indeterminate mild T12 vertebral body compression fracture without significant osseous retropulsion, possibly acute. R scalp soft tissue swelling.    In the ED  - ASA 324mg x1  - R temporal lac repair  - Cefazolin 2g IV x1  - Morphine 4mg IV x1  - Zofran 4mg IV x1 (2024 21:10)       INTERVAL HPI/OVERNIGHT EVENTS:   No overnight events   Afebrile, hemodynamically stable  Right temporal laceration no longer oozing blood  Heparin gtt stopped as Hgb dropped to 6.9 g/dL --> received 1 pRBC  TTE performed  Resumed feeding as no procedure is planned for today    Subjective:    ICU Vital Signs Last 24 Hrs  T(C): 36.2 (2024 12:00), Max: 37.1 (2024 05:00)  T(F): 97.2 (2024 12:00), Max: 98.7 (2024 05:00)  HR: 63 (2024 13:00) (62 - 99)  BP: 101/52 (2024 13:00) (91/46 - 151/88)  BP(mean): 71 (2024 13:00) (66 - 94)  RR: 25 (2024 13:00) (12 - 46)  SpO2: 98% (2024 13:00) (93% - 100%)    O2 Parameters below as of 2024 14:00  Patient On (Oxygen Delivery Method): room air    I&O's Summary    2024 07:01  -  2024 07:00  --------------------------------------------------------  IN: 172 mL / OUT: 1100 mL / NET: -928 mL    2024 07:01  -  2024 14:03  --------------------------------------------------------  IN: 200 mL / OUT: 365 mL / NET: -165 mL    Daily Height in cm: 165.1 (2024 21:25)    Daily Weight in k.9 (2024 05:00)    MEDICATIONS  (STANDING):  aspirin  chewable 81 milliGRAM(s) Oral daily  atorvastatin 80 milliGRAM(s) Oral at bedtime  bacitracin   Ointment 1 Application(s) Topical three times a day  chlorhexidine 2% Cloths 1 Application(s) Topical daily  ferrous    sulfate 325 milliGRAM(s) Oral daily  lisinopril 5 milliGRAM(s) Oral daily  metoprolol tartrate 25 milliGRAM(s) Oral two times a day  mirtazapine 15 milliGRAM(s) Oral daily  pantoprazole    Tablet 40 milliGRAM(s) Oral before breakfast  polyethylene glycol 3350 17 Gram(s) Oral daily  senna 2 Tablet(s) Oral at bedtime  sodium chloride 1 Gram(s) Oral three times a day    MEDICATIONS  (PRN):  acetaminophen     Tablet .. 650 milliGRAM(s) Oral every 6 hours PRN Temp greater or equal to 38C (100.4F), Mild Pain (1 - 3), Moderate Pain (4 - 6)    PHYSICAL EXAM:  GENERAL: NAD, alert and interactive  HEAD: Atraumatic, normocephalic  EYES: EOMI, conjunctiva and sclera clear  NECK: Supple, no JVD  CHEST/LUNG: Clear to auscultation bilaterally; no rales, rhonchi, or wheezing; normal WOB on RA  HEART: Regular rate and rhythm; S1 S2 normal, no S3; no murmurs, rubs, or gallops  ABDOMEN: Soft, nontender, nondistended; bowel sounds present  EXTREMITIES: No clubbing, cyanosis, or edema  NEURO: Grossly nonfocal  SKIN: No rashes or lesions    LABS:                        8.0    6.45  )-----------( 206      ( 2024 11:13 )             23.9         128<L>  |  93<L>  |  18  ----------------------------<  75  4.9   |  21  |  1.2    Ca    9.2      2024 06:00  Mg     2.3         TPro  5.7<L>  /  Alb  3.2<L>  /  TBili  0.3  /  DBili  x   /  AST  29  /  ALT  9   /  AlkPhos  101      LIVER FUNCTIONS - ( 2024 06:00 )  Alb: 3.2 g/dL / Pro: 5.7 g/dL / ALK PHOS: 101 U/L / ALT: 9 U/L / AST: 29 U/L / GGT: x           PT/INR - ( 2024 15:09 )   PT: 11.70 sec;   INR: 1.03 ratio         PTT - ( 2024 02:25 )  PTT:68.8 sec    CAPILLARY BLOOD GLUCOSE  POCT Blood Glucose.: 113 mg/dL (2024 21:17)  POCT Blood Glucose.: 106 mg/dL (2024 14:43)    CKMB Units: 8.6 ng/mL ( @ 11:13)  Creatine Kinase, Serum: 120 U/L ( @ 11:13)  Creatine Kinase, Serum: 125 U/L ( @ 06:00)  Creatine Kinase, Serum: 46 U/L ( @ 15:09)    CARDIAC MARKERS ( 2024 11:13 )  x     / x     / 120 U/L / x     / 8.6 ng/mL  CARDIAC MARKERS ( 2024 06:00 )  x     / x     / 125 U/L / x     / x      CARDIAC MARKERS ( 2024 15:09 )  x     / x     / 46 U/L / x     / x        Urinalysis Basic - ( 2024 06:00 )    Color: x / Appearance: x / SG: x / pH: x  Gluc: 75 mg/dL / Ketone: x  / Bili: x / Urobili: x   Blood: x / Protein: x / Nitrite: x   Leuk Esterase: x / RBC: x / WBC x   Sq Epi: x / Non Sq Epi: x / Bacteria: x      RADIOLOGY & ADDITIONAL TESTS:  ACC: [id number] EXAM:  XR CHEST PORTABLE ROUTINE 1V   ORDERED BY: AZAM ULLOA     PROCEDURE DATE:  2024      INTERPRETATION:  Reason for study : Follow up  Technique:  Frontal view the chest    Comparison: Chest x-ray2024    Findings/impression:    EKG leads overlie thorax, obscuring anatomy.    Support devices: None    Cardiac/ Mediastinum: unremarkable    Lungs/ Pleura: No consolidation, pleural effusions or pneumothorax.    Skeletal/ soft tissues: Stable    --- End of Report ---    LEATHA DYER MD; Attending Radiologist  This document has been electronically signed. 2024  9:03AM    Care Discussed with Consultants/Other Providers [ x] YES  [ ] NO    Assessment and Plan:   Case of 88 yo woman w/ Hx of dementia, chronic hyponatremia, neurogenic bladder on chronic Waldorn, Hx left Hx fracture 3/24 presenting with right temporal laceration after an unwitnessed fall, found to have ST elevation in inferior leads with reciprocal ST depression in lateral leads.    1-: STEMI:  - In the setting of right temporal laceration and bleeding  - Changes resolved on EKG performed on   - Troponin peaked at 273  - No chest pain, loaded with Aspirin and started on a heparin drip  - Currently off heparin gtt  - TTE: Left ventricular ejection fraction, by visual estimation, is 60 to 65%. Normal global left ventricular systolic function. Impaired relaxation pattern of left  ventricular myocardial filling (Grade I diastolic dysfunction). Mildly enlarged left atrium.  - c/w ASA 81 mg daily  - Atorvastatin 80 mg nightly  - C/w home lisinopril 5 mg daily and metoprolol tartrate 25 mg BID  - No plan for C  - HbA1c: 4.9  - Lipid panel: total cholesterol: 179; LDL: 113; HDL: 48; T  - Pending TSH    2- T12 compression fracture:  - Neurosurgery consulted  - No acute surgical intervention --> abdominal binder for support - TLSO when OOB. Will need PT/OT. Pain management consulted --> tylenol 975 mg q8h.    3- R temporal laceration:  - S/p suturing  - On Bacitracin    4- Dementia:  - Continue with home Mirtazapine  - Avoid CNS depressants    5- Hyponatremia:  - Continue with salt tablets    6- General measures:  - DVT ppx: was on heparin gtt --> SCD for now, F/U morning Hgb to resume prophylactic AC  - GI ppx: pantoprazole  - T/D/L: chronic Waldron, PIV  - Code status: Full Code    Disposition: Telemetry

## 2024-06-25 NOTE — PROGRESS NOTE ADULT - CRITICAL CARE ATTENDING COMMENT
This included management of respiratory function, adjustment of vasopressor support, optimization of nutrition and glycemic control, management of antibiotics, review and adjustment of fluid balances, management of pain and sedation, review of radiographs and imaging studies, and communication with consultants.

## 2024-06-25 NOTE — CONSULT NOTE ADULT - SUBJECTIVE AND OBJECTIVE BOX
HPI: Patient is an 89M with PMH of dementia, HTN, neurogenic bladder, L hip fx 3/2024 and multiple falls presenting from NH after being found down on the floor after an unwitnessed fall w/ actively bleeding R temporal laceration, trauma code was called in ED and laceration was repaired. Trauma workup revealed a new age-indeterminate T12 fracture, possibly acute. EKG performed later in ED stay showed ST-elevations in II, III and avF and ST-depressions in avL and avR, code STEMI was then called. Pain medicine services now consulted for assistance managing T12 fx.    Patient seen and evaluated at bedside by me. Patient oriented to self and year, but does not remember the fall or where he is. Patient states he is in no pain despite the T12 fx. Patient also has laceration to scalp.     Current Inpatient Medication Regimen:  acetaminophen     Tablet .. 650 milliGRAM(s) Oral every 6 hours PRN  aspirin  chewable 81 milliGRAM(s) Oral daily  atorvastatin 80 milliGRAM(s) Oral at bedtime  bacitracin   Ointment 1 Application(s) Topical three times a day  chlorhexidine 2% Cloths 1 Application(s) Topical daily  ferrous    sulfate 325 milliGRAM(s) Oral daily  heparin   Injectable 3200 Unit(s) IV Push every 6 hours PRN  heparin  Infusion.  Unit(s)/Hr IV Continuous <Continuous>  lisinopril 5 milliGRAM(s) Oral daily  metoprolol tartrate 25 milliGRAM(s) Oral two times a day  mirtazapine 15 milliGRAM(s) Oral daily  pantoprazole    Tablet 40 milliGRAM(s) Oral before breakfast  polyethylene glycol 3350 17 Gram(s) Oral daily  senna 2 Tablet(s) Oral at bedtime  sodium chloride 1 Gram(s) Oral three times a day      Allergies:  No Known Allergies      Past Medical History:  ST elevation myocardial infarction (STEMI)    HTN (hypertension)    Neurogenic bladder    Yañez catheter in place    Dementia    STEMI (ST elevation myocardial infarction)    No significant past surgical history    HEAD INJURY      Fall         Review of Systems:  General: no fevers or chills  Eyes: no diplopia or blurred vision  ENT: no rhinorrhea  CV: no chest pain  Resp: no cough or dyspnea  GI: no abdominal pain, constipation, or diarrhea  : no urinary incontinence or dysuria  Neuro: weakness, denies numbness,  denies headache  Psych: no depression, no anxiety    Physical Exam:  T(C): 37.1 (06-25-24 @ 05:00), Max: 37.1 (06-25-24 @ 05:00)  HR: 64 (06-25-24 @ 09:00) (62 - 99)  BP: 91/46 (06-25-24 @ 09:00) (91/46 - 151/88)  RR: 22 (06-25-24 @ 09:00) (12 - 24)  SpO2: 93% (06-25-24 @ 09:00) (93% - 100%)  Gen: NAD  Eyes: no glasses, no scleral icterus  Head: Normocephalic / Atraumatic  CV: no JVD  Lungs: nonlabored breathing  Abdomen: nondistended, soft  : yañez catheter in place  Back: no tenderness to palpation  Neuro:  AOx2   Extremities:  full ROM in upper/lower extremities  Psych:  normal affect      Labs:  CBC  9.07 K/uL [4.80 - 10.80] > 6.9 g/dL<LL> [14.0 - 18.0] / 20.2 %<L> [42.0 - 52.0] < 217 K/uL [130 - 400]      BMP  128 mmol/L<L> [135 - 146] | 93 mmol/L<L> [98 - 110] | 18 mg/dL [10 - 20]  4.9 mmol/L [3.5 - 5.0] | 21 mmol/L [17 - 32] | 1.2 mg/dL [0.7 - 1.5]    75 mg/dL [70 - 99]  CBC  11.47 K/uL<H> [4.80 - 10.80] > 7.5 g/dL<L> [14.0 - 18.0] / 21.9 %<L> [42.0 - 52.0] < 201 K/uL [130 - 400]    CBC  9.03 K/uL [4.80 - 10.80] > 9.4 g/dL<L> [14.0 - 18.0] / 28.1 %<L> [42.0 - 52.0] < 267 K/uL [130 - 400]      BMP  126 mmol/L<L> [135 - 146] | 91 mmol/L<L> [98 - 110] | 19 mg/dL [10 - 20]  4.9 mmol/L [3.5 - 5.0] | 24 mmol/L [17 - 32] | 1.2 mg/dL [0.7 - 1.5]    113 mg/dL<H> [70 - 99]        Imaging:  NCHCT: CT Head No Cont:   ACC: [id number] EXAM:  CT CERVICAL SPINE   ORDERED BY: OCTAVIANO BARRERA     ACC: [id number] EXAM:  CT BRAIN   ORDERED BY: OCTAVIANO BARRERA     PROCEDURE DATE:  06/24/2024          INTERPRETATION:  CLINICAL INDICATIONS:  Trauma code.    TECHNIQUE:  Noncontrast head and cervical spine CT was performed.    Multiple contiguous axial images were obtained from the skull base to the   vertex without the use of intravenous contrast. Reformatted coronal and   sagittal images were were subsequently obtained and reviewed.    Axial images were obtained through the cervical spine using multislice   helical technique.  Reformatted coronal and sagittal images were were   subsequently obtained and reviewed.    COMPARISON: CT head dated 3/5/2024. CT cervical spine dated 3/4/2024    FINDINGS:    CT BRAIN:  There is right frontal scalp soft tissue swelling with skin laceration.  There is no evidence for acute intracranial hemorrhage, mass effect or   midline shift.    The basal cisterns are patent. There isno hydrocephalus.    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 There is periventricular   and subcortical white matter hypodensity consistent with moderate chronic   microvascular type changes.    There is no extra-axial collection.    The visualized orbits are unremarkable.. There has been bilateral   cataract surgery.    The calvarium is intact, without depressed fracture.    Well small air-fluid level within the right maxillary sinus.    CT CERVICAL SPINE:  There is mild chronic compression fracture deformity of T2.    There is no prevertebral soft tissue swelling. There is no splaying of   the spinous processes. The occipital condyles are normal. Lateral masses   of C1 align normally with C2. No lucent fracture line is identified.   There is no spondylolisthesis.    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 Findings include marginal osteophytes, uncovertebral spurring,   loss of normal disc space height, endplate sclerosis, and facet joint   arthrosis.    The spinal canal contents are suboptimally evaluated inherent to CT   technique though grossly unremarkable.    The visualized lung apices are within normal limits.    Miscellaneous:  None.    IMPRESSION:    CT HEAD:  Right frontalscalp soft tissue swelling with skin laceration without   underlying calvarial fracture or intracranial hemorrhage.    CT CERVICAL SPINE:  No acute fracture or subluxation.    < from: CT Chest w/ IV Cont (06.24.24 @ 16:13) >    IMPRESSION:    1. New, age-indeterminate mild T12 vertebral body compression fracture   without significant osseous retropulsion, possibly acute.    2. No additional evidence of acute traumatic thoracic or abdominal   pathology.    --- End of Report ---    < end of copied text >    CJ VERMA MD; Attending Radiologist  This document has been electronically signed. Jun 24 2024  4:34PM (06-24-24 @ 16:11)          Opioid Risk Assessment Tool                                                                           Female       Male  Family History  Alcohol                                                              1                3  Illegal drugs                                                       2                3  Rx drugs                                                            4                4    Personal History   Alcohol                                                              3                3  Illegal drugs                                                       4                4  Rx drugs                                                            5                5    Age between 16—45 years                                1                1  History of preadolescent sexual abuse               3                0    Psychological disease  ADD, OCD, bipolar, schizophrenia                      2                2  Depression                                                       1                1    Total Score                                                      __            0    0 - 3 = low risk for future opioid abuse  4 - 7 = moderate risk for future opioid abuse  8+ = high risk for future opioid abuse

## 2024-06-25 NOTE — PROGRESS NOTE ADULT - SUBJECTIVE AND OBJECTIVE BOX
Patient is a 89y old  Male who presents with a chief complaint of Fall (2024 09:10)    HPI:  Mr. Milner is an 88 yo M with a PMH of dementia, HTN, neurogenic bladder, L hip fx 3/2024 and multiple falls presenting from NH after being found down on the floor this morning after an unwitnessed fall w/ actively bleeding R temporal laceration, trauma code was called in ED and laceration was repaired. Trauma workup revealed a new age-indeterminate T12 fracture, possibly acute. EKG performed later in ED stay showed ST-elevations in II, III and avF and ST-depressions in avL and avR, code STEMI was then called. Patient is currently alert and oriented to name only and unable to recall events leading up to the fall. He denies having chest pain. Patient is being admitted to CCU for close monitoring and medical management of suspected inferior STEMI.    Vitals on Admission  Temp: 97.7  HR: 92  BP: 151/88  O2: 96% on RA  RR: 17    Labs  HST: 59 ->136  Lactate: 1.9 (blood) -> 2.3 (VBG)  Na: 126  UA; Sterile pyuria    Imaging  Trauma Code Pan Scan: New, age-indeterminate mild T12 vertebral body compression fracture without significant osseous retropulsion, possibly acute. R scalp soft tissue swelling.    In the ED  - ASA 324mg x1  - R temporal lac repair  - Cefazolin 2g IV x1  - Morphine 4mg IV x1  - Zofran 4mg IV x1 (2024 21:10)       INTERVAL HPI/OVERNIGHT EVENTS:   No overnight events   Afebrile, hemodynamically stable  Right temporal laceration no longer oozing blood  Heparin gtt stopped as Hgb dropped to 6.9 g/dL --> received 1 pRBC  TTE performed, pending report  Resumed feeding as no procedure is planned for today    Subjective:    ICU Vital Signs Last 24 Hrs  T(C): 36.2 (2024 12:00), Max: 37.1 (2024 05:00)  T(F): 97.2 (2024 12:00), Max: 98.7 (2024 05:00)  HR: 63 (2024 13:00) (62 - 99)  BP: 101/52 (2024 13:00) (91/46 - 151/88)  BP(mean): 71 (2024 13:00) (66 - 94)  ABP: --  ABP(mean): --  RR: 25 (2024 13:00) (12 - 46)  SpO2: 98% (2024 13:00) (93% - 100%)    O2 Parameters below as of 2024 14:00  Patient On (Oxygen Delivery Method): room air          I&O's Summary    2024 07:01  -  2024 07:00  --------------------------------------------------------  IN: 172 mL / OUT: 1100 mL / NET: -928 mL    2024 07:01  -  2024 14:03  --------------------------------------------------------  IN: 200 mL / OUT: 365 mL / NET: -165 mL          Daily Height in cm: 165.1 (2024 21:25)    Daily Weight in k.9 (2024 05:00)    Adult Advanced Hemodynamics Last 24 Hrs  CVP(mm Hg): --  CVP(cm H2O): --  CO: --  CI: --  PA: --  PA(mean): --  PCWP: --  SVR: --  SVRI: --  PVR: --  PVRI: --    EKG/Telemetry Events:    MEDICATIONS  (STANDING):  aspirin  chewable 81 milliGRAM(s) Oral daily  atorvastatin 80 milliGRAM(s) Oral at bedtime  bacitracin   Ointment 1 Application(s) Topical three times a day  chlorhexidine 2% Cloths 1 Application(s) Topical daily  ferrous    sulfate 325 milliGRAM(s) Oral daily  lisinopril 5 milliGRAM(s) Oral daily  metoprolol tartrate 25 milliGRAM(s) Oral two times a day  mirtazapine 15 milliGRAM(s) Oral daily  pantoprazole    Tablet 40 milliGRAM(s) Oral before breakfast  polyethylene glycol 3350 17 Gram(s) Oral daily  senna 2 Tablet(s) Oral at bedtime  sodium chloride 1 Gram(s) Oral three times a day    MEDICATIONS  (PRN):  acetaminophen     Tablet .. 650 milliGRAM(s) Oral every 6 hours PRN Temp greater or equal to 38C (100.4F), Mild Pain (1 - 3), Moderate Pain (4 - 6)      PHYSICAL EXAM:  GENERAL: NAD, alert and interactive  HEAD: Atraumatic, normocephalic  EYES: EOMI, conjunctiva and sclera clear  NECK: Supple, no JVD  CHEST/LUNG: Clear to auscultation bilaterally; no rales, rhonchi, or wheezing; normal WOB on RA  HEART: Regular rate and rhythm; S1 S2 normal, no S3; no murmurs, rubs, or gallops  ABDOMEN: Soft, nontender, nondistended; bowel sounds present  EXTREMITIES: No clubbing, cyanosis, or edema  NEURO: Grossly nonfocal  SKIN: No rashes or lesions    LABS:                        8.0    6.45  )-----------( 206      ( 2024 11:13 )             23.9     06-25    128<L>  |  93<L>  |  18  ----------------------------<  75  4.9   |  21  |  1.2    Ca    9.2      2024 06:00  Mg     2.3     06-25    TPro  5.7<L>  /  Alb  3.2<L>  /  TBili  0.3  /  DBili  x   /  AST  29  /  ALT  9   /  AlkPhos  101  06-25    LIVER FUNCTIONS - ( 2024 06:00 )  Alb: 3.2 g/dL / Pro: 5.7 g/dL / ALK PHOS: 101 U/L / ALT: 9 U/L / AST: 29 U/L / GGT: x           PT/INR - ( 2024 15:09 )   PT: 11.70 sec;   INR: 1.03 ratio         PTT - ( 2024 02:25 )  PTT:68.8 sec  CAPILLARY BLOOD GLUCOSE      POCT Blood Glucose.: 113 mg/dL (2024 21:17)  POCT Blood Glucose.: 106 mg/dL (2024 14:43)      CKMB Units: 8.6 ng/mL ( @ 11:13)  Creatine Kinase, Serum: 120 U/L ( @ 11:13)  Creatine Kinase, Serum: 125 U/L ( @ 06:00)  Creatine Kinase, Serum: 46 U/L ( @ 15:09)    CARDIAC MARKERS ( 2024 11:13 )  x     / x     / 120 U/L / x     / 8.6 ng/mL  CARDIAC MARKERS ( 2024 06:00 )  x     / x     / 125 U/L / x     / x      CARDIAC MARKERS ( 2024 15:09 )  x     / x     / 46 U/L / x     / x          Urinalysis Basic - ( 2024 06:00 )    Color: x / Appearance: x / SG: x / pH: x  Gluc: 75 mg/dL / Ketone: x  / Bili: x / Urobili: x   Blood: x / Protein: x / Nitrite: x   Leuk Esterase: x / RBC: x / WBC x   Sq Epi: x / Non Sq Epi: x / Bacteria: x        RADIOLOGY & ADDITIONAL TESTS:  CXR:    Care Discussed with Consultants/Other Providers [ x] YES  [ ] NO

## 2024-06-25 NOTE — CONSULT NOTE ADULT - ASSESSMENT
A/P: Patient is an 89M with PMH of dementia, HTN, neurogenic bladder, L hip fx 3/2024 and multiple falls presenting from NH after being found down on the floor after an unwitnessed fall w/ actively bleeding R temporal laceration, trauma code was called in ED and laceration was repaired. Trauma workup revealed a new age-indeterminate T12 fracture, possibly acute. EKG performed later in ED stay showed ST-elevations in II, III and avF and ST-depressions in avL and avR, code STEMI was then called. Pain medicine services now consulted for assistance managing T12 fx.    Patient seen and evaluated at bedside by me. Patient oriented to self and year, but does not remember the fall or where he is. Patient states he is in no pain despite the T12 fx. Patient also has laceration to scalp.       #T12 Compression fx  - conservative tx d/t patients age and history  - Tylenol 975mg q8h standing  - abdominal binder for support - TLSO when OOB  - PT/OT evaluation   - bowel regimen as per primary team

## 2024-06-25 NOTE — CHART NOTE - NSCHARTNOTEFT_GEN_A_CORE
Tertiary Trauma Survey (TTS)    Date of TTS: 06-25-24 @ 16:07   Admit Date: 06-24-24  Trauma Activation: CODE / ALERT / CONSULT  Admit GCS:        E-     V-     M-     HPI:  HPI: 89m HTN, dementia, neurogenic bladder with chronic indwelling yañez, R hip fracture s/p internal fixation BIBA from SNF, s/p unwitnessed fall in shower, unknown loc, pt with hx dementia at baseline (AAOx1-2), no anticoagulation. Per EMS has large laceration to scalp +active bleeding noted in triage. Patient remained HD stable, code trauma activated due to active pulsatile bleeding. Bleed was suture ligated in the trauma bay and patient was not found to have any other external signs of trauma. ROS unable to be obtained due to confusion, but patient denied any pain at time of examination. Trauma assessment in ED: ABCs intact , GCS 14 , AAOx1. Obvious external signs of injury: right temporal scalp laceration. Trauma Code Pan Scan: New, age-indeterminate mild T12 vertebral body compression fracture without significant osseous retropulsion, possibly acute. R scalp soft tissue swelling.    Patient seen and examined. Patient agitated and combative (baseline). Stated feels no pain.     PHYSICAL EXAM:  General: NAD, combative(baseline).   HEENT: Normocephalic, right temporal scalp laceration 2 cm without active bleeding.    Neck: Soft, midline trachea. no c-spine tenderness.   Chest: No chest wall tenderness, no subcutaneous emphysema.  Cardiac: RRR.  Respiratory: No respiratory distress on room air.   Abdomen: Soft, non-distended, non-tender, no rebound, no guarding.  Groin: Normal appearing, pelvis stable.   Ext:  Moving b/l upper and lower extremities.   Back: No T/L/S spine tenderness, No palpable runoff/stepoff/deformity    Vital Signs Last 24 Hrs  T(C): 36.2 (25 Jun 2024 12:00), Max: 37.1 (25 Jun 2024 05:00)  T(F): 97.2 (25 Jun 2024 12:00), Max: 98.7 (25 Jun 2024 05:00)  HR: 62 (25 Jun 2024 15:00) (62 - 99)  BP: 103/58 (25 Jun 2024 15:00) (91/46 - 151/65)  BP(mean): 75 (25 Jun 2024 15:00) (66 - 94)  RR: 19 (25 Jun 2024 15:00) (12 - 46)  SpO2: 98% (25 Jun 2024 15:00) (93% - 100%)    Parameters below as of 25 Jun 2024 16:00  Patient On (Oxygen Delivery Method): room air    Labs:  CAPILLARY BLOOD GLUCOSE      POCT Blood Glucose.: 113 mg/dL (24 Jun 2024 21:17)                          8.0    6.45  )-----------( 206      ( 25 Jun 2024 11:13 )             23.9       Auto Neutrophil %: 67.0 % (06-25-24 @ 11:13)  Auto Immature Granulocyte %: 0.9 % (06-25-24 @ 11:13)  Auto Neutrophil %: 71.4 % (06-25-24 @ 06:00)  Auto Immature Granulocyte %: 0.4 % (06-25-24 @ 06:00)    06-25    128<L>  |  93<L>  |  18  ----------------------------<  75  4.9   |  21  |  1.2      Magnesium: 2.3 mg/dL (06-25-24 @ 06:00)      LFTs:             5.7  | 0.3  | 29       ------------------[101     ( 25 Jun 2024 06:00 )  3.2  | x    | 9           Lipase:x      Amylase:x         Lactate, Blood: 2.3 mmol/L (06-25-24 @ 06:00)  Lactate, Blood: 2.0 mmol/L (06-25-24 @ 00:36)  Blood Gas Venous - Lactate: 2.5 mmol/L (06-24-24 @ 20:32)  Lactate, Blood: 1.9 mmol/L (06-24-24 @ 15:09)      Coags:     x      ----< x       ( 25 Jun 2024 02:25 )     68.8        CARDIAC MARKERS ( 25 Jun 2024 11:13 )  x     / x     / 120 U/L / x     / 8.6 ng/mL  CARDIAC MARKERS ( 25 Jun 2024 06:00 )  x     / x     / 125 U/L / x     / x      CARDIAC MARKERS ( 24 Jun 2024 15:09 )  x     / x     / 46 U/L / x     / x        Urinalysis Basic - ( 25 Jun 2024 06:00 )    Color: x / Appearance: x / SG: x / pH: x  Gluc: 75 mg/dL / Ketone: x  / Bili: x / Urobili: x   Blood: x / Protein: x / Nitrite: x   Leuk Esterase: x / RBC: x / WBC x   Sq Epi: x / Non Sq Epi: x / Bacteria: x      RADIOLOGICAL FINDINGS REVIEW:  Xray Chest 1 View AP/PA (6/24/24): No radiographic evidence of acute cardiopulmonary disease.    Xray Pelvis AP only (6/24/24): Diffuse osteopenia limits evaluation. No acute displaced fracture or dislocation. Right gamma nail across a healed intertrochanteric fracture, partially imaged. Status post left total hip arthroplasty.    CT HEAD (6/24/24): Right frontal scalp soft tissue swelling with skin laceration without underlying calvarial fracture or intracranial hemorrhage.    CT CERVICAL SPINE (6/24/24): No acute fracture or subluxation.    CT Chest/Abdomen/Pelvis w/ IV Cont (6/24/24): New, age-indeterminate mild T12 vertebral body compression fracture without significant osseous retropulsion, possibly acute. No additional evidence of acute traumatic thoracic or abdominal pathology.      ASSESSMENT/ PLAN:   89M w/ PMH as above who presents initially as a trauma alert s/p unwitnessed fall at NH in shower, +HT, ?LOC, -AC, who was upgraded to a code trauma after noted to have scalp laceration with pulsatile bleeding in triage area, s/p suture repair in trauma bay. External signs of trauma included scalp laceration. Injuries identified: Age-indeterminate T12 compression fx.     Sutures (4 total in right temporal scalp, 3 prolenes and 1 nylon) to be removed in 7 days. Bacitracin to wound 2x per day.     - All images/reports reviewed. No further traumatic work-up warranted. Tertiary Trauma Survey (TTS)    Date of TTS: 06-25-24 @ 16:07   Admit Date: 06-24-24  Trauma Activation: CODE  Admit GCS: 14 	E: 4	V: 4	M: 6    HPI:  HPI: 89m HTN, dementia, neurogenic bladder with chronic indwelling yañez, R hip fracture s/p internal fixation BIBA from SNF, s/p unwitnessed fall in shower, unknown loc, pt with hx dementia at baseline (AAOx1-2), no anticoagulation. Per EMS has large laceration to scalp +active bleeding noted in triage. Patient remained HD stable, code trauma activated due to active pulsatile bleeding. Bleed was suture ligated in the trauma bay and patient was not found to have any other external signs of trauma. ROS unable to be obtained due to confusion, but patient denied any pain at time of examination. Trauma assessment in ED: ABCs intact , GCS 14 , AAOx1. Obvious external signs of injury: right temporal scalp laceration. Trauma Code Pan Scan: New, age-indeterminate mild T12 vertebral body compression fracture without significant osseous retropulsion, possibly acute. R scalp soft tissue swelling.    Patient seen and examined. Patient agitated and combative (baseline). Stated feels no pain.     PHYSICAL EXAM:  General: NAD, combative(baseline).   HEENT: Normocephalic, right temporal scalp laceration 2 cm without active bleeding.    Neck: Soft, midline trachea. no c-spine tenderness.   Chest: No chest wall tenderness, no subcutaneous emphysema.  Cardiac: RRR.  Respiratory: No respiratory distress on room air.   Abdomen: Soft, non-distended, non-tender, no rebound, no guarding.  Groin: Normal appearing, pelvis stable.   Ext:  Moving b/l upper and lower extremities.   Back: No T/L/S spine tenderness, No palpable runoff/stepoff/deformity    Vital Signs Last 24 Hrs  T(C): 36.2 (25 Jun 2024 12:00), Max: 37.1 (25 Jun 2024 05:00)  T(F): 97.2 (25 Jun 2024 12:00), Max: 98.7 (25 Jun 2024 05:00)  HR: 62 (25 Jun 2024 15:00) (62 - 99)  BP: 103/58 (25 Jun 2024 15:00) (91/46 - 151/65)  BP(mean): 75 (25 Jun 2024 15:00) (66 - 94)  RR: 19 (25 Jun 2024 15:00) (12 - 46)  SpO2: 98% (25 Jun 2024 15:00) (93% - 100%)    Parameters below as of 25 Jun 2024 16:00  Patient On (Oxygen Delivery Method): room air    Labs:  CAPILLARY BLOOD GLUCOSE      POCT Blood Glucose.: 113 mg/dL (24 Jun 2024 21:17)                          8.0    6.45  )-----------( 206      ( 25 Jun 2024 11:13 )             23.9       Auto Neutrophil %: 67.0 % (06-25-24 @ 11:13)  Auto Immature Granulocyte %: 0.9 % (06-25-24 @ 11:13)  Auto Neutrophil %: 71.4 % (06-25-24 @ 06:00)  Auto Immature Granulocyte %: 0.4 % (06-25-24 @ 06:00)    06-25    128<L>  |  93<L>  |  18  ----------------------------<  75  4.9   |  21  |  1.2      Magnesium: 2.3 mg/dL (06-25-24 @ 06:00)      LFTs:             5.7  | 0.3  | 29       ------------------[101     ( 25 Jun 2024 06:00 )  3.2  | x    | 9           Lipase:x      Amylase:x         Lactate, Blood: 2.3 mmol/L (06-25-24 @ 06:00)  Lactate, Blood: 2.0 mmol/L (06-25-24 @ 00:36)  Blood Gas Venous - Lactate: 2.5 mmol/L (06-24-24 @ 20:32)  Lactate, Blood: 1.9 mmol/L (06-24-24 @ 15:09)      Coags:     x      ----< x       ( 25 Jun 2024 02:25 )     68.8        CARDIAC MARKERS ( 25 Jun 2024 11:13 )  x     / x     / 120 U/L / x     / 8.6 ng/mL  CARDIAC MARKERS ( 25 Jun 2024 06:00 )  x     / x     / 125 U/L / x     / x      CARDIAC MARKERS ( 24 Jun 2024 15:09 )  x     / x     / 46 U/L / x     / x        Urinalysis Basic - ( 25 Jun 2024 06:00 )    Color: x / Appearance: x / SG: x / pH: x  Gluc: 75 mg/dL / Ketone: x  / Bili: x / Urobili: x   Blood: x / Protein: x / Nitrite: x   Leuk Esterase: x / RBC: x / WBC x   Sq Epi: x / Non Sq Epi: x / Bacteria: x      RADIOLOGICAL FINDINGS REVIEW:  Xray Chest 1 View AP/PA (6/24/24): No radiographic evidence of acute cardiopulmonary disease.    Xray Pelvis AP only (6/24/24): Diffuse osteopenia limits evaluation. No acute displaced fracture or dislocation. Right gamma nail across a healed intertrochanteric fracture, partially imaged. Status post left total hip arthroplasty.    CT HEAD (6/24/24): Right frontal scalp soft tissue swelling with skin laceration without underlying calvarial fracture or intracranial hemorrhage.    CT CERVICAL SPINE (6/24/24): No acute fracture or subluxation.    CT Chest/Abdomen/Pelvis w/ IV Cont (6/24/24): New, age-indeterminate mild T12 vertebral body compression fracture without significant osseous retropulsion, possibly acute. No additional evidence of acute traumatic thoracic or abdominal pathology.      ASSESSMENT/ PLAN:   89M w/ PMH as above who presents initially as a trauma alert s/p unwitnessed fall at NH in shower, +HT, ?LOC, -AC, who was upgraded to a code trauma after noted to have scalp laceration with pulsatile bleeding in triage area, s/p suture repair in trauma bay. External signs of trauma included scalp laceration. Injuries identified: Age-indeterminate T12 compression fx.     Sutures (4 total in right temporal scalp, 3 prolenes and 1 nylon) to be removed in 7 days. Bacitracin to wound 2x per day.     - All images/reports reviewed. No further traumatic work-up warranted.

## 2024-06-25 NOTE — PROGRESS NOTE ADULT - ASSESSMENT
IMPRESSION:  #ST Elevations in II, III, avF - Suspected MI  #2 cm R Temporal Laceration w/ Arterial Bleed - Repaired  #New, Age Indeterminate T12 Compression Fracture - Possibly Acute  #Unwitnessed Fall  #Hypertension  #Dementia  #Neurogenic Bladder w/ Chronic Yañez  #Chronic Hyponatremia  #Normocytic anemia    PLAN:    CNS: Avoid CNS depressants. Continue home Mirtazapine.    HEENT: Oral care. Bacitracin for repaired R temporal laceration.    PULMONARY: Aspiration precautions. Monitor pulse ox.    CARDIOVASCULAR: Loaded w/ ASA 324mg. Heparin ACS protocol. C/w ASA 81mg daily, continue home Metoprolol tartrate 25mg BID. Started Atorvastatin 80mg QHS. Trend troponin (253 --> 273 --> 270) and lactate (2 --> 2.3). Repeat EKG in AM. Pending formal TTE report.    GI: Continue home PPI. Resumed diet.    RENAL: Chronic yañez. Monitor sodium and lytes, continue home salt tabs    INFECTIOUS DISEASE: CHG wipes.    HEMATOLOGICAL: Heparin ACS protocol --> stopped due to Hgb drop. Received 1 pRBC, Hgb picked up to 8.0 g/dL.    ENDOCRINE: HbA1c 4.9% (2024) HbA1C 5.7 % (24), 5.5 % (10-25-23). Lipid profile (2024): total cholesterol: 179; LDL: 113; HDL: 48; T. Pending TSH level    MUSCULOSKELETAL: T12 compression fracture, possibly acute. Seen by NSGY, no acute surgical intervention --> abdominal binder for support - TLSO when OOB. Will need PT/OT. Pain management consulted --> tylenol 975 mg q8h.    CODE STATUS: Full Code    DISPOSITION: CCU    LINES: PIV   IMPRESSION:  #ST Elevations in II, III, avF - Suspected MI  #2 cm R Temporal Laceration w/ Arterial Bleed - Repaired  #New, Age Indeterminate T12 Compression Fracture - Possibly Acute  #Unwitnessed Fall  #Hypertension  #Dementia  #Neurogenic Bladder w/ Chronic Waldron  #Chronic Hyponatremia  #Normocytic anemia    PLAN:    CNS: Avoid CNS depressants. Continue home Mirtazapine.    HEENT: Oral care. Bacitracin for repaired R temporal laceration.    PULMONARY: Aspiration precautions. Monitor pulse ox.    CARDIOVASCULAR: Loaded w/ ASA 324mg. Heparin ACS protocol. C/w ASA 81mg daily, continue home Metoprolol tartrate 25mg BID. Started Atorvastatin 80mg QHS. Trend troponin (253 --> 273 --> 270) and lactate (2 --> 2.3). Repeat EKG in AM showed resolution of the ST segment changes. Formal TTE report: Left ventricular ejection fraction, by visual estimation, is 60 to 65%. Normal global left ventricular systolic function. Impaired relaxation pattern of left  ventricular myocardial filling (Grade I diastolic dysfunction). Mildly enlarged left atrium.    GI: Continue home PPI. Resumed diet.    RENAL: Chronic Waldron. Monitor sodium and lytes, continue home salt tabs    INFECTIOUS DISEASE: CHG wipes.    HEMATOLOGICAL: Heparin ACS protocol --> stopped due to Hgb drop. Received 1 pRBC, Hgb picked up to 8.0 g/dL. Will repeat CBC in the afternoon    ENDOCRINE: HbA1c 4.9% (2024) HbA1C 5.7 % (24), 5.5 % (10-25-23). Lipid profile (2024): total cholesterol: 179; LDL: 113; HDL: 48; T. Pending TSH level    MUSCULOSKELETAL: T12 compression fracture, possibly acute. Seen by NSGY, no acute surgical intervention --> abdominal binder for support - TLSO when OOB. Will need PT/OT. Pain management consulted --> tylenol 975 mg q8h.    CODE STATUS: Full Code    DISPOSITION: CCU    LINES: PIV   IMPRESSION:  #ST Elevations in II, III, avF - Suspected MI  #2 cm R Temporal Laceration w/ Arterial Bleed - Repaired  #New, Age Indeterminate T12 Compression Fracture - Possibly Acute  #Unwitnessed Fall  #Hypertension  #Dementia  #Neurogenic Bladder w/ Chronic Waldron  #Chronic Hyponatremia  #Normocytic anemia    PLAN:    CNS: Avoid CNS depressants. Continue home Mirtazapine.    HEENT: Oral care. Bacitracin for repaired R temporal laceration.    PULMONARY: Aspiration precautions. Monitor pulse ox.    CARDIOVASCULAR:   Loaded w/ ASA 324mg.   Heparin ACS protocol discontinued due to acute blood loss anemia.   C/w ASA 81mg daily,   continue home Metoprolol tartrate 25mg BID.   Started Atorvastatin 80mg QHS.   Trend troponin (253 --> 273 --> 270) and lactate (2 --> 2.3).   Repeat EKG in AM showed resolution of the ST segment changes.   Formal TTE report: Left ventricular ejection fraction, by visual estimation, is 60 to 65%. Normal global left ventricular systolic function. Impaired relaxation pattern of left  ventricular myocardial filling (Grade I diastolic dysfunction). Mildly enlarged left atrium.  No plans for LHC at this time.    GI: Continue home PPI. Resumed diet.    RENAL: Chronic Waldron. Monitor sodium and lytes, continue home salt tabs    INFECTIOUS DISEASE: CHG wipes.    HEMATOLOGICAL: Heparin ACS protocol --> stopped due to Hgb drop. Received 1 pRBC, Hgb picked up to 8.0 g/dL. Will repeat CBC in the afternoon    ENDOCRINE: HbA1c 4.9% (2024) HbA1C 5.7 % (24), 5.5 % (10-25-23). Lipid profile (2024): total cholesterol: 179; LDL: 113; HDL: 48; T. Pending TSH level    MUSCULOSKELETAL: T12 compression fracture, possibly acute. Seen by NSGY, no acute surgical intervention --> abdominal binder for support - TLSO when OOB. Will need PT/OT. Pain management consulted --> tylenol 975 mg q8h.    CODE STATUS: Full Code    DISPOSITION: CCU    LINES: PIV

## 2024-06-26 LAB
ALBUMIN SERPL ELPH-MCNC: 3.5 G/DL — SIGNIFICANT CHANGE UP (ref 3.5–5.2)
ALP SERPL-CCNC: 96 U/L — SIGNIFICANT CHANGE UP (ref 30–115)
ALT FLD-CCNC: 8 U/L — SIGNIFICANT CHANGE UP (ref 0–41)
ANION GAP SERPL CALC-SCNC: 11 MMOL/L — SIGNIFICANT CHANGE UP (ref 7–14)
AST SERPL-CCNC: 22 U/L — SIGNIFICANT CHANGE UP (ref 0–41)
BILIRUB SERPL-MCNC: 0.3 MG/DL — SIGNIFICANT CHANGE UP (ref 0.2–1.2)
BUN SERPL-MCNC: 16 MG/DL — SIGNIFICANT CHANGE UP (ref 10–20)
CALCIUM SERPL-MCNC: 9.1 MG/DL — SIGNIFICANT CHANGE UP (ref 8.4–10.5)
CHLORIDE SERPL-SCNC: 96 MMOL/L — LOW (ref 98–110)
CO2 SERPL-SCNC: 23 MMOL/L — SIGNIFICANT CHANGE UP (ref 17–32)
CREAT SERPL-MCNC: 1.3 MG/DL — SIGNIFICANT CHANGE UP (ref 0.7–1.5)
EGFR: 53 ML/MIN/1.73M2 — LOW
GLUCOSE SERPL-MCNC: 84 MG/DL — SIGNIFICANT CHANGE UP (ref 70–99)
HCT VFR BLD CALC: 24 % — LOW (ref 42–52)
HGB BLD-MCNC: 7.9 G/DL — LOW (ref 14–18)
MCHC RBC-ENTMCNC: 28.3 PG — SIGNIFICANT CHANGE UP (ref 27–31)
MCHC RBC-ENTMCNC: 32.9 G/DL — SIGNIFICANT CHANGE UP (ref 32–37)
MCV RBC AUTO: 86 FL — SIGNIFICANT CHANGE UP (ref 80–94)
NRBC # BLD: 0 /100 WBCS — SIGNIFICANT CHANGE UP (ref 0–0)
PLATELET # BLD AUTO: 163 K/UL — SIGNIFICANT CHANGE UP (ref 130–400)
PMV BLD: 8.8 FL — SIGNIFICANT CHANGE UP (ref 7.4–10.4)
POTASSIUM SERPL-MCNC: 4 MMOL/L — SIGNIFICANT CHANGE UP (ref 3.5–5)
POTASSIUM SERPL-SCNC: 4 MMOL/L — SIGNIFICANT CHANGE UP (ref 3.5–5)
PROT SERPL-MCNC: 5.7 G/DL — LOW (ref 6–8)
RBC # BLD: 2.79 M/UL — LOW (ref 4.7–6.1)
RBC # FLD: 16.1 % — HIGH (ref 11.5–14.5)
SODIUM SERPL-SCNC: 130 MMOL/L — LOW (ref 135–146)
TSH SERPL-MCNC: 1.6 UIU/ML — SIGNIFICANT CHANGE UP (ref 0.27–4.2)
WBC # BLD: 6.65 K/UL — SIGNIFICANT CHANGE UP (ref 4.8–10.8)
WBC # FLD AUTO: 6.65 K/UL — SIGNIFICANT CHANGE UP (ref 4.8–10.8)

## 2024-06-26 PROCEDURE — 99291 CRITICAL CARE FIRST HOUR: CPT

## 2024-06-26 PROCEDURE — 93010 ELECTROCARDIOGRAM REPORT: CPT

## 2024-06-26 RX ORDER — HALOPERIDOL DECANOATE 100 MG/ML
5 VIAL (ML) INTRAMUSCULAR ONCE
Refills: 0 | Status: COMPLETED | OUTPATIENT
Start: 2024-06-26 | End: 2024-06-26

## 2024-06-26 RX ADMIN — Medication 5 MILLIGRAM(S): at 14:41

## 2024-06-26 RX ADMIN — Medication 1 APPLICATION(S): at 21:21

## 2024-06-26 RX ADMIN — Medication 25 MILLIGRAM(S): at 17:29

## 2024-06-26 RX ADMIN — PANTOPRAZOLE SODIUM 40 MILLIGRAM(S): 40 INJECTION, POWDER, FOR SOLUTION INTRAVENOUS at 06:02

## 2024-06-26 RX ADMIN — LISINOPRIL 5 MILLIGRAM(S): 5 TABLET ORAL at 06:03

## 2024-06-26 RX ADMIN — Medication 1 GRAM(S): at 21:21

## 2024-06-26 RX ADMIN — ASPIRIN 81 MILLIGRAM(S): 325 TABLET, FILM COATED ORAL at 13:00

## 2024-06-26 RX ADMIN — Medication 1 GRAM(S): at 13:01

## 2024-06-26 RX ADMIN — Medication 325 MILLIGRAM(S): at 13:00

## 2024-06-26 RX ADMIN — MIRTAZAPINE 15 MILLIGRAM(S): 15 TABLET, FILM COATED ORAL at 13:01

## 2024-06-26 RX ADMIN — ATORVASTATIN CALCIUM 80 MILLIGRAM(S): 20 TABLET, FILM COATED ORAL at 21:20

## 2024-06-26 RX ADMIN — Medication 2 TABLET(S): at 21:21

## 2024-06-26 RX ADMIN — Medication 1 GRAM(S): at 06:03

## 2024-06-26 RX ADMIN — Medication 1 APPLICATION(S): at 13:01

## 2024-06-26 RX ADMIN — POLYETHYLENE GLYCOL 3350 17 GRAM(S): 1 POWDER ORAL at 13:00

## 2024-06-26 RX ADMIN — Medication 25 MILLIGRAM(S): at 06:03

## 2024-06-26 RX ADMIN — Medication 1 APPLICATION(S): at 06:02

## 2024-06-26 NOTE — CHART NOTE - NSCHARTNOTEFT_GEN_A_CORE
Patient is a 89y old  Male who presents with a chief complaint of Fall (2024 14:03)    HPI:    ED course:  Mr. Milner is an 88 yo M with a PMH of dementia, HTN, neurogenic bladder, L hip fx 3/2024 and multiple falls presenting from NH after being found down on the floor this morning after an unwitnessed fall w/ actively bleeding R temporal laceration, trauma code was called in ED and laceration was repaired. Trauma workup revealed a new age-indeterminate T12 fracture, possibly acute. EKG performed later in ED stay showed ST-elevations in II, III and avF and ST-depressions in avL and avR, code STEMI was then called. Patient is currently alert and oriented to name only and unable to recall events leading up to the fall. He denies having chest pain. Patient is being admitted to CCU for close monitoring and medical management of suspected inferior STEMI.    Vitals on Admission  Temp: 97.7  HR: 92  BP: 151/88  O2: 96% on RA  RR: 17    Labs  HST: 59 ->136  Lactate: 1.9 (blood) -> 2.3 (VBG)  Na: 126  UA; Sterile pyuria    Imaging  Trauma Code Pan Scan: New, age-indeterminate mild T12 vertebral body compression fracture without significant osseous retropulsion, possibly acute. R scalp soft tissue swelling.    In the ED  - ASA 324mg x1  - R temporal lac repair  - Cefazolin 2g IV x1  - Morphine 4mg IV x1  - Zofran 4mg IV x1 (2024 21:10)    CCU course:  Right temporal laceration no longer oozing blood  Heparin gtt stopped as Hgb dropped to 6.9 g/dL --> received 1 pRBC  TTE performed --> Left ventricular ejection fraction, by visual estimation, is 60 to 65%. Normal global left ventricular systolic function. Impaired relaxation pattern of left  ventricular myocardial filling (Grade I diastolic dysfunction). Mildly enlarged left atrium.  No LHC was done.  EKG done on  showed resolution of the ST changes.       INTERVAL HPI/OVERNIGHT EVENTS:   No overnight events   Afebrile, hemodynamically stable     Subjective:    ICU Vital Signs Last 24 Hrs  T(C): 37.1 (2024 12:28), Max: 37.1 (2024 04:00)  T(F): 98.7 (2024 12:28), Max: 98.7 (2024 04:00)  HR: 59 (2024 12:28) (59 - 92)  BP: 150/75 (2024 12:28) (103/50 - 154/65)  BP(mean): 85 (2024 08:00) (72 - 99)  ABP: --  ABP(mean): --  RR: 18 (2024 12:28) (15 - 59)  SpO2: 98% (2024 20:43) (98% - 100%)    O2 Parameters below as of 2024 08:00  Patient On (Oxygen Delivery Method): room air          I&O's Summary    2024 07:01  -  2024 07:00  --------------------------------------------------------  IN: 400 mL / OUT: 1790 mL / NET: -1390 mL    2024 07:01  -  2024 13:02  --------------------------------------------------------  IN: 0 mL / OUT: 160 mL / NET: -160 mL          Daily     Daily Weight in k.7 (2024 04:00)    Adult Advanced Hemodynamics Last 24 Hrs  CVP(mm Hg): --  CVP(cm H2O): --  CO: --  CI: --  PA: --  PA(mean): --  PCWP: --  SVR: --  SVRI: --  PVR: --  PVRI: --    EKG/Telemetry Events:    MEDICATIONS  (STANDING):  aspirin  chewable 81 milliGRAM(s) Oral daily  atorvastatin 80 milliGRAM(s) Oral at bedtime  bacitracin   Ointment 1 Application(s) Topical three times a day  chlorhexidine 2% Cloths 1 Application(s) Topical daily  ferrous    sulfate 325 milliGRAM(s) Oral daily  lisinopril 5 milliGRAM(s) Oral daily  metoprolol tartrate 25 milliGRAM(s) Oral two times a day  mirtazapine 15 milliGRAM(s) Oral daily  pantoprazole    Tablet 40 milliGRAM(s) Oral before breakfast  polyethylene glycol 3350 17 Gram(s) Oral daily  senna 2 Tablet(s) Oral at bedtime  sodium chloride 1 Gram(s) Oral three times a day    MEDICATIONS  (PRN):  acetaminophen     Tablet .. 650 milliGRAM(s) Oral every 6 hours PRN Temp greater or equal to 38C (100.4F), Mild Pain (1 - 3), Moderate Pain (4 - 6)      PHYSICAL EXAM:  GENERAL: NAD, alert and interactive  HEAD: Atraumatic, normocephalic  EYES: EOMI, conjunctiva and sclera clear  NECK: Supple, no JVD  CHEST/LUNG: Clear to auscultation bilaterally; no rales, rhonchi, or wheezing; normal WOB on RA  HEART: Regular rate and rhythm; S1 S2 normal, no S3; no murmurs, rubs, or gallops  ABDOMEN: Soft, nontender, nondistended; bowel sounds present  EXTREMITIES: No clubbing, cyanosis, or edema  NEURO: Grossly nonfocal  SKIN: No rashes or lesions    LABS:                        7.9    6.65  )-----------( 163      ( 2024 04:45 )             24.0     06-26    130<L>  |  96<L>  |  16  ----------------------------<  84  4.0   |  23  |  1.3    Ca    9.1      2024 04:45  Mg     2.3     06-25    TPro  5.7<L>  /  Alb  3.5  /  TBili  0.3  /  DBili  x   /  AST  22  /  ALT  8   /  AlkPhos  96  06-26    LIVER FUNCTIONS - ( 2024 04:45 )  Alb: 3.5 g/dL / Pro: 5.7 g/dL / ALK PHOS: 96 U/L / ALT: 8 U/L / AST: 22 U/L / GGT: x           PT/INR - ( 2024 15:09 )   PT: 11.70 sec;   INR: 1.03 ratio         PTT - ( 2024 02:25 )  PTT:68.8 sec  CAPILLARY BLOOD GLUCOSE            CARDIAC MARKERS ( 2024 11:13 )  x     / x     / 120 U/L / x     / 8.6 ng/mL  CARDIAC MARKERS ( 2024 06:00 )  x     / x     / 125 U/L / x     / x      CARDIAC MARKERS ( 2024 15:09 )  x     / x     / 46 U/L / x     / x          Urinalysis Basic - ( 2024 04:45 )    Color: x / Appearance: x / SG: x / pH: x  Gluc: 84 mg/dL / Ketone: x  / Bili: x / Urobili: x   Blood: x / Protein: x / Nitrite: x   Leuk Esterase: x / RBC: x / WBC x   Sq Epi: x / Non Sq Epi: x / Bacteria: x        RADIOLOGY & ADDITIONAL TESTS:  CXR:    Care Discussed with Consultants/Other Providers [ x] YES  [ ] NO      Assessment and Plan:   Case of 88 yo woman w/ Hx of dementia, chronic hyponatremia, neurogenic bladder on chronic Waldron, Hx left Hx fracture 3/24 presenting with right temporal laceration after an unwitnessed fall, found to have ST elevation in inferior leads with reciprocal ST depression in lateral leads.    1-: STEMI:  - In the setting of right temporal laceration and bleeding  - Changes resolved on EKG performed on   - Troponin peaked at 273  - No chest pain, loaded with Aspirin and started on a heparin drip  - Currently off heparin gtt  - TTE: Left ventricular ejection fraction, by visual estimation, is 60 to 65%. Normal global left ventricular systolic function. Impaired relaxation pattern of left  ventricular myocardial filling (Grade I diastolic dysfunction). Mildly enlarged left atrium.  - c/w ASA 81 mg daily  - Atorvastatin 80 mg nightly  - C/w home lisinopril 5 mg daily and metoprolol tartrate 25 mg BID  - No plan for LHC  - HbA1c: 4.9  - Lipid panel: total cholesterol: 179; LDL: 113; HDL: 48; T  - Pending TSH    2- T12 compression fracture:  - Neurosurgery consulted  - No acute surgical intervention --> abdominal binder for support - TLSO when OOB. Will need PT/OT. Pain management consulted --> tylenol 975 mg q8h.    3- R temporal laceration:  - S/p suturing  - On Bacitracin    4- Dementia:  - Continue with home Mirtazapine  - Avoid CNS depressants    5- Hyponatremia:  - Continue with salt tablets    6- General measures:  - DVT ppx: was on heparin gtt --> SCD for now, F/U morning Hgb to resume prophylactic AC  - GI ppx: pantoprazole  - T/D/L: chronic Waldron, PIV  - Code status: Full Code    Disposition: Telemetry. Patient is a 89y old  Male who presents with a chief complaint of Fall (2024 14:03)    HPI:    ED course:  Mr. Milner is an 89 yo M with a PMH of dementia, HTN, neurogenic bladder, L hip fx 3/2024 and multiple falls presenting from NH after being found down on the floor this morning after an unwitnessed fall w/ actively bleeding R temporal laceration, trauma code was called in ED and laceration was repaired. Trauma workup revealed a new age-indeterminate T12 fracture, possibly acute. EKG performed later in ED stay showed ST-elevations in II, III and avF and ST-depressions in avL and avR, code STEMI was then called. Patient is currently alert and oriented to name only and unable to recall events leading up to the fall. He denies having chest pain. Patient is being admitted to CCU for close monitoring and medical management of suspected inferior STEMI.    Vitals on Admission  Temp: 97.7  HR: 92  BP: 151/88  O2: 96% on RA  RR: 17    Labs  HST: 59 ->136  Lactate: 1.9 (blood) -> 2.3 (VBG)  Na: 126  UA; Sterile pyuria    Imaging  Trauma Code Pan Scan: New, age-indeterminate mild T12 vertebral body compression fracture without significant osseous retropulsion, possibly acute. R scalp soft tissue swelling.    In the ED  - ASA 324mg x1  - R temporal lac repair  - Cefazolin 2g IV x1  - Morphine 4mg IV x1  - Zofran 4mg IV x1 (2024 21:10)    CCU course:  Right temporal laceration no longer oozing blood  Heparin gtt stopped as Hgb dropped to 6.9 g/dL --> received 1 pRBC  TTE performed on --> Left ventricular ejection fraction, by visual estimation, is 60 to 65%. Normal global left ventricular systolic function. Impaired relaxation pattern of left  ventricular myocardial filling (Grade I diastolic dysfunction). Mildly enlarged left atrium.  No LHC was done.  EKG done on  showed resolution of the ST changes.       INTERVAL HPI/OVERNIGHT EVENTS:   No overnight events   Afebrile, hemodynamically stable  Right temporal laceration not oozing blood anymore  Slight Hgb drop to 7.8, currently stable    Subjective:    ICU Vital Signs Last 24 Hrs  T(C): 37.1 (2024 12:28), Max: 37.1 (2024 04:00)  T(F): 98.7 (2024 12:28), Max: 98.7 (2024 04:00)  HR: 59 (:) (59 - 92)  BP: 150/75 (:28) (103/50 - 154/65)  BP(mean): 85 (2024 08:00) (72 - 99)  ABP: --  ABP(mean): --  RR: 18 (2024 12:28) (15 - 59)  SpO2: 98% (2024 20:43) (98% - 100%)    O2 Parameters below as of 2024 08:  Patient On (Oxygen Delivery Method): room air          I&O's Summary    2024 07:01  -  2024 07:00  --------------------------------------------------------  IN: 400 mL / OUT: 1790 mL / NET: -1390 mL    2024 07:01  -  2024 13:02  --------------------------------------------------------  IN: 0 mL / OUT: 160 mL / NET: -160 mL          Daily     Daily Weight in k.7 (2024 04:00)    Adult Advanced Hemodynamics Last 24 Hrs  CVP(mm Hg): --  CVP(cm H2O): --  CO: --  CI: --  PA: --  PA(mean): --  PCWP: --  SVR: --  SVRI: --  PVR: --  PVRI: --    EKG/Telemetry Events:    MEDICATIONS  (STANDING):  aspirin  chewable 81 milliGRAM(s) Oral daily  atorvastatin 80 milliGRAM(s) Oral at bedtime  bacitracin   Ointment 1 Application(s) Topical three times a day  chlorhexidine 2% Cloths 1 Application(s) Topical daily  ferrous    sulfate 325 milliGRAM(s) Oral daily  lisinopril 5 milliGRAM(s) Oral daily  metoprolol tartrate 25 milliGRAM(s) Oral two times a day  mirtazapine 15 milliGRAM(s) Oral daily  pantoprazole    Tablet 40 milliGRAM(s) Oral before breakfast  polyethylene glycol 3350 17 Gram(s) Oral daily  senna 2 Tablet(s) Oral at bedtime  sodium chloride 1 Gram(s) Oral three times a day    MEDICATIONS  (PRN):  acetaminophen     Tablet .. 650 milliGRAM(s) Oral every 6 hours PRN Temp greater or equal to 38C (100.4F), Mild Pain (1 - 3), Moderate Pain (4 - 6)      PHYSICAL EXAM:  GENERAL: NAD, alert and interactive  HEAD: Atraumatic, normocephalic  EYES: EOMI, conjunctiva and sclera clear  NECK: Supple, no JVD  CHEST/LUNG: Clear to auscultation bilaterally; no rales, rhonchi, or wheezing; normal WOB on RA  HEART: Regular rate and rhythm; S1 S2 normal, no S3; no murmurs, rubs, or gallops  ABDOMEN: Soft, nontender, nondistended; bowel sounds present  EXTREMITIES: No clubbing, cyanosis, or edema  NEURO: Grossly nonfocal  SKIN: No rashes or lesions    LABS:                        7.9    6.65  )-----------( 163      ( 2024 04:45 )             24.0     06-26    130<L>  |  96<L>  |  16  ----------------------------<  84  4.0   |  23  |  1.3    Ca    9.1      2024 04:45  Mg     2.3     06-25    TPro  5.7<L>  /  Alb  3.5  /  TBili  0.3  /  DBili  x   /  AST  22  /  ALT  8   /  AlkPhos  96  06-26    LIVER FUNCTIONS - ( 2024 04:45 )  Alb: 3.5 g/dL / Pro: 5.7 g/dL / ALK PHOS: 96 U/L / ALT: 8 U/L / AST: 22 U/L / GGT: x           PT/INR - ( 2024 15:09 )   PT: 11.70 sec;   INR: 1.03 ratio         PTT - ( 2024 02:25 )  PTT:68.8 sec  CAPILLARY BLOOD GLUCOSE            CARDIAC MARKERS ( 2024 11:13 )  x     / x     / 120 U/L / x     / 8.6 ng/mL  CARDIAC MARKERS ( 2024 06:00 )  x     / x     / 125 U/L / x     / x      CARDIAC MARKERS ( 2024 15:09 )  x     / x     / 46 U/L / x     / x          Urinalysis Basic - ( 2024 04:45 )    Color: x / Appearance: x / SG: x / pH: x  Gluc: 84 mg/dL / Ketone: x  / Bili: x / Urobili: x   Blood: x / Protein: x / Nitrite: x   Leuk Esterase: x / RBC: x / WBC x   Sq Epi: x / Non Sq Epi: x / Bacteria: x        RADIOLOGY & ADDITIONAL TESTS:  CXR:    Care Discussed with Consultants/Other Providers [ x] YES  [ ] NO      Assessment and Plan:   Case of 89 yo woman w/ Hx of dementia, chronic hyponatremia, neurogenic bladder on chronic Waldron, Hx left Hx fracture 3/24 presenting with right temporal laceration after an unwitnessed fall, found to have ST elevation in inferior leads with reciprocal ST depression in lateral leads.    1-: STEMI:  - In the setting of right temporal laceration and bleeding  - Changes resolved on EKG performed on   - Troponin peaked at 273  - No chest pain, loaded with Aspirin and started on a heparin drip  - Currently off heparin gtt  - TTE: Left ventricular ejection fraction, by visual estimation, is 60 to 65%. Normal global left ventricular systolic function. Impaired relaxation pattern of left  ventricular myocardial filling (Grade I diastolic dysfunction). Mildly enlarged left atrium.  - c/w ASA 81 mg daily  - Atorvastatin 80 mg nightly  - C/w home lisinopril 5 mg daily and metoprolol tartrate 25 mg BID  - No plan for LHC  - HbA1c: 4.9  - Lipid panel: total cholesterol: 179; LDL: 113; HDL: 48; T  - TSH: 1.89    2- T12 compression fracture:  - Neurosurgery consulted  - No acute surgical intervention --> abdominal binder for support - TLSO when OOB. Will need PT/OT. Pain management consulted --> tylenol 975 mg q8h.    3- R temporal laceration:  - S/p suturing, to be removed on   - On Bacitracin    4- Dementia:  - Continue with home Mirtazapine  - Avoid CNS depressants    5- Hyponatremia:  - Continue with salt tablets    6- General measures:  - DVT ppx: was on heparin gtt --> SCD for now, F/U when to resume prophylactic LMWH  - GI ppx: pantoprazole  - T/D/L: chronic Waldron, PIV  - Code status: Full Code    Disposition: Telemetry. Patient is a 89y old  Male who presents with a chief complaint of Fall (2024 14:03)    HPI:    ED course:  Mr. Milner is an 88 yo M with a PMH of dementia, HTN, neurogenic bladder, L hip fx 3/2024 and multiple falls presenting from NH after being found down on the floor this morning after an unwitnessed fall w/ actively bleeding R temporal laceration, trauma code was called in ED and laceration was repaired. Trauma workup revealed a new age-indeterminate T12 fracture, possibly acute. EKG performed later in ED stay showed ST-elevations in II, III and avF and ST-depressions in avL and avR, code STEMI was then called. Patient is currently alert and oriented to name only and unable to recall events leading up to the fall. He denies having chest pain. Patient is being admitted to CCU for close monitoring and medical management of suspected inferior STEMI.    Vitals on Admission  Temp: 97.7  HR: 92  BP: 151/88  O2: 96% on RA  RR: 17    Labs  HST: 59 ->136  Lactate: 1.9 (blood) -> 2.3 (VBG)  Na: 126  UA; Sterile pyuria    Imaging  Trauma Code Pan Scan: New, age-indeterminate mild T12 vertebral body compression fracture without significant osseous retropulsion, possibly acute. R scalp soft tissue swelling.    In the ED  - ASA 324mg x1  - R temporal lac repair  - Cefazolin 2g IV x1  - Morphine 4mg IV x1  - Zofran 4mg IV x1 (2024 21:10)    CCU course:  Right temporal laceration no longer oozing blood  Heparin gtt stopped as Hgb dropped to 6.9 g/dL --> received 1 pRBC  TTE performed on --> Left ventricular ejection fraction, by visual estimation, is 60 to 65%. Normal global left ventricular systolic function. Impaired relaxation pattern of left  ventricular myocardial filling (Grade I diastolic dysfunction). Mildly enlarged left atrium.  No LHC was done.  EKG done on  showed resolution of the ST changes.       INTERVAL HPI/OVERNIGHT EVENTS:   No overnight events   Afebrile, hemodynamically stable  Right temporal laceration not oozing blood anymore  Slight Hgb drop to 7.8, currently stable    Subjective:    ICU Vital Signs Last 24 Hrs  T(C): 37.1 (2024 12:28), Max: 37.1 (2024 04:00)  T(F): 98.7 (2024 12:28), Max: 98.7 (2024 04:00)  HR: 59 (:) (59 - 92)  BP: 150/75 (:28) (103/50 - 154/65)  BP(mean): 85 (2024 08:00) (72 - 99)  ABP: --  ABP(mean): --  RR: 18 (2024 12:28) (15 - 59)  SpO2: 98% (2024 20:43) (98% - 100%)    O2 Parameters below as of 2024 08:  Patient On (Oxygen Delivery Method): room air          I&O's Summary    2024 07:01  -  2024 07:00  --------------------------------------------------------  IN: 400 mL / OUT: 1790 mL / NET: -1390 mL    2024 07:01  -  2024 13:02  --------------------------------------------------------  IN: 0 mL / OUT: 160 mL / NET: -160 mL          Daily     Daily Weight in k.7 (2024 04:00)    Adult Advanced Hemodynamics Last 24 Hrs  CVP(mm Hg): --  CVP(cm H2O): --  CO: --  CI: --  PA: --  PA(mean): --  PCWP: --  SVR: --  SVRI: --  PVR: --  PVRI: --    EKG/Telemetry Events:    MEDICATIONS  (STANDING):  aspirin  chewable 81 milliGRAM(s) Oral daily  atorvastatin 80 milliGRAM(s) Oral at bedtime  bacitracin   Ointment 1 Application(s) Topical three times a day  chlorhexidine 2% Cloths 1 Application(s) Topical daily  ferrous    sulfate 325 milliGRAM(s) Oral daily  lisinopril 5 milliGRAM(s) Oral daily  metoprolol tartrate 25 milliGRAM(s) Oral two times a day  mirtazapine 15 milliGRAM(s) Oral daily  pantoprazole    Tablet 40 milliGRAM(s) Oral before breakfast  polyethylene glycol 3350 17 Gram(s) Oral daily  senna 2 Tablet(s) Oral at bedtime  sodium chloride 1 Gram(s) Oral three times a day    MEDICATIONS  (PRN):  acetaminophen     Tablet .. 650 milliGRAM(s) Oral every 6 hours PRN Temp greater or equal to 38C (100.4F), Mild Pain (1 - 3), Moderate Pain (4 - 6)      PHYSICAL EXAM:  GENERAL: NAD, alert and interactive  HEAD: Atraumatic, normocephalic  EYES: EOMI, conjunctiva and sclera clear  NECK: Supple, no JVD  CHEST/LUNG: Clear to auscultation bilaterally; no rales, rhonchi, or wheezing; normal WOB on RA  HEART: Regular rate and rhythm; S1 S2 normal, no S3; no murmurs, rubs, or gallops  ABDOMEN: Soft, nontender, nondistended; bowel sounds present  EXTREMITIES: No clubbing, cyanosis, or edema  NEURO: Grossly nonfocal  SKIN: No rashes or lesions    LABS:                        7.9    6.65  )-----------( 163      ( 2024 04:45 )             24.0     06-26    130<L>  |  96<L>  |  16  ----------------------------<  84  4.0   |  23  |  1.3    Ca    9.1      2024 04:45  Mg     2.3     06-25    TPro  5.7<L>  /  Alb  3.5  /  TBili  0.3  /  DBili  x   /  AST  22  /  ALT  8   /  AlkPhos  96  06-26    LIVER FUNCTIONS - ( 2024 04:45 )  Alb: 3.5 g/dL / Pro: 5.7 g/dL / ALK PHOS: 96 U/L / ALT: 8 U/L / AST: 22 U/L / GGT: x           PT/INR - ( 2024 15:09 )   PT: 11.70 sec;   INR: 1.03 ratio         PTT - ( 2024 02:25 )  PTT:68.8 sec  CAPILLARY BLOOD GLUCOSE            CARDIAC MARKERS ( 2024 11:13 )  x     / x     / 120 U/L / x     / 8.6 ng/mL  CARDIAC MARKERS ( 2024 06:00 )  x     / x     / 125 U/L / x     / x      CARDIAC MARKERS ( 2024 15:09 )  x     / x     / 46 U/L / x     / x          Urinalysis Basic - ( 2024 04:45 )    Color: x / Appearance: x / SG: x / pH: x  Gluc: 84 mg/dL / Ketone: x  / Bili: x / Urobili: x   Blood: x / Protein: x / Nitrite: x   Leuk Esterase: x / RBC: x / WBC x   Sq Epi: x / Non Sq Epi: x / Bacteria: x        RADIOLOGY & ADDITIONAL TESTS:  CXR:    Care Discussed with Consultants/Other Providers [ x] YES  [ ] NO      Assessment and Plan:   Case of 88 yo man w/ Hx of dementia, chronic hyponatremia, neurogenic bladder on chronic Waldron, Hx left Hx fracture 3/24 presenting with right temporal laceration after an unwitnessed fall, found to have ST elevation in inferior leads with reciprocal ST depression in lateral leads.    1-: STEMI:  - In the setting of right temporal laceration and bleeding  - Changes resolved on EKG performed on   - Troponin peaked at 273  - No chest pain, loaded with Aspirin and started on a heparin drip  - Currently off heparin gtt  - TTE: Left ventricular ejection fraction, by visual estimation, is 60 to 65%. Normal global left ventricular systolic function. Impaired relaxation pattern of left  ventricular myocardial filling (Grade I diastolic dysfunction). Mildly enlarged left atrium.  - c/w ASA 81 mg daily  - Atorvastatin 80 mg nightly  - C/w home lisinopril 5 mg daily and metoprolol tartrate 25 mg BID  - No plan for LHC  - HbA1c: 4.9  - Lipid panel: total cholesterol: 179; LDL: 113; HDL: 48; T  - TSH: 1.89    2- T12 compression fracture:  - Neurosurgery consulted  - No acute surgical intervention --> abdominal binder for support - TLSO when OOB. Will need PT/OT. Pain management consulted --> tylenol 975 mg q8h.    3- R temporal laceration:  - S/p suturing, to be removed on   - On Bacitracin    4- Dementia:  - Continue with home Mirtazapine  - Avoid CNS depressants    5- Hyponatremia:  - Continue with salt tablets    6- General measures:  - DVT ppx: was on heparin gtt --> SCD for now, F/U when to resume prophylactic LMWH  - GI ppx: pantoprazole  - T/D/L: chronic Waldron, PIV  - Code status: Full Code    Disposition: Telemetry. Patient is a 89y old  Male who presents with a chief complaint of Fall (2024 14:03)    HPI:    ED course:  Mr. Milner is an 89 yo M with a PMH of dementia, HTN, neurogenic bladder, L hip fx 3/2024 and multiple falls presenting from NH after being found down on the floor this morning after an unwitnessed fall w/ actively bleeding R temporal laceration, trauma code was called in ED and laceration was repaired. Trauma workup revealed a new age-indeterminate T12 fracture, possibly acute. EKG performed later in ED stay showed ST-elevations in II, III and avF and ST-depressions in avL and avR, code STEMI was then called. Patient is currently alert and oriented to name only and unable to recall events leading up to the fall. He denies having chest pain. Patient is being admitted to CCU for close monitoring and medical management of suspected inferior STEMI.    Vitals on Admission  Temp: 97.7  HR: 92  BP: 151/88  O2: 96% on RA  RR: 17    Labs  HST: 59 ->136  Lactate: 1.9 (blood) -> 2.3 (VBG)  Na: 126  UA; Sterile pyuria    Imaging  Trauma Code Pan Scan: New, age-indeterminate mild T12 vertebral body compression fracture without significant osseous retropulsion, possibly acute. R scalp soft tissue swelling.    In the ED  - ASA 324mg x1  - R temporal lac repair  - Cefazolin 2g IV x1  - Morphine 4mg IV x1  - Zofran 4mg IV x1 (2024 21:10)    CCU course:  Right temporal laceration no longer oozing blood  Heparin gtt stopped as Hgb dropped to 6.9 g/dL --> received 1 pRBC  TTE performed on --> Left ventricular ejection fraction, by visual estimation, is 60 to 65%. Normal global left ventricular systolic function. Impaired relaxation pattern of left  ventricular myocardial filling (Grade I diastolic dysfunction). Mildly enlarged left atrium.  No LHC was done.  EKG done on  showed resolution of the ST changes.       INTERVAL HPI/OVERNIGHT EVENTS:   No overnight events   Afebrile, hemodynamically stable  Right temporal laceration not oozing blood anymore  Slight Hgb drop to 7.8, currently stable    Subjective:    ICU Vital Signs Last 24 Hrs  T(C): 37.1 (2024 12:28), Max: 37.1 (2024 04:00)  T(F): 98.7 (2024 12:28), Max: 98.7 (2024 04:00)  HR: 59 (:) (59 - 92)  BP: 150/75 (:28) (103/50 - 154/65)  BP(mean): 85 (2024 08:00) (72 - 99)  ABP: --  ABP(mean): --  RR: 18 (2024 12:28) (15 - 59)  SpO2: 98% (2024 20:43) (98% - 100%)    O2 Parameters below as of 2024 08:  Patient On (Oxygen Delivery Method): room air          I&O's Summary    2024 07:01  -  2024 07:00  --------------------------------------------------------  IN: 400 mL / OUT: 1790 mL / NET: -1390 mL    2024 07:01  -  2024 13:02  --------------------------------------------------------  IN: 0 mL / OUT: 160 mL / NET: -160 mL          Daily     Daily Weight in k.7 (2024 04:00)    Adult Advanced Hemodynamics Last 24 Hrs  CVP(mm Hg): --  CVP(cm H2O): --  CO: --  CI: --  PA: --  PA(mean): --  PCWP: --  SVR: --  SVRI: --  PVR: --  PVRI: --    EKG/Telemetry Events:    MEDICATIONS  (STANDING):  aspirin  chewable 81 milliGRAM(s) Oral daily  atorvastatin 80 milliGRAM(s) Oral at bedtime  bacitracin   Ointment 1 Application(s) Topical three times a day  chlorhexidine 2% Cloths 1 Application(s) Topical daily  ferrous    sulfate 325 milliGRAM(s) Oral daily  lisinopril 5 milliGRAM(s) Oral daily  metoprolol tartrate 25 milliGRAM(s) Oral two times a day  mirtazapine 15 milliGRAM(s) Oral daily  pantoprazole    Tablet 40 milliGRAM(s) Oral before breakfast  polyethylene glycol 3350 17 Gram(s) Oral daily  senna 2 Tablet(s) Oral at bedtime  sodium chloride 1 Gram(s) Oral three times a day    MEDICATIONS  (PRN):  acetaminophen     Tablet .. 650 milliGRAM(s) Oral every 6 hours PRN Temp greater or equal to 38C (100.4F), Mild Pain (1 - 3), Moderate Pain (4 - 6)      PHYSICAL EXAM:  GENERAL: NAD, alert and interactive  HEAD: + bruising, lac  EYES: PERRL, conjunctiva and sclera clear  NECK: Supple, no JVD  CHEST/LUNG: Clear to auscultation bilaterally; no rales, rhonchi, or wheezing; normal WOB on RA  HEART: Regular rate and rhythm; S1 S2 normal, no S3; no murmurs, rubs, or gallops  ABDOMEN: Soft, nontender, nondistended; bowel sounds present  EXTREMITIES: No clubbing, cyanosis, or edema  NEURO: Grossly nonfocal  SKIN: No rashes or lesions      LABS:                        7.9    6.65  )-----------( 163      ( 2024 04:45 )             24.0     06-26    130<L>  |  96<L>  |  16  ----------------------------<  84  4.0   |  23  |  1.3    Ca    9.1      2024 04:45  Mg     2.3     06-25    TPro  5.7<L>  /  Alb  3.5  /  TBili  0.3  /  DBili  x   /  AST  22  /  ALT  8   /  AlkPhos  96  06-26    LIVER FUNCTIONS - ( 2024 04:45 )  Alb: 3.5 g/dL / Pro: 5.7 g/dL / ALK PHOS: 96 U/L / ALT: 8 U/L / AST: 22 U/L / GGT: x           PT/INR - ( 2024 15:09 )   PT: 11.70 sec;   INR: 1.03 ratio         PTT - ( 2024 02:25 )  PTT:68.8 sec  CAPILLARY BLOOD GLUCOSE            CARDIAC MARKERS ( 2024 11:13 )  x     / x     / 120 U/L / x     / 8.6 ng/mL  CARDIAC MARKERS ( 2024 06:00 )  x     / x     / 125 U/L / x     / x      CARDIAC MARKERS ( 2024 15:09 )  x     / x     / 46 U/L / x     / x          Urinalysis Basic - ( 2024 04:45 )    Color: x / Appearance: x / SG: x / pH: x  Gluc: 84 mg/dL / Ketone: x  / Bili: x / Urobili: x   Blood: x / Protein: x / Nitrite: x   Leuk Esterase: x / RBC: x / WBC x   Sq Epi: x / Non Sq Epi: x / Bacteria: x        RADIOLOGY & ADDITIONAL TESTS:  CXR:    Care Discussed with Consultants/Other Providers [ x] YES  [ ] NO      Assessment and Plan:   Case of 89 yo man w/ Hx of dementia, chronic hyponatremia, neurogenic bladder on chronic Waldron, Hx left Hx fracture 3/24 presenting with right temporal laceration after an unwitnessed fall, found to have ST elevation in inferior leads with reciprocal ST depression in lateral leads.    1-: STEMI:  - In the setting of right temporal laceration and bleeding  - Changes resolved on EKG performed on   - Troponin peaked at 273  - No chest pain, loaded with Aspirin and started on a heparin drip  - Currently off heparin gtt  - TTE: Left ventricular ejection fraction, by visual estimation, is 60 to 65%. Normal global left ventricular systolic function. Impaired relaxation pattern of left  ventricular myocardial filling (Grade I diastolic dysfunction). Mildly enlarged left atrium.  - c/w ASA 81 mg daily  - Atorvastatin 80 mg nightly  - C/w home lisinopril 5 mg daily and metoprolol tartrate 25 mg BID  - No plan for LHC  - HbA1c: 4.9  - Lipid panel: total cholesterol: 179; LDL: 113; HDL: 48; T  - TSH: 1.89    2- T12 compression fracture:  - Neurosurgery consulted  - No acute surgical intervention --> abdominal binder for support - TLSO when OOB. Will need PT/OT. Pain management consulted --> tylenol 975 mg q8h.    3- R temporal laceration:  - S/p suturing, to be removed on   - On Bacitracin    4- Dementia:  - Continue with home Mirtazapine  - Avoid CNS depressants    5- Hyponatremia:  - Continue with salt tablets    6- General measures:  - DVT ppx: was on heparin gtt --> SCD for now, F/U when to resume prophylactic LMWH  - GI ppx: pantoprazole  - T/D/L: chronic Waldron, PIV  - Code status: Full Code    Disposition: CCU, if stable will transfer to Telemetry in the afternoon.      Critical care time 32 minutes.

## 2024-06-26 NOTE — CHART NOTE - NSCHARTNOTEFT_GEN_A_CORE
Transfer Note    Transfer from: CCU  Transfer to: 3C Tele    ED Course:    Mr. Milner is an 89 yo M with a PMH of dementia, HTN, neurogenic bladder, L hip fx 3/2024 and multiple falls presenting from NH after being found down on the floor this morning after an unwitnessed fall w/ actively bleeding R temporal laceration, trauma code was called in ED and laceration was repaired. Trauma workup revealed a new age-indeterminate T12 fracture, possibly acute. EKG performed later in ED stay showed ST-elevations in II, III and avF and ST-depressions in avL and avR, code STEMI was then called. Patient is currently alert and oriented to name only and unable to recall events leading up to the fall. He denies having chest pain. Patient is being admitted to CCU for close monitoring and medical management of suspected inferior STEMI.    Given dementia, recent fall with head trauma, anemia and vertebral fracture, as well as no anginal pain, will treat medically for MI      Hospital Course:        CCU Course:                ASSESSMENT & PLAN:         For Follow-Up:  - Trauma to follow up for scalp suture removal in 7 days          Vital Signs Last 24 Hrs  T(C): 37.1 (26 Jun 2024 12:28), Max: 37.1 (26 Jun 2024 04:00)  T(F): 98.7 (26 Jun 2024 12:28), Max: 98.7 (26 Jun 2024 04:00)  HR: 59 (26 Jun 2024 12:28) (59 - 92)  BP: 150/75 (26 Jun 2024 12:28) (101/52 - 154/65)  BP(mean): 85 (26 Jun 2024 08:00) (71 - 99)  RR: 18 (26 Jun 2024 12:28) (15 - 59)  SpO2: 98% (25 Jun 2024 20:43) (98% - 100%)    Parameters below as of 26 Jun 2024 08:00  Patient On (Oxygen Delivery Method): room air      I&O's Summary    25 Jun 2024 07:01  -  26 Jun 2024 07:00  --------------------------------------------------------  IN: 400 mL / OUT: 1790 mL / NET: -1390 mL    26 Jun 2024 07:01  -  26 Jun 2024 12:49  --------------------------------------------------------  IN: 0 mL / OUT: 160 mL / NET: -160 mL          MEDICATIONS  (STANDING):  aspirin  chewable 81 milliGRAM(s) Oral daily  atorvastatin 80 milliGRAM(s) Oral at bedtime  bacitracin   Ointment 1 Application(s) Topical three times a day  chlorhexidine 2% Cloths 1 Application(s) Topical daily  ferrous    sulfate 325 milliGRAM(s) Oral daily  lisinopril 5 milliGRAM(s) Oral daily  metoprolol tartrate 25 milliGRAM(s) Oral two times a day  mirtazapine 15 milliGRAM(s) Oral daily  pantoprazole    Tablet 40 milliGRAM(s) Oral before breakfast  polyethylene glycol 3350 17 Gram(s) Oral daily  senna 2 Tablet(s) Oral at bedtime  sodium chloride 1 Gram(s) Oral three times a day    MEDICATIONS  (PRN):  acetaminophen     Tablet .. 650 milliGRAM(s) Oral every 6 hours PRN Temp greater or equal to 38C (100.4F), Mild Pain (1 - 3), Moderate Pain (4 - 6)        LABS                                            7.9                   Neurophils% (auto):   x      (06-26 @ 04:45):    6.65 )-----------(163          Lymphocytes% (auto):  x                                             24.0                   Eosinphils% (auto):   x        Manual%: Neutrophils x    ; Lymphocytes x    ; Eosinophils x    ; Bands%: x    ; Blasts x                                    130    |  96     |  16                  Calcium: 9.1   / iCa: x      (06-26 @ 04:45)    ----------------------------<  84        Magnesium: x                                4.0     |  23     |  1.3              Phosphorous: x        TPro  5.7    /  Alb  3.5    /  TBili  0.3    /  DBili  x      /  AST  22     /  ALT  8      /  AlkPhos  96     26 Jun 2024 04:45

## 2024-06-27 LAB
HCT VFR BLD CALC: 22.4 % — LOW (ref 42–52)
HGB BLD-MCNC: 7.7 G/DL — LOW (ref 14–18)
MCHC RBC-ENTMCNC: 29.2 PG — SIGNIFICANT CHANGE UP (ref 27–31)
MCHC RBC-ENTMCNC: 34.4 G/DL — SIGNIFICANT CHANGE UP (ref 32–37)
MCV RBC AUTO: 84.8 FL — SIGNIFICANT CHANGE UP (ref 80–94)
NRBC # BLD: 0 /100 WBCS — SIGNIFICANT CHANGE UP (ref 0–0)
PLATELET # BLD AUTO: 185 K/UL — SIGNIFICANT CHANGE UP (ref 130–400)
PMV BLD: 8.8 FL — SIGNIFICANT CHANGE UP (ref 7.4–10.4)
RBC # BLD: 2.64 M/UL — LOW (ref 4.7–6.1)
RBC # FLD: 15.7 % — HIGH (ref 11.5–14.5)
WBC # BLD: 8.73 K/UL — SIGNIFICANT CHANGE UP (ref 4.8–10.8)
WBC # FLD AUTO: 8.73 K/UL — SIGNIFICANT CHANGE UP (ref 4.8–10.8)

## 2024-06-27 PROCEDURE — 99233 SBSQ HOSP IP/OBS HIGH 50: CPT

## 2024-06-27 PROCEDURE — 99223 1ST HOSP IP/OBS HIGH 75: CPT

## 2024-06-27 RX ORDER — ZOLPIDEM TARTRATE 10 MG
5 TABLET ORAL ONCE
Refills: 0 | Status: DISCONTINUED | OUTPATIENT
Start: 2024-06-27 | End: 2024-06-27

## 2024-06-27 RX ADMIN — Medication 1 APPLICATION(S): at 05:40

## 2024-06-27 RX ADMIN — LISINOPRIL 5 MILLIGRAM(S): 5 TABLET ORAL at 05:42

## 2024-06-27 RX ADMIN — Medication 1 APPLICATION(S): at 14:17

## 2024-06-27 RX ADMIN — ASPIRIN 81 MILLIGRAM(S): 325 TABLET, FILM COATED ORAL at 11:42

## 2024-06-27 RX ADMIN — MIRTAZAPINE 15 MILLIGRAM(S): 15 TABLET, FILM COATED ORAL at 11:41

## 2024-06-27 RX ADMIN — ATORVASTATIN CALCIUM 80 MILLIGRAM(S): 20 TABLET, FILM COATED ORAL at 22:28

## 2024-06-27 RX ADMIN — Medication 1 GRAM(S): at 05:40

## 2024-06-27 RX ADMIN — Medication 325 MILLIGRAM(S): at 11:41

## 2024-06-27 RX ADMIN — POLYETHYLENE GLYCOL 3350 17 GRAM(S): 1 POWDER ORAL at 11:41

## 2024-06-27 RX ADMIN — PANTOPRAZOLE SODIUM 40 MILLIGRAM(S): 40 INJECTION, POWDER, FOR SOLUTION INTRAVENOUS at 05:41

## 2024-06-27 RX ADMIN — Medication 5 MILLIGRAM(S): at 02:27

## 2024-06-27 RX ADMIN — Medication 1 APPLICATION(S): at 22:28

## 2024-06-27 RX ADMIN — Medication 2 TABLET(S): at 22:28

## 2024-06-27 RX ADMIN — Medication 25 MILLIGRAM(S): at 17:28

## 2024-06-27 RX ADMIN — Medication 25 MILLIGRAM(S): at 05:41

## 2024-06-27 RX ADMIN — Medication 1 GRAM(S): at 22:27

## 2024-06-27 RX ADMIN — Medication 1 GRAM(S): at 14:17

## 2024-06-27 NOTE — CONSULT NOTE ADULT - SUBJECTIVE AND OBJECTIVE BOX
Gastroenterology Initial Consult Note      [facility]: [address] [facility]  Patient Name: JEMAL RANGEL  Age: 89y  Gender: Male      Chief Complaint  Patient is a 89y old Male who presents with a chief complaint of Fall (27 Jun 2024 13:43)  Primary diagnosis of ST elevation myocardial infarction (STEMI)      Reason for Consult  Anemia  No Overt GI Bleeding      History of Present Illness  89 year old male patient known to have a history of Dementia, neurogenic bladder, HTN, recurrent falls s/p left hip replacement, HTN, and HFpEF who was brought to the ED on 06/24 following a fall complicated by right temporal laceration and bleeding, found to have elevated troponins and evidence of inferior STEMI on ECG s/p IV heparin initiation. Stay was complicated by acute on chronic anemia with no overt GI Bleeding.  Summary:  * Admitted on 06/24 for fall; found to have elevated troponins and inferior ST elevations on ECG; s/p Aspirin loading dose on 06/24 and s/p IV heparin from 06/24 to 06/25  * Stay complicated by drop in Hb drom 9.4 on 06/24 to 6.9 on 06/25 (s/p1 unit of pRBC) -> Hb 7.7 on 06/27 (baseline Hb 11.1 in 03/2024)  * No melena or hematochezia or hematemesis since admission; denies abdominal pain, nausea, vomiting, weight loss, NSAID use, reflux symptoms, dysphagia, odynophagia; denies constipation; baseline BMs: formed brown stools QOD  * Iron with Total Binding Capacity in AM (03.29.24 @ 07:13) Iron - Total Binding Capacity.: 167 ug/dL; % Saturation, Iron: 26 %; Iron Total: 44 ug/dL; Unsaturated Iron Binding Capacity: 123; ferritin 404  * NICO brown stools on 06/27  * History of chronic anemia; had EGD and colonoscopy many years ago per patient and son (unremarkable)  * No family history of colon cancer or GI malignancies      Prior EGD:  as below      Prior Colonoscopy:  as below      Past Medical and Surgical History:  HTN (hypertension)    Neurogenic bladder    Waldron catheter in place  2016    Dementia    No significant past surgical history  Right Hip ORIF        Home Medications:  Home Medications:  acetaminophen 325 mg oral tablet: 3 tab(s) orally every 8 hours (02 Apr 2024 13:17)  alendronate 35 mg oral tablet: 1 tab(s) orally once a week (24 Jun 2024 21:53)  ferrous fumarate 325 mg (106 mg elemental iron) oral tablet: 1 tab(s) orally once a day (27 Mar 2024 18:43)  lisinopril 5 mg oral tablet: 1 tab(s) orally once a day (02 Apr 2024 13:17)  metoprolol tartrate 25 mg oral tablet: 1 tab(s) orally 2 times a day (08 Apr 2024 13:56)  mirtazapine 15 mg oral tablet: 1 tab(s) orally once a day (24 Jun 2024 22:13)  pantoprazole 40 mg oral delayed release tablet: 1 tab(s) orally once a day (before a meal) (02 Apr 2024 13:17)  polyethylene glycol 3350 oral powder for reconstitution: 17 gram(s) orally once a day As needed Constipation (02 Apr 2024 13:17)  senna leaf extract oral tablet: 2 tab(s) orally once a day (at bedtime) as needed for  constipation (02 Apr 2024 13:17)  sodium chloride 1 g oral tablet: 1 tab(s) orally 3 times a day (08 Apr 2024 13:56)      Social History:  Tobacco: denies  Alcohol: denies  Drugs: denies      Allergies:  No Known Allergies      Family History:  no GI malignancies        Vital Signs in the last 24 hours   Vitals Summary T(C): 36.7 (06-27-24 @ 13:28), Max: 36.8 (06-26-24 @ 20:15)  HR: 81 (06-27-24 @ 13:28) (80 - 108)  BP: 143/66 (06-27-24 @ 13:28) (133/70 - 160/77)  RR: 18 (06-27-24 @ 13:28) (18 - 18)  SpO2: --  Vent Data   Intake/ Output   06-26-24 @ 07:01  -  06-27-24 @ 07:00  --------------------------------------------------------  IN: 236 mL / OUT: 1060 mL / NET: -824 mL    06-27-24 @ 07:01  -  06-27-24 @ 15:21  --------------------------------------------------------  IN: 480 mL / OUT: 500 mL / NET: -20 mL        Physical Exam  * General Appearance: Alert, cooperative, oriented to time, place, and person, in no acute distress  * Right temporal laceration noted  * Neck: Supple, symmetrical, trachea midline, no adenopathy   * Lungs: Good bilateral air entry  * Heart: Regular Rate and Rhythm, normal S1 and S2, no audible murmur, rub, or gallop  * Abdomen: Symmetric, non-distended, soft, non-tender, bowel sounds active all four quadrants  * Rectal: Brown stool, Normal tone, no palpable masses or tenderness  * Extremities: no lower extremity pitting edema bilaterally      Investigations   Laboratory Workup      - CBC:                        7.7    8.73  )-----------( 185      ( 27 Jun 2024 07:14 )             22.4       - Hgb Trend:  7.7  06-27-24 @ 07:14  7.9  06-26-24 @ 04:45  7.8  06-25-24 @ 20:00  8.5  06-25-24 @ 16:00  8.0  06-25-24 @ 11:13  6.9  06-25-24 @ 06:00  7.5  06-25-24 @ 02:25          - Chemistry:  06-26    130<L>  |  96<L>  |  16  ----------------------------<  84  4.0   |  23  |  1.3    Ca    9.1      26 Jun 2024 04:45    TPro  5.7<L>  /  Alb  3.5  /  TBili  0.3  /  DBili  x   /  AST  22  /  ALT  8   /  AlkPhos  96  06-26    Liver panel trend:  TBili 0.3   /   AST 22   /   ALT 8   /   AlkP 96   /   Tptn 5.7   /   Alb 3.5    /   DBili --      06-26  TBili 0.3   /   AST 29   /   ALT 9   /   AlkP 101   /   Tptn 5.7   /   Alb 3.2    /   DBili --      06-25  TBili 0.3   /   AST 23   /   ALT 12   /   AlkP 125   /   Tptn 6.7   /   Alb 3.5    /   DBili --      06-24      Microbiological Workup  Urinalysis Basic - ( 26 Jun 2024 04:45 )    Color: x / Appearance: x / SG: x / pH: x  Gluc: 84 mg/dL / Ketone: x  / Bili: x / Urobili: x   Blood: x / Protein: x / Nitrite: x   Leuk Esterase: x / RBC: x / WBC x   Sq Epi: x / Non Sq Epi: x / Bacteria: x      Current Medications  Standing Medications  aspirin  chewable 81 milliGRAM(s) Oral daily  atorvastatin 80 milliGRAM(s) Oral at bedtime  bacitracin   Ointment 1 Application(s) Topical three times a day  chlorhexidine 2% Cloths 1 Application(s) Topical daily  ferrous    sulfate 325 milliGRAM(s) Oral daily  lisinopril 5 milliGRAM(s) Oral daily  metoprolol tartrate 25 milliGRAM(s) Oral two times a day  mirtazapine 15 milliGRAM(s) Oral daily  pantoprazole    Tablet 40 milliGRAM(s) Oral before breakfast  polyethylene glycol 3350 17 Gram(s) Oral daily  senna 2 Tablet(s) Oral at bedtime  sodium chloride 1 Gram(s) Oral three times a day    PRN Medications  acetaminophen     Tablet .. 650 milliGRAM(s) Oral every 6 hours PRN Temp greater or equal to 38C (100.4F), Mild Pain (1 - 3), Moderate Pain (4 - 6)    Singles Doses Administered  (ADM OVERRIDE) 1 each &lt;see task&gt; GiveOnce  (ADM OVERRIDE) 1 each &lt;see task&gt; GiveOnce  (ADM OVERRIDE) 1 each &lt;see task&gt; GiveOnce  (ADM OVERRIDE) 2 each &lt;see task&gt; GiveOnce  (ADM OVERRIDE) 4 each &lt;see task&gt; GiveOnce  (ADM OVERRIDE) 1 each &lt;see task&gt; GiveOnce  (ADM OVERRIDE) 1 each &lt;see task&gt; GiveOnce  aspirin  chewable 324 milliGRAM(s) Oral once  ceFAZolin   IVPB 2000 milliGRAM(s) IV Intermittent once  diphtheria/tetanus/pertussis (acellular) Vaccine (Adacel) 0.5 milliLiter(s) IntraMuscular once  haloperidol    Injectable 5 milliGRAM(s) IV Push once  heparin   Injectable 3200 Unit(s) IV Push once  lactated ringers Bolus 1000 milliLiter(s) IV Bolus once  morphine  - Injectable 4 milliGRAM(s) IV Push Once  ondansetron Injectable 4 milliGRAM(s) IV Push once  tranexamic acid IVPB 1000 milliGRAM(s) IV Intermittent Once  zolpidem 5 milliGRAM(s) Oral once PRN

## 2024-06-27 NOTE — CONSULT NOTE ADULT - ASSESSMENT
IMPRESSION: Rehab of T12 comp fx / s/p fall / dementia, HTN, neurogenic bladder, L hip fx 3/2024    PRECAUTIONS: [   ] Cardiac  [   ] Respiratory  [   ] Seizures [   ] Contact Isolation  [   ] Droplet Isolation  [   ] Other    Weight Bearing Status:     RECOMMENDATION:    Out of Bed to Chair     DVT/Decubiti Prophylaxis    REHAB PLAN:     [   x ] Bedside P/T 3-5 times a week   [    ]   Bedside O/T  2-3 times a week             [    ] Speech Therapy               [    ]  No Rehab Therapy Indicated   Conditioning/ROM                                    ADL  Bed Mobility                                               Conditioning/ROM  Transfers                                                     Bed Mobility  Sitting /Standing Balance                         Transfers                                        Gait Training                                               Sitting/Standing Balance  Stair Training [   ]Applicable                    Home equipment Eval                                                                        Splinting  [   ] Only      GOALS:   ADL   [    ]   Independent                    Transfers  [   x ] Independent                          Ambulation  [ x   ] Independent     [ x    ] With device                            [    ]  CG                                                         [    ]  CG                                                                  [    ] CG                            [    ] Min A                                                   [    ] Min A                                                              [    ] Min  A          DISCHARGE PLAN:   [    ]  Good candidate for Intensive Rehabilitation/Hospital based                                             Will tolerate 3hrs Intensive Rehab Daily                                       [ x    ]  Short Term Rehab in Skilled Nursing Facility                                       [     ]  Home with Outpatient or  services                                         [     ]  Possible Candidate for Intensive Hospital based Rehab

## 2024-06-27 NOTE — PROGRESS NOTE ADULT - ASSESSMENT
Case of 88 yo woman w/ Hx of dementia, chronic hyponatremia, neurogenic bladder on chronic Waldron, Hx left Hx fracture 3/24 presenting with right temporal laceration after an unwitnessed fall, found to have ST elevation in inferior leads with reciprocal ST depression in lateral leads.    1-: STEMI:  - In the setting of right temporal laceration and bleeding  - Changes resolved on EKG performed on   - Troponin peaked at 273  - No chest pain, loaded with Aspirin and started on a heparin drip  - Currently off heparin gtt  - TTE: Left ventricular ejection fraction, by visual estimation, is 60 to 65%. Normal global left ventricular systolic function. Impaired relaxation pattern of left  ventricular myocardial filling (Grade I diastolic dysfunction). Mildly enlarged left atrium.  - c/w ASA 81 mg daily  - Atorvastatin 80 mg nightly  - C/w home lisinopril 5 mg daily and metoprolol tartrate 25 mg BID  - No plan for LHC  - HbA1c: 4.9  - Lipid panel: total cholesterol: 179; LDL: 113; HDL: 48; T  - Pending TSH    2- T12 compression fracture:  - Neurosurgery consulted  - No acute surgical intervention --> abdominal binder for support - TLSO when OOB. Will need PT/OT. Pain management consulted --> tylenol 975 mg q8h.    3- R temporal laceration:  - S/p suturing  - On Bacitracin    4- Dementia:  - Continue with home Mirtazapine  - Avoid CNS depressants    5- Hyponatremia:  - Continue with salt tablets    6- General measures:  - DVT ppx: was on heparin gtt --> SCD for now, F/U morning Hgb to resume prophylactic AC  - GI ppx: pantoprazole  - T/D/L: chronic Waldron, PIV  - Code status: Full Code    Disposition: Telemetry. Case of 88 yo woman w/ Hx of dementia, chronic hyponatremia, neurogenic bladder on chronic Waldron, Hx left Hx fracture 3/24 presenting with right temporal laceration after an unwitnessed fall, found to have ST elevation in inferior leads with reciprocal ST depression in lateral leads.    1-: STEMI:  - In the setting of right temporal laceration and bleeding  - Changes resolved on EKG performed on   - Troponin peaked at 273  - No chest pain, loaded with Aspirin and started on a heparin drip  - Currently off heparin gtt  - TTE: Left ventricular ejection fraction, by visual estimation, is 60 to 65%. Normal global left ventricular systolic function. Impaired relaxation pattern of left  ventricular myocardial filling (Grade I diastolic dysfunction). Mildly enlarged left atrium.  - c/w ASA 81 mg daily  - Atorvastatin 80 mg nightly  - C/w home lisinopril 5 mg daily and metoprolol tartrate 25 mg BID  - No plan for [facility]  - HbA1c: 4.9  - Lipid panel: total cholesterol: 179; LDL: 113; HDL: 48; T  -TSH 1.89    #Anemia  Hb 11--> 7.7  GI recs: No signs of overt bleed.       -Colonoscopy/EGD outpt if the family desires       - If Hb drop persists, full anemia workup       - Trend Hb and get T/S. Transfuse Hb > 7.       -Monitor for melena or hematochezia      - F/U FOBT     2- T12 compression fracture:  - Neurosurgery consulted  - No acute surgical intervention --> abdominal binder for support - TLSO when OOB. Will need PT/OT. Pain management consulted --> tylenol 975 mg q8h.    3- R temporal laceration:  - S/p suturing  - On Bacitracin    4- Dementia:  - Continue with home Mirtazapine  - Avoid CNS depressants    5- Hyponatremia:  - Continue with salt tablets    6- General measures:  - DVT ppx: was on heparin gtt --> SCD for now, F/U morning Hgb to resume prophylactic AC  - GI ppx: pantoprazole  -Activity: OOB to chair   - T/D/L: chronic Waldron, PIV  - Code status: Full Code    Disposition: Telemetry.

## 2024-06-27 NOTE — CONSULT NOTE ADULT - CONSULT REASON
s/p fall +HT -LOC -AC
STEMI
T12 compression Fracture
fall
Anemia
T12 compression fracture, multipile falls, eval recommended by neurosurgery

## 2024-06-27 NOTE — CONSULT NOTE ADULT - CONSULT REQUESTED DATE/TIME
24-Jun-2024 19:57
27-Jun-2024 14:56
24-Jun-2024
27-Jun-2024 13:43
24-Jun-2024 15:13
24-Jun-2024 22:34

## 2024-06-27 NOTE — CONSULT NOTE ADULT - ATTENDING COMMENTS
88 y/o male with history as above, admitted s/p fall, trauma, ECG c/w inferior STEMI, decision was made to treat the patient medically and not proceed with emergent cath/primary PCI, patient admitted to CCU for medical management.  Started on ASA and Heparin  This AM no chest pain  + head trauma, T12 fx - neurosurgical evaluation appreciated  elevated Lactate at 2.3  troponin trending up  2D echo pending  worsening anemia - hg dropping to 6.9 from 9.4    Recommend:    D/c Heparin  Type and cross 2 units  w/u for source of bleeding  close hemodynamic monitoring  stable IV access, consider central venous line  CCU/ICU care  repeat CBC - monitor H/H, transfuse as needed  not a candidate for PCI at this time as risk of the procedure and anticoagulation/DAPT outweighs the benefit.  f/u 2D echo
88yo male presents after a fall with temporal laceration found to have possible STEMI asked to evaluate for anemia. No overt GI bleeding. Currently risks of endoscopy likely outweigh potential benefits and pt/pts son prefer to defer at this time. Complete anemia workup and consider CT imaging if recurrent decline in hb. If overt GI bleeding or drop in Hb would consider more urgent endoscopic evaluation.
This note reflects my exam and care of this patient on6/24/2024.    This is 88 y/o male with pmh of HTN, dementia, neurogenic bladder with chronic indwelling yañez, R hip fracture s/p internal fixation BIBA from SNF, s/p unwitnessed fall in shower, unknown loc, pt with hx dementia at baseline (AAOx1-2), no anticoagulation. Per EMS has large laceration to scalp +active bleeding noted in triage. Patient remained HD stable, code trauma activated due to active pulsatile bleeding from scalp laceration. Bleed was suture ligated in the trauma bay and patient was not found to have any other external signs of trauma. ROS unable to be obtained due to confusion, but patient denies any pain at time of examination.    Primary and secondary surveys were performed.    AAO x2  GCS 14.  Neuro intact.  Neck: no step-offs  Right temporal area of scalp - laceration about 4 cm long  Chest: clear.  CV : rrr  Abdomen: soft, nontender  extr: no deformities.    Trop 54    CT Head - no injury  CT C-spine - no injury  CT chest/abd/pelvis - T12 compression fracture, chronic as patient has no pain in area    ECG consistent with STEMI.    ASSESSMENT:  88 y/o male, S/P Fall.  Closed head injury.  Scalp laceration with arterial bleeding.  Acute pain due to trauma.  At risk for hemodynamic instability.  Elevated Troponin.  Acute MI.    PLAN:  Scalp laceration was repair after hemostasis was obtained.  Needs Cardiology eval and management of acute MI  Will follow for tertiary survey

## 2024-06-27 NOTE — CONSULT NOTE ADULT - ASSESSMENT
Assessment and Plan  Case of an 89 year old male patient known to have a history of Dementia, neurogenic bladder, HTN, recurrent falls s/p left hip replacement, HTN, and HFpEF who was brought to the ED on 06/24 following a fall complicated by right temporal laceration and bleeding, found to have elevated troponins and evidence of inferior STEMI on ECG s/p IV heparin initiation. Stay was complicated by acute on chronic anemia with no overt GI Bleeding.      Acute on Chronic Microcytic Anemia   Right Temporal Laceration with Bleeding- Resolved  No Evidence of Overt Gastrointestinal Bleeding  * Admitted on 06/24 for fall; found to have elevated troponins and inferior ST elevations on ECG; s/p Aspirin loading dose on 06/24 and s/p IV heparin from 06/24 to 06/25  * Stay complicated by drop in Hb drom 9.4 on 06/24 to 6.9 on 06/25 (s/p1 unit of pRBC) -> Hb 7.7 on 06/27 (baseline Hb 11.1 in 03/2024)  * No melena or hematochezia or hematemesis since admission; denies abdominal pain, nausea, vomiting, weight loss, NSAID use, reflux symptoms, dysphagia, odynophagia; denies constipation; baseline BMs: formed brown stools QOD  * Iron with Total Binding Capacity in AM (03.29.24 @ 07:13) Iron - Total Binding Capacity.: 167 ug/dL; % Saturation, Iron: 26 %; Iron Total: 44 ug/dL; Unsaturated Iron Binding Capacity: 123; ferritin 404  * NICO brown stools on 06/27  * History of chronic anemia; had EGD and colonoscopy many years ago per patient and son (unremarkable)  * No family history of colon cancer or GI malignancies    RECOMMENDATIONS  - May ideally benefit from EGD and colonoscopy for workup of anemia  - However, in absence of overt GI bleeding and in setting of hemodynamic stability and stable Hb, current risks outweigh benefits  - After discussing the risks and benefits of EGD and colonoscopy with son and patient, they seemed hesitant and requested time to think about it. We offered outpatient follow up at [facility] for EGD and colonoscopy if patient and family become agreeable   - In the setting of temporal relation between the acute drop in Hb and the initiation of therapeutic anticoagulation and in the setting of no overt GI bleeding, would recommend complete anemia workup if Hb drops again (including ruling out hematoma)  - In the setting of stable Hb and no overt GI bleed, can advance diet as tolerated and resume blood thinners as deemed necessary per cardiology if benefits outweigh risks (the elevated troponins was likely from blood loss anemia > true STEMI per cardiology)  - Trend Hb and keep active T/S. Transfuse as needed to keep Hb >7  - Continue PO protonix 40 mg QD and FeSO4 325mg QD  - Monitor BM for signs of bleeding (no melena or hematochezia)  - Follow up with our [facility] located at [address] [place] [address]. Phone Number: [phone]        Thank you for your consult.  - Please note that plan was communicated with medical team.   - Please reach GI on [phone] during weekdays till 5pm.  - Please call the GI service line after 5pm on Weekdays and anytime on Weekends: [phone].      Maite Boyd MD  PGY - 4 Gastroenterology Fellow   [facility]     Assessment and Plan  Case of an 89 year old male patient known to have a history of Dementia, neurogenic bladder, HTN, recurrent falls s/p left hip replacement, HTN, and HFpEF who was brought to the ED on 06/24 following a fall complicated by right temporal laceration and bleeding, found to have elevated troponins and evidence of inferior STEMI on ECG s/p IV heparin initiation. Stay was complicated by acute on chronic anemia with no overt GI Bleeding.      Acute on Chronic Microcytic Anemia   Right Temporal Laceration with Bleeding- Resolved  No Evidence of Overt Gastrointestinal Bleeding  * Admitted on 06/24 for fall; found to have elevated troponins and inferior ST elevations on ECG; s/p Aspirin loading dose on 06/24 and s/p IV heparin from 06/24 to 06/25  * Stay complicated by drop in Hb drom 9.4 on 06/24 to 6.9 on 06/25 (s/p1 unit of pRBC) -> Hb 7.7 on 06/27 (baseline Hb 11.1 in 03/2024)  * No melena or hematochezia or hematemesis since admission; denies abdominal pain, nausea, vomiting, weight loss, NSAID use, reflux symptoms, dysphagia, odynophagia; denies constipation; baseline BMs: formed brown stools QOD  * Iron with Total Binding Capacity in AM (03.29.24 @ 07:13) Iron - Total Binding Capacity.: 167 ug/dL; % Saturation, Iron: 26 %; Iron Total: 44 ug/dL; Unsaturated Iron Binding Capacity: 123; ferritin 404  * NICO brown stools on 06/27  * History of chronic anemia; had EGD and colonoscopy many years ago per patient and son (unremarkable)  * No family history of colon cancer or GI malignancies    RECOMMENDATIONS  - May ideally benefit from EGD and colonoscopy for workup of anemia  - However, in absence of overt GI bleeding and in setting of hemodynamic stability and stable Hb, current risks outweigh benefits  - After discussing the risks and benefits of EGD and colonoscopy with son and patient, they seemed hesitant and requested time to think about it. We offered outpatient follow up at [facility] for EGD and colonoscopy if patient and family become agreeable   - In the setting of temporal relation between the acute drop in Hb and the initiation of therapeutic anticoagulation and in the setting of no overt GI bleeding, would recommend complete anemia workup if Hb drops again (including CT imaging to rule out hematoma)  - In the setting of stable Hb and no overt GI bleed, can advance diet as tolerated and resume blood thinners as deemed necessary per cardiology if benefits outweigh risks (the elevated troponins was likely from blood loss anemia > true STEMI per cardiology)  - Trend Hb and keep active T/S. Transfuse as needed to keep Hb >7  - Continue PO protonix 40 mg QD and FeSO4 325mg QD  - Monitor BM for signs of bleeding (no melena or hematochezia)  - Follow up with our [facility] located at [address] [place] [address]. Phone Number: [phone]        Thank you for your consult.  - Please note that plan was communicated with medical team.   - Please reach GI on [phone] during weekdays till 5pm.  - Please call the GI service line after 5pm on Weekdays and anytime on Weekends: [phone].      Maite Boyd MD  PGY - 4 Gastroenterology Fellow   [facility]

## 2024-06-27 NOTE — CONSULT NOTE ADULT - SUBJECTIVE AND OBJECTIVE BOX
HPI:  Mr. Milner is an 89 yo M with a PMH of dementia, HTN, neurogenic bladder, L hip fx 3/2024 and multiple falls presenting from NH after being found down on the floor this morning after an unwitnessed fall w/ actively bleeding R temporal laceration, trauma code was called in ED and laceration was repaired. Trauma workup revealed a new age-indeterminate T12 fracture, possibly acute. EKG performed later in ED stay showed ST-elevations in II, III and avF and ST-depressions in avL and avR, code STEMI was then called. Patient is currently alert and oriented to name only and unable to recall events leading up to the fall. He denies having chest pain. Patient is being admitted to CCU for close monitoring and medical management of suspected inferior STEMI.    Vitals on Admission  Temp: 97.7  HR: 92  BP: 151/88  O2: 96% on RA  RR: 17    Labs  HST: 59 ->136  Lactate: 1.9 (blood) -> 2.3 (VBG)  Na: 126  UA; Sterile pyuria    Imaging  Trauma Code Pan Scan: New, age-indeterminate mild T12 vertebral body compression fracture without significant osseous retropulsion, possibly acute. R scalp soft tissue swelling.    In the ED  - ASA 324mg x1  - R temporal lac repair  - Cefazolin 2g IV x1  - Morphine 4mg IV x1  - Zofran 4mg IV x1     1-: STEMI:  - In the setting of right temporal laceration and bleeding  - Changes resolved on EKG performed on 6/25  - Troponin peaked at 273  - No chest pain, loaded with Aspirin and started on a heparin drip  - Currently off heparin gtt  - TTE: Left ventricular ejection fraction, by visual estimation, is 60 to 65%. Normal global left ventricular systolic function. Impaired relaxation pattern of left  ventricular myocardial filling (Grade I diastolic dysfunction). Mildly enlarged left atrium.  - c/w ASA 81 mg daily  - Atorvastatin 80 mg nightly  - C/w home lisinopril 5 mg daily and metoprolol tartrate 25 mg BID  - No plan for LHC    2- T12 compression fracture:  - Neurosurgery consulted  - No acute surgical intervention --> abdominal binder for support - TLSO when OOB. Will need       PAST MEDICAL & SURGICAL HISTORY:  HTN (hypertension)      Neurogenic bladder      Waldron catheter in place  2016      Dementia      No significant past surgical history  Right Hip ORIF          Hospital Course:    TODAY'S SUBJECTIVE & REVIEW OF SYMPTOMS:     Constitutional WNL   Cardio WNL   Resp WNL   GI WNL  Heme WNL  Endo WNL  Skin WNL  MSK pain   Neuro WNL  Cognitive WNL  Psych WNL      MEDICATIONS  (STANDING):  aspirin  chewable 81 milliGRAM(s) Oral daily  atorvastatin 80 milliGRAM(s) Oral at bedtime  bacitracin   Ointment 1 Application(s) Topical three times a day  chlorhexidine 2% Cloths 1 Application(s) Topical daily  ferrous    sulfate 325 milliGRAM(s) Oral daily  lisinopril 5 milliGRAM(s) Oral daily  metoprolol tartrate 25 milliGRAM(s) Oral two times a day  mirtazapine 15 milliGRAM(s) Oral daily  pantoprazole    Tablet 40 milliGRAM(s) Oral before breakfast  polyethylene glycol 3350 17 Gram(s) Oral daily  senna 2 Tablet(s) Oral at bedtime  sodium chloride 1 Gram(s) Oral three times a day    MEDICATIONS  (PRN):  acetaminophen     Tablet .. 650 milliGRAM(s) Oral every 6 hours PRN Temp greater or equal to 38C (100.4F), Mild Pain (1 - 3), Moderate Pain (4 - 6)      FAMILY HISTORY:      Allergies    No Known Allergies    Intolerances        SOCIAL HISTORY:    [  ] Etoh  [  ] Smoking  [  ] Substance abuse     Home Environment:  [   ] Home Alone  [   ] Lives with Family  [   ] Home Health Aid    Dwelling:  [   ] Apartment  [   ] Private House  [   ] Adult Home  [   ] Skilled Nursing Facility      [  x ] Short Term  [   ] Long Term  [   ] Stairs       Elevator [   ]    FUNCTIONAL STATUS PTA: (Check all that apply)  Ambulation: [    ]Independent    [  x ] Dependent     [   ] Non-Ambulatory  Assistive Device: [   ] SA Cane  [   ]  Q Cane  [ x  ] Walker  [   ]  Wheelchair  ADL : [   ] Independent  [  x  ]  Dependent       Vital Signs Last 24 Hrs  T(C): 36.7 (27 Jun 2024 13:28), Max: 36.8 (26 Jun 2024 20:15)  T(F): 98 (27 Jun 2024 13:28), Max: 98.3 (26 Jun 2024 20:15)  HR: 81 (27 Jun 2024 13:28) (80 - 108)  BP: 143/66 (27 Jun 2024 13:28) (133/70 - 160/77)  BP(mean): --  RR: 18 (27 Jun 2024 13:28) (18 - 18)  SpO2: --          PHYSICAL EXAM: Awake & Alert  GENERAL: NAD  HEAD:  Normocephalic, right periorbital ecchymosis   CHEST/LUNG: Clear   HEART: S1S2+  ABDOMEN: Soft, Nontender  EXTREMITIES:  no calf tenderness, darren wrist restrain     NERVOUS SYSTEM:  Cranial Nerves 2-12 intact [   ] Abnormal  [   ]  ROM: WFL all extremities [   ]  Abnormal [   ]able to move all ext - restrained   Motor Strength: WFL all extremities  [   ]  Abnormal [   ]  Sensation: intact to light touch [   ] Abnormal [   ]    FUNCTIONAL STATUS:  Bed Mobility: Independent [   ]  Supervision [   ]  Needs Assistance [ x  ]  N/A [   ]  Transfers: Independent [   ]  Supervision [   ]  Needs Assistance [   ]  N/A [   ]   Ambulation: Independent [   ]  Supervision [   ]  Needs Assistance [   ]  N/A [   ]  ADL: Independent [   ] Requires Assistance [   ] N/A [   ]      LABS:                        7.7    8.73  )-----------( 185      ( 27 Jun 2024 07:14 )             22.4     06-26    130<L>  |  96<L>  |  16  ----------------------------<  84  4.0   |  23  |  1.3    Ca    9.1      26 Jun 2024 04:45    TPro  5.7<L>  /  Alb  3.5  /  TBili  0.3  /  DBili  x   /  AST  22  /  ALT  8   /  AlkPhos  96  06-26      Urinalysis Basic - ( 26 Jun 2024 04:45 )    Color: x / Appearance: x / SG: x / pH: x  Gluc: 84 mg/dL / Ketone: x  / Bili: x / Urobili: x   Blood: x / Protein: x / Nitrite: x   Leuk Esterase: x / RBC: x / WBC x   Sq Epi: x / Non Sq Epi: x / Bacteria: x        RADIOLOGY & ADDITIONAL STUDIES:

## 2024-06-27 NOTE — PROGRESS NOTE ADULT - SUBJECTIVE AND OBJECTIVE BOX
SUBJECTIVE/OVERNIGHT EVENTS  Today is hospital day 3d. This morning patient was seen and examined at bedside, resting comfortably in bed. No acute or major events overnight.    CODE STATUS: full code     MEDICATIONS  STANDING MEDICATIONS  aspirin  chewable 81 milliGRAM(s) Oral daily  atorvastatin 80 milliGRAM(s) Oral at bedtime  bacitracin   Ointment 1 Application(s) Topical three times a day  chlorhexidine 2% Cloths 1 Application(s) Topical daily  ferrous    sulfate 325 milliGRAM(s) Oral daily  lisinopril 5 milliGRAM(s) Oral daily  metoprolol tartrate 25 milliGRAM(s) Oral two times a day  mirtazapine 15 milliGRAM(s) Oral daily  pantoprazole    Tablet 40 milliGRAM(s) Oral before breakfast  polyethylene glycol 3350 17 Gram(s) Oral daily  senna 2 Tablet(s) Oral at bedtime  sodium chloride 1 Gram(s) Oral three times a day    PRN MEDICATIONS  acetaminophen     Tablet .. 650 milliGRAM(s) Oral every 6 hours PRN    VITALS  T(F): 98 (06-27-24 @ 13:28), Max: 98.3 (06-26-24 @ 20:15)  HR: 81 (06-27-24 @ 13:28) (80 - 108)  BP: 143/66 (06-27-24 @ 13:28) (133/70 - 160/77)  RR: 18 (06-27-24 @ 13:28) (18 - 18)  SpO2: --    PHYSICAL EXAM  GENERAL  ( x ) NAD, lying in bed comfortably     (  ) obtunded     (  ) lethargic     (  ) somnolent    HEAD  (  ) Atraumatic     (  ) hematoma     (  ) laceration (specify location:       ) bruise on R side of face     NECK  (  ) Supple     (  ) neck stiffness     (  ) nuchal rigidity     (  )  no JVD     (  ) JVD present ( -- cm)    HEART  Rate -->  (x  ) normal rate    (  ) bradycardic    (  ) tachycardic  Rhythm -->  (x  ) regular    (  ) regularly irregular    (  ) irregularly irregular  Murmurs -->  (  x) normal s1/s2    (  ) systolic murmur    (  ) diastolic murmur    (  ) continuous murmur     (  ) S3 present    (  ) S4 present    LUNGS  (x  )Unlabored respirations     (  ) tachypnea  (x  ) B/L air entry     (  ) decreased breath sounds in:  (location     )    ( x ) no adventitious sound     (  ) crackles     (  ) wheezing      (  ) rhonchi      (specify location:       )  (  ) chest wall tenderness (specify location:       )    ABDOMEN  (  x) Soft     (  ) tense   |   ( x ) nondistended     (  ) distended   |   (  ) +BS     (  ) hypoactive bowel sounds     (  ) hyperactive bowel sounds  (x  ) nontender     (  ) RUQ tenderness     (  ) RLQ tenderness     (  ) LLQ tenderness     (  ) epigastric tenderness     (  ) diffuse tenderness  (  ) Splenomegaly      (  ) Hepatomegaly      (  ) Jaundice     (  ) ecchymosis     EXTREMITIES  (x  ) Normal     (  ) Rash     (  ) ecchymosis     (  ) varicose veins      (  ) pitting edema     (  ) non-pitting edema   (  ) ulceration     (  ) gangrene:     (location:     )    NERVOUS SYSTEM  (  ) A&Ox3     ( x ) confused     (  ) lethargic  CN II-XII:     (  ) Intact     (  ) focal deficits  (Specify:     )   Upper extremities:     (  ) strength X/5     (  ) focal deficit (specify:    )  Lower extremities:     (  ) strength  X/5    (  ) focal deficit (specify:    )    SKIN  (  ) No rashes or lesions     (  ) maculopapular rash     (  ) pustules     (  ) vesicles     (  ) ulcer     (  x) ecchymosis     (specify location: face    )    (  ) Indwelling Waldron Catheter   Date insterted:    Reason (  ) Critical illness     (  ) urinary retention    (  ) Accurate Ins/Outs Monitoring     (  ) CMO patient    (  ) Central Line  Date inserted:  Location: (  ) Right IJ   (  ) Left IJ   (  ) Right Fem   (  ) Left Fem    (  ) SPC  (  ) pigtail  (  ) PEG tube  (  ) colostomy  (  ) jejunostomy  (  ) U-Dall    LABS             7.7    8.73  )-----------( 185      ( 06-27-24 @ 07:14 )             22.4     130  |  96  |  16  -------------------------<  84   06-26-24 @ 04:45  4.0  |  23  |  1.3    Ca      9.1     06-26-24 @ 04:45    TPro  5.7  /  Alb  3.5  /  TBili  0.3  /  DBili  x   /  AST  22  /  ALT  8   /  AlkPhos  96  /  GGT  x     06-26-24 @ 04:45      Troponin T, High Sensitivity Result: 270 ng/L (06-25-24 @ 11:13)  CKMB Units: 8.6 ng/mL (06-25-24 @ 11:13)  Troponin T, High Sensitivity Result: 273 ng/L (06-25-24 @ 06:00)    Urinalysis Basic - ( 26 Jun 2024 04:45 )    Color: x / Appearance: x / SG: x / pH: x  Gluc: 84 mg/dL / Ketone: x  / Bili: x / Urobili: x   Blood: x / Protein: x / Nitrite: x   Leuk Esterase: x / RBC: x / WBC x   Sq Epi: x / Non Sq Epi: x / Bacteria: x          IMAGING  CXR: negative  CT chest: 1. New, age-indeterminate mild T12 vertebral body compression fracture   without significant osseous retropulsion, possibly acute.  No additional evidence of acute traumatic thoracic or abdominal   pathology.  CT Cervical spine: Right frontalscalp soft tissue swelling with skin laceration without   underlying calvarial fracture or intracranial hemorrhage.       SUBJECTIVE/OVERNIGHT EVENTS  Today is hospital day 3d. This morning patient was seen and examined at bedside, resting comfortably in bed. No acute or major events overnight.    CODE STATUS: full code     MEDICATIONS  STANDING MEDICATIONS  aspirin  chewable 81 milliGRAM(s) Oral daily  atorvastatin 80 milliGRAM(s) Oral at bedtime  bacitracin   Ointment 1 Application(s) Topical three times a day  chlorhexidine 2% Cloths 1 Application(s) Topical daily  ferrous    sulfate 325 milliGRAM(s) Oral daily  lisinopril 5 milliGRAM(s) Oral daily  metoprolol tartrate 25 milliGRAM(s) Oral two times a day  mirtazapine 15 milliGRAM(s) Oral daily  pantoprazole    Tablet 40 milliGRAM(s) Oral before breakfast  polyethylene glycol 3350 17 Gram(s) Oral daily  senna 2 Tablet(s) Oral at bedtime  sodium chloride 1 Gram(s) Oral three times a day    PRN MEDICATIONS  acetaminophen     Tablet .. 650 milliGRAM(s) Oral every 6 hours PRN    VITALS  T(F): 98 (06-27-24 @ 13:28), Max: 98.3 (06-26-24 @ 20:15)  HR: 81 (06-27-24 @ 13:28) (80 - 108)  BP: 143/66 (06-27-24 @ 13:28) (133/70 - 160/77)  RR: 18 (06-27-24 @ 13:28) (18 - 18)  SpO2: --    PHYSICAL EXAM  GENERAL  ( x ) NAD, lying in bed comfortably     (  ) obtunded     (  ) lethargic     (  ) somnolent    HEAD  (  ) Atraumatic     (  ) hematoma     (  ) laceration (specify location:       ) x bruise on R side of face     NECK  (  ) Supple     (  ) neck stiffness     (  ) nuchal rigidity     (  )  no JVD     (  ) JVD present ( -- cm)    HEART  Rate -->  (x  ) normal rate    (  ) bradycardic    (  ) tachycardic  Rhythm -->  (x  ) regular    (  ) regularly irregular    (  ) irregularly irregular  Murmurs -->  (  x) normal s1/s2    (  ) systolic murmur    (  ) diastolic murmur    (  ) continuous murmur     (  ) S3 present    (  ) S4 present    LUNGS  (x  )Unlabored respirations     (  ) tachypnea  (x  ) B/L air entry     (  ) decreased breath sounds in:  (location     )    ( x ) no adventitious sound     (  ) crackles     (  ) wheezing      (  ) rhonchi      (specify location:       )  (  ) chest wall tenderness (specify location:       )    ABDOMEN  (  x) Soft     (  ) tense   |   ( x ) nondistended     (  ) distended   |   (  ) +BS     (  ) hypoactive bowel sounds     (  ) hyperactive bowel sounds  (x  ) nontender     (  ) RUQ tenderness     (  ) RLQ tenderness     (  ) LLQ tenderness     (  ) epigastric tenderness     (  ) diffuse tenderness  (  ) Splenomegaly      (  ) Hepatomegaly      (  ) Jaundice     (  ) ecchymosis     EXTREMITIES  (x  ) Normal     (  ) Rash     (  ) ecchymosis     (  ) varicose veins      (  ) pitting edema     (  ) non-pitting edema   (  ) ulceration     (  ) gangrene:     (location:     )    NERVOUS SYSTEM  (  ) A&Ox3     ( x ) confused     (  ) lethargic  CN II-XII:     (  ) Intact     (  ) focal deficits  (Specify:     )   Upper extremities:     (  ) strength X/5     (  ) focal deficit (specify:    )  Lower extremities:     (  ) strength  X/5    (  ) focal deficit (specify:    )    SKIN  (  ) No rashes or lesions     (  ) maculopapular rash     (  ) pustules     (  ) vesicles     (  ) ulcer     (  x) ecchymosis     (specify location: face    )    (  ) Indwelling Waldron Catheter   Date insterted:    Reason (  ) Critical illness     (  ) urinary retention    (  ) Accurate Ins/Outs Monitoring     (  ) CMO patient    (  ) Central Line  Date inserted:  Location: (  ) Right IJ   (  ) Left IJ   (  ) Right Fem   (  ) Left Fem    (  ) SPC  (  ) pigtail  (  ) PEG tube  (  ) colostomy  (  ) jejunostomy  (  ) U-Dall    LABS             7.7    8.73  )-----------( 185      ( 06-27-24 @ 07:14 )             22.4     130  |  96  |  16  -------------------------<  84   06-26-24 @ 04:45  4.0  |  23  |  1.3    Ca      9.1     06-26-24 @ 04:45    TPro  5.7  /  Alb  3.5  /  TBili  0.3  /  DBili  x   /  AST  22  /  ALT  8   /  AlkPhos  96  /  GGT  x     06-26-24 @ 04:45      Troponin T, High Sensitivity Result: 270 ng/L (06-25-24 @ 11:13)  CKMB Units: 8.6 ng/mL (06-25-24 @ 11:13)  Troponin T, High Sensitivity Result: 273 ng/L (06-25-24 @ 06:00)    Urinalysis Basic - ( 26 Jun 2024 04:45 )    Color: x / Appearance: x / SG: x / pH: x  Gluc: 84 mg/dL / Ketone: x  / Bili: x / Urobili: x   Blood: x / Protein: x / Nitrite: x   Leuk Esterase: x / RBC: x / WBC x   Sq Epi: x / Non Sq Epi: x / Bacteria: x          IMAGING  CXR: negative  CT chest: 1. New, age-indeterminate mild T12 vertebral body compression fracture   without significant osseous retropulsion, possibly acute.  No additional evidence of acute traumatic thoracic or abdominal   pathology.  CT Cervical spine: Right frontalscalp soft tissue swelling with skin laceration without   underlying calvarial fracture or intracranial hemorrhage.

## 2024-06-28 LAB
ALBUMIN SERPL ELPH-MCNC: 3.9 G/DL — SIGNIFICANT CHANGE UP (ref 3.5–5.2)
ALP SERPL-CCNC: 96 U/L — SIGNIFICANT CHANGE UP (ref 30–115)
ALT FLD-CCNC: 8 U/L — SIGNIFICANT CHANGE UP (ref 0–41)
ANION GAP SERPL CALC-SCNC: 11 MMOL/L — SIGNIFICANT CHANGE UP (ref 7–14)
AST SERPL-CCNC: 26 U/L — SIGNIFICANT CHANGE UP (ref 0–41)
BASOPHILS # BLD AUTO: 0.03 K/UL — SIGNIFICANT CHANGE UP (ref 0–0.2)
BASOPHILS NFR BLD AUTO: 0.5 % — SIGNIFICANT CHANGE UP (ref 0–1)
BILIRUB SERPL-MCNC: 0.6 MG/DL — SIGNIFICANT CHANGE UP (ref 0.2–1.2)
BUN SERPL-MCNC: 17 MG/DL — SIGNIFICANT CHANGE UP (ref 10–20)
CALCIUM SERPL-MCNC: 9.5 MG/DL — SIGNIFICANT CHANGE UP (ref 8.4–10.5)
CHLORIDE SERPL-SCNC: 98 MMOL/L — SIGNIFICANT CHANGE UP (ref 98–110)
CO2 SERPL-SCNC: 26 MMOL/L — SIGNIFICANT CHANGE UP (ref 17–32)
CREAT SERPL-MCNC: 1.1 MG/DL — SIGNIFICANT CHANGE UP (ref 0.7–1.5)
EGFR: 65 ML/MIN/1.73M2 — SIGNIFICANT CHANGE UP
EOSINOPHIL # BLD AUTO: 0.47 K/UL — SIGNIFICANT CHANGE UP (ref 0–0.7)
EOSINOPHIL NFR BLD AUTO: 7.2 % — SIGNIFICANT CHANGE UP (ref 0–8)
GLUCOSE SERPL-MCNC: 80 MG/DL — SIGNIFICANT CHANGE UP (ref 70–99)
HCT VFR BLD CALC: 24.6 % — LOW (ref 42–52)
HGB BLD-MCNC: 8.2 G/DL — LOW (ref 14–18)
IMM GRANULOCYTES NFR BLD AUTO: 0.5 % — HIGH (ref 0.1–0.3)
LYMPHOCYTES # BLD AUTO: 1.12 K/UL — LOW (ref 1.2–3.4)
LYMPHOCYTES # BLD AUTO: 17.2 % — LOW (ref 20.5–51.1)
MAGNESIUM SERPL-MCNC: 1.9 MG/DL — SIGNIFICANT CHANGE UP (ref 1.8–2.4)
MCHC RBC-ENTMCNC: 29 PG — SIGNIFICANT CHANGE UP (ref 27–31)
MCHC RBC-ENTMCNC: 33.3 G/DL — SIGNIFICANT CHANGE UP (ref 32–37)
MCV RBC AUTO: 86.9 FL — SIGNIFICANT CHANGE UP (ref 80–94)
MONOCYTES # BLD AUTO: 0.74 K/UL — HIGH (ref 0.1–0.6)
MONOCYTES NFR BLD AUTO: 11.4 % — HIGH (ref 1.7–9.3)
NEUTROPHILS # BLD AUTO: 4.12 K/UL — SIGNIFICANT CHANGE UP (ref 1.4–6.5)
NEUTROPHILS NFR BLD AUTO: 63.2 % — SIGNIFICANT CHANGE UP (ref 42.2–75.2)
NRBC # BLD: 0 /100 WBCS — SIGNIFICANT CHANGE UP (ref 0–0)
PHOSPHATE SERPL-MCNC: 3.8 MG/DL — SIGNIFICANT CHANGE UP (ref 2.1–4.9)
PLATELET # BLD AUTO: 201 K/UL — SIGNIFICANT CHANGE UP (ref 130–400)
PMV BLD: 9.2 FL — SIGNIFICANT CHANGE UP (ref 7.4–10.4)
POTASSIUM SERPL-MCNC: 3.9 MMOL/L — SIGNIFICANT CHANGE UP (ref 3.5–5)
POTASSIUM SERPL-SCNC: 3.9 MMOL/L — SIGNIFICANT CHANGE UP (ref 3.5–5)
PROT SERPL-MCNC: 6.3 G/DL — SIGNIFICANT CHANGE UP (ref 6–8)
RBC # BLD: 2.83 M/UL — LOW (ref 4.7–6.1)
RBC # FLD: 15.9 % — HIGH (ref 11.5–14.5)
SODIUM SERPL-SCNC: 135 MMOL/L — SIGNIFICANT CHANGE UP (ref 135–146)
WBC # BLD: 6.51 K/UL — SIGNIFICANT CHANGE UP (ref 4.8–10.8)
WBC # FLD AUTO: 6.51 K/UL — SIGNIFICANT CHANGE UP (ref 4.8–10.8)

## 2024-06-28 PROCEDURE — 99232 SBSQ HOSP IP/OBS MODERATE 35: CPT

## 2024-06-28 RX ORDER — ENOXAPARIN SODIUM 100 MG/ML
40 INJECTION SUBCUTANEOUS EVERY 24 HOURS
Refills: 0 | Status: DISCONTINUED | OUTPATIENT
Start: 2024-06-28 | End: 2024-07-03

## 2024-06-28 RX ADMIN — Medication 1 GRAM(S): at 06:16

## 2024-06-28 RX ADMIN — Medication 1 GRAM(S): at 13:09

## 2024-06-28 RX ADMIN — Medication 1 APPLICATION(S): at 13:10

## 2024-06-28 RX ADMIN — Medication 25 MILLIGRAM(S): at 17:57

## 2024-06-28 RX ADMIN — Medication 1 APPLICATION(S): at 12:50

## 2024-06-28 RX ADMIN — LISINOPRIL 5 MILLIGRAM(S): 5 TABLET ORAL at 06:16

## 2024-06-28 RX ADMIN — Medication 1 APPLICATION(S): at 06:18

## 2024-06-28 RX ADMIN — Medication 1 GRAM(S): at 21:08

## 2024-06-28 RX ADMIN — Medication 325 MILLIGRAM(S): at 12:49

## 2024-06-28 RX ADMIN — Medication 1 APPLICATION(S): at 21:07

## 2024-06-28 RX ADMIN — Medication 12.5 MILLIGRAM(S): at 21:08

## 2024-06-28 RX ADMIN — ENOXAPARIN SODIUM 40 MILLIGRAM(S): 100 INJECTION SUBCUTANEOUS at 17:57

## 2024-06-28 RX ADMIN — Medication 25 MILLIGRAM(S): at 06:16

## 2024-06-28 RX ADMIN — ASPIRIN 81 MILLIGRAM(S): 325 TABLET, FILM COATED ORAL at 12:49

## 2024-06-28 RX ADMIN — POLYETHYLENE GLYCOL 3350 17 GRAM(S): 1 POWDER ORAL at 12:49

## 2024-06-28 RX ADMIN — MIRTAZAPINE 15 MILLIGRAM(S): 15 TABLET, FILM COATED ORAL at 12:49

## 2024-06-28 RX ADMIN — PANTOPRAZOLE SODIUM 40 MILLIGRAM(S): 40 INJECTION, POWDER, FOR SOLUTION INTRAVENOUS at 06:16

## 2024-06-28 RX ADMIN — ATORVASTATIN CALCIUM 80 MILLIGRAM(S): 20 TABLET, FILM COATED ORAL at 21:07

## 2024-06-28 NOTE — PROGRESS NOTE ADULT - SUBJECTIVE AND OBJECTIVE BOX
Gastroenterology Follow Up Note      Location: [facility] 3A 016 A [facility]  Patient Name: JEMAL RANGEL  Age: 89y  Gender: Male      Chief Complaint  Patient is a 89y old Male who presents with a chief complaint of Fall (28 Jun 2024 15:06)  Primary diagnosis of ST elevation myocardial infarction (STEMI)      Reason for Consult  Anemia      Progress Note  This morning patient was seen and examined at bedside.    Today is hospital day 4d.  Patient is doing fine. No acute events overnight.   Patient's appetite is adequate, and he is tolerating diet and denies nausea or vomiting.   Patient denies any abdominal pain or melena or hematochezia.  Last bowel movement was soft and brown few days ago.      Vital Signs in the last 24 hours   Vitals Summary T(C): 36.7 (06-28-24 @ 17:22), Max: 36.7 (06-28-24 @ 17:22)  HR: 76 (06-28-24 @ 17:22) (67 - 85)  BP: 128/64 (06-28-24 @ 17:22) (109/52 - 128/64)  RR: 18 (06-28-24 @ 17:22) (18 - 18)  SpO2: 100% (06-28-24 @ 17:22) (100% - 100%)  Vent Data   Intake/ Output   06-27-24 @ 07:01  -  06-28-24 @ 07:00  --------------------------------------------------------  IN: 480 mL / OUT: 900 mL / NET: -420 mL    06-28-24 @ 07:01  -  06-28-24 @ 17:28  --------------------------------------------------------  IN: 118 mL / OUT: 200 mL / NET: -82 mL        Physical Exam  * General Appearance: Alert, cooperative, oriented to time, place, and person, in no acute distress  * Right temporal laceration noted  * Neck: Supple, symmetrical, trachea midline, no adenopathy   * Lungs: Good bilateral air entry  * Heart: Regular Rate and Rhythm, normal S1 and S2, no audible murmur, rub, or gallop  * Abdomen: Symmetric, non-distended, soft, non-tender, bowel sounds active all four quadrants  * Rectal: Brown stool, Normal tone, no palpable masses or tenderness  * Extremities: no lower extremity pitting edema bilaterally      Investigations   Laboratory Workup      - CBC:                        8.2    6.51  )-----------( 201      ( 28 Jun 2024 06:51 )             24.6       - Hgb Trend:  8.2  06-28-24 @ 06:51  7.7  06-27-24 @ 07:14  7.9  06-26-24 @ 04:45  7.8  06-25-24 @ 20:00          - Chemistry:  06-28    135  |  98  |  17  ----------------------------<  80  3.9   |  26  |  1.1    Ca    9.5      28 Jun 2024 06:51  Phos  3.8     06-28  Mg     1.9     06-28    TPro  6.3  /  Alb  3.9  /  TBili  0.6  /  DBili  x   /  AST  26  /  ALT  8   /  AlkPhos  96  06-28    Liver panel trend:  TBili 0.6   /   AST 26   /   ALT 8   /   AlkP 96   /   Tptn 6.3   /   Alb 3.9    /   DBili --      06-28  TBili 0.3   /   AST 22   /   ALT 8   /   AlkP 96   /   Tptn 5.7   /   Alb 3.5    /   DBili --      06-26  TBili 0.3   /   AST 29   /   ALT 9   /   AlkP 101   /   Tptn 5.7   /   Alb 3.2    /   DBili --      06-25  TBili 0.3   /   AST 23   /   ALT 12   /   AlkP 125   /   Tptn 6.7   /   Alb 3.5    /   DBili --      06-24        Microbiological Workup  Urinalysis Basic - ( 28 Jun 2024 06:51 )    Color: x / Appearance: x / SG: x / pH: x  Gluc: 80 mg/dL / Ketone: x  / Bili: x / Urobili: x   Blood: x / Protein: x / Nitrite: x   Leuk Esterase: x / RBC: x / WBC x   Sq Epi: x / Non Sq Epi: x / Bacteria: x          Radiological Workup            Current Medications  Standing Medications  aspirin  chewable 81 milliGRAM(s) Oral daily  atorvastatin 80 milliGRAM(s) Oral at bedtime  bacitracin   Ointment 1 Application(s) Topical three times a day  chlorhexidine 2% Cloths 1 Application(s) Topical daily  enoxaparin Injectable 40 milliGRAM(s) SubCutaneous every 24 hours  ferrous    sulfate 325 milliGRAM(s) Oral daily  lisinopril 5 milliGRAM(s) Oral daily  metoprolol tartrate 25 milliGRAM(s) Oral two times a day  mirtazapine 15 milliGRAM(s) Oral daily  pantoprazole    Tablet 40 milliGRAM(s) Oral before breakfast  polyethylene glycol 3350 17 Gram(s) Oral daily  QUEtiapine 12.5 milliGRAM(s) Oral at bedtime  senna 2 Tablet(s) Oral at bedtime  sodium chloride 1 Gram(s) Oral three times a day    PRN Medications  acetaminophen     Tablet .. 650 milliGRAM(s) Oral every 6 hours PRN Temp greater or equal to 38C (100.4F), Mild Pain (1 - 3), Moderate Pain (4 - 6)    Singles Doses Administered  (ADM OVERRIDE) 1 each &lt;see task&gt; GiveOnce  (ADM OVERRIDE) 1 each &lt;see task&gt; GiveOnce  (ADM OVERRIDE) 4 each &lt;see task&gt; GiveOnce  (ADM OVERRIDE) 1 each &lt;see task&gt; GiveOnce  (ADM OVERRIDE) 2 each &lt;see task&gt; GiveOnce  (ADM OVERRIDE) 1 each &lt;see task&gt; GiveOnce  (ADM OVERRIDE) 1 each &lt;see task&gt; GiveOnce  aspirin  chewable 324 milliGRAM(s) Oral once  ceFAZolin   IVPB 2000 milliGRAM(s) IV Intermittent once  diphtheria/tetanus/pertussis (acellular) Vaccine (Adacel) 0.5 milliLiter(s) IntraMuscular once  haloperidol    Injectable 5 milliGRAM(s) IV Push once  heparin   Injectable 3200 Unit(s) IV Push once  lactated ringers Bolus 1000 milliLiter(s) IV Bolus once  morphine  - Injectable 4 milliGRAM(s) IV Push Once  ondansetron Injectable 4 milliGRAM(s) IV Push once  tranexamic acid IVPB 1000 milliGRAM(s) IV Intermittent Once  zolpidem 5 milliGRAM(s) Oral once PRN

## 2024-06-28 NOTE — PROGRESS NOTE ADULT - TIME BILLING
Coordination of care
Direct clinical care,  review of records after transfer from CCU, coordination with consultants, nursing and case management/social work teams, review of tests and scheduled medications,  and resident supervision.

## 2024-06-28 NOTE — PROGRESS NOTE ADULT - ASSESSMENT
Assessment and Plan  Case of an 89 year old male patient known to have a history of Dementia, neurogenic bladder, HTN, recurrent falls s/p left hip replacement, HTN, and HFpEF who was brought to the ED on 06/24 following a fall complicated by right temporal laceration and bleeding, found to have elevated troponins and evidence of inferior STEMI on ECG s/p IV heparin initiation. Stay was complicated by acute on chronic anemia with no overt GI Bleeding.      Acute on Chronic Microcytic Anemia   Right Temporal Laceration with Bleeding- Resolved  No Evidence of Overt Gastrointestinal Bleeding  * Admitted on 06/24 for fall; found to have elevated troponins and inferior ST elevations on ECG; s/p Aspirin loading dose on 06/24 and s/p IV heparin from 06/24 to 06/25  * Stay complicated by drop in Hb drom 9.4 on 06/24 to 6.9 on 06/25 (s/p1 unit of pRBC) -> Hb 7.7 on 06/27 -> Hb 8.2 on 06/28 (baseline Hb 11.1 in 03/2024)  * No melena or hematochezia or hematemesis since admission; denies abdominal pain, nausea, vomiting, weight loss, NSAID use, reflux symptoms, dysphagia, odynophagia; denies constipation; baseline BMs: formed brown stools QOD  * Iron with Total Binding Capacity in AM (03.29.24 @ 07:13) Iron - Total Binding Capacity.: 167 ug/dL; % Saturation, Iron: 26 %; Iron Total: 44 ug/dL; Unsaturated Iron Binding Capacity: 123; ferritin 404  * NICO brown stools on 06/27  * History of chronic anemia; had EGD and colonoscopy many years ago per patient and son (unremarkable)  * No family history of colon cancer or GI malignancies    RECOMMENDATIONS  - May ideally benefit from EGD and colonoscopy for workup of anemia  - However, in absence of overt GI bleeding and in setting of hemodynamic stability and stable Hb, current risks outweigh benefits  - After discussing the risks and benefits of EGD and colonoscopy with son and patient, they requested time to think about it. We offered outpatient follow up at [facility] for EGD and colonoscopy if patient and family become agreeable   - In the setting of temporal relation between the acute drop in Hb and the initiation of therapeutic anticoagulation and in the setting of no overt GI bleeding, would recommend complete anemia workup if Hb drops again (including CT imaging to rule out hematoma)  - In the setting of stable Hb and no overt GI bleed, can advance diet as tolerated and resume blood thinners as deemed necessary per cardiology if benefits outweigh risks (the elevated troponins was likely from blood loss anemia > true STEMI per cardiology)  - Trend Hb and keep active T/S. Transfuse as needed to keep Hb >7  - Continue PO protonix 40 mg QD and FeSO4 325mg QD  - Monitor BM for signs of bleeding (no melena or hematochezia)  - Follow up with our [facility] located at [address] [place] [address]. Phone Number: [phone]        Thank you for your consult.  - Please note that plan was communicated with medical team.   - Please reach GI on [phone] during weekdays till 5pm.  - Please call the GI service line after 5pm on Weekdays and anytime on Weekends: [phone].      Maite Boyd MD  PGY - 4 Gastroenterology Fellow   [facility]     Assessment and Plan  Case of an 89 year old male patient known to have a history of Dementia, neurogenic bladder, HTN, recurrent falls s/p left hip replacement, HTN, and HFpEF who was brought to the ED on 06/24 following a fall complicated by right temporal laceration and bleeding, found to have elevated troponins and evidence of inferior STEMI on ECG s/p IV heparin initiation. Stay was complicated by acute on chronic anemia with no overt GI Bleeding.      Acute on Chronic Microcytic Anemia   Right Temporal Laceration with Bleeding- Resolved  No Evidence of Overt Gastrointestinal Bleeding  * Admitted on 06/24 for fall; found to have elevated troponins and inferior ST elevations on ECG; s/p Aspirin loading dose on 06/24 and s/p IV heparin from 06/24 to 06/25  * Stay complicated by drop in Hb drom 9.4 on 06/24 to 6.9 on 06/25 (s/p1 unit of pRBC) -> Hb 7.7 on 06/27 -> Hb 8.2 on 06/28 (baseline Hb 11.1 in 03/2024)  * No melena or hematochezia or hematemesis since admission; denies abdominal pain, nausea, vomiting, weight loss, NSAID use, reflux symptoms, dysphagia, odynophagia; denies constipation; baseline BMs: formed brown stools QOD  * Iron with Total Binding Capacity in AM (03.29.24 @ 07:13) Iron - Total Binding Capacity.: 167 ug/dL; % Saturation, Iron: 26 %; Iron Total: 44 ug/dL; Unsaturated Iron Binding Capacity: 123; ferritin 404  * NICO brown stools on 06/27  * History of chronic anemia; had EGD and colonoscopy many years ago per patient and son (unremarkable)  * No family history of colon cancer or GI malignancies    RECOMMENDATIONS  - May ideally benefit from EGD and colonoscopy for workup of anemia  - However, in absence of overt GI bleeding and in setting of hemodynamic stability and stable Hb, currently risks outweigh benefits  - After discussing the risks and benefits of EGD and colonoscopy with son and patient, they requested time to think about it. We offered outpatient follow up at [facility] for EGD and colonoscopy if patient and family become agreeable   - In the setting of temporal relation between the acute drop in Hb and the initiation of therapeutic anticoagulation and in the setting of no overt GI bleeding, would recommend complete anemia workup if Hb drops again (including CT imaging to rule out hematoma)  - In the setting of stable Hb and no overt GI bleed, can advance diet as tolerated and resume blood thinners as deemed necessary per cardiology if benefits outweigh risks (the elevated troponins was likely from blood loss anemia > true STEMI per cardiology)  - Trend Hb and keep active T/S. Transfuse as needed to keep Hb >7  - Continue PO protonix 40 mg QD and FeSO4 325mg QD  - Monitor BM for signs of bleeding (no melena or hematochezia)  - Follow up with our [facility] located at [address] [place] [address]. Phone Number: [phone]        Thank you for your consult.  - Please note that plan was communicated with medical team.   - Please reach GI on [phone] during weekdays till 5pm.  - Please call the GI service line after 5pm on Weekdays and anytime on Weekends: [phone].      Maite Boyd MD  PGY - 4 Gastroenterology Fellow   [facility]

## 2024-06-28 NOTE — PROGRESS NOTE ADULT - SUBJECTIVE AND OBJECTIVE BOX
SUBJECTIVE/OVERNIGHT EVENTS  Today is hospital day 4d. This morning patient was seen and examined at bedside, resting comfortably in bed. No acute or major events overnight.    HOSPITAL COURSE  Day 1:   Day 2:   Day 3:     CODE STATUS:    FAMILY COMMUNICATION  Contact date:  Name of person contacted:  Relationship to patient:  Communication details:    MEDICATIONS  STANDING MEDICATIONS  aspirin  chewable 81 milliGRAM(s) Oral daily  atorvastatin 80 milliGRAM(s) Oral at bedtime  bacitracin   Ointment 1 Application(s) Topical three times a day  chlorhexidine 2% Cloths 1 Application(s) Topical daily  enoxaparin Injectable 40 milliGRAM(s) SubCutaneous every 24 hours  ferrous    sulfate 325 milliGRAM(s) Oral daily  lisinopril 5 milliGRAM(s) Oral daily  metoprolol tartrate 25 milliGRAM(s) Oral two times a day  mirtazapine 15 milliGRAM(s) Oral daily  pantoprazole    Tablet 40 milliGRAM(s) Oral before breakfast  polyethylene glycol 3350 17 Gram(s) Oral daily  QUEtiapine 12.5 milliGRAM(s) Oral at bedtime  senna 2 Tablet(s) Oral at bedtime  sodium chloride 1 Gram(s) Oral three times a day    PRN MEDICATIONS  acetaminophen     Tablet .. 650 milliGRAM(s) Oral every 6 hours PRN    VITALS  T(F): 97.6 (06-28-24 @ 12:33), Max: 97.6 (06-28-24 @ 12:33)  HR: 67 (06-28-24 @ 12:33) (67 - 85)  BP: 109/52 (06-28-24 @ 12:33) (109/52 - 120/68)  RR: 18 (06-28-24 @ 12:33) (18 - 18)  SpO2: 100% (06-28-24 @ 12:33) (100% - 100%)    (  ) Indwelling Waldron Catheter   Date insterted:    Reason (  ) Critical illness     (  ) urinary retention    (  ) Accurate Ins/Outs Monitoring     (  ) CMO patient    (  ) Central Line  Date inserted:  Location: (  ) Right IJ   (  ) Left IJ   (  ) Right Fem   (  ) Left Fem    (  ) SPC  (  ) pigtail  (  ) PEG tube  (  ) colostomy  (  ) jejunostomy  (  ) U-Dall    LABS             8.2    6.51  )-----------( 201      ( 06-28-24 @ 06:51 )             24.6     135  |  98  |  17  -------------------------<  80   06-28-24 @ 06:51  3.9  |  26  |  1.1    Ca      9.5     06-28-24 @ 06:51  Phos   3.8     06-28-24 @ 06:51  Mg     1.9     06-28-24 @ 06:51    TPro  6.3  /  Alb  3.9  /  TBili  0.6  /  DBili  x   /  AST  26  /  ALT  8   /  AlkPhos  96  /  GGT  x     06-28-24 @ 06:51        Urinalysis Basic - ( 28 Jun 2024 06:51 )    Color: x / Appearance: x / SG: x / pH: x  Gluc: 80 mg/dL / Ketone: x  / Bili: x / Urobili: x   Blood: x / Protein: x / Nitrite: x   Leuk Esterase: x / RBC: x / WBC x   Sq Epi: x / Non Sq Epi: x / Bacteria: x

## 2024-06-28 NOTE — PHYSICAL THERAPY INITIAL EVALUATION ADULT - SPECIFY REASON(S)
PT attempted to see the patient for PT eval. Patient declined participating in PT today. Patient did not give any reasoning.

## 2024-06-28 NOTE — PROGRESS NOTE ADULT - SUBJECTIVE AND OBJECTIVE BOX
JEMAL RANGEL  89y  [facility] [address] 024 A      Patient is a 89y old  Male who presents with a chief complaint of Fall (27 Jun 2024 15:34)      INTERVAL HPI/OVERNIGHT EVENTS:        REVIEW OF SYSTEMS:        FAMILY HISTORY:    T(C): 36.4 (06-28-24 @ 05:04), Max: 36.7 (06-27-24 @ 13:28)  HR: 85 (06-28-24 @ 05:04) (81 - 85)  BP: 120/68 (06-28-24 @ 05:04) (120/68 - 143/66)  RR: 18 (06-28-24 @ 05:04) (18 - 18)  SpO2: --  Wt(kg): --Vital Signs Last 24 Hrs  T(C): 36.4 (28 Jun 2024 05:04), Max: 36.7 (27 Jun 2024 13:28)  T(F): 97.5 (28 Jun 2024 05:04), Max: 98 (27 Jun 2024 13:28)  HR: 85 (28 Jun 2024 05:04) (81 - 85)  BP: 120/68 (28 Jun 2024 05:04) (120/68 - 143/66)  BP(mean): --  RR: 18 (28 Jun 2024 05:04) (18 - 18)  SpO2: --        PHYSICAL EXAM:  GENERAL: NAD, well-groomed, well-developed  HEAD:  Atraumatic, Normocephalic  EYES: EOMI, PERRLA, conjunctiva and sclera clear  ENMT: No tonsillar erythema, exudates, or enlargement; Moist mucous membranes, Good dentition, No lesions  NECK: Supple, No JVD, Normal thyroid  NERVOUS SYSTEM:  Alert & Oriented X3, Good concentration; Motor Strength 5/5 B/L upper and lower extremities; DTRs 2+ intact and symmetric  PULM: Clear to auscultation bilaterally  CARDIAC: Regular rate and rhythm; No murmurs, rubs, or gallops  GI: Soft, Nontender, Nondistended; Bowel sounds present  EXTREMITIES:  2+ Peripheral Pulses, No clubbing, cyanosis, or edema  LYMPH: No lymphadenopathy noted  SKIN: No rashes or lesions    Consultant(s) Notes Reviewed:  [x ] YES  [ ] NO  Care Discussed with Consultants/Other Providers [ x] YES  [ ] NO    LABS:                            7.7    8.73  )-----------( 185      ( 27 Jun 2024 07:14 )             22.4   06-28    135  |  98  |  17  ----------------------------<  80  3.9   |  26  |  1.1    Ca    9.5      28 Jun 2024 06:51  Phos  3.8     06-28  Mg     1.9     06-28    TPro  6.3  /  Alb  3.9  /  TBili  0.6  /  DBili  x   /  AST  26  /  ALT  8   /  AlkPhos  96  06-28            acetaminophen     Tablet .. 650 milliGRAM(s) Oral every 6 hours PRN  aspirin  chewable 81 milliGRAM(s) Oral daily  atorvastatin 80 milliGRAM(s) Oral at bedtime  bacitracin   Ointment 1 Application(s) Topical three times a day  chlorhexidine 2% Cloths 1 Application(s) Topical daily  ferrous    sulfate 325 milliGRAM(s) Oral daily  lisinopril 5 milliGRAM(s) Oral daily  metoprolol tartrate 25 milliGRAM(s) Oral two times a day  mirtazapine 15 milliGRAM(s) Oral daily  pantoprazole    Tablet 40 milliGRAM(s) Oral before breakfast  polyethylene glycol 3350 17 Gram(s) Oral daily  senna 2 Tablet(s) Oral at bedtime  sodium chloride 1 Gram(s) Oral three times a day      This is an 88 yo woman w/ Hx of dementia, chronic hyponatremia, neurogenic bladder on chronic Waldron, Hx left Hx fracture 3/24 presenting with right temporal laceration after an unwitnessed fall, found to have ST elevation in inferior leads with reciprocal ST depression in lateral leads.    1-: STEMI In the setting of right temporal laceration and bleeding  - Telemetry            - ECG: ST elevated that resolved         - TSH:1.89        - Hgba1c: 4.9  - Cont aspirin   - Cont statin         - Lipid profile. LDL:113   - Troponin peaked at 273  - Was on heparin   - TTE: Left ventricular ejection fraction, by visual estimation, is 60 to 65%. Normal global left ventricular systolic function. Impaired relaxation pattern of left  ventricular myocardial filling (Grade I diastolic dysfunction). Mildly enlarged left atrium.    2. Hypertension   - C/w home lisinopril 5 mg daily and metoprolol tartrate 25 mg BID  - No plan for C      3. Anemia  Hb 11--> 7.7  GI recs: No signs of overt bleed.       -Colonoscopy/EGD outpt if the family desires       - If Hb drop persists, full anemia workup       - Trend Hb and get T/S. Transfuse Hb > 7.       -Monitor for melena or hematochezia      - F/U FOBT     3. T12 compression fracture:  - Neurosurgery consulted  - No acute surgical intervention --> abdominal binder for support - TLSO when OOB. Will need PT/OT. Pain management consulted --> tylenol 975 mg q8h.    4.  R temporal laceration:  - S/p suturing  - On Bacitracin    5. Dementia:  - Continue with home Mirtazapine  - Avoid CNS depressants    6. Hyponatremia:  - Continue with salt tablets    6- General measures:  - DVT ppx: was on heparin gtt --> SCD for now, F/U morning Hgb to resume prophylactic AC  - GI ppx: pantoprazole  -Activity: OOB to chair   - T/D/L: chronic Waldron, PIV  - Code status: Full Code    Disposition: Telemetry.   JEMAL RANGEL  89y  [facility] 024 A      Patient is a 89y old  Male who presents with a chief complaint of Fall (27 Jun 2024 15:34)      INTERVAL HPI/OVERNIGHT EVENTS:    Patient looks comfortable   he knows his name . he has no complains. no overnight events.         FAMILY HISTORY:    T(C): 36.4 (06-28-24 @ 05:04), Max: 36.7 (06-27-24 @ 13:28)  HR: 85 (06-28-24 @ 05:04) (81 - 85)  BP: 120/68 (06-28-24 @ 05:04) (120/68 - 143/66)  RR: 18 (06-28-24 @ 05:04) (18 - 18)  SpO2: --  Wt(kg): --Vital Signs Last 24 Hrs  T(C): 36.4 (28 Jun 2024 05:04), Max: 36.7 (27 Jun 2024 13:28)  T(F): 97.5 (28 Jun 2024 05:04), Max: 98 (27 Jun 2024 13:28)  HR: 85 (28 Jun 2024 05:04) (81 - 85)  BP: 120/68 (28 Jun 2024 05:04) (120/68 - 143/66)  BP(mean): --  RR: 18 (28 Jun 2024 05:04) (18 - 18)  SpO2: --        PHYSICAL EXAM:  GENERAL:Deconditioned. alert and oriented *1   PULM: Clear to auscultation bilaterally  CARDIAC: Regular rate and rhythm;   GI: Soft, Nontender, Nondistended; Bowel sounds present  EXTREMITIES:  2+ Peripheral Pulses,     Consultant(s) Notes Reviewed:  [x ] YES  [ ] NO  Care Discussed with Consultants/Other Providers [ x] YES  [ ] NO    LABS:                            7.7    8.73  )-----------( 185      ( 27 Jun 2024 07:14 )             22.4   06-28    135  |  98  |  17  ----------------------------<  80  3.9   |  26  |  1.1    Ca    9.5      28 Jun 2024 06:51  Phos  3.8     06-28  Mg     1.9     06-28    TPro  6.3  /  Alb  3.9  /  TBili  0.6  /  DBili  x   /  AST  26  /  ALT  8   /  AlkPhos  96  06-28            acetaminophen     Tablet .. 650 milliGRAM(s) Oral every 6 hours PRN  aspirin  chewable 81 milliGRAM(s) Oral daily  atorvastatin 80 milliGRAM(s) Oral at bedtime  bacitracin   Ointment 1 Application(s) Topical three times a day  chlorhexidine 2% Cloths 1 Application(s) Topical daily  ferrous    sulfate 325 milliGRAM(s) Oral daily  lisinopril 5 milliGRAM(s) Oral daily  metoprolol tartrate 25 milliGRAM(s) Oral two times a day  mirtazapine 15 milliGRAM(s) Oral daily  pantoprazole    Tablet 40 milliGRAM(s) Oral before breakfast  polyethylene glycol 3350 17 Gram(s) Oral daily  senna 2 Tablet(s) Oral at bedtime  sodium chloride 1 Gram(s) Oral three times a day      This is an 89 yo woman w/ Hx of dementia, chronic hyponatremia, neurogenic bladder on chronic Yañez, Hx left Hx fracture 3/24 presenting with right temporal laceration after an unwitnessed fall, found to have ST elevation in inferior leads with reciprocal ST depression in lateral leads.      1. Acute blood loss anemia due to temporal laceration (arterial bleeding)   - Admitted to CCU initially now on telemetry   - Hb dropped from 11 to 7.7   - iron sulfate daily for one month   - Suture and management initially by ER. Suture removal typically in 7-10 days   - GI consult appreciated     2. STEMI resolved with medical management In the setting of right temporal laceration and bleeding  - Telemetry            - ECG: ST elevated that resolved         - TSH:1.89        - Hgba1c: 4.9  - Cont aspirin   - Cont statin         - Lipid profile. LDL:113   - Troponin peaked at 273  - Was on heparin   - TTE: Left ventricular ejection fraction, by visual estimation, is 60 to 65%. Normal global left ventricular systolic function. Impaired relaxation pattern of left  ventricular myocardial filling (Grade I diastolic dysfunction). Mildly enlarged left atrium.  - Cardio consult appreciated    3. Hypertension   - C/w home lisinopril 5 mg daily and metoprolol tartrate 25 mg BID  - No plan for LHC    4. T12 compression fracture:  - Neurosurgery consulted  - No acute surgical intervention --> abdominal binder for support - TLSO when OOB. Will need PT/OT. Pain management consulted --> tylenol 975 mg q8h.    5. Dementia:  - Continue with home Mirtazapine  - Avoid CNS depressants  - Remove 1:1. Seroquel hs prn     6.Chronic  Hyponatremia:  - Continue with salt tablets    7. Neurogenic bladder  - Chronic yañez     8. General measures:  - DVT ppx: was on heparin gtt --> lovenox   - GI ppx: pantoprazole  -Activity: OOB to chair   - T/D/L: chronic Yañez, PIV  - Code status: Full Code

## 2024-06-28 NOTE — PROGRESS NOTE ADULT - ASSESSMENT
This is an 88 yo woman w/ Hx of dementia, chronic hyponatremia, neurogenic bladder on chronic Yañez, Hx left Hx fracture 3/24 presenting with right temporal laceration after an unwitnessed fall, found to have ST elevation in inferior leads with reciprocal ST depression in lateral leads.      1. Acute blood loss anemia due to temporal laceration (arterial bleeding)   - Admitted to CCU initially now on telemetry   - Hb dropped from 11 to 7.7   - iron sulfate daily for one month   - Suture and management initially by ER. Suture removal typically in 7-10 days   - Trend Hb and keep active T/S  - Cont. PO protonix 40 mg QD and FeSO4 325 mg QD  - Follow [facility] outpatient with [facility]    2. STEMI resolved with medical management In the setting of right temporal laceration and bleeding  - Telemetry            - ECG: ST elevated that resolved         - TSH:1.89        - Hgba1c: 4.9  - Cont aspirin   - Cont statin         - Lipid profile. LDL:113   - Troponin peaked at 273  - Was on heparin   - TTE: Left ventricular ejection fraction, by visual estimation, is 60 to 65%. Normal global left ventricular systolic function. Impaired relaxation pattern of left  ventricular myocardial filling (Grade I diastolic dysfunction). Mildly enlarged left atrium.  - Cardio consult appreciated    3. Hypertension   - C/w home lisinopril 5 mg daily and metoprolol tartrate 25 mg BID  - No plan for LHC    4. T12 compression fracture:  - Neurosurgery consulted  - No acute surgical intervention --> abdominal binder for support - TLSO when OOB. Will need PT/OT. Pain management consulted --> tylenol 975 mg q8h.    5. Dementia:  - Continue with home Mirtazapine  - Avoid CNS depressants  - Remove 1:1. Seroquel hs prn     6.Chronic  Hyponatremia:  - Continue with salt tablets    7. Neurogenic bladder  - Chronic yañez     8. General measures:  - DVT ppx: was on heparin gtt --> lovenox   - GI ppx: pantoprazole  -Activity: OOB to chair   - T/D/L: chronic Yañez, PIV  - Code status: Full Code

## 2024-06-29 LAB
ALBUMIN SERPL ELPH-MCNC: 3.6 G/DL — SIGNIFICANT CHANGE UP (ref 3.5–5.2)
ALP SERPL-CCNC: 89 U/L — SIGNIFICANT CHANGE UP (ref 30–115)
ALT FLD-CCNC: 9 U/L — SIGNIFICANT CHANGE UP (ref 0–41)
ANION GAP SERPL CALC-SCNC: 11 MMOL/L — SIGNIFICANT CHANGE UP (ref 7–14)
AST SERPL-CCNC: 19 U/L — SIGNIFICANT CHANGE UP (ref 0–41)
BASOPHILS # BLD AUTO: 0.02 K/UL — SIGNIFICANT CHANGE UP (ref 0–0.2)
BASOPHILS NFR BLD AUTO: 0.3 % — SIGNIFICANT CHANGE UP (ref 0–1)
BILIRUB SERPL-MCNC: 0.4 MG/DL — SIGNIFICANT CHANGE UP (ref 0.2–1.2)
BUN SERPL-MCNC: 20 MG/DL — SIGNIFICANT CHANGE UP (ref 10–20)
CALCIUM SERPL-MCNC: 8.9 MG/DL — SIGNIFICANT CHANGE UP (ref 8.4–10.5)
CHLORIDE SERPL-SCNC: 100 MMOL/L — SIGNIFICANT CHANGE UP (ref 98–110)
CO2 SERPL-SCNC: 23 MMOL/L — SIGNIFICANT CHANGE UP (ref 17–32)
CREAT SERPL-MCNC: 1.3 MG/DL — SIGNIFICANT CHANGE UP (ref 0.7–1.5)
EGFR: 53 ML/MIN/1.73M2 — LOW
EOSINOPHIL # BLD AUTO: 0.39 K/UL — SIGNIFICANT CHANGE UP (ref 0–0.7)
EOSINOPHIL NFR BLD AUTO: 6.3 % — SIGNIFICANT CHANGE UP (ref 0–8)
GLUCOSE SERPL-MCNC: 87 MG/DL — SIGNIFICANT CHANGE UP (ref 70–99)
HCT VFR BLD CALC: 23.1 % — LOW (ref 42–52)
HGB BLD-MCNC: 7.6 G/DL — LOW (ref 14–18)
IMM GRANULOCYTES NFR BLD AUTO: 0.2 % — SIGNIFICANT CHANGE UP (ref 0.1–0.3)
LYMPHOCYTES # BLD AUTO: 0.98 K/UL — LOW (ref 1.2–3.4)
LYMPHOCYTES # BLD AUTO: 15.7 % — LOW (ref 20.5–51.1)
MAGNESIUM SERPL-MCNC: 1.8 MG/DL — SIGNIFICANT CHANGE UP (ref 1.8–2.4)
MCHC RBC-ENTMCNC: 28.7 PG — SIGNIFICANT CHANGE UP (ref 27–31)
MCHC RBC-ENTMCNC: 32.9 G/DL — SIGNIFICANT CHANGE UP (ref 32–37)
MCV RBC AUTO: 87.2 FL — SIGNIFICANT CHANGE UP (ref 80–94)
MONOCYTES # BLD AUTO: 0.58 K/UL — SIGNIFICANT CHANGE UP (ref 0.1–0.6)
MONOCYTES NFR BLD AUTO: 9.3 % — SIGNIFICANT CHANGE UP (ref 1.7–9.3)
NEUTROPHILS # BLD AUTO: 4.25 K/UL — SIGNIFICANT CHANGE UP (ref 1.4–6.5)
NEUTROPHILS NFR BLD AUTO: 68.2 % — SIGNIFICANT CHANGE UP (ref 42.2–75.2)
NRBC # BLD: 0 /100 WBCS — SIGNIFICANT CHANGE UP (ref 0–0)
PHOSPHATE SERPL-MCNC: 3.3 MG/DL — SIGNIFICANT CHANGE UP (ref 2.1–4.9)
PLATELET # BLD AUTO: 185 K/UL — SIGNIFICANT CHANGE UP (ref 130–400)
PMV BLD: 9 FL — SIGNIFICANT CHANGE UP (ref 7.4–10.4)
POTASSIUM SERPL-MCNC: 4.1 MMOL/L — SIGNIFICANT CHANGE UP (ref 3.5–5)
POTASSIUM SERPL-SCNC: 4.1 MMOL/L — SIGNIFICANT CHANGE UP (ref 3.5–5)
PROT SERPL-MCNC: 5.9 G/DL — LOW (ref 6–8)
RBC # BLD: 2.65 M/UL — LOW (ref 4.7–6.1)
RBC # FLD: 15.9 % — HIGH (ref 11.5–14.5)
SODIUM SERPL-SCNC: 134 MMOL/L — LOW (ref 135–146)
WBC # BLD: 6.23 K/UL — SIGNIFICANT CHANGE UP (ref 4.8–10.8)
WBC # FLD AUTO: 6.23 K/UL — SIGNIFICANT CHANGE UP (ref 4.8–10.8)

## 2024-06-29 PROCEDURE — 99232 SBSQ HOSP IP/OBS MODERATE 35: CPT | Mod: GC

## 2024-06-29 RX ADMIN — Medication 1 GRAM(S): at 13:18

## 2024-06-29 RX ADMIN — ATORVASTATIN CALCIUM 80 MILLIGRAM(S): 20 TABLET, FILM COATED ORAL at 21:54

## 2024-06-29 RX ADMIN — Medication 1 APPLICATION(S): at 06:08

## 2024-06-29 RX ADMIN — ASPIRIN 81 MILLIGRAM(S): 325 TABLET, FILM COATED ORAL at 11:45

## 2024-06-29 RX ADMIN — Medication 25 MILLIGRAM(S): at 06:07

## 2024-06-29 RX ADMIN — LISINOPRIL 5 MILLIGRAM(S): 5 TABLET ORAL at 06:07

## 2024-06-29 RX ADMIN — Medication 25 MILLIGRAM(S): at 17:12

## 2024-06-29 RX ADMIN — Medication 1 GRAM(S): at 06:07

## 2024-06-29 RX ADMIN — Medication 1 APPLICATION(S): at 13:16

## 2024-06-29 RX ADMIN — Medication 325 MILLIGRAM(S): at 11:46

## 2024-06-29 RX ADMIN — Medication 1 APPLICATION(S): at 21:54

## 2024-06-29 RX ADMIN — Medication 1 GRAM(S): at 21:54

## 2024-06-29 RX ADMIN — Medication 2 TABLET(S): at 21:54

## 2024-06-29 RX ADMIN — Medication 1 APPLICATION(S): at 11:46

## 2024-06-29 RX ADMIN — MIRTAZAPINE 15 MILLIGRAM(S): 15 TABLET, FILM COATED ORAL at 11:46

## 2024-06-29 RX ADMIN — ENOXAPARIN SODIUM 40 MILLIGRAM(S): 100 INJECTION SUBCUTANEOUS at 17:12

## 2024-06-29 RX ADMIN — PANTOPRAZOLE SODIUM 40 MILLIGRAM(S): 40 INJECTION, POWDER, FOR SOLUTION INTRAVENOUS at 06:07

## 2024-06-29 RX ADMIN — POLYETHYLENE GLYCOL 3350 17 GRAM(S): 1 POWDER ORAL at 11:45

## 2024-06-29 RX ADMIN — Medication 12.5 MILLIGRAM(S): at 21:54

## 2024-06-29 NOTE — PROGRESS NOTE ADULT - SUBJECTIVE AND OBJECTIVE BOX
Patient is a 89y old  Male who presents with a chief complaint of Fall (29 Jun 2024 09:15)      overnight events: patient on RA. having breakfast. no complaint    Drips: none            ROS: as in HPI; All other systems reviewed are negative        PHYSICAL EXAM  Vital Signs Last 24 Hrs  T(C): 36.6 (29 Jun 2024 05:05), Max: 36.7 (28 Jun 2024 17:22)  T(F): 97.9 (29 Jun 2024 05:05), Max: 98 (28 Jun 2024 17:22)  HR: 82 (29 Jun 2024 05:05) (67 - 88)  BP: 123/74 (29 Jun 2024 05:05) (109/52 - 154/76)  BP(mean): --  RR: 18 (29 Jun 2024 05:05) (18 - 18)  SpO2: 100% (29 Jun 2024 05:05) (98% - 100%)    Parameters below as of 29 Jun 2024 05:05  Patient On (Oxygen Delivery Method): room air          CONSTITUTIONAL:   NAD    ENT:   Airway patent,     EYES:   Clear bilaterally,   pupils equal,   round and reactive to light.    CARDIAC:   Normal rate,   regular rhythm.    no edema    RESPIRATORY:   No wheezing   Normal chest expansion  Not tachypneic,    GASTROINTESTINAL:  Abdomen soft, non-tender,   No guarding,   Positive BS    MUSCULOSKELETAL:   Range of motion is not limited,    NEUROLOGICAL:   Alert and oriented   No motor deficits.    SKIN:   Skin normal color for race,   No evidence of rash.                I&O's Detail    28 Jun 2024 07:01  -  29 Jun 2024 07:00  --------------------------------------------------------  IN:    Oral Fluid: 118 mL  Total IN: 118 mL    OUT:    Indwelling Catheter - Urethral (mL): 1250 mL  Total OUT: 1250 mL    Total NET: -1132 mL            LABS:                        7.6    6.23  )-----------( 185      ( 29 Jun 2024 08:44 )             23.1     29 Jun 2024 08:44    134    |  100    |  20     ----------------------------<  87     4.1     |  23     |  1.3      Creatinine Trend: 1.3<--, 1.1<--, 1.3<--, 1.4<--, 1.2<--, 1.2<--    Ca    8.9        29 Jun 2024 08:44  Phos  3.3       29 Jun 2024 08:44  Mg     1.8       29 Jun 2024 08:44    TPro  5.9    /  Alb  3.6    /  TBili  0.4    /  DBili  x      /  AST  19     /  ALT  9      /  AlkPhos  89     29 Jun 2024 08:44  Amylase x     lipase x              CAPILLARY BLOOD GLUCOSE          Urinalysis Basic - ( 29 Jun 2024 08:44 )    Color: x / Appearance: x / SG: x / pH: x  Gluc: 87 mg/dL / Ketone: x  / Bili: x / Urobili: x   Blood: x / Protein: x / Nitrite: x   Leuk Esterase: x / RBC: x / WBC x   Sq Epi: x / Non Sq Epi: x / Bacteria: x      Culture        MEDICATIONS  (STANDING):  aspirin  chewable 81 milliGRAM(s) Oral daily  atorvastatin 80 milliGRAM(s) Oral at bedtime  bacitracin   Ointment 1 Application(s) Topical three times a day  chlorhexidine 2% Cloths 1 Application(s) Topical daily  enoxaparin Injectable 40 milliGRAM(s) SubCutaneous every 24 hours  ferrous    sulfate 325 milliGRAM(s) Oral daily  lisinopril 5 milliGRAM(s) Oral daily  metoprolol tartrate 25 milliGRAM(s) Oral two times a day  mirtazapine 15 milliGRAM(s) Oral daily  pantoprazole    Tablet 40 milliGRAM(s) Oral before breakfast  polyethylene glycol 3350 17 Gram(s) Oral daily  QUEtiapine 12.5 milliGRAM(s) Oral at bedtime  senna 2 Tablet(s) Oral at bedtime  sodium chloride 1 Gram(s) Oral three times a day    MEDICATIONS  (PRN):  acetaminophen     Tablet .. 650 milliGRAM(s) Oral every 6 hours PRN Temp greater or equal to 38C (100.4F), Mild Pain (1 - 3), Moderate Pain (4 - 6)

## 2024-06-29 NOTE — PROGRESS NOTE ADULT - ASSESSMENT
This is an 88 yo woman w/ Hx of dementia, chronic hyponatremia, neurogenic bladder on chronic Yañez, Hx left Hx fracture 3/24 presenting with right temporal laceration after an unwitnessed fall, found to have ST elevation in inferior leads with reciprocal ST depression in lateral leads.      1. Acute blood loss anemia due to temporal laceration (arterial bleeding)   - Slow drop in Hb.   - No overnight bleeding  - Gastro recs noted  - iron sulfate daily for one month   - Suture and management initially by ER. Suture removal typically in 7-10 days   - GI consult appreciated     2. STEMI resolved with medical management In the setting of right temporal laceration and bleeding  -         - ECG: ST elevated that resolved         - TSH:1.89        - Hgba1c: 4.9  - Cont aspirin   - Cont statin         - Lipid profile. LDL:113   - Troponin peaked at 273  - Was on heparin   - TTE: Left ventricular ejection fraction, by visual estimation, is 60 to 65%. Normal global left ventricular systolic function. Impaired relaxation pattern of left  ventricular myocardial filling (Grade I diastolic dysfunction). Mildly enlarged left atrium.  - Cardio consult appreciated  - No plan for LHC    3. Hypertension   - C/w home lisinopril 5 mg daily and metoprolol tartrate 25 mg BID    4. T12 compression fracture:  - Neurosurgery consulted  - No acute surgical intervention --> abdominal binder for support - TLSO when OOB. Will need PT/OT. Pain management consulted --> tylenol 975 mg q8h.    5. Dementia:  - Continue with home Mirtazapine  - Avoid CNS depressants  - reorientation  - Seroquel hs prn     6.Chronic  Hyponatremia:  - Continue with salt tablets    7. Neurogenic bladder  - Chronic yañez     8. General measures:  - DVT ppx: was on heparin gtt --> lovenox   - GI ppx: pantoprazole  -Activity: OOB to chair   - T/D/L: chronic Yañez, PIV  - Code status: Full Code    DC planning

## 2024-06-29 NOTE — PROGRESS NOTE ADULT - ASSESSMENT
Imp:  Rehab of T12 comp fx / s/p fall / dementia, HTN, neurogenic bladder, L hip fx 3/2024  Plan: continue bedside therapy             encouraged pt to participate in therapy and its importance             d/c back to SNF when medically stable

## 2024-06-29 NOTE — PROGRESS NOTE ADULT - SUBJECTIVE AND OBJECTIVE BOX
Patient is a 89y old  Male who presents with a chief complaint of Fall       HPI:  Mr. Milner is an 89 yo M with a PMH of dementia, HTN, neurogenic bladder, L hip fx 3/2024 and multiple falls presenting from NH after being found down on the floor this morning after an unwitnessed fall w/ actively bleeding R temporal laceration, trauma code was called in ED and laceration was repaired. Trauma workup revealed a new age-indeterminate T12 fracture, possibly acute. EKG performed later in ED stay showed ST-elevations in II, III and avF and ST-depressions in avL and avR, code STEMI was then called. Patient is currently alert and oriented to name only and unable to recall events leading up to the fall. He denies having chest pain. Patient is being admitted to CCU for close monitoring and medical management of suspected inferior STEMI.      Imaging  Trauma Code Pan Scan: New, age-indeterminate mild T12 vertebral body compression fracture without significant osseous retropulsion, possibly acute. R scalp soft tissue swelling.      1. Acute blood loss anemia due to temporal laceration (arterial bleeding)   - Admitted to CCU initially now on telemetry   - Hb dropped from 11 to 7.7   - iron sulfate daily for one month   - Suture and management initially by ER. Suture removal typically in 7-10 days   - Trend Hb and keep active T/S  - Cont. PO protonix 40 mg QD and FeSO4 325 mg QD  - [facility]    2. STEMI resolved with medical management In the setting of right temporal laceration and bleeding  - Telemetry            - ECG: ST elevated that resolved         - TSH:1.89        - Hgba1c: 4.9  - Cont aspirin   - Cont statin         - Lipid profile. LDL:113   - Troponin peaked at 273  - Was on heparin   - TTE: Left ventricular ejection fraction, by visual estimation, is 60 to 65%. Normal global left ventricular systolic function. Impaired relaxation pattern of left  ventricular myocardial filling (Grade I diastolic dysfunction). Mildly enlarged left atrium.        PHYSICAL EXAM    Vital Signs Last 24 Hrs  T(C): 36.6 (29 Jun 2024 05:05), Max: 36.7 (28 Jun 2024 17:22)  T(F): 97.9 (29 Jun 2024 05:05), Max: 98 (28 Jun 2024 17:22)  HR: 82 (29 Jun 2024 05:05) (67 - 88)  BP: 123/74 (29 Jun 2024 05:05) (109/52 - 154/76)  BP(mean): --  RR: 18 (29 Jun 2024 05:05) (18 - 18)  SpO2: 100% (29 Jun 2024 05:05) (98% - 100%)    Parameters below as of 29 Jun 2024 05:05  Patient On (Oxygen Delivery Method): room air        Constitutional - NAD  Chest - CTA  Cardiovascular - S1S2+  Abdomen -  Soft  Extremities -  No calf tenderness   Function : refused bedside therapy yesterday       acetaminophen     Tablet .. 650 milliGRAM(s) Oral every 6 hours PRN  aspirin  chewable 81 milliGRAM(s) Oral daily  atorvastatin 80 milliGRAM(s) Oral at bedtime  bacitracin   Ointment 1 Application(s) Topical three times a day  chlorhexidine 2% Cloths 1 Application(s) Topical daily  enoxaparin Injectable 40 milliGRAM(s) SubCutaneous every 24 hours  ferrous    sulfate 325 milliGRAM(s) Oral daily  lisinopril 5 milliGRAM(s) Oral daily  metoprolol tartrate 25 milliGRAM(s) Oral two times a day  mirtazapine 15 milliGRAM(s) Oral daily  pantoprazole    Tablet 40 milliGRAM(s) Oral before breakfast  polyethylene glycol 3350 17 Gram(s) Oral daily  QUEtiapine 12.5 milliGRAM(s) Oral at bedtime  senna 2 Tablet(s) Oral at bedtime  sodium chloride 1 Gram(s) Oral three times a day      RECENT LABS/IMAGING                        8.2    6.51  )-----------( 201      ( 28 Jun 2024 06:51 )             24.6     06-28    135  |  98  |  17  ----------------------------<  80  3.9   |  26  |  1.1    Ca    9.5      28 Jun 2024 06:51  Phos  3.8     06-28  Mg     1.9     06-28    TPro  6.3  /  Alb  3.9  /  TBili  0.6  /  DBili  x   /  AST  26  /  ALT  8   /  AlkPhos  96  06-28      Urinalysis Basic - ( 28 Jun 2024 06:51 )    Color: x / Appearance: x / SG: x / pH: x  Gluc: 80 mg/dL / Ketone: x  / Bili: x / Urobili: x   Blood: x / Protein: x / Nitrite: x   Leuk Esterase: x / RBC: x / WBC x   Sq Epi: x / Non Sq Epi: x / Bacteria: x

## 2024-06-30 LAB
HCT VFR BLD CALC: 23.8 % — LOW (ref 42–52)
HGB BLD-MCNC: 7.8 G/DL — LOW (ref 14–18)
MCHC RBC-ENTMCNC: 28.8 PG — SIGNIFICANT CHANGE UP (ref 27–31)
MCHC RBC-ENTMCNC: 32.8 G/DL — SIGNIFICANT CHANGE UP (ref 32–37)
MCV RBC AUTO: 87.8 FL — SIGNIFICANT CHANGE UP (ref 80–94)
NRBC # BLD: 0 /100 WBCS — SIGNIFICANT CHANGE UP (ref 0–0)
PLATELET # BLD AUTO: 215 K/UL — SIGNIFICANT CHANGE UP (ref 130–400)
PMV BLD: 9 FL — SIGNIFICANT CHANGE UP (ref 7.4–10.4)
RBC # BLD: 2.71 M/UL — LOW (ref 4.7–6.1)
RBC # FLD: 16 % — HIGH (ref 11.5–14.5)
WBC # BLD: 9.86 K/UL — SIGNIFICANT CHANGE UP (ref 4.8–10.8)
WBC # FLD AUTO: 9.86 K/UL — SIGNIFICANT CHANGE UP (ref 4.8–10.8)

## 2024-06-30 PROCEDURE — 99232 SBSQ HOSP IP/OBS MODERATE 35: CPT | Mod: GC

## 2024-06-30 RX ADMIN — Medication 1 APPLICATION(S): at 06:04

## 2024-06-30 RX ADMIN — Medication 1 GRAM(S): at 21:16

## 2024-06-30 RX ADMIN — Medication 12.5 MILLIGRAM(S): at 05:40

## 2024-06-30 RX ADMIN — POLYETHYLENE GLYCOL 3350 17 GRAM(S): 1 POWDER ORAL at 11:26

## 2024-06-30 RX ADMIN — Medication 12.5 MILLIGRAM(S): at 21:16

## 2024-06-30 RX ADMIN — Medication 1 APPLICATION(S): at 11:26

## 2024-06-30 RX ADMIN — Medication 1 GRAM(S): at 13:13

## 2024-06-30 RX ADMIN — LISINOPRIL 5 MILLIGRAM(S): 5 TABLET ORAL at 06:04

## 2024-06-30 RX ADMIN — Medication 325 MILLIGRAM(S): at 11:25

## 2024-06-30 RX ADMIN — Medication 25 MILLIGRAM(S): at 06:04

## 2024-06-30 RX ADMIN — ATORVASTATIN CALCIUM 80 MILLIGRAM(S): 20 TABLET, FILM COATED ORAL at 21:16

## 2024-06-30 RX ADMIN — PANTOPRAZOLE SODIUM 40 MILLIGRAM(S): 40 INJECTION, POWDER, FOR SOLUTION INTRAVENOUS at 06:05

## 2024-06-30 RX ADMIN — ENOXAPARIN SODIUM 40 MILLIGRAM(S): 100 INJECTION SUBCUTANEOUS at 17:25

## 2024-06-30 RX ADMIN — Medication 1 APPLICATION(S): at 13:11

## 2024-06-30 RX ADMIN — Medication 25 MILLIGRAM(S): at 17:25

## 2024-06-30 RX ADMIN — Medication 1 GRAM(S): at 06:04

## 2024-06-30 RX ADMIN — MIRTAZAPINE 15 MILLIGRAM(S): 15 TABLET, FILM COATED ORAL at 11:25

## 2024-06-30 RX ADMIN — Medication 1 APPLICATION(S): at 21:15

## 2024-06-30 RX ADMIN — ASPIRIN 81 MILLIGRAM(S): 325 TABLET, FILM COATED ORAL at 11:25

## 2024-06-30 RX ADMIN — Medication 2 TABLET(S): at 21:16

## 2024-06-30 NOTE — PROGRESS NOTE ADULT - SUBJECTIVE AND OBJECTIVE BOX
Patient is a 89y old  Male who presents with a chief complaint of Fall (29 Jun 2024 10:05)      overnight events: none. on RA. awake. no complaints    Drips: none            ROS: as in HPI; All other systems reviewed are negative        PHYSICAL EXAM  Vital Signs Last 24 Hrs  T(C): 37.1 (30 Jun 2024 06:02), Max: 37.1 (30 Jun 2024 06:02)  T(F): 98.7 (30 Jun 2024 06:02), Max: 98.7 (30 Jun 2024 06:02)  HR: 104 (30 Jun 2024 06:02) (67 - 104)  BP: 115/69 (30 Jun 2024 06:02) (112/58 - 165/82)  BP(mean): 84 (30 Jun 2024 06:02) (84 - 84)  RR: 18 (30 Jun 2024 06:02) (18 - 20)  SpO2: 98% (30 Jun 2024 06:02) (98% - 99%)          CONSTITUTIONAL:   NAD    ENT:   Airway patent,     EYES:   Clear bilaterally,   pupils equal,   round and reactive to light.    CARDIAC:   Normal rate,   regular rhythm.    no edema    RESPIRATORY:   No wheezing   Normal chest expansion  Not tachypneic,    GASTROINTESTINAL:  Abdomen soft, non-tender,   No guarding,   Positive BS    MUSCULOSKELETAL:   Range of motion is not limited,    NEUROLOGICAL:   Alert and oriented   No motor deficits.    SKIN:   Skin normal color for race,   No evidence of rash.                I&O's Detail    29 Jun 2024 07:01  -  30 Jun 2024 07:00  --------------------------------------------------------  IN:  Total IN: 0 mL    OUT:    Indwelling Catheter - Urethral (mL): 2500 mL  Total OUT: 2500 mL    Total NET: -2500 mL            LABS:                        7.6    6.23  )-----------( 185      ( 29 Jun 2024 08:44 )             23.1     29 Jun 2024 08:44    134    |  100    |  20     ----------------------------<  87     4.1     |  23     |  1.3      Creatinine Trend: 1.3<--, 1.1<--, 1.3<--, 1.4<--, 1.2<--, 1.2<--    Ca    8.9        29 Jun 2024 08:44  Phos  3.3       29 Jun 2024 08:44  Mg     1.8       29 Jun 2024 08:44            CAPILLARY BLOOD GLUCOSE          Urinalysis Basic - ( 29 Jun 2024 08:44 )    Color: x / Appearance: x / SG: x / pH: x  Gluc: 87 mg/dL / Ketone: x  / Bili: x / Urobili: x   Blood: x / Protein: x / Nitrite: x   Leuk Esterase: x / RBC: x / WBC x   Sq Epi: x / Non Sq Epi: x / Bacteria: x      Culture        MEDICATIONS  (STANDING):  aspirin  chewable 81 milliGRAM(s) Oral daily  atorvastatin 80 milliGRAM(s) Oral at bedtime  bacitracin   Ointment 1 Application(s) Topical three times a day  chlorhexidine 2% Cloths 1 Application(s) Topical daily  enoxaparin Injectable 40 milliGRAM(s) SubCutaneous every 24 hours  ferrous    sulfate 325 milliGRAM(s) Oral daily  lisinopril 5 milliGRAM(s) Oral daily  metoprolol tartrate 25 milliGRAM(s) Oral two times a day  mirtazapine 15 milliGRAM(s) Oral daily  pantoprazole    Tablet 40 milliGRAM(s) Oral before breakfast  polyethylene glycol 3350 17 Gram(s) Oral daily  QUEtiapine 12.5 milliGRAM(s) Oral at bedtime  senna 2 Tablet(s) Oral at bedtime  sodium chloride 1 Gram(s) Oral three times a day    MEDICATIONS  (PRN):  acetaminophen     Tablet .. 650 milliGRAM(s) Oral every 6 hours PRN Temp greater or equal to 38C (100.4F), Mild Pain (1 - 3), Moderate Pain (4 - 6)

## 2024-06-30 NOTE — PROGRESS NOTE ADULT - ASSESSMENT
This is an 89 yo woman w/ Hx of dementia, chronic hyponatremia, neurogenic bladder on chronic Yañez, Hx left Hx fracture 3/24 presenting with right temporal laceration after an unwitnessed fall, found to have ST elevation in inferior leads with reciprocal ST depression in lateral leads.      1.Acute on Chronic Microcytic Anemia   Right Temporal Laceration with Bleeding- Resolved  No Evidence of Overt Gastrointestinal Bleeding  - Hb dropped from 11 to 7.7, today 6/30: hb= 7.8  - iron sulfate daily for one month   - Trend Hb and keep Hb>7  -monitor signs of bleeding  - Suture and management initially by ER.  - Suture removal typically in 7-10 days   - [facility]  -GI c/S: recommend EGD & colono for anemia workup ideally, but risk> benefit currently, outpatient F/U offered, monitor cbc for hb drop    2. STEMI - resolved with medical management         - ECG: ST elevated ,resolved           - TSH:1.89        - Hgba1c: 4.9  - Cont aspirin   - Cont statin         - Lipid profile. LDL:113   - Troponin peaked at 273  - Was on heparin   - TTE: Left ventricular ejection fraction, by visual estimation, is 60 to 65%. Normal global left ventricular systolic function. (Grade I diastolic dysfunction). Mildly enlarged left atrium.  - Cardio consult appreciated    3. Hypertension   - C/w home meds    4. T12 compression fracture:  - Neurosurgery consulted  - No acute surgical intervention --> abdominal binder for support - TLSO when OOB. Will need PT/OT. Pain management consulted --> tylenol 975 mg q8h.    5. Dementia:  - Continue with home Mirtazapine  - Avoid CNS depressants  - Remove 1:1. Seroquel hs prn     6.Chronic  Hyponatremia:  - Continue with salt tablets    7. Neurogenic bladder  - Chronic yañez     - DVT ppx: was on heparin gtt --> lovenox   - GI ppx: pantoprazole  -Activity: OOB to chair   - T/D/L: chronic Yañez, PIV

## 2024-06-30 NOTE — PROGRESS NOTE ADULT - SUBJECTIVE AND OBJECTIVE BOX
SUBJECTIVE/OVERNIGHT EVENTS  Today is hospital day 6d. This morning patient was seen and examined at bedside, resting comfortably in bed. No acute or major events overnight.  CODE STATUS: full code      MEDICATIONS  STANDING MEDICATIONS  aspirin  chewable 81 milliGRAM(s) Oral daily  atorvastatin 80 milliGRAM(s) Oral at bedtime  bacitracin   Ointment 1 Application(s) Topical three times a day  chlorhexidine 2% Cloths 1 Application(s) Topical daily  enoxaparin Injectable 40 milliGRAM(s) SubCutaneous every 24 hours  ferrous    sulfate 325 milliGRAM(s) Oral daily  lisinopril 5 milliGRAM(s) Oral daily  metoprolol tartrate 25 milliGRAM(s) Oral two times a day  mirtazapine 15 milliGRAM(s) Oral daily  pantoprazole    Tablet 40 milliGRAM(s) Oral before breakfast  polyethylene glycol 3350 17 Gram(s) Oral daily  QUEtiapine 12.5 milliGRAM(s) Oral at bedtime  senna 2 Tablet(s) Oral at bedtime  sodium chloride 1 Gram(s) Oral three times a day    PRN MEDICATIONS  acetaminophen     Tablet .. 650 milliGRAM(s) Oral every 6 hours PRN    VITALS  T(F): 97.5 (06-30-24 @ 12:00), Max: 98.7 (06-30-24 @ 06:02)  HR: 80 (06-30-24 @ 17:22) (80 - 104)  BP: 110/57 (06-30-24 @ 17:22) (110/57 - 127/89)  RR: 22 (06-30-24 @ 12:00) (18 - 22)  SpO2: 97% (06-30-24 @ 12:00) (97% - 99%)    PHYSICAL EXAM  GENERAL:Deconditioned. alert and oriented *1   PULM: Clear to auscultation bilaterally  CARDIAC: Regular rate and rhythm;   GI: Soft, Nontender, Nondistended; Bowel sounds present  EXTREMITIES: normal      LABS             7.8    9.86  )-----------( 215      ( 06-30-24 @ 08:15 )             23.8     134  |  100  |  20  -------------------------<  87   06-29-24 @ 08:44  4.1  |  23  |  1.3    Ca      8.9     06-29-24 @ 08:44  Phos   3.3     06-29-24 @ 08:44  Mg     1.8     06-29-24 @ 08:44    TPro  5.9  /  Alb  3.6  /  TBili  0.4  /  DBili  x   /  AST  19  /  ALT  9   /  AlkPhos  89  /  GGT  x     06-29-24 @ 08:44        Urinalysis Basic - ( 29 Jun 2024 08:44 )    Color: x / Appearance: x / SG: x / pH: x  Gluc: 87 mg/dL / Ketone: x  / Bili: x / Urobili: x   Blood: x / Protein: x / Nitrite: x   Leuk Esterase: x / RBC: x / WBC x   Sq Epi: x / Non Sq Epi: x / Bacteria: x          IMAGING

## 2024-06-30 NOTE — PROGRESS NOTE ADULT - ASSESSMENT
This is an 89 yo woman w/ Hx of dementia, chronic hyponatremia, neurogenic bladder on chronic Yañez, Hx left Hx fracture 3/24 presenting with right temporal laceration after an unwitnessed fall, found to have ST elevation in inferior leads with reciprocal ST depression in lateral leads.      1. Acute blood loss anemia due to temporal laceration (arterial bleeding)   - Slow drop in Hb.   - No overnight bleeding  - Gastro recs noted  - iron sulfate daily for one month   - Suture and management initially by ER. Suture removal typically in 7-10 days   - GI consult appreciated     2. STEMI resolved with medical management In the setting of right temporal laceration and bleeding  -         - ECG: ST elevated that resolved         - TSH:1.89        - Hgba1c: 4.9  - Cont aspirin   - Cont statin         - Lipid profile. LDL:113   - Troponin peaked at 273  - Was on heparin   - TTE: Left ventricular ejection fraction, by visual estimation, is 60 to 65%. Normal global left ventricular systolic function. Impaired relaxation pattern of left  ventricular myocardial filling (Grade I diastolic dysfunction). Mildly enlarged left atrium.  - Cardio consult appreciated  - No plan for LHC    3. Hypertension   - C/w home lisinopril 5 mg daily and metoprolol tartrate 25 mg BID    4. T12 compression fracture:  - Neurosurgery consulted  - No acute surgical intervention --> abdominal binder for support - TLSO when OOB. Will need PT/OT. Pain management consulted --> tylenol 975 mg q8h.    5. Dementia:  - Continue with home Mirtazapine  - Avoid CNS depressants  - reorientation  - Seroquel hs prn     6.Chronic  Hyponatremia:  - Continue with salt tablets    7. Neurogenic bladder  - Chronic yañez     8. General measures:  - DVT ppx: was on heparin gtt --> lovenox   - GI ppx: pantoprazole  -Activity: OOB to chair   - T/D/L: chronic Yañez, PIV  - Code status: Full Code    DC planning/case management aware

## 2024-07-01 LAB
HCT VFR BLD CALC: 24 % — LOW (ref 42–52)
HGB BLD-MCNC: 8 G/DL — LOW (ref 14–18)
MCHC RBC-ENTMCNC: 29.5 PG — SIGNIFICANT CHANGE UP (ref 27–31)
MCHC RBC-ENTMCNC: 33.3 G/DL — SIGNIFICANT CHANGE UP (ref 32–37)
MCV RBC AUTO: 88.6 FL — SIGNIFICANT CHANGE UP (ref 80–94)
NRBC # BLD: 0 /100 WBCS — SIGNIFICANT CHANGE UP (ref 0–0)
PLATELET # BLD AUTO: 221 K/UL — SIGNIFICANT CHANGE UP (ref 130–400)
PMV BLD: 9.4 FL — SIGNIFICANT CHANGE UP (ref 7.4–10.4)
RBC # BLD: 2.71 M/UL — LOW (ref 4.7–6.1)
RBC # FLD: 16.3 % — HIGH (ref 11.5–14.5)
WBC # BLD: 7.2 K/UL — SIGNIFICANT CHANGE UP (ref 4.8–10.8)
WBC # FLD AUTO: 7.2 K/UL — SIGNIFICANT CHANGE UP (ref 4.8–10.8)

## 2024-07-01 PROCEDURE — 99232 SBSQ HOSP IP/OBS MODERATE 35: CPT

## 2024-07-01 RX ADMIN — Medication 1 GRAM(S): at 06:20

## 2024-07-01 RX ADMIN — ASPIRIN 81 MILLIGRAM(S): 325 TABLET, FILM COATED ORAL at 13:02

## 2024-07-01 RX ADMIN — Medication 1 GRAM(S): at 21:30

## 2024-07-01 RX ADMIN — Medication 1 APPLICATION(S): at 21:29

## 2024-07-01 RX ADMIN — Medication 325 MILLIGRAM(S): at 13:02

## 2024-07-01 RX ADMIN — Medication 1 APPLICATION(S): at 06:20

## 2024-07-01 RX ADMIN — Medication 1 APPLICATION(S): at 13:03

## 2024-07-01 RX ADMIN — Medication 3 MILLIGRAM(S): at 01:10

## 2024-07-01 RX ADMIN — Medication 25 MILLIGRAM(S): at 06:20

## 2024-07-01 RX ADMIN — Medication 2 TABLET(S): at 21:30

## 2024-07-01 RX ADMIN — ATORVASTATIN CALCIUM 80 MILLIGRAM(S): 20 TABLET, FILM COATED ORAL at 21:30

## 2024-07-01 RX ADMIN — Medication 25 MILLIGRAM(S): at 17:58

## 2024-07-01 RX ADMIN — Medication 1 APPLICATION(S): at 13:02

## 2024-07-01 RX ADMIN — Medication 12.5 MILLIGRAM(S): at 21:30

## 2024-07-01 RX ADMIN — ENOXAPARIN SODIUM 40 MILLIGRAM(S): 100 INJECTION SUBCUTANEOUS at 17:57

## 2024-07-01 RX ADMIN — MIRTAZAPINE 15 MILLIGRAM(S): 15 TABLET, FILM COATED ORAL at 13:01

## 2024-07-01 RX ADMIN — Medication 1 GRAM(S): at 13:02

## 2024-07-01 RX ADMIN — PANTOPRAZOLE SODIUM 40 MILLIGRAM(S): 40 INJECTION, POWDER, FOR SOLUTION INTRAVENOUS at 06:20

## 2024-07-01 RX ADMIN — LISINOPRIL 5 MILLIGRAM(S): 5 TABLET ORAL at 06:20

## 2024-07-01 NOTE — PROGRESS NOTE ADULT - ASSESSMENT
3/24 presenting with right temporal laceration after an unwitnessed fall, found to have ST elevation in inferior leads with reciprocal ST depression in lateral leads.      #  Acute blood loss anemia   - hgb stable 8.0  - maintain active type and screen    # fall, temporal sculp laceration, local care   sutured laceration done in ER ,  ( placed gisele 24, may remove after 10 days)     # STEMI   medical management    - TTE: Left ventricular ejection fraction, by visual estimation, is 60 to 65%. Normal global left ventricular systolic function. Impaired relaxation pattern of left  ventricular myocardial filling (Grade I diastolic dysfunction). Mildly enlarged left atrium.  - Cardio consult appreciated  - No plan for LHC    #  Hypertension   controlled,   - C/w home lisinopril 5 mg daily and metoprolol tartrate 25 mg BID  hold lisinopril if bp is low     # T12 compression fracture:  - Neurosurgery consulted  - No acute surgical intervention --> abdominal binder for support - TLSO when OOB. Will need PT/OT. Pain management consulted --> tylenol 975 mg q8h.    #  Dementia:  - Continue with home Mirtazapine  - Avoid CNS depressants  - reorientation  - Seroquel hs prn     # Chronic  Hyponatremia:  Sodium: 134 mmol/L (06-29-24 @ 08:44)  Sodium: 135 mmol/L (06-28-24 @ 06:51)  - Continue with salt tablets    #?  Neurogenic bladder  - Chronic yañez   cont     #Progress Note Handoff    Pending :  discharge planning   Disposition: snf   code status: full code

## 2024-07-01 NOTE — PROGRESS NOTE ADULT - SUBJECTIVE AND OBJECTIVE BOX
Hospital Day:  7d    Subjective:    Patient is a 89y old  Male who presents with a chief complaint of Fall. Pt was seen and evaluated at bedside, no acute events overnight.     Admitted to medicine for a primary diagnosis of STEMI    Past Medical Hx:   HTN (hypertension)    Neurogenic bladder    Yañez catheter in place    Dementia      Past Sx:  No significant past surgical history      Allergies:  No Known Allergies    Current Meds:   Standng Meds:  aspirin  chewable 81 milliGRAM(s) Oral daily  atorvastatin 80 milliGRAM(s) Oral at bedtime  bacitracin   Ointment 1 Application(s) Topical three times a day  chlorhexidine 2% Cloths 1 Application(s) Topical daily  enoxaparin Injectable 40 milliGRAM(s) SubCutaneous every 24 hours  ferrous    sulfate 325 milliGRAM(s) Oral daily  lisinopril 5 milliGRAM(s) Oral daily  metoprolol tartrate 25 milliGRAM(s) Oral two times a day  mirtazapine 15 milliGRAM(s) Oral daily  pantoprazole    Tablet 40 milliGRAM(s) Oral before breakfast  polyethylene glycol 3350 17 Gram(s) Oral daily  QUEtiapine 12.5 milliGRAM(s) Oral at bedtime  senna 2 Tablet(s) Oral at bedtime  sodium chloride 1 Gram(s) Oral three times a day    PRN Meds:  acetaminophen     Tablet .. 650 milliGRAM(s) Oral every 6 hours PRN Temp greater or equal to 38C (100.4F), Mild Pain (1 - 3), Moderate Pain (4 - 6)    HOME MEDICATIONS:  acetaminophen 325 mg oral tablet: 3 tab(s) orally every 8 hours  alendronate 35 mg oral tablet: 1 tab(s) orally once a week  ferrous fumarate 325 mg (106 mg elemental iron) oral tablet: 1 tab(s) orally once a day  lisinopril 5 mg oral tablet: 1 tab(s) orally once a day  metoprolol tartrate 25 mg oral tablet: 1 tab(s) orally 2 times a day  mirtazapine 15 mg oral tablet: 1 tab(s) orally once a day  pantoprazole 40 mg oral delayed release tablet: 1 tab(s) orally once a day (before a meal)  polyethylene glycol 3350 oral powder for reconstitution: 17 gram(s) orally once a day As needed Constipation  senna leaf extract oral tablet: 2 tab(s) orally once a day (at bedtime) as needed for  constipation  sodium chloride 1 g oral tablet: 1 tab(s) orally 3 times a day      Vital Signs:   T(F): 97.3 (07-01-24 @ 12:09), Max: 97.9 (06-30-24 @ 20:19)  HR: 74 (07-01-24 @ 12:09) (69 - 87)  BP: 128/76 (07-01-24 @ 12:09) (115/64 - 128/76)  RR: 19 (07-01-24 @ 12:09) (18 - 19)  SpO2: 100% (07-01-24 @ 12:09) (98% - 100%)      06-30-24 @ 07:01  -  07-01-24 @ 07:00  --------------------------------------------------------  IN: 0 mL / OUT: 1100 mL / NET: -1100 mL        Physical Exam:   GENERAL: NAD  HEENT: Right sided temporal and infraorbital ecchymosis.   CHEST/LUNG: CTAB  HEART: Regular rate and rhythm; s1 s2 appreciated, No murmurs, rubs, or gallops  ABDOMEN: Soft, Nontender, Nondistended; Bowel sounds present  EXTREMITIES: No LE edema b/l  SKIN: no rashes, no new lesions  NERVOUS SYSTEM:  Alert & Oriented X3  LINES/CATHETERS: yañez in place         Labs:                         8.0    7.20  )-----------( 221      ( 01 Jul 2024 06:25 )             24.0                             Urinalysis Basic - ( 29 Jun 2024 08:44 )    Color: x / Appearance: x / SG: x / pH: x  Gluc: 87 mg/dL / Ketone: x  / Bili: x / Urobili: x   Blood: x / Protein: x / Nitrite: x   Leuk Esterase: x / RBC: x / WBC x   Sq Epi: x / Non Sq Epi: x / Bacteria: x

## 2024-07-01 NOTE — PROGRESS NOTE ADULT - SUBJECTIVE AND OBJECTIVE BOX
pt seen and examined.     My notes supersede resident's notes in case of discrepancy       ROS: no cp, no sob, no n/v, no fever    Vital Signs Last 24 Hrs  T(C): 36.3 (01 Jul 2024 12:09), Max: 36.6 (30 Jun 2024 20:19)  T(F): 97.3 (01 Jul 2024 12:09), Max: 97.9 (30 Jun 2024 20:19)  HR: 74 (01 Jul 2024 12:09) (69 - 87)  BP: 128/76 (01 Jul 2024 12:09) (110/57 - 128/76)  BP(mean): --  RR: 19 (01 Jul 2024 12:09) (18 - 19)  SpO2: 100% (01 Jul 2024 12:09) (98% - 100%)    Parameters below as of 01 Jul 2024 04:34  Patient On (Oxygen Delivery Method): room air    physical exam  constitutional NAD, AA, Respiratory  lungs CTA, CVS heart RRR, GI: abdomen Soft NT, ND, BS+, skin: right eye , periorbital bruise , scalp laceration, sp sutures   neuro exam, gen weakness     MEDICATIONS  (STANDING):  aspirin  chewable 81 milliGRAM(s) Oral daily  atorvastatin 80 milliGRAM(s) Oral at bedtime  bacitracin   Ointment 1 Application(s) Topical three times a day  chlorhexidine 2% Cloths 1 Application(s) Topical daily  enoxaparin Injectable 40 milliGRAM(s) SubCutaneous every 24 hours  ferrous    sulfate 325 milliGRAM(s) Oral daily  lisinopril 5 milliGRAM(s) Oral daily  metoprolol tartrate 25 milliGRAM(s) Oral two times a day  mirtazapine 15 milliGRAM(s) Oral daily  pantoprazole    Tablet 40 milliGRAM(s) Oral before breakfast  polyethylene glycol 3350 17 Gram(s) Oral daily  QUEtiapine 12.5 milliGRAM(s) Oral at bedtime  senna 2 Tablet(s) Oral at bedtime  sodium chloride 1 Gram(s) Oral three times a day    MEDICATIONS  (PRN):  acetaminophen     Tablet .. 650 milliGRAM(s) Oral every 6 hours PRN Temp greater or equal to 38C (100.4F), Mild Pain (1 - 3), Moderate Pain (4 - 6)                        8.0    7.20  )-----------( 221      ( 01 Jul 2024 06:25 )             24.0     A/p   88 yo woman w/ Hx of dementia, chronic hyponatremia, neurogenic bladder on chronic Yañez, Hx left Hx fracture 3/24 presenting with right temporal laceration after an unwitnessed fall, found to have ST elevation in inferior leads with reciprocal ST depression in lateral leads.      #  Acute blood loss anemia   stable hgb   Hemoglobin: 8.0 g/dL (07-01-24 @ 06:25)  Hemoglobin: 7.8 g/dL (06-30-24 @ 08:15)  Hemoglobin: 7.6 g/dL (06-29-24 @ 08:44)  Hemoglobin: 8.2 g/dL (06-28-24 @ 06:51)    repeat iron level, no external bleeding     # fall, temporal sculp laceration, local care   sutured laceration done in ER ,  ( placed june 24, may remove after 10 days)     # STEMI   medical management    - TTE: Left ventricular ejection fraction, by visual estimation, is 60 to 65%. Normal global left ventricular systolic function. Impaired relaxation pattern of left  ventricular myocardial filling (Grade I diastolic dysfunction). Mildly enlarged left atrium.  - Cardio consult appreciated  - No plan for LHC    #  Hypertension   controlled,   - C/w home lisinopril 5 mg daily and metoprolol tartrate 25 mg BID  hold lisinopril if bp is low     # T12 compression fracture:  - Neurosurgery consulted  - No acute surgical intervention --> abdominal binder for support - TLSO when OOB. Will need PT/OT. Pain management consulted --> tylenol 975 mg q8h.    #  Dementia:  - Continue with home Mirtazapine  - Avoid CNS depressants  - reorientation  - Seroquel hs prn     # Chronic  Hyponatremia:  Sodium: 134 mmol/L (06-29-24 @ 08:44)  Sodium: 135 mmol/L (06-28-24 @ 06:51)  - Continue with salt tablets    #?  Neurogenic bladder  - Chronic yañez   cont     #Progress Note Handoff    Pending :  discharge planning   Disposition: snf   code status: full code

## 2024-07-01 NOTE — PROVIDER CONTACT NOTE (MEDICATION) - ASSESSMENT
patient is A/O x 2. Received seroquel during medication administration at 10 pm. Patient is awake, unable to fall asleep, and unable to be redirected

## 2024-07-02 ENCOUNTER — TRANSCRIPTION ENCOUNTER (OUTPATIENT)
Age: 89
End: 2024-07-02

## 2024-07-02 LAB
ANION GAP SERPL CALC-SCNC: 11 MMOL/L — SIGNIFICANT CHANGE UP (ref 7–14)
BUN SERPL-MCNC: 17 MG/DL — SIGNIFICANT CHANGE UP (ref 10–20)
CALCIUM SERPL-MCNC: 9.3 MG/DL — SIGNIFICANT CHANGE UP (ref 8.4–10.5)
CHLORIDE SERPL-SCNC: 101 MMOL/L — SIGNIFICANT CHANGE UP (ref 98–110)
CO2 SERPL-SCNC: 25 MMOL/L — SIGNIFICANT CHANGE UP (ref 17–32)
CREAT SERPL-MCNC: 1.2 MG/DL — SIGNIFICANT CHANGE UP (ref 0.7–1.5)
EGFR: 59 ML/MIN/1.73M2 — LOW
GLUCOSE SERPL-MCNC: 81 MG/DL — SIGNIFICANT CHANGE UP (ref 70–99)
HCT VFR BLD CALC: 23.8 % — LOW (ref 42–52)
HGB BLD-MCNC: 8 G/DL — LOW (ref 14–18)
MCHC RBC-ENTMCNC: 29.5 PG — SIGNIFICANT CHANGE UP (ref 27–31)
MCHC RBC-ENTMCNC: 33.6 G/DL — SIGNIFICANT CHANGE UP (ref 32–37)
MCV RBC AUTO: 87.8 FL — SIGNIFICANT CHANGE UP (ref 80–94)
NRBC # BLD: 0 /100 WBCS — SIGNIFICANT CHANGE UP (ref 0–0)
PLATELET # BLD AUTO: 214 K/UL — SIGNIFICANT CHANGE UP (ref 130–400)
PMV BLD: 9.1 FL — SIGNIFICANT CHANGE UP (ref 7.4–10.4)
POTASSIUM SERPL-MCNC: 4.9 MMOL/L — SIGNIFICANT CHANGE UP (ref 3.5–5)
POTASSIUM SERPL-SCNC: 4.9 MMOL/L — SIGNIFICANT CHANGE UP (ref 3.5–5)
RBC # BLD: 2.71 M/UL — LOW (ref 4.7–6.1)
RBC # FLD: 16.3 % — HIGH (ref 11.5–14.5)
SODIUM SERPL-SCNC: 137 MMOL/L — SIGNIFICANT CHANGE UP (ref 135–146)
WBC # BLD: 7.48 K/UL — SIGNIFICANT CHANGE UP (ref 4.8–10.8)
WBC # FLD AUTO: 7.48 K/UL — SIGNIFICANT CHANGE UP (ref 4.8–10.8)

## 2024-07-02 PROCEDURE — 99232 SBSQ HOSP IP/OBS MODERATE 35: CPT

## 2024-07-02 RX ORDER — ASPIRIN 325 MG/1
1 TABLET, FILM COATED ORAL
Qty: 0 | Refills: 0 | DISCHARGE
Start: 2024-07-02

## 2024-07-02 RX ORDER — ATORVASTATIN CALCIUM 20 MG/1
1 TABLET, FILM COATED ORAL
Qty: 0 | Refills: 0 | DISCHARGE
Start: 2024-07-02

## 2024-07-02 RX ADMIN — Medication 1 GRAM(S): at 06:29

## 2024-07-02 RX ADMIN — Medication 1 APPLICATION(S): at 06:29

## 2024-07-02 RX ADMIN — ATORVASTATIN CALCIUM 80 MILLIGRAM(S): 20 TABLET, FILM COATED ORAL at 21:23

## 2024-07-02 RX ADMIN — Medication 1 APPLICATION(S): at 13:19

## 2024-07-02 RX ADMIN — Medication 25 MILLIGRAM(S): at 17:11

## 2024-07-02 RX ADMIN — Medication 25 MILLIGRAM(S): at 06:29

## 2024-07-02 RX ADMIN — Medication 325 MILLIGRAM(S): at 11:49

## 2024-07-02 RX ADMIN — LISINOPRIL 5 MILLIGRAM(S): 5 TABLET ORAL at 06:30

## 2024-07-02 RX ADMIN — Medication 12.5 MILLIGRAM(S): at 21:23

## 2024-07-02 RX ADMIN — POLYETHYLENE GLYCOL 3350 17 GRAM(S): 1 POWDER ORAL at 11:50

## 2024-07-02 RX ADMIN — ASPIRIN 81 MILLIGRAM(S): 325 TABLET, FILM COATED ORAL at 11:49

## 2024-07-02 RX ADMIN — Medication 1 GRAM(S): at 21:23

## 2024-07-02 RX ADMIN — Medication 2 TABLET(S): at 21:23

## 2024-07-02 RX ADMIN — ENOXAPARIN SODIUM 40 MILLIGRAM(S): 100 INJECTION SUBCUTANEOUS at 17:08

## 2024-07-02 RX ADMIN — PANTOPRAZOLE SODIUM 40 MILLIGRAM(S): 40 INJECTION, POWDER, FOR SOLUTION INTRAVENOUS at 06:29

## 2024-07-02 RX ADMIN — Medication 1 GRAM(S): at 13:19

## 2024-07-02 RX ADMIN — MIRTAZAPINE 15 MILLIGRAM(S): 15 TABLET, FILM COATED ORAL at 11:49

## 2024-07-02 RX ADMIN — Medication 1 APPLICATION(S): at 21:23

## 2024-07-02 RX ADMIN — Medication 1 APPLICATION(S): at 11:50

## 2024-07-02 NOTE — DISCHARGE NOTE PROVIDER - NSDCHOSPICE_GEN_A_CORE
Nursing Transfer Note    Receiving Transfer Note    2/23/2018 4:45 PM  Received in transfer from 8th floor Oncology to Peds Rm [id number]  Report received as documented in PER Handoff on Doc Flowsheet.  See Doc Flowsheet for VS's and complete assessment.  Continuous EKG monitoring in place: n/a  Chart received with patient: yes  What Caregiver / Guardian was Notified of Arrival: patient by himself (22 y/o)  Patient and / or caregiver / guardian oriented to room and nurse call system.  Leydi James RN  2/23/2018 4:45 PM       
No

## 2024-07-02 NOTE — DISCHARGE NOTE PROVIDER - CARE PROVIDERS DIRECT ADDRESSES
,[email or web address]

## 2024-07-02 NOTE — PHYSICAL THERAPY INITIAL EVALUATION ADULT - ADDITIONAL COMMENTS
Pt is ? historian, has hx of dementia. Reports he amb using RW, assist with ADL's. Pt was in NH prior to this hospital admission as per chart review.

## 2024-07-02 NOTE — DISCHARGE NOTE PROVIDER - HOSPITAL COURSE
88 yo M with a PMH of dementia, HTN, neurogenic bladder, L hip fx 3/2024 and multiple falls presenting from NH after being found down on the floor this morning after an unwitnessed fall w/ actively bleeding R temporal laceration, trauma code was called in ED and laceration was repaired. Trauma workup revealed a new age-indeterminate T12 fracture, possibly acute. EKG performed later in ED stay showed ST-elevations in II, III and avF and ST-depressions in avL and avR, code STEMI was then called. Patient was admitted to CCU for close monitoring and medical management of suspected inferior STEMI. Cardiology recommended that the pt is not a good candidate for PCI intervention so opted for medical management. Neurosurgery recommended conservative management for t12 fracture with abdominal binder and pain control. Pt recieved3 units of PRBC for his anemia.     #Acute blood loss anemia   - hgb stable 8.0  - maintain active type and screen    # Fall, temporal sculp laceration, local care   - sutured laceration done in ER ,  ( placed june 24, may remove after 10 days)     #STEMI   medical management    - TTE: Left ventricular ejection fraction, by visual estimation, is 60 to 65%. Normal global left ventricular systolic function. Impaired relaxation pattern of left  ventricular myocardial filling (Grade I diastolic dysfunction). Mildly enlarged left atrium.  - Cardio consult appreciated  - No plan for C    #Hypertension   controlled,   - C/w home lisinopril 5 mg daily and metoprolol tartrate 25 mg BID  hold lisinopril if bp is low     #T12 compression fracture:  - Neurosurgery consulted  - No acute surgical intervention --> abdominal binder for support - TLSO when OOB. Will need PT/OT. Pain management consulted --> tylenol 975 mg q8h.    #Dementia:  - Continue with home Mirtazapine  - Avoid CNS depressants  - reorientation  - Seroquel hs prn     #Chronic  Hyponatremia:  Sodium: 134 mmol/L (06-29-24 @ 08:44)  Sodium: 135 mmol/L (06-28-24 @ 06:51)  - Continue with salt tablets    #Neurogenic bladder  - Chronic yañez   cont     #Progress Note Handoff    Pending :  discharge planning   Disposition: snf   code status: full code    89 y/o M w/ PMHx of dementia, HTN, neurogenic bladder, L hip fx 3/2024 and multiple falls presenting from NH after being found down on the floor this morning after an unwitnessed fall w/ actively bleeding R temporal laceration, trauma code was called in ED and laceration was repaired. Trauma workup revealed a new age-indeterminate T12 fracture, possibly acute. EKG performed later in ED stay showed ST-elevations in II, III and avF and ST-depressions in avL and avR, code STEMI was then called. Patient was admitted to CCU for close monitoring and medical management of suspected inferior STEMI. Cardiology recommended that the pt is not a good candidate for PCI intervention so opted for medical management. Neurosurgery recommended conservative management for t12 fracture with abdominal binder and pain control. S/p 3 units of pRBC for his anemia.     #Acute blood loss anemia  - suspected 2/2 temporal artery    #Fall, temporal sculp laceration, local care   - s/p sutured laceration done in ER  (placed June 24, may remove after 10 days)     #STEMI   - GDMT w/ ASA, BB, ACE-i  - TTE: Left ventricular ejection fraction, by visual estimation, is 60 to 65%. Normal global left ventricular systolic function. Impaired relaxation pattern of left  ventricular myocardial filling (Grade I diastolic dysfunction). Mildly enlarged left atrium.  - Cardio consult appreciated  - No plan for LHC    #HTN  - C/w home lisinopril 5 mg daily    #T12 compression fracture:  - Neurosurgery consulted  - No acute surgical intervention --> abdominal binder for support - TLSO when OOB    #Chronic  Hyponatremia:  - C/w NaCl tabs    #Neurogenic bladder  - Chronic yañez     Pt is medically stable for d/c to SNF   olvin with reciprocal ST depression in lateral leads.    #Acute blood loss anemia   # Iron deficiency   - hgb stable 7.3 trend CBC  - maintain active type and screen  - b12, folate wnl  - daily multivitamin therapy  - IV Venofer 200mg daily  - GI no intervention if hgb stable, [facility]up in [facility]     # Fall, temporal sculp laceration, local care   - sutured laceration done in ER ,  ( placed june 24, may remove after 10 days)   - f/u orthostatics if +ve d/c lisinopril     #STEMI   medical management    - TTE: Left ventricular ejection fraction, by visual estimation, is 60 to 65%. Normal global left ventricular systolic function. Impaired relaxation pattern of left  ventricular myocardial filling (Grade I diastolic dysfunction). Mildly enlarged left atrium.  - Cardio consult appreciated  - No plan for LHC  - f/u cardio in 2 weeks   - fasted lipid panel in 1 month     #Hypertension   controlled,   - C/w home lisinopril 5 mg daily and metoprolol tartrate 25 mg BID  hold lisinopril if bp is low     #T12 compression fracture:  - Neurosurgery consulted  - No acute surgical intervention --> abdominal binder for support - TLSO when OOB. Will need PT/OT. Pain management consulted --> tylenol 975 mg q8h.    #Dementia:  - Continue with home Mirtazapine  - Avoid CNS depressants  - reorientation  - Seroquel hs prn     #Chronic  Hyponatremia:  - Continue with salt tablets    #Neurogenic bladder  - Chronic yañez     Pending :  discharge planning    olvin with reciprocal ST depression in lateral leads.    #Acute blood loss anemia   # Iron deficiency   - hgb stable  - b12, folate wnl  - daily multivitamin therapy  - s/p IV Venofer 200mg daily, continue with PO Iron tx.   - outpatient CBC monitoring     # Fall, temporal sculp laceration, local care   - sutured laceration done in ER ,  ( placed june 24, may remove after 10 days)   - f/u orthostatics if +ve d/c lisinopril     #STEMI   medical management    - TTE: Left ventricular ejection fraction, by visual estimation, is 60 to 65%. Normal global left ventricular systolic function. Impaired relaxation pattern of left  ventricular myocardial filling (Grade I diastolic dysfunction). Mildly enlarged left atrium.  - Cardio consult appreciated  - No plan for C  - f/u cardio in 2 weeks   - fasted lipid panel in 1 month     #Hypertension   controlled,   - due to Orthostatic Hypotension d/c  lisinopril 5 mg and decreased metoprolol tartrate 12.5  mg BID      #T12 compression fracture:  - Neurosurgery consulted  - No acute surgical intervention --> abdominal binder for support - TLSO when OOB. Will need PT/OT. Pain management consulted --> tylenol prn. tx.    #Dementia:  - Continue with home Mirtazapine  - Avoid CNS depressants  - reorientation  - Seroquel hs prn     #Chronic  Hyponatremia:  - Continue with salt tablets    #Neurogenic bladder  - Chronic yañez     Pending :  discharge planning to Nursing home

## 2024-07-02 NOTE — PHYSICAL THERAPY INITIAL EVALUATION ADULT - PATIENT PROFILE REVIEW, REHAB EVAL
yes TLSO brace needs to be ordered, abdominal binder used at present time. Sarah AGUILAR notified who notified MD./yes

## 2024-07-02 NOTE — PROGRESS NOTE ADULT - ASSESSMENT
89 y/o M presenting with right temporal laceration after an unwitnessed fall, found to have ST elevation in inferior leads with reciprocal ST depression in lateral leads.    #  Acute blood loss anemia   - hgb stable 8.0  - maintain active type and screen    # fall, temporal sculp laceration, local care   sutured laceration done in ER ,  ( placed june 24, may remove after 10 days)     # STEMI   medical management    - TTE: Left ventricular ejection fraction, by visual estimation, is 60 to 65%. Normal global left ventricular systolic function. Impaired relaxation pattern of left  ventricular myocardial filling (Grade I diastolic dysfunction). Mildly enlarged left atrium.  - Cardio consult appreciated  - No plan for LHC    #  Hypertension   controlled,   - C/w home lisinopril 5 mg daily and metoprolol tartrate 25 mg BID  hold lisinopril if bp is low     # T12 compression fracture:  - Neurosurgery consulted  - No acute surgical intervention --> abdominal binder for support - TLSO when OOB. Will need PT/OT. Pain management consulted --> tylenol 975 mg q8h.    #  Dementia:  - Continue with home Mirtazapine  - Avoid CNS depressants  - reorientation  - Seroquel hs prn     # Chronic  Hyponatremia:  Sodium: 134 mmol/L (06-29-24 @ 08:44)  Sodium: 135 mmol/L (06-28-24 @ 06:51)  - Continue with salt tablets    #?  Neurogenic bladder  - Chronic yañez   cont     #Progress Note Handoff    Pending :  discharge planning   Disposition: snf   code status: full code    89 y/o M presenting with right temporal laceration after an unwitnessed fall, found to have ST elevation in inferior leads with reciprocal ST depression in lateral leads.    #Acute blood loss anemia   - hgb stable 8.0  - maintain active type and screen    # Fall, temporal sculp laceration, local care   - sutured laceration done in ER ,  ( placed june 24, may remove after 10 days)     #STEMI   medical management    - TTE: Left ventricular ejection fraction, by visual estimation, is 60 to 65%. Normal global left ventricular systolic function. Impaired relaxation pattern of left  ventricular myocardial filling (Grade I diastolic dysfunction). Mildly enlarged left atrium.  - Cardio consult appreciated  - No plan for LHC    #Hypertension   controlled,   - C/w home lisinopril 5 mg daily and metoprolol tartrate 25 mg BID  hold lisinopril if bp is low     #T12 compression fracture:  - Neurosurgery consulted  - No acute surgical intervention --> abdominal binder for support - TLSO when OOB. Will need PT/OT. Pain management consulted --> tylenol 975 mg q8h.    #Dementia:  - Continue with home Mirtazapine  - Avoid CNS depressants  - reorientation  - Seroquel hs prn     #Chronic  Hyponatremia:  Sodium: 134 mmol/L (06-29-24 @ 08:44)  Sodium: 135 mmol/L (06-28-24 @ 06:51)  - Continue with salt tablets    #Neurogenic bladder  - Chronic yañez   cont     #Progress Note Handoff    Pending :  discharge planning   Disposition: snf   code status: full code

## 2024-07-02 NOTE — DISCHARGE NOTE PROVIDER - PROVIDER TOKENS
PROVIDER:[TOKEN:[[id number]],FOLLOWUP:[2 weeks]],PROVIDER:[TOKEN:[[id number]],FOLLOWUP:[1 week]],PROVIDER:[TOKEN:[[id number]],FOLLOWUP:[1 week]] PROVIDER:[TOKEN:[[id number]],FOLLOWUP:[2 weeks]],PROVIDER:[TOKEN:[[id number]],FOLLOWUP:[1 week]],PROVIDER:[TOKEN:[[id number]],FOLLOWUP:[1 week]],PROVIDER:[TOKEN:[[id number]]]

## 2024-07-02 NOTE — DISCHARGE NOTE PROVIDER - NSDCFUSCHEDAPPT_GEN_ALL_CORE_FT
Bridget Bass  [facility] Physician Formerly [facility]  [facility] [address]  Scheduled Appointment: 09/04/2024

## 2024-07-02 NOTE — PROGRESS NOTE ADULT - SUBJECTIVE AND OBJECTIVE BOX
Hospital Day:  8d    Subjective:    Patient is a 89y old  Male who presents with a chief complaint of Fall (01 Jul 2024 17:34)      Admitted to medicine for a primary diagnosis of     Past Medical Hx:   HTN (hypertension)    Neurogenic bladder    Waldron catheter in place    Dementia      Past Sx:  No significant past surgical history      Allergies:  No Known Allergies    Current Meds:   Standng Meds:  aspirin  chewable 81 milliGRAM(s) Oral daily  atorvastatin 80 milliGRAM(s) Oral at bedtime  bacitracin   Ointment 1 Application(s) Topical three times a day  chlorhexidine 2% Cloths 1 Application(s) Topical daily  enoxaparin Injectable 40 milliGRAM(s) SubCutaneous every 24 hours  ferrous    sulfate 325 milliGRAM(s) Oral daily  lisinopril 5 milliGRAM(s) Oral daily  metoprolol tartrate 25 milliGRAM(s) Oral two times a day  mirtazapine 15 milliGRAM(s) Oral daily  pantoprazole    Tablet 40 milliGRAM(s) Oral before breakfast  polyethylene glycol 3350 17 Gram(s) Oral daily  QUEtiapine 12.5 milliGRAM(s) Oral at bedtime  senna 2 Tablet(s) Oral at bedtime  sodium chloride 1 Gram(s) Oral three times a day    PRN Meds:  acetaminophen     Tablet .. 650 milliGRAM(s) Oral every 6 hours PRN Temp greater or equal to 38C (100.4F), Mild Pain (1 - 3), Moderate Pain (4 - 6)    HOME MEDICATIONS:  acetaminophen 325 mg oral tablet: 3 tab(s) orally every 8 hours  alendronate 35 mg oral tablet: 1 tab(s) orally once a week  ferrous fumarate 325 mg (106 mg elemental iron) oral tablet: 1 tab(s) orally once a day  lisinopril 5 mg oral tablet: 1 tab(s) orally once a day  metoprolol tartrate 25 mg oral tablet: 1 tab(s) orally 2 times a day  mirtazapine 15 mg oral tablet: 1 tab(s) orally once a day  pantoprazole 40 mg oral delayed release tablet: 1 tab(s) orally once a day (before a meal)  polyethylene glycol 3350 oral powder for reconstitution: 17 gram(s) orally once a day As needed Constipation  senna leaf extract oral tablet: 2 tab(s) orally once a day (at bedtime) as needed for  constipation  sodium chloride 1 g oral tablet: 1 tab(s) orally 3 times a day      Vital Signs:   T(F): 97.5 (07-01-24 @ 19:56), Max: 97.5 (07-01-24 @ 19:56)  HR: 88 (07-01-24 @ 19:56) (74 - 88)  BP: 157/76 (07-01-24 @ 19:56) (126/72 - 157/76)  RR: 18 (07-01-24 @ 19:56) (18 - 19)  SpO2: 100% (07-01-24 @ 19:56) (100% - 100%)      06-30-24 @ 07:01  -  07-01-24 @ 07:00  --------------------------------------------------------  IN: 0 mL / OUT: 1100 mL / NET: -1100 mL    07-01-24 @ 07:01  -  07-02-24 @ 06:26  --------------------------------------------------------  IN: 0 mL / OUT: 200 mL / NET: -200 mL        Physical Exam:   GENERAL: NAD  HEENT: NCAT  CHEST/LUNG: CTAB  HEART: Regular rate and rhythm; s1 s2 appreciated, No murmurs, rubs, or gallops  ABDOMEN: Soft, Nontender, Nondistended; Bowel sounds present  EXTREMITIES: No LE edema b/l  SKIN: no rashes, no new lesions  NERVOUS SYSTEM:  Alert & Oriented X3  LINES/CATHETERS:        Labs:                         8.0    7.20  )-----------( 221      ( 01 Jul 2024 06:25 )             24.0                             Urinalysis Basic - ( 29 Jun 2024 08:44 )    Color: x / Appearance: x / SG: x / pH: x  Gluc: 87 mg/dL / Ketone: x  / Bili: x / Urobili: x   Blood: x / Protein: x / Nitrite: x   Leuk Esterase: x / RBC: x / WBC x   Sq Epi: x / Non Sq Epi: x / Bacteria: x             Hospital Day:  8d    Subjective:    Patient is a 89y old  Male who presents with a chief complaint of Fall. Pt was seen and evaluated at bedside. No acute events overnight  Admitted to medicine for a primary diagnosis of     Past Medical Hx:   HTN (hypertension)    Neurogenic bladder    Yañez catheter in place    Dementia      Past Sx:  No significant past surgical history      Allergies:  No Known Allergies    Current Meds:   Standng Meds:  aspirin  chewable 81 milliGRAM(s) Oral daily  atorvastatin 80 milliGRAM(s) Oral at bedtime  bacitracin   Ointment 1 Application(s) Topical three times a day  chlorhexidine 2% Cloths 1 Application(s) Topical daily  enoxaparin Injectable 40 milliGRAM(s) SubCutaneous every 24 hours  ferrous    sulfate 325 milliGRAM(s) Oral daily  lisinopril 5 milliGRAM(s) Oral daily  metoprolol tartrate 25 milliGRAM(s) Oral two times a day  mirtazapine 15 milliGRAM(s) Oral daily  pantoprazole    Tablet 40 milliGRAM(s) Oral before breakfast  polyethylene glycol 3350 17 Gram(s) Oral daily  QUEtiapine 12.5 milliGRAM(s) Oral at bedtime  senna 2 Tablet(s) Oral at bedtime  sodium chloride 1 Gram(s) Oral three times a day    PRN Meds:  acetaminophen     Tablet .. 650 milliGRAM(s) Oral every 6 hours PRN Temp greater or equal to 38C (100.4F), Mild Pain (1 - 3), Moderate Pain (4 - 6)    HOME MEDICATIONS:  acetaminophen 325 mg oral tablet: 3 tab(s) orally every 8 hours  alendronate 35 mg oral tablet: 1 tab(s) orally once a week  ferrous fumarate 325 mg (106 mg elemental iron) oral tablet: 1 tab(s) orally once a day  lisinopril 5 mg oral tablet: 1 tab(s) orally once a day  metoprolol tartrate 25 mg oral tablet: 1 tab(s) orally 2 times a day  mirtazapine 15 mg oral tablet: 1 tab(s) orally once a day  pantoprazole 40 mg oral delayed release tablet: 1 tab(s) orally once a day (before a meal)  polyethylene glycol 3350 oral powder for reconstitution: 17 gram(s) orally once a day As needed Constipation  senna leaf extract oral tablet: 2 tab(s) orally once a day (at bedtime) as needed for  constipation  sodium chloride 1 g oral tablet: 1 tab(s) orally 3 times a day      Vital Signs:   T(F): 97.5 (07-01-24 @ 19:56), Max: 97.5 (07-01-24 @ 19:56)  HR: 88 (07-01-24 @ 19:56) (74 - 88)  BP: 157/76 (07-01-24 @ 19:56) (126/72 - 157/76)  RR: 18 (07-01-24 @ 19:56) (18 - 19)  SpO2: 100% (07-01-24 @ 19:56) (100% - 100%)      06-30-24 @ 07:01  -  07-01-24 @ 07:00  --------------------------------------------------------  IN: 0 mL / OUT: 1100 mL / NET: -1100 mL    07-01-24 @ 07:01  -  07-02-24 @ 06:26  --------------------------------------------------------  IN: 0 mL / OUT: 200 mL / NET: -200 mL        Physical Exam:   GENERAL: NAD  HEENT: Right sided temporal and infraorbital ecchymosis.   CHEST/LUNG: CTAB  HEART: Regular rate and rhythm; s1 s2 appreciated, No murmurs, rubs, or gallops  ABDOMEN: Soft, Nontender, Nondistended; Bowel sounds present  EXTREMITIES: No LE edema b/l  SKIN: no rashes, no new lesions  NERVOUS SYSTEM:  Alert & Oriented X3  LINES/CATHETERS: yañez in place         Labs:                         8.0    7.20  )-----------( 221      ( 01 Jul 2024 06:25 )             24.0                             Urinalysis Basic - ( 29 Jun 2024 08:44 )    Color: x / Appearance: x / SG: x / pH: x  Gluc: 87 mg/dL / Ketone: x  / Bili: x / Urobili: x   Blood: x / Protein: x / Nitrite: x   Leuk Esterase: x / RBC: x / WBC x   Sq Epi: x / Non Sq Epi: x / Bacteria: x             Hospital Day:  8d    Subjective:    Patient is a 89y old  Male who presents with a chief complaint of Fall. Pt was seen and evaluated at bedside. No acute events overnight  Admitted to medicine for a primary diagnosis of STEMI    Past Medical Hx:   HTN (hypertension)    Neurogenic bladder    Yañez catheter in place    Dementia      Past Sx:  No significant past surgical history      Allergies:  No Known Allergies    Current Meds:   Standng Meds:  aspirin  chewable 81 milliGRAM(s) Oral daily  atorvastatin 80 milliGRAM(s) Oral at bedtime  bacitracin   Ointment 1 Application(s) Topical three times a day  chlorhexidine 2% Cloths 1 Application(s) Topical daily  enoxaparin Injectable 40 milliGRAM(s) SubCutaneous every 24 hours  ferrous    sulfate 325 milliGRAM(s) Oral daily  lisinopril 5 milliGRAM(s) Oral daily  metoprolol tartrate 25 milliGRAM(s) Oral two times a day  mirtazapine 15 milliGRAM(s) Oral daily  pantoprazole    Tablet 40 milliGRAM(s) Oral before breakfast  polyethylene glycol 3350 17 Gram(s) Oral daily  QUEtiapine 12.5 milliGRAM(s) Oral at bedtime  senna 2 Tablet(s) Oral at bedtime  sodium chloride 1 Gram(s) Oral three times a day    PRN Meds:  acetaminophen     Tablet .. 650 milliGRAM(s) Oral every 6 hours PRN Temp greater or equal to 38C (100.4F), Mild Pain (1 - 3), Moderate Pain (4 - 6)    HOME MEDICATIONS:  acetaminophen 325 mg oral tablet: 3 tab(s) orally every 8 hours  alendronate 35 mg oral tablet: 1 tab(s) orally once a week  ferrous fumarate 325 mg (106 mg elemental iron) oral tablet: 1 tab(s) orally once a day  lisinopril 5 mg oral tablet: 1 tab(s) orally once a day  metoprolol tartrate 25 mg oral tablet: 1 tab(s) orally 2 times a day  mirtazapine 15 mg oral tablet: 1 tab(s) orally once a day  pantoprazole 40 mg oral delayed release tablet: 1 tab(s) orally once a day (before a meal)  polyethylene glycol 3350 oral powder for reconstitution: 17 gram(s) orally once a day As needed Constipation  senna leaf extract oral tablet: 2 tab(s) orally once a day (at bedtime) as needed for  constipation  sodium chloride 1 g oral tablet: 1 tab(s) orally 3 times a day      Vital Signs:   T(F): 97.5 (07-01-24 @ 19:56), Max: 97.5 (07-01-24 @ 19:56)  HR: 88 (07-01-24 @ 19:56) (74 - 88)  BP: 157/76 (07-01-24 @ 19:56) (126/72 - 157/76)  RR: 18 (07-01-24 @ 19:56) (18 - 19)  SpO2: 100% (07-01-24 @ 19:56) (100% - 100%)      06-30-24 @ 07:01  -  07-01-24 @ 07:00  --------------------------------------------------------  IN: 0 mL / OUT: 1100 mL / NET: -1100 mL    07-01-24 @ 07:01  -  07-02-24 @ 06:26  --------------------------------------------------------  IN: 0 mL / OUT: 200 mL / NET: -200 mL        Physical Exam:   GENERAL: NAD  HEENT: Right sided temporal and infraorbital ecchymosis.   CHEST/LUNG: CTAB  HEART: Regular rate and rhythm; s1 s2 appreciated, No murmurs, rubs, or gallops  ABDOMEN: Soft, Nontender, Nondistended; Bowel sounds present  EXTREMITIES: No LE edema b/l  SKIN: no rashes, no new lesions  NERVOUS SYSTEM:  Alert & Oriented X3  LINES/CATHETERS: yañez in place         Labs:                         8.0    7.20  )-----------( 221      ( 01 Jul 2024 06:25 )             24.0                             Urinalysis Basic - ( 29 Jun 2024 08:44 )    Color: x / Appearance: x / SG: x / pH: x  Gluc: 87 mg/dL / Ketone: x  / Bili: x / Urobili: x   Blood: x / Protein: x / Nitrite: x   Leuk Esterase: x / RBC: x / WBC x   Sq Epi: x / Non Sq Epi: x / Bacteria: x

## 2024-07-02 NOTE — PHYSICAL THERAPY INITIAL EVALUATION ADULT - GENERAL OBSERVATIONS, REHAB EVAL
10:28-10:34. Pt encountered semifowler in bed in NAD, in deep sleep. As per Lorna PCA pt has been sleeping, was up all night. Will f/u with PT at later time as schedule permits.
Patient was educated on importance of OOB activities and participating in early ambulation. However patient started raising his voice and continued to decline. Patient verbalized, "I am comfortable like this. I just want to sit here and relax." Upon asked, patient even declined PT to return back later. JEFF Rubio was notified. PT will f/u when appropriate.
11:30-12:00. Pt encountered semifowler in bed in NAD, +abdominal binder, rock-n-roll posey,  yañez, no complaints. Pt agreeable to PT.

## 2024-07-02 NOTE — PROGRESS NOTE ADULT - SUBJECTIVE AND OBJECTIVE BOX
pt seen and examined.     no acute issues overnight     Vital Signs Last 24 Hrs  Vital Signs Last 24 Hrs  T(C): 36.4 (02 Jul 2024 06:29), Max: 36.4 (01 Jul 2024 19:56)  T(F): 97.6 (02 Jul 2024 06:29), Max: 97.6 (02 Jul 2024 06:29)  HR: 89 (02 Jul 2024 06:29) (74 - 89)  BP: 133/80 (02 Jul 2024 06:29) (126/72 - 157/76)  BP(mean): --  RR: 18 (02 Jul 2024 06:29) (18 - 19)  SpO2: 100% (02 Jul 2024 06:29) (100% - 100%)      physical exam  constitutional NAD, AA, Respiratory  lungs CTA, CVS heart RRR, GI: abdomen Soft NT, ND, BS+, skin: right eye , periorbital bruise , scalp laceration, sp sutures   neuro exam, gen weakness     MEDICATIONS  (STANDING):  aspirin  chewable 81 milliGRAM(s) Oral daily  atorvastatin 80 milliGRAM(s) Oral at bedtime  bacitracin   Ointment 1 Application(s) Topical three times a day  chlorhexidine 2% Cloths 1 Application(s) Topical daily  enoxaparin Injectable 40 milliGRAM(s) SubCutaneous every 24 hours  ferrous    sulfate 325 milliGRAM(s) Oral daily  lisinopril 5 milliGRAM(s) Oral daily  metoprolol tartrate 25 milliGRAM(s) Oral two times a day  mirtazapine 15 milliGRAM(s) Oral daily  pantoprazole    Tablet 40 milliGRAM(s) Oral before breakfast  polyethylene glycol 3350 17 Gram(s) Oral daily  QUEtiapine 12.5 milliGRAM(s) Oral at bedtime  senna 2 Tablet(s) Oral at bedtime  sodium chloride 1 Gram(s) Oral three times a day    MEDICATIONS  (PRN):  acetaminophen     Tablet .. 650 milliGRAM(s) Oral every 6 hours PRN Temp greater or equal to 38C (100.4F), Mild Pain (1 - 3), Moderate Pain (4 - 6)                 LABS:                        8.0    7.48  )-----------( 214      ( 02 Jul 2024 08:30 )             23.8     07-02    137  |  101  |  17  ----------------------------<  81  4.9   |  25  |  1.2    Ca    9.3      02 Jul 2024 08:30            A/p   89 yo woman w/ Hx of dementia, chronic hyponatremia, neurogenic bladder on chronic Yañez, Hx left Hx fracture 3/24 presenting with right temporal laceration after an unwitnessed fall, found to have ST elevation in inferior leads with reciprocal ST depression in lateral leads.      #  Acute blood loss anemia   stable hgb   monitor     # fall, temporal sculp laceration, local care   sutured laceration done in ER ,  ( placed june 24, may remove after 10 days)     # STEMI   medical management    - TTE: Left ventricular ejection fraction, by visual estimation, is 60 to 65%. Normal global left ventricular systolic function. Impaired relaxation pattern of left  ventricular myocardial filling (Grade I diastolic dysfunction). Mildly enlarged left atrium.  - Cardio consult appreciated  - No plan for [facility]  OP cardiology follow up     #  Hypertension   controlled,   - C/w home lisinopril 5 mg daily and metoprolol tartrate 25 mg BID  hold lisinopril if bp is low     # T12 compression fracture:  - Neurosurgery consulted  - No acute surgical intervention --> abdominal binder for support - TLSO when OOB. Will need PT/OT. Pain management consulted --> tylenol 975 mg q8h.    #  Dementia:  - Continue with home Mirtazapine  - Avoid CNS depressants  - reorientation  - Seroquel hs prn     # Chronic  Hyponatremia:  Sodium: 134 mmol/L (06-29-24 @ 08:44)  Sodium: 135 mmol/L (06-28-24 @ 06:51)  - Continue with salt tablets    #?  Neurogenic bladder  - Chronic yañez   cont     #Progress Note Handoff    follow up: pending placement. , auth, will be discharged once cleared by CM

## 2024-07-02 NOTE — DISCHARGE NOTE PROVIDER - NSDCCPCAREPLAN_GEN_ALL_CORE_FT
PRINCIPAL DISCHARGE DIAGNOSIS  Diagnosis: STEMI (ST elevation myocardial infarction)  Assessment and Plan of Treatment: You came in after an unwitnessed fall in the nursing home. you were foyund to have laceration on your head which was sutured in the Emergency room and there was signs of a brain bleed on imaging. You were found to have an ST elevation myocardial infarction, and was medically managed by cardiology and they did place any stents. You were also found to have T12 spinal fracture and neurosurgery opted for conservative managment with abdominal binders and pain control but no surgery. Please followup with your PCP and cardiologist as outpatient.   If you have any chest pain or shortness of breath please let the nursing facility know or come to the emrgency room.      SECONDARY DISCHARGE DIAGNOSES  Diagnosis: Fall  Assessment and Plan of Treatment:     Diagnosis: Scalp laceration  Assessment and Plan of Treatment:      PRINCIPAL DISCHARGE DIAGNOSIS  Diagnosis: STEMI (ST elevation myocardial infarction)  Assessment and Plan of Treatment: You came in after an unwitnessed fall in the nursing home. you were foyund to have laceration on your head which was sutured in the Emergency room and there was signs of a brain bleed on imaging. You were found to have an ST elevation myocardial infarction, and was medically managed by cardiology and they did place any stents. You were also found to have T12 spinal fracture and neurosurgery opted for conservative managment with abdominal binders and pain control but no surgery. Please followup with your PCP and cardiologist as outpatient. For anemia, your hemoglobin is now stable. Please follow up with the [facility] in 1 week.   If you have any chest pain or shortness of breath please let the nursing facility know or come to the emrgency room.      SECONDARY DISCHARGE DIAGNOSES  Diagnosis: Fall  Assessment and Plan of Treatment:     Diagnosis: Scalp laceration  Assessment and Plan of Treatment:

## 2024-07-02 NOTE — DISCHARGE NOTE PROVIDER - NSDCQMAMI_CARD_ALL_CORE
Patient seen for MST score 2 or > risk rescreening. Patient has been screened for and presents negative for    -Pressure ulcers stage 2 or greater  -Enteral or Parenteral Nutrition  -BMI <18  -[id number] >8  -Chewing/Swallowing Difficulty  -Decreased appetite  -Unintentional Weight Loss  -Inadequate diet (i.e. NPO/Clear Liquids)    No intervention required at this time. RD remains available. Pt seen on medical floor, adm w/ dizziness, r/o stroke ; ANASTACIO vs CKD; essential HTN ; DM2 ( 7/6 - HgbA1c = 7.3 %. Denies N/V/D/C/Chewing/Swallowing issues, No food allergies. No hx of wt loss. Pt does not weigh herself and has no idea what she weighs. Her clothing is fitting the same. Consuming 75 - 100% meals. Checks her FS 2x daily. MST score 2 or > N/A at this time. Will remain available upon consultation.
Yes...

## 2024-07-02 NOTE — DISCHARGE NOTE PROVIDER - NSDCACTIVITY_GEN_ALL_CORE
Ambulation w/ walker and assistance only/Do not drive or operate machinery/Do not make important decisions

## 2024-07-02 NOTE — DISCHARGE NOTE PROVIDER - CARE PROVIDER_API CALL
Kunal Weinstein  Neurosurgery  [address], [address]  [place] [address]  Phone: [phone]  Fax: [phone]  Follow Up Time: 2 weeks    Aki Mcrae  Interventional Cardiology  [address], [address]  [place] [address]  Phone: [phone]  Fax: [phone]  Follow Up Time: 1 week    Kumar Aceves  Internal Medicine  [address] [place] [address]  Phone: [phone]  Fax: [phone]  Follow Up Time: 1 week   Kunal Weinstein  Neurosurgery  [address], [address]  [place] [address]  Phone: [phone]  Fax: [phone]  Follow Up Time: 2 weeks    Aki Mcrae  Interventional Cardiology  [address], [address]  [place] [address]  Phone: [phone]  Fax: [phone]  Follow Up Time: 1 week    Kumar Aceves  Internal Medicine  [address] [place] [address]  Phone: [phone]  Fax: [phone]  Follow Up Time: 1 week    Srinivas Ackerman  Gastroenterology  [address] [place] [address]  Phone: [phone]  Fax: [phone]  Follow Up Time:

## 2024-07-02 NOTE — DISCHARGE NOTE PROVIDER - NSDCMRMEDTOKEN_GEN_ALL_CORE_FT
acetaminophen 325 mg oral tablet: 3 tab(s) orally every 8 hours  alendronate 35 mg oral tablet: 1 tab(s) orally once a week  ferrous fumarate 325 mg (106 mg elemental iron) oral tablet: 1 tab(s) orally once a day  lisinopril 5 mg oral tablet: 1 tab(s) orally once a day  Lovenox 40 mg/0.4 mL injectable solution: 40 milligram(s) injectable once a day subcutaneous, for 6 weeks post op for DVT prophylaxis  metoprolol tartrate 25 mg oral tablet: 1 tab(s) orally 2 times a day  mirtazapine 15 mg oral tablet: 1 tab(s) orally once a day  pantoprazole 40 mg oral delayed release tablet: 1 tab(s) orally once a day (before a meal)  polyethylene glycol 3350 oral powder for reconstitution: 17 gram(s) orally once a day As needed Constipation  senna leaf extract oral tablet: 2 tab(s) orally once a day (at bedtime) as needed for  constipation  sodium chloride 1 g oral tablet: 1 tab(s) orally 3 times a day   acetaminophen 325 mg oral tablet: 3 tab(s) orally every 8 hours  alendronate 35 mg oral tablet: 1 tab(s) orally once a week  aspirin 81 mg oral tablet, chewable: 1 tab(s) orally once a day  atorvastatin 80 mg oral tablet: 1 tab(s) orally once a day (at bedtime)  ferrous fumarate 325 mg (106 mg elemental iron) oral tablet: 1 tab(s) orally once a day  lisinopril 5 mg oral tablet: 1 tab(s) orally once a day  Lovenox 40 mg/0.4 mL injectable solution: 40 milligram(s) injectable once a day subcutaneous, for 6 weeks post op for DVT prophylaxis  metoprolol tartrate 25 mg oral tablet: 1 tab(s) orally 2 times a day  mirtazapine 15 mg oral tablet: 1 tab(s) orally once a day  pantoprazole 40 mg oral delayed release tablet: 1 tab(s) orally once a day (before a meal)  polyethylene glycol 3350 oral powder for reconstitution: 17 gram(s) orally once a day As needed Constipation  senna leaf extract oral tablet: 2 tab(s) orally once a day (at bedtime) as needed for  constipation  sodium chloride 1 g oral tablet: 1 tab(s) orally 3 times a day   acetaminophen 325 mg oral tablet: 3 tab(s) orally every 8 hours  alendronate 35 mg oral tablet: 1 tab(s) orally once a week  aspirin 81 mg oral tablet, chewable: 1 tab(s) orally once a day  atorvastatin 80 mg oral tablet: 1 tab(s) orally once a day (at bedtime)  lisinopril 5 mg oral tablet: 1 tab(s) orally once a day  Lovenox 40 mg/0.4 mL injectable solution: 40 milligram(s) injectable once a day subcutaneous, for 6 weeks post op for DVT prophylaxis  metoprolol tartrate 25 mg oral tablet: 1 tab(s) orally 2 times a day  mirtazapine 15 mg oral tablet: 1 tab(s) orally once a day  Multiple Vitamins oral tablet: 1 tab(s) orally every 24 hours  pantoprazole 40 mg oral delayed release tablet: 1 tab(s) orally once a day (before a meal)  polyethylene glycol 3350 oral powder for reconstitution: 17 gram(s) orally once a day As needed Constipation  senna leaf extract oral tablet: 2 tab(s) orally once a day (at bedtime) as needed for  constipation  sodium chloride 1 g oral tablet: 1 tab(s) orally 3 times a day   acetaminophen 325 mg oral tablet: 3 tab(s) orally every 8 hours  alendronate 35 mg oral tablet: 1 tab(s) orally once a week  aspirin 81 mg oral tablet, chewable: 1 tab(s) orally once a day  atorvastatin 80 mg oral tablet: 1 tab(s) orally once a day (at bedtime)  Lovenox 40 mg/0.4 mL injectable solution: 40 milligram(s) injectable once a day subcutaneous, for 6 weeks post op for DVT prophylaxis  mirtazapine 15 mg oral tablet: 1 tab(s) orally once a day  Multiple Vitamins oral tablet: 1 tab(s) orally every 24 hours  pantoprazole 40 mg oral delayed release tablet: 1 tab(s) orally once a day (before a meal)  polyethylene glycol 3350 oral powder for reconstitution: 17 gram(s) orally once a day As needed Constipation  senna leaf extract oral tablet: 2 tab(s) orally once a day (at bedtime) as needed for  constipation  sodium chloride 1 g oral tablet: 1 tab(s) orally 3 times a day   acetaminophen 325 mg oral tablet: 3 tab(s) orally every 8 hours  alendronate 35 mg oral tablet: 1 tab(s) orally once a week  aspirin 81 mg oral tablet, chewable: 1 tab(s) orally once a day  atorvastatin 80 mg oral tablet: 1 tab(s) orally once a day (at bedtime)  Lovenox 40 mg/0.4 mL injectable solution: 40 milligram(s) injectable once a day subcutaneous, for 6 weeks post op for DVT prophylaxis  Metoprolol Tartrate 25 mg oral tablet: 0.5 tab(s) orally 2 times a day HOLD IF  OR BELOW, HR 60 BPM OR BELOW  mirtazapine 15 mg oral tablet: 1 tab(s) orally once a day  Multiple Vitamins oral tablet: 1 tab(s) orally every 24 hours  pantoprazole 40 mg oral delayed release tablet: 1 tab(s) orally once a day (before a meal)  polyethylene glycol 3350 oral powder for reconstitution: 17 gram(s) orally once a day As needed Constipation  senna leaf extract oral tablet: 2 tab(s) orally once a day (at bedtime) as needed for  constipation  sodium chloride 1 g oral tablet: 1 tab(s) orally 3 times a day

## 2024-07-03 LAB
ANION GAP SERPL CALC-SCNC: 10 MMOL/L — SIGNIFICANT CHANGE UP (ref 7–14)
BASOPHILS # BLD AUTO: 0.02 K/UL — SIGNIFICANT CHANGE UP (ref 0–0.2)
BASOPHILS NFR BLD AUTO: 0.3 % — SIGNIFICANT CHANGE UP (ref 0–1)
BUN SERPL-MCNC: 19 MG/DL — SIGNIFICANT CHANGE UP (ref 10–20)
CALCIUM SERPL-MCNC: 8.8 MG/DL — SIGNIFICANT CHANGE UP (ref 8.4–10.4)
CHLORIDE SERPL-SCNC: 98 MMOL/L — SIGNIFICANT CHANGE UP (ref 98–110)
CO2 SERPL-SCNC: 22 MMOL/L — SIGNIFICANT CHANGE UP (ref 17–32)
CREAT SERPL-MCNC: 1.4 MG/DL — SIGNIFICANT CHANGE UP (ref 0.7–1.5)
EGFR: 49 ML/MIN/1.73M2 — LOW
EOSINOPHIL # BLD AUTO: 0.19 K/UL — SIGNIFICANT CHANGE UP (ref 0–0.7)
EOSINOPHIL NFR BLD AUTO: 2.6 % — SIGNIFICANT CHANGE UP (ref 0–8)
FOLATE SERPL-MCNC: >20 NG/ML — SIGNIFICANT CHANGE UP
GLUCOSE SERPL-MCNC: 83 MG/DL — SIGNIFICANT CHANGE UP (ref 70–99)
HCT VFR BLD CALC: 22.3 % — LOW (ref 42–52)
HCT VFR BLD CALC: 23.9 % — LOW (ref 42–52)
HCT VFR BLD CALC: 26.1 % — LOW (ref 42–52)
HGB BLD-MCNC: 7 G/DL — LOW (ref 14–18)
HGB BLD-MCNC: 7.9 G/DL — LOW (ref 14–18)
HGB BLD-MCNC: 8.5 G/DL — LOW (ref 14–18)
IMM GRANULOCYTES NFR BLD AUTO: 0.3 % — SIGNIFICANT CHANGE UP (ref 0.1–0.3)
IRON SATN MFR SERPL: 12 % — LOW (ref 15–50)
IRON SATN MFR SERPL: 29 UG/DL — LOW (ref 35–150)
LYMPHOCYTES # BLD AUTO: 0.93 K/UL — LOW (ref 1.2–3.4)
LYMPHOCYTES # BLD AUTO: 12.9 % — LOW (ref 20.5–51.1)
MAGNESIUM SERPL-MCNC: 1.8 MG/DL — SIGNIFICANT CHANGE UP (ref 1.8–2.4)
MCHC RBC-ENTMCNC: 28.3 PG — SIGNIFICANT CHANGE UP (ref 27–31)
MCHC RBC-ENTMCNC: 28.8 PG — SIGNIFICANT CHANGE UP (ref 27–31)
MCHC RBC-ENTMCNC: 29.3 PG — SIGNIFICANT CHANGE UP (ref 27–31)
MCHC RBC-ENTMCNC: 31.4 G/DL — LOW (ref 32–37)
MCHC RBC-ENTMCNC: 32.6 G/DL — SIGNIFICANT CHANGE UP (ref 32–37)
MCHC RBC-ENTMCNC: 33.1 G/DL — SIGNIFICANT CHANGE UP (ref 32–37)
MCV RBC AUTO: 88.5 FL — SIGNIFICANT CHANGE UP (ref 80–94)
MCV RBC AUTO: 88.5 FL — SIGNIFICANT CHANGE UP (ref 80–94)
MCV RBC AUTO: 90.3 FL — SIGNIFICANT CHANGE UP (ref 80–94)
MONOCYTES # BLD AUTO: 0.47 K/UL — SIGNIFICANT CHANGE UP (ref 0.1–0.6)
MONOCYTES NFR BLD AUTO: 6.5 % — SIGNIFICANT CHANGE UP (ref 1.7–9.3)
NEUTROPHILS # BLD AUTO: 5.57 K/UL — SIGNIFICANT CHANGE UP (ref 1.4–6.5)
NEUTROPHILS NFR BLD AUTO: 77.4 % — HIGH (ref 42.2–75.2)
NRBC # BLD: 0 /100 WBCS — SIGNIFICANT CHANGE UP (ref 0–0)
PLATELET # BLD AUTO: 208 K/UL — SIGNIFICANT CHANGE UP (ref 130–400)
PLATELET # BLD AUTO: 221 K/UL — SIGNIFICANT CHANGE UP (ref 130–400)
PLATELET # BLD AUTO: 254 K/UL — SIGNIFICANT CHANGE UP (ref 130–400)
PMV BLD: 9 FL — SIGNIFICANT CHANGE UP (ref 7.4–10.4)
PMV BLD: 9.6 FL — SIGNIFICANT CHANGE UP (ref 7.4–10.4)
PMV BLD: 9.6 FL — SIGNIFICANT CHANGE UP (ref 7.4–10.4)
POTASSIUM SERPL-MCNC: 4.4 MMOL/L — SIGNIFICANT CHANGE UP (ref 3.5–5)
POTASSIUM SERPL-SCNC: 4.4 MMOL/L — SIGNIFICANT CHANGE UP (ref 3.5–5)
RBC # BLD: 2.47 M/UL — LOW (ref 4.7–6.1)
RBC # BLD: 2.7 M/UL — LOW (ref 4.7–6.1)
RBC # BLD: 2.95 M/UL — LOW (ref 4.7–6.1)
RBC # FLD: 16.3 % — HIGH (ref 11.5–14.5)
RBC # FLD: 16.4 % — HIGH (ref 11.5–14.5)
RBC # FLD: 16.5 % — HIGH (ref 11.5–14.5)
SODIUM SERPL-SCNC: 130 MMOL/L — LOW (ref 135–146)
TIBC SERPL-MCNC: 235 UG/DL — SIGNIFICANT CHANGE UP (ref 220–430)
UIBC SERPL-MCNC: 206 UG/DL — SIGNIFICANT CHANGE UP (ref 110–370)
VIT B12 SERPL-MCNC: 406 PG/ML — SIGNIFICANT CHANGE UP (ref 232–1245)
WBC # BLD: 6.16 K/UL — SIGNIFICANT CHANGE UP (ref 4.8–10.8)
WBC # BLD: 7.2 K/UL — SIGNIFICANT CHANGE UP (ref 4.8–10.8)
WBC # BLD: 9.31 K/UL — SIGNIFICANT CHANGE UP (ref 4.8–10.8)
WBC # FLD AUTO: 6.16 K/UL — SIGNIFICANT CHANGE UP (ref 4.8–10.8)
WBC # FLD AUTO: 7.2 K/UL — SIGNIFICANT CHANGE UP (ref 4.8–10.8)
WBC # FLD AUTO: 9.31 K/UL — SIGNIFICANT CHANGE UP (ref 4.8–10.8)

## 2024-07-03 PROCEDURE — 99232 SBSQ HOSP IP/OBS MODERATE 35: CPT

## 2024-07-03 RX ORDER — IRON SUCROSE 20 MG/ML
200 INJECTION, SOLUTION INTRAVENOUS EVERY 24 HOURS
Refills: 0 | Status: DISCONTINUED | OUTPATIENT
Start: 2024-07-03 | End: 2024-07-03

## 2024-07-03 RX ORDER — IRON SUCROSE 20 MG/ML
200 INJECTION, SOLUTION INTRAVENOUS EVERY 24 HOURS
Refills: 0 | Status: COMPLETED | OUTPATIENT
Start: 2024-07-04 | End: 2024-07-07

## 2024-07-03 RX ORDER — IRON SUCROSE 20 MG/ML
200 INJECTION, SOLUTION INTRAVENOUS ONCE
Refills: 0 | Status: DISCONTINUED | OUTPATIENT
Start: 2024-07-03 | End: 2024-07-03

## 2024-07-03 RX ADMIN — LISINOPRIL 5 MILLIGRAM(S): 5 TABLET ORAL at 05:29

## 2024-07-03 RX ADMIN — MIRTAZAPINE 15 MILLIGRAM(S): 15 TABLET, FILM COATED ORAL at 11:48

## 2024-07-03 RX ADMIN — ASPIRIN 81 MILLIGRAM(S): 325 TABLET, FILM COATED ORAL at 11:49

## 2024-07-03 RX ADMIN — Medication 1 GRAM(S): at 21:43

## 2024-07-03 RX ADMIN — Medication 25 MILLIGRAM(S): at 17:01

## 2024-07-03 RX ADMIN — Medication 12.5 MILLIGRAM(S): at 21:42

## 2024-07-03 RX ADMIN — Medication 1 GRAM(S): at 15:10

## 2024-07-03 RX ADMIN — Medication 1 TABLET(S): at 11:49

## 2024-07-03 RX ADMIN — Medication 1 APPLICATION(S): at 15:10

## 2024-07-03 RX ADMIN — Medication 1 GRAM(S): at 05:29

## 2024-07-03 RX ADMIN — POLYETHYLENE GLYCOL 3350 17 GRAM(S): 1 POWDER ORAL at 11:39

## 2024-07-03 RX ADMIN — Medication 1 APPLICATION(S): at 21:43

## 2024-07-03 RX ADMIN — Medication 2 TABLET(S): at 21:43

## 2024-07-03 RX ADMIN — Medication 1 APPLICATION(S): at 11:40

## 2024-07-03 RX ADMIN — Medication 325 MILLIGRAM(S): at 11:48

## 2024-07-03 RX ADMIN — IRON SUCROSE 110 MILLIGRAM(S): 20 INJECTION, SOLUTION INTRAVENOUS at 15:08

## 2024-07-03 RX ADMIN — ATORVASTATIN CALCIUM 80 MILLIGRAM(S): 20 TABLET, FILM COATED ORAL at 21:42

## 2024-07-03 RX ADMIN — PANTOPRAZOLE SODIUM 40 MILLIGRAM(S): 40 INJECTION, POWDER, FOR SOLUTION INTRAVENOUS at 05:30

## 2024-07-03 RX ADMIN — Medication 1 APPLICATION(S): at 05:29

## 2024-07-03 RX ADMIN — Medication 25 MILLIGRAM(S): at 05:30

## 2024-07-03 NOTE — PROGRESS NOTE ADULT - SUBJECTIVE AND OBJECTIVE BOX
Hospital Day:  9d    Subjective:    Patient is a 89y old  Male who presents with a chief complaint of Fall (02 Jul 2024 11:40)      Admitted to medicine for a primary diagnosis of     Past Medical Hx:   HTN (hypertension)    Neurogenic bladder    Waldron catheter in place    Dementia      Past Sx:  No significant past surgical history      Allergies:  No Known Allergies    Current Meds:   Standng Meds:  aspirin  chewable 81 milliGRAM(s) Oral daily  atorvastatin 80 milliGRAM(s) Oral at bedtime  bacitracin   Ointment 1 Application(s) Topical three times a day  chlorhexidine 2% Cloths 1 Application(s) Topical daily  enoxaparin Injectable 40 milliGRAM(s) SubCutaneous every 24 hours  ferrous    sulfate 325 milliGRAM(s) Oral daily  lisinopril 5 milliGRAM(s) Oral daily  metoprolol tartrate 25 milliGRAM(s) Oral two times a day  mirtazapine 15 milliGRAM(s) Oral daily  pantoprazole    Tablet 40 milliGRAM(s) Oral before breakfast  polyethylene glycol 3350 17 Gram(s) Oral daily  QUEtiapine 12.5 milliGRAM(s) Oral at bedtime  senna 2 Tablet(s) Oral at bedtime  sodium chloride 1 Gram(s) Oral three times a day    PRN Meds:  acetaminophen     Tablet .. 650 milliGRAM(s) Oral every 6 hours PRN Temp greater or equal to 38C (100.4F), Mild Pain (1 - 3), Moderate Pain (4 - 6)    HOME MEDICATIONS:  acetaminophen 325 mg oral tablet: 3 tab(s) orally every 8 hours  alendronate 35 mg oral tablet: 1 tab(s) orally once a week  aspirin 81 mg oral tablet, chewable: 1 tab(s) orally once a day  atorvastatin 80 mg oral tablet: 1 tab(s) orally once a day (at bedtime)  ferrous fumarate 325 mg (106 mg elemental iron) oral tablet: 1 tab(s) orally once a day  lisinopril 5 mg oral tablet: 1 tab(s) orally once a day  metoprolol tartrate 25 mg oral tablet: 1 tab(s) orally 2 times a day  mirtazapine 15 mg oral tablet: 1 tab(s) orally once a day  pantoprazole 40 mg oral delayed release tablet: 1 tab(s) orally once a day (before a meal)  polyethylene glycol 3350 oral powder for reconstitution: 17 gram(s) orally once a day As needed Constipation  senna leaf extract oral tablet: 2 tab(s) orally once a day (at bedtime) as needed for  constipation  sodium chloride 1 g oral tablet: 1 tab(s) orally 3 times a day      Vital Signs:   T(F): 97.5 (07-03-24 @ 05:00), Max: 97.9 (07-02-24 @ 19:27)  HR: 71 (07-03-24 @ 05:00) (71 - 89)  BP: 105/62 (07-03-24 @ 05:00) (105/62 - 138/73)  RR: 18 (07-03-24 @ 05:00) (18 - 18)  SpO2: 97% (07-02-24 @ 19:27) (91% - 100%)      07-01-24 @ 07:01  -  07-02-24 @ 07:00  --------------------------------------------------------  IN: 0 mL / OUT: 700 mL / NET: -700 mL    07-02-24 @ 07:01  -  07-03-24 @ 06:09  --------------------------------------------------------  IN: 0 mL / OUT: 1500 mL / NET: -1500 mL        Physical Exam:   GENERAL: NAD  HEENT: NCAT  CHEST/LUNG: CTAB  HEART: Regular rate and rhythm; s1 s2 appreciated, No murmurs, rubs, or gallops  ABDOMEN: Soft, Nontender, Nondistended; Bowel sounds present  EXTREMITIES: No LE edema b/l  SKIN: no rashes, no new lesions  NERVOUS SYSTEM:  Alert & Oriented X3  LINES/CATHETERS:        Labs:                         8.0    7.48  )-----------( 214      ( 02 Jul 2024 08:30 )             23.8       02 Jul 2024 08:30    137    |  101    |  17     ----------------------------<  81     4.9     |  25     |  1.2      Ca    9.3        02 Jul 2024 08:30                      Urinalysis Basic - ( 02 Jul 2024 08:30 )    Color: x / Appearance: x / SG: x / pH: x  Gluc: 81 mg/dL / Ketone: x  / Bili: x / Urobili: x   Blood: x / Protein: x / Nitrite: x   Leuk Esterase: x / RBC: x / WBC x   Sq Epi: x / Non Sq Epi: x / Bacteria: x             Hospital Day:  9d    Subjective:    Patient is a 89y old  Male who presents with a chief complaint of Fall. PT was seen and evaluated at bedside. No acute events overnight.   Admitted to medicine for a primary diagnosis of STEMI    Past Medical Hx:   HTN (hypertension)    Neurogenic bladder    Yañez catheter in place    Dementia      Past Sx:  No significant past surgical history      Allergies:  No Known Allergies    Current Meds:   Standng Meds:  aspirin  chewable 81 milliGRAM(s) Oral daily  atorvastatin 80 milliGRAM(s) Oral at bedtime  bacitracin   Ointment 1 Application(s) Topical three times a day  chlorhexidine 2% Cloths 1 Application(s) Topical daily  enoxaparin Injectable 40 milliGRAM(s) SubCutaneous every 24 hours  ferrous    sulfate 325 milliGRAM(s) Oral daily  lisinopril 5 milliGRAM(s) Oral daily  metoprolol tartrate 25 milliGRAM(s) Oral two times a day  mirtazapine 15 milliGRAM(s) Oral daily  pantoprazole    Tablet 40 milliGRAM(s) Oral before breakfast  polyethylene glycol 3350 17 Gram(s) Oral daily  QUEtiapine 12.5 milliGRAM(s) Oral at bedtime  senna 2 Tablet(s) Oral at bedtime  sodium chloride 1 Gram(s) Oral three times a day    PRN Meds:  acetaminophen     Tablet .. 650 milliGRAM(s) Oral every 6 hours PRN Temp greater or equal to 38C (100.4F), Mild Pain (1 - 3), Moderate Pain (4 - 6)    HOME MEDICATIONS:  acetaminophen 325 mg oral tablet: 3 tab(s) orally every 8 hours  alendronate 35 mg oral tablet: 1 tab(s) orally once a week  aspirin 81 mg oral tablet, chewable: 1 tab(s) orally once a day  atorvastatin 80 mg oral tablet: 1 tab(s) orally once a day (at bedtime)  ferrous fumarate 325 mg (106 mg elemental iron) oral tablet: 1 tab(s) orally once a day  lisinopril 5 mg oral tablet: 1 tab(s) orally once a day  metoprolol tartrate 25 mg oral tablet: 1 tab(s) orally 2 times a day  mirtazapine 15 mg oral tablet: 1 tab(s) orally once a day  pantoprazole 40 mg oral delayed release tablet: 1 tab(s) orally once a day (before a meal)  polyethylene glycol 3350 oral powder for reconstitution: 17 gram(s) orally once a day As needed Constipation  senna leaf extract oral tablet: 2 tab(s) orally once a day (at bedtime) as needed for  constipation  sodium chloride 1 g oral tablet: 1 tab(s) orally 3 times a day      Vital Signs:   T(F): 97.5 (07-03-24 @ 05:00), Max: 97.9 (07-02-24 @ 19:27)  HR: 71 (07-03-24 @ 05:00) (71 - 89)  BP: 105/62 (07-03-24 @ 05:00) (105/62 - 138/73)  RR: 18 (07-03-24 @ 05:00) (18 - 18)  SpO2: 97% (07-02-24 @ 19:27) (91% - 100%)      07-01-24 @ 07:01  -  07-02-24 @ 07:00  --------------------------------------------------------  IN: 0 mL / OUT: 700 mL / NET: -700 mL    07-02-24 @ 07:01  -  07-03-24 @ 06:09  --------------------------------------------------------  IN: 0 mL / OUT: 1500 mL / NET: -1500 mL        Physical Exam:   GENERAL: NAD  HEENT: Right sided temporal and infraorbital ecchymosis.   CHEST/LUNG: CTAB  HEART: Regular rate and rhythm; s1 s2 appreciated, No murmurs, rubs, or gallops  ABDOMEN: Soft, Nontender, Nondistended; Bowel sounds present  EXTREMITIES: No LE edema b/l  SKIN: no rashes, no new lesions  NERVOUS SYSTEM:  Alert & Oriented X3  LINES/CATHETERS: yañez in place         Labs:                         8.0    7.48  )-----------( 214      ( 02 Jul 2024 08:30 )             23.8       02 Jul 2024 08:30    137    |  101    |  17     ----------------------------<  81     4.9     |  25     |  1.2      Ca    9.3        02 Jul 2024 08:30                      Urinalysis Basic - ( 02 Jul 2024 08:30 )    Color: x / Appearance: x / SG: x / pH: x  Gluc: 81 mg/dL / Ketone: x  / Bili: x / Urobili: x   Blood: x / Protein: x / Nitrite: x   Leuk Esterase: x / RBC: x / WBC x   Sq Epi: x / Non Sq Epi: x / Bacteria: x

## 2024-07-03 NOTE — PROGRESS NOTE ADULT - ASSESSMENT
88 y/o M presenting with right temporal laceration after an unwitnessed fall, found to have ST elevation in inferior leads with reciprocal ST depression in lateral leads.    #Acute blood loss anemia   - hgb stable 8.0  - maintain active type and screen    # Fall, temporal sculp laceration, local care   - sutured laceration done in ER ,  ( placed june 24, may remove after 10 days)     #STEMI   medical management    - TTE: Left ventricular ejection fraction, by visual estimation, is 60 to 65%. Normal global left ventricular systolic function. Impaired relaxation pattern of left  ventricular myocardial filling (Grade I diastolic dysfunction). Mildly enlarged left atrium.  - Cardio consult appreciated  - No plan for LHC    #Hypertension   controlled,   - C/w home lisinopril 5 mg daily and metoprolol tartrate 25 mg BID  hold lisinopril if bp is low     #T12 compression fracture:  - Neurosurgery consulted  - No acute surgical intervention --> abdominal binder for support - TLSO when OOB. Will need PT/OT. Pain management consulted --> tylenol 975 mg q8h.    #Dementia:  - Continue with home Mirtazapine  - Avoid CNS depressants  - reorientation  - Seroquel hs prn     #Chronic  Hyponatremia:  Sodium: 134 mmol/L (06-29-24 @ 08:44)  Sodium: 135 mmol/L (06-28-24 @ 06:51)  - Continue with salt tablets    #Neurogenic bladder  - Chronic yañez   cont     #Progress Note Handoff    Pending :  discharge planning   Disposition: snf   code status: full code      88 y/o M presenting with right temporal laceration after an unwitnessed fall, found to have ST elevation in inferior leads with reciprocal ST depression in lateral leads.    #Acute blood loss anemia   - hgb stable 8.0  - maintain active type and screen  - f/u b12, folate  - daily multivit therapy    # Fall, temporal sculp laceration, local care   - sutured laceration done in ER ,  ( placed june 24, may remove after 10 days)   - f/u orthostatics if +ve d/c lisinopril     #STEMI   medical management    - TTE: Left ventricular ejection fraction, by visual estimation, is 60 to 65%. Normal global left ventricular systolic function. Impaired relaxation pattern of left  ventricular myocardial filling (Grade I diastolic dysfunction). Mildly enlarged left atrium.  - Cardio consult appreciated  - No plan for LHC  - f/u cardio in 2 weeks   - fasted lipid panel in 1 month     #Hypertension   controlled,   - C/w home lisinopril 5 mg daily and metoprolol tartrate 25 mg BID  hold lisinopril if bp is low     #T12 compression fracture:  - Neurosurgery consulted  - No acute surgical intervention --> abdominal binder for support - TLSO when OOB. Will need PT/OT. Pain management consulted --> tylenol 975 mg q8h.    #Dementia:  - Continue with home Mirtazapine  - Avoid CNS depressants  - reorientation  - Seroquel hs prn     #Chronic  Hyponatremia:  Sodium: 134 mmol/L (06-29-24 @ 08:44)  Sodium: 135 mmol/L (06-28-24 @ 06:51)  - Continue with salt tablets    #Neurogenic bladder  - Chronic yañez   cont     #Progress Note Handoff    Pending :  discharge planning   Disposition: snf walk with assistance and with a walker   code status: full code   d/c:  f/u cardio in 2 weeks, fasted lipid panel in 1 month        88 y/o M presenting with right temporal laceration after an unwitnessed fall, found to have ST elevation in inferior leads with reciprocal ST depression in lateral leads.    #Acute blood loss anemia   # Iron deficiency   - hgb stable 8.5 trend CBC   - maintain active type and screen  - f/u b12, folate  - daily multivitamin therapy  - IV Venofer 200mg daily    # Fall, temporal sculp laceration, local care   - sutured laceration done in ER ,  ( placed june 24, may remove after 10 days)   - f/u orthostatics if +ve d/c lisinopril     #STEMI   medical management    - TTE: Left ventricular ejection fraction, by visual estimation, is 60 to 65%. Normal global left ventricular systolic function. Impaired relaxation pattern of left  ventricular myocardial filling (Grade I diastolic dysfunction). Mildly enlarged left atrium.  - Cardio consult appreciated  - No plan for LHC  - f/u cardio in 2 weeks   - fasted lipid panel in 1 month     #Hypertension   controlled,   - C/w home lisinopril 5 mg daily and metoprolol tartrate 25 mg BID  hold lisinopril if bp is low     #T12 compression fracture:  - Neurosurgery consulted  - No acute surgical intervention --> abdominal binder for support - TLSO when OOB. Will need PT/OT. Pain management consulted --> tylenol 975 mg q8h.    #Dementia:  - Continue with home Mirtazapine  - Avoid CNS depressants  - reorientation  - Seroquel hs prn     #Chronic  Hyponatremia:  Sodium: 134 mmol/L (06-29-24 @ 08:44)  Sodium: 135 mmol/L (06-28-24 @ 06:51)  - Continue with salt tablets    #Neurogenic bladder  - Chronic yañez   cont     #Progress Note Handoff    Pending :  discharge planning   Disposition: snf walk with assistance and with a walker   code status: full code   d/c:  f/u cardio in 2 weeks, fasted lipid panel in 1 month

## 2024-07-03 NOTE — PROGRESS NOTE ADULT - SUBJECTIVE AND OBJECTIVE BOX
JEMAL RANGEL  [facility]-N 3A 016 A (SI-N 3A)        Patient was evaluated and examined  by bedside, no active complains           REVIEW OF SYSTEMS: unable to obtain, patient is a poor historian      T(C): , Max: 36.6 (07-02-24 @ 19:27)  HR: 71 (07-03-24 @ 05:00)  BP: 105/62 (07-03-24 @ 05:00)  RR: 18 (07-03-24 @ 05:00)  SpO2: 97% (07-02-24 @ 19:27)  CAPILLARY BLOOD GLUCOSE          PHYSICAL EXAM:  General: NAD, AAOX3, patient is laying comfortably in bed  HEENT: right infraorbital ecchymosis with bruising, right temporal area - 3 sutures present,  NC, Supple, NO JVD, NO CB  Lungs: CTA B/L, no wheezing, no rhonchi  CVS: normal S1, S2, RRR, NO M/G/R  Abdomen: soft, bowel sounds present, non-tender, non-distended  Extremities: no edema, no clubbing, no cyanosis, positive peripheral pulses b/l  Neuro: no acute focal neurological deficits, forgetful , sensory intact   Skin: as above       LAB  CBC  Date: 07-03-24 @ 08:42  Mean cell Panbkgenmc46.3  Mean cell Hemoglobin Conc31.4  Mean cell Volum 90.3  Platelet count-Automate 208  RBC Count 2.47  Red Cell Distrib Width16.5  WBC Count6.16  % Albumin, Urine--  Hematocrit 22.3  Hemoglobin 7.0  CBC  Date: 07-02-24 @ 08:30  Mean cell Zesdcsbymb26.5  Mean cell Hemoglobin Conc33.6  Mean cell Volum 87.8  Platelet count-Automate 214  RBC Count 2.71  Red Cell Distrib Width16.3  WBC Count7.48  % Albumin, Urine--  Hematocrit 23.8  Hemoglobin 8.0  CBC  Date: 07-01-24 @ 06:25  Mean cell Eydbhnbfzz87.5  Mean cell Hemoglobin Conc33.3  Mean cell Volum 88.6  Platelet count-Automate 221  RBC Count 2.71  Red Cell Distrib Width16.3  WBC Count7.20  % Albumin, Urine--  Hematocrit 24.0  Hemoglobin 8.0  CBC  Date: 06-30-24 @ 08:15  Mean cell Rqjsutidyr53.8  Mean cell Hemoglobin Conc32.8  Mean cell Volum 87.8  Platelet count-Automate 215  RBC Count 2.71  Red Cell Distrib Width16.0  WBC Count9.86  % Albumin, Urine--  Hematocrit 23.8  Hemoglobin 7.8  CBC  Date: 06-29-24 @ 08:44  Mean cell Tsljnfatqz92.7  Mean cell Hemoglobin Conc32.9  Mean cell Volum 87.2  Platelet count-Automate 185  RBC Count 2.65  Red Cell Distrib Width15.9  WBC Count6.23  % Albumin, Urine--  Hematocrit 23.1  Hemoglobin 7.6  CBC  Date: 06-28-24 @ 06:51  Mean cell Oblniugike40.0  Mean cell Hemoglobin Conc33.3  Mean cell Volum 86.9  Platelet count-Automate 201  RBC Count 2.83  Red Cell Distrib Width15.9  WBC Count6.51  % Albumin, Urine--  Hematocrit 24.6  Hemoglobin 8.2  CBC  Date: 06-27-24 @ 07:14  Mean cell Qguqhjtmyv86.2  Mean cell Hemoglobin Conc34.4  Mean cell Volum 84.8  Platelet count-Automate 185  RBC Count 2.64  Red Cell Distrib Width15.7  WBC Count8.73  % Albumin, Urine--  Hematocrit 22.4  Hemoglobin 7.7    [facility]  07-03-24 @ 08:42  Blood Gas Arterial-Calcium,Ionized--  Blood Urea Nitrogen, Serum 19 mg/dL [10 - 20]  Carbon Dioxide, Serum22 mmol/L [17 - 32]  Chloride, Serum98 mmol/L [98 - 110]  Creatinie, Serum1.4 mg/dL [0.7 - 1.5]  Glucose, Serum83 mg/dL [70 - 99]  Potassium, Serum4.4 mmol/L [3.5 - 5.0] [Hemolyzed. Interpret with caution]  Sodium, Serum 130 mmol/L<L> [135 - 146]  [facility]  07-02-24 @ 08:30  Blood Gas Arterial-Calcium,Ionized--  Blood Urea Nitrogen, Serum 17 mg/dL [10 - 20]  Carbon Dioxide, Serum25 mmol/L [17 - 32]  Chloride, Uwfes288 mmol/L [98 - 110]  Creatinie, Serum1.2 mg/dL [0.7 - 1.5]  Glucose, Serum81 mg/dL [70 - 99]  Potassium, Serum4.9 mmol/L [3.5 - 5.0]  Sodium, Serum 137 mmol/L [135 - 146]  [facility]  06-29-24 @ 08:44  Blood Gas Arterial-Calcium,Ionized--  Blood Urea Nitrogen, Serum 20 mg/dL [10 - 20]  Carbon Dioxide, Serum23 mmol/L [17 - 32]  Chloride, Pmunz498 mmol/L [98 - 110]  Creatinie, Serum1.3 mg/dL [0.7 - 1.5]  Glucose, Serum87 mg/dL [70 - 99]  Potassium, Serum4.1 mmol/L [3.5 - 5.0]  Sodium, Serum 134 mmol/L<L> [135 - 146]        Medications:  acetaminophen     Tablet .. 650 milliGRAM(s) Oral every 6 hours PRN  aspirin  chewable 81 milliGRAM(s) Oral daily  atorvastatin 80 milliGRAM(s) Oral at bedtime  bacitracin   Ointment 1 Application(s) Topical three times a day  chlorhexidine 2% Cloths 1 Application(s) Topical daily  ferrous    sulfate 325 milliGRAM(s) Oral daily  lisinopril 5 milliGRAM(s) Oral daily  metoprolol tartrate 25 milliGRAM(s) Oral two times a day  mirtazapine 15 milliGRAM(s) Oral daily  multivitamin 1 Tablet(s) Oral every 24 hours  pantoprazole    Tablet 40 milliGRAM(s) Oral before breakfast  polyethylene glycol 3350 17 Gram(s) Oral daily  QUEtiapine 12.5 milliGRAM(s) Oral at bedtime  senna 2 Tablet(s) Oral at bedtime  sodium chloride 1 Gram(s) Oral three times a day        Assessment and Plan:  Patient is an 88 y/o Male w/ Hx of dementia, chronic hyponatremia, neurogenic bladder with chronic Yañez, Hx left Hx fracture 3/24 presenting with right temporal laceration after an unwitnessed fall, found to have ST elevation in inferior leads with reciprocal ST depression in lateral leads.      # Anemia of chronic disease - no gross bleeding events from admission  - hg dropped to 7 today, repeat CBC at 4 pm to ensure this in not the artifact in blood work, obtain active type and screen, obtain iron/tibc/ferritin/stool hemeoccult study   - transfuse 1 unit if hg will drop below 7  - obtain vit b12, folate tx, hold lovenox.   - in view of stemi - continue daily asa tx.     # fall, temporal sculp laceration, local care   sutured laceration done in ER ,  ( placed june 24, may remove after 10 days)     # STEMI   - post Cardiology specialist evaluation - recommended conservative medical management    - TTE: Left ventricular ejection fraction, by visual estimation, is 60 to 65%. Normal global left ventricular systolic function. Impaired relaxation pattern of left  ventricular myocardial filling (Grade I diastolic dysfunction). Mildly enlarged left atrium.  OP cardiology follow up     #  Hypertension - well controlled, obtain Orthostatic VS  - C/w home lisinopril 5 mg daily and metoprolol tartrate 25 mg BID      # T12 compression fracture:  - Neurosurgery consulted  - No acute surgical intervention --> abdominal binder for support - TLSO when OOB. Will need PT/OT. Pain management consulted --> tylenol prn. tx.     #  Dementia:  - Continue with home Mirtazapine  - Avoid CNS depressants  - reorientation  - Seroquel hs prn     # Chronic  Hyponatremia - Continue with salt tablets    #Neurogenic bladder - Chronic yañez   - outpatient  f/up     DVT proph: hold lovenox tx. due to h/h drop    Code status: full code     #Progress Note Handoff: due to hemoglobin drop, obtain active type and screen repeat CBC to confirm drop of h/h, if repeated hg 7 or below, will give 1 unit PRBC , hold lovenox subc. tx. , obtain anemia work up, obtain Orthostatic VS, maintain falls precautions at all times, patient also pending insurance auth for STR.   Family discussion: yes, medical team Disposition: STR once medically stable    Total time spent to complete patient's bedside assessment, review medical chart, discuss medical plan of care with covering medical team was more than 55 minutes with >50% of time spent face to face with patient, discussion with patient/family and/or coordination of care       JEMAL RANGEL  [facility]-N 3A 016 A (SI-N 3A)        Patient was evaluated and examined  by bedside, no active complains           REVIEW OF SYSTEMS: unable to obtain, patient is a poor historian      T(C): , Max: 36.6 (07-02-24 @ 19:27)  HR: 71 (07-03-24 @ 05:00)  BP: 105/62 (07-03-24 @ 05:00)  RR: 18 (07-03-24 @ 05:00)  SpO2: 97% (07-02-24 @ 19:27)  CAPILLARY BLOOD GLUCOSE          PHYSICAL EXAM:  General: NAD, AAOX2, patient is laying comfortably in bed  HEENT: right infraorbital ecchymosis with bruising, right temporal area - 3 sutures present,  NC, Supple, NO JVD, NO CB  Lungs: CTA B/L, no wheezing, no rhonchi  CVS: normal S1, S2, RRR, NO M/G/R  Abdomen: soft, bowel sounds present, non-tender, non-distended  Extremities: no edema, no clubbing, no cyanosis, positive peripheral pulses b/l  Neuro: no acute focal neurological deficits, forgetful , sensory intact   Skin: as above       LAB  CBC  Date: 07-03-24 @ 08:42  Mean cell Rqoermrdfi28.3  Mean cell Hemoglobin Conc31.4  Mean cell Volum 90.3  Platelet count-Automate 208  RBC Count 2.47  Red Cell Distrib Width16.5  WBC Count6.16  % Albumin, Urine--  Hematocrit 22.3  Hemoglobin 7.0  CBC  Date: 07-02-24 @ 08:30  Mean cell Xqchfjmhjn98.5  Mean cell Hemoglobin Conc33.6  Mean cell Volum 87.8  Platelet count-Automate 214  RBC Count 2.71  Red Cell Distrib Width16.3  WBC Count7.48  % Albumin, Urine--  Hematocrit 23.8  Hemoglobin 8.0  CBC  Date: 07-01-24 @ 06:25  Mean cell Dhgzwodeaw41.5  Mean cell Hemoglobin Conc33.3  Mean cell Volum 88.6  Platelet count-Automate 221  RBC Count 2.71  Red Cell Distrib Width16.3  WBC Count7.20  % Albumin, Urine--  Hematocrit 24.0  Hemoglobin 8.0  CBC  Date: 06-30-24 @ 08:15  Mean cell Ofluacxice01.8  Mean cell Hemoglobin Conc32.8  Mean cell Volum 87.8  Platelet count-Automate 215  RBC Count 2.71  Red Cell Distrib Width16.0  WBC Count9.86  % Albumin, Urine--  Hematocrit 23.8  Hemoglobin 7.8  CBC  Date: 06-29-24 @ 08:44  Mean cell Aedvzrjfok39.7  Mean cell Hemoglobin Conc32.9  Mean cell Volum 87.2  Platelet count-Automate 185  RBC Count 2.65  Red Cell Distrib Width15.9  WBC Count6.23  % Albumin, Urine--  Hematocrit 23.1  Hemoglobin 7.6  CBC  Date: 06-28-24 @ 06:51  Mean cell Xruakiwrvs85.0  Mean cell Hemoglobin Conc33.3  Mean cell Volum 86.9  Platelet count-Automate 201  RBC Count 2.83  Red Cell Distrib Width15.9  WBC Count6.51  % Albumin, Urine--  Hematocrit 24.6  Hemoglobin 8.2  CBC  Date: 06-27-24 @ 07:14  Mean cell Zfsqvkpwau47.2  Mean cell Hemoglobin Conc34.4  Mean cell Volum 84.8  Platelet count-Automate 185  RBC Count 2.64  Red Cell Distrib Width15.7  WBC Count8.73  % Albumin, Urine--  Hematocrit 22.4  Hemoglobin 7.7    [facility]  07-03-24 @ 08:42  Blood Gas Arterial-Calcium,Ionized--  Blood Urea Nitrogen, Serum 19 mg/dL [10 - 20]  Carbon Dioxide, Serum22 mmol/L [17 - 32]  Chloride, Serum98 mmol/L [98 - 110]  Creatinie, Serum1.4 mg/dL [0.7 - 1.5]  Glucose, Serum83 mg/dL [70 - 99]  Potassium, Serum4.4 mmol/L [3.5 - 5.0] [Hemolyzed. Interpret with caution]  Sodium, Serum 130 mmol/L<L> [135 - 146]  [facility]  07-02-24 @ 08:30  Blood Gas Arterial-Calcium,Ionized--  Blood Urea Nitrogen, Serum 17 mg/dL [10 - 20]  Carbon Dioxide, Serum25 mmol/L [17 - 32]  Chloride, Dzbmn703 mmol/L [98 - 110]  Creatinie, Serum1.2 mg/dL [0.7 - 1.5]  Glucose, Serum81 mg/dL [70 - 99]  Potassium, Serum4.9 mmol/L [3.5 - 5.0]  Sodium, Serum 137 mmol/L [135 - 146]  [facility]  06-29-24 @ 08:44  Blood Gas Arterial-Calcium,Ionized--  Blood Urea Nitrogen, Serum 20 mg/dL [10 - 20]  Carbon Dioxide, Serum23 mmol/L [17 - 32]  Chloride, Xaqnf502 mmol/L [98 - 110]  Creatinie, Serum1.3 mg/dL [0.7 - 1.5]  Glucose, Serum87 mg/dL [70 - 99]  Potassium, Serum4.1 mmol/L [3.5 - 5.0]  Sodium, Serum 134 mmol/L<L> [135 - 146]        Medications:  acetaminophen     Tablet .. 650 milliGRAM(s) Oral every 6 hours PRN  aspirin  chewable 81 milliGRAM(s) Oral daily  atorvastatin 80 milliGRAM(s) Oral at bedtime  bacitracin   Ointment 1 Application(s) Topical three times a day  chlorhexidine 2% Cloths 1 Application(s) Topical daily  ferrous    sulfate 325 milliGRAM(s) Oral daily  lisinopril 5 milliGRAM(s) Oral daily  metoprolol tartrate 25 milliGRAM(s) Oral two times a day  mirtazapine 15 milliGRAM(s) Oral daily  multivitamin 1 Tablet(s) Oral every 24 hours  pantoprazole    Tablet 40 milliGRAM(s) Oral before breakfast  polyethylene glycol 3350 17 Gram(s) Oral daily  QUEtiapine 12.5 milliGRAM(s) Oral at bedtime  senna 2 Tablet(s) Oral at bedtime  sodium chloride 1 Gram(s) Oral three times a day        Assessment and Plan:  Patient is an 88 y/o Male w/ Hx of dementia, chronic hyponatremia, neurogenic bladder with chronic Yañez, Hx left Hx fracture 3/24 presenting with right temporal laceration after an unwitnessed fall, found to have ST elevation in inferior leads with reciprocal ST depression in lateral leads.      # Anemia of chronic disease - no gross bleeding events from admission  - hg dropped to 7 today, repeat CBC at 4 pm to ensure this in not the artifact in blood work, obtain active type and screen, obtain iron/tibc/ferritin/stool hemeoccult study   - transfuse 1 unit if hg will drop below 7  - obtain vit b12, folate tx, hold lovenox.   - in view of stemi - continue daily asa tx.     # Recurrent fall, temporal sculp laceration, local care   sutured laceration done in ER ,  ( placed june 24, may remove after 10 days)   -PT/OT tx as tolerated, maintain falls precautions    # STEMI   - post Cardiology specialist evaluation - recommended conservative medical management    - TTE: Left ventricular ejection fraction, by visual estimation, is 60 to 65%. Normal global left ventricular systolic function. Impaired relaxation pattern of left  ventricular myocardial filling (Grade I diastolic dysfunction). Mildly enlarged left atrium.  OP cardiology follow up     #  Hypertension - well controlled, obtain Orthostatic VS  - C/w home lisinopril 5 mg daily and metoprolol tartrate 25 mg BID      # T12 compression fracture:  - Neurosurgery consulted  - No acute surgical intervention --> abdominal binder for support - TLSO when OOB. Will need PT/OT. Pain management consulted --> tylenol prn. tx.     #  Dementia:  - Continue with home Mirtazapine  - Avoid CNS depressants  - reorientation  - Seroquel hs prn     # Chronic  Hyponatremia - Continue with salt tablets    #Neurogenic bladder - Chronic yañez   - outpatient  f/up     DVT proph: hold lovenox tx. due to h/h drop    Code status: full code     #Progress Note Handoff: due to hemoglobin drop, obtain active type and screen repeat CBC to confirm drop of h/h, if repeated hg 7 or below, will give 1 unit PRBC , hold lovenox subc. tx. , obtain anemia work up, obtain Orthostatic VS, maintain falls precautions at all times, patient also pending insurance auth for STR.   Family discussion: yes, medical team Disposition: STR once medically stable    Total time spent to complete patient's bedside assessment, review medical chart, discuss medical plan of care with covering medical team was more than 55 minutes with >50% of time spent face to face with patient, discussion with patient/family and/or coordination of care

## 2024-07-04 LAB
ANION GAP SERPL CALC-SCNC: 11 MMOL/L — SIGNIFICANT CHANGE UP (ref 7–14)
BASOPHILS # BLD AUTO: 0.02 K/UL — SIGNIFICANT CHANGE UP (ref 0–0.2)
BASOPHILS NFR BLD AUTO: 0.3 % — SIGNIFICANT CHANGE UP (ref 0–1)
BUN SERPL-MCNC: 20 MG/DL — SIGNIFICANT CHANGE UP (ref 10–20)
CALCIUM SERPL-MCNC: 8.6 MG/DL — SIGNIFICANT CHANGE UP (ref 8.4–10.5)
CHLORIDE SERPL-SCNC: 99 MMOL/L — SIGNIFICANT CHANGE UP (ref 98–110)
CO2 SERPL-SCNC: 23 MMOL/L — SIGNIFICANT CHANGE UP (ref 17–32)
CREAT SERPL-MCNC: 1.2 MG/DL — SIGNIFICANT CHANGE UP (ref 0.7–1.5)
EGFR: 59 ML/MIN/1.73M2 — LOW
EOSINOPHIL # BLD AUTO: 0.27 K/UL — SIGNIFICANT CHANGE UP (ref 0–0.7)
EOSINOPHIL NFR BLD AUTO: 4 % — SIGNIFICANT CHANGE UP (ref 0–8)
FERRITIN SERPL-MCNC: 125 NG/ML — SIGNIFICANT CHANGE UP (ref 30–400)
GLUCOSE SERPL-MCNC: 94 MG/DL — SIGNIFICANT CHANGE UP (ref 70–99)
HCT VFR BLD CALC: 22.3 % — LOW (ref 42–52)
HCT VFR BLD CALC: 23.8 % — LOW (ref 42–52)
HGB BLD-MCNC: 7.4 G/DL — LOW (ref 14–18)
HGB BLD-MCNC: 7.9 G/DL — LOW (ref 14–18)
IMM GRANULOCYTES NFR BLD AUTO: 0.6 % — HIGH (ref 0.1–0.3)
LYMPHOCYTES # BLD AUTO: 1.04 K/UL — LOW (ref 1.2–3.4)
LYMPHOCYTES # BLD AUTO: 15.6 % — LOW (ref 20.5–51.1)
MAGNESIUM SERPL-MCNC: 1.8 MG/DL — SIGNIFICANT CHANGE UP (ref 1.8–2.4)
MCHC RBC-ENTMCNC: 29.5 PG — SIGNIFICANT CHANGE UP (ref 27–31)
MCHC RBC-ENTMCNC: 29.6 PG — SIGNIFICANT CHANGE UP (ref 27–31)
MCHC RBC-ENTMCNC: 33.2 G/DL — SIGNIFICANT CHANGE UP (ref 32–37)
MCHC RBC-ENTMCNC: 33.2 G/DL — SIGNIFICANT CHANGE UP (ref 32–37)
MCV RBC AUTO: 88.8 FL — SIGNIFICANT CHANGE UP (ref 80–94)
MCV RBC AUTO: 89.2 FL — SIGNIFICANT CHANGE UP (ref 80–94)
MONOCYTES # BLD AUTO: 0.59 K/UL — SIGNIFICANT CHANGE UP (ref 0.1–0.6)
MONOCYTES NFR BLD AUTO: 8.8 % — SIGNIFICANT CHANGE UP (ref 1.7–9.3)
NEUTROPHILS # BLD AUTO: 4.72 K/UL — SIGNIFICANT CHANGE UP (ref 1.4–6.5)
NEUTROPHILS NFR BLD AUTO: 70.7 % — SIGNIFICANT CHANGE UP (ref 42.2–75.2)
NRBC # BLD: 0 /100 WBCS — SIGNIFICANT CHANGE UP (ref 0–0)
NRBC # BLD: 0 /100 WBCS — SIGNIFICANT CHANGE UP (ref 0–0)
PLATELET # BLD AUTO: 236 K/UL — SIGNIFICANT CHANGE UP (ref 130–400)
PLATELET # BLD AUTO: 243 K/UL — SIGNIFICANT CHANGE UP (ref 130–400)
PMV BLD: 9.3 FL — SIGNIFICANT CHANGE UP (ref 7.4–10.4)
PMV BLD: 9.5 FL — SIGNIFICANT CHANGE UP (ref 7.4–10.4)
POTASSIUM SERPL-MCNC: 4.1 MMOL/L — SIGNIFICANT CHANGE UP (ref 3.5–5)
POTASSIUM SERPL-SCNC: 4.1 MMOL/L — SIGNIFICANT CHANGE UP (ref 3.5–5)
RBC # BLD: 2.5 M/UL — LOW (ref 4.7–6.1)
RBC # BLD: 2.68 M/UL — LOW (ref 4.7–6.1)
RBC # FLD: 16.2 % — HIGH (ref 11.5–14.5)
RBC # FLD: 16.4 % — HIGH (ref 11.5–14.5)
SODIUM SERPL-SCNC: 133 MMOL/L — LOW (ref 135–146)
WBC # BLD: 6.68 K/UL — SIGNIFICANT CHANGE UP (ref 4.8–10.8)
WBC # BLD: 6.71 K/UL — SIGNIFICANT CHANGE UP (ref 4.8–10.8)
WBC # FLD AUTO: 6.68 K/UL — SIGNIFICANT CHANGE UP (ref 4.8–10.8)
WBC # FLD AUTO: 6.71 K/UL — SIGNIFICANT CHANGE UP (ref 4.8–10.8)

## 2024-07-04 PROCEDURE — 99232 SBSQ HOSP IP/OBS MODERATE 35: CPT

## 2024-07-04 RX ORDER — METOPROLOL TARTRATE 50 MG
12.5 TABLET ORAL
Refills: 0 | Status: DISCONTINUED | OUTPATIENT
Start: 2024-07-04 | End: 2024-07-09

## 2024-07-04 RX ORDER — METOPROLOL TARTRATE 50 MG
25 TABLET ORAL
Refills: 0 | Status: DISCONTINUED | OUTPATIENT
Start: 2024-07-04 | End: 2024-07-04

## 2024-07-04 RX ADMIN — Medication 1 GRAM(S): at 21:26

## 2024-07-04 RX ADMIN — PANTOPRAZOLE SODIUM 40 MILLIGRAM(S): 40 INJECTION, POWDER, FOR SOLUTION INTRAVENOUS at 05:52

## 2024-07-04 RX ADMIN — Medication 12.5 MILLIGRAM(S): at 18:16

## 2024-07-04 RX ADMIN — Medication 2 TABLET(S): at 21:26

## 2024-07-04 RX ADMIN — IRON SUCROSE 110 MILLIGRAM(S): 20 INJECTION, SOLUTION INTRAVENOUS at 12:16

## 2024-07-04 RX ADMIN — ATORVASTATIN CALCIUM 80 MILLIGRAM(S): 20 TABLET, FILM COATED ORAL at 21:26

## 2024-07-04 RX ADMIN — ASPIRIN 81 MILLIGRAM(S): 325 TABLET, FILM COATED ORAL at 12:12

## 2024-07-04 RX ADMIN — Medication 12.5 MILLIGRAM(S): at 21:27

## 2024-07-04 RX ADMIN — Medication 1 APPLICATION(S): at 06:03

## 2024-07-04 RX ADMIN — Medication 1 APPLICATION(S): at 12:15

## 2024-07-04 RX ADMIN — Medication 1 APPLICATION(S): at 13:40

## 2024-07-04 RX ADMIN — Medication 1 APPLICATION(S): at 21:27

## 2024-07-04 RX ADMIN — Medication 325 MILLIGRAM(S): at 12:12

## 2024-07-04 RX ADMIN — Medication 1 TABLET(S): at 12:12

## 2024-07-04 RX ADMIN — POLYETHYLENE GLYCOL 3350 17 GRAM(S): 1 POWDER ORAL at 12:15

## 2024-07-04 RX ADMIN — Medication 1 GRAM(S): at 05:52

## 2024-07-04 RX ADMIN — Medication 1 GRAM(S): at 13:40

## 2024-07-04 RX ADMIN — MIRTAZAPINE 15 MILLIGRAM(S): 15 TABLET, FILM COATED ORAL at 12:11

## 2024-07-04 NOTE — PROGRESS NOTE ADULT - ASSESSMENT
88 y/o M presenting with right temporal laceration after an unwitnessed fall, found to have ST elevation in inferior leads with reciprocal ST depression in lateral leads.    #Acute blood loss anemia   # Iron deficiency   - hgb stable 7.9 trend CBC  - maintain active type and screen  - f/u b12, folate  - daily multivitamin therapy  - IV Venofer 200mg daily    # Fall, temporal sculp laceration, local care   - sutured laceration done in ER ,  ( placed june 24, may remove after 10 days)   - f/u orthostatics if +ve d/c lisinopril     #STEMI   medical management    - TTE: Left ventricular ejection fraction, by visual estimation, is 60 to 65%. Normal global left ventricular systolic function. Impaired relaxation pattern of left  ventricular myocardial filling (Grade I diastolic dysfunction). Mildly enlarged left atrium.  - Cardio consult appreciated  - No plan for LHC  - f/u cardio in 2 weeks   - fasted lipid panel in 1 month     #Hypertension   controlled,   - C/w home lisinopril 5 mg daily and metoprolol tartrate 25 mg BID  hold lisinopril if bp is low     #T12 compression fracture:  - Neurosurgery consulted  - No acute surgical intervention --> abdominal binder for support - TLSO when OOB. Will need PT/OT. Pain management consulted --> tylenol 975 mg q8h.    #Dementia:  - Continue with home Mirtazapine  - Avoid CNS depressants  - reorientation  - Seroquel hs prn     #Chronic  Hyponatremia:  Sodium: 134 mmol/L (06-29-24 @ 08:44)  Sodium: 135 mmol/L (06-28-24 @ 06:51)  - Continue with salt tablets    #Neurogenic bladder  - Chronic yañez   cont     #Progress Note Handoff    Pending :  discharge planning   Disposition: snf walk with assistance and with a walker   code status: full code   d/c:  f/u cardio in 2 weeks, fasted lipid panel in 1 month      88 y/o M presenting with right temporal laceration after an unwitnessed fall, found to have ST elevation in inferior leads with reciprocal ST depression in lateral leads.    #Acute blood loss anemia   # Iron deficiency   - hgb stable 7.4 trend CBC  - maintain active type and screen  - f/u b12, folate  - daily multivitamin therapy  - IV Venofer 200mg daily    # Fall, temporal sculp laceration, local care   - sutured laceration done in ER ,  ( placed june 24, may remove after 10 days)   - f/u orthostatics if +ve d/c lisinopril     #STEMI   medical management    - TTE: Left ventricular ejection fraction, by visual estimation, is 60 to 65%. Normal global left ventricular systolic function. Impaired relaxation pattern of left  ventricular myocardial filling (Grade I diastolic dysfunction). Mildly enlarged left atrium.  - Cardio consult appreciated  - No plan for LHC  - f/u cardio in 2 weeks   - fasted lipid panel in 1 month     #Hypertension   controlled,   - C/w home lisinopril 5 mg daily and metoprolol tartrate 25 mg BID  hold lisinopril if bp is low     #T12 compression fracture:  - Neurosurgery consulted  - No acute surgical intervention --> abdominal binder for support - TLSO when OOB. Will need PT/OT. Pain management consulted --> tylenol 975 mg q8h.    #Dementia:  - Continue with home Mirtazapine  - Avoid CNS depressants  - reorientation  - Seroquel hs prn     #Chronic  Hyponatremia:  Sodium: 134 mmol/L (06-29-24 @ 08:44)  Sodium: 135 mmol/L (06-28-24 @ 06:51)  - Continue with salt tablets    #Neurogenic bladder  - Chronic yañez   cont     #Progress Note Handoff    Pending :  discharge planning   Disposition: snf walk with assistance and with a walker   code status: full code   d/c:  f/u cardio in 2 weeks, fasted lipid panel in 1 month

## 2024-07-04 NOTE — PROGRESS NOTE ADULT - SUBJECTIVE AND OBJECTIVE BOX
Hospital Day:  10d    Subjective:    Patient is a 89y old  Male who presents with a chief complaint of Fall. Pt was seen and evaluated at bedside. Per RN pt was agitated overnight. Pt did not want to be bothered this morning.     Admitted to medicine for a primary diagnosis of STEMI    Past Medical Hx:   HTN (hypertension)    Neurogenic bladder    Yañez catheter in place    Dementia      Past Sx:  No significant past surgical history      Allergies:  No Known Allergies    Current Meds:   Standng Meds:  aspirin  chewable 81 milliGRAM(s) Oral daily  atorvastatin 80 milliGRAM(s) Oral at bedtime  bacitracin   Ointment 1 Application(s) Topical three times a day  chlorhexidine 2% Cloths 1 Application(s) Topical daily  ferrous    sulfate 325 milliGRAM(s) Oral daily  iron sucrose IVPB 200 milliGRAM(s) IV Intermittent every 24 hours  lisinopril 5 milliGRAM(s) Oral daily  metoprolol tartrate 25 milliGRAM(s) Oral two times a day  mirtazapine 15 milliGRAM(s) Oral daily  multivitamin 1 Tablet(s) Oral every 24 hours  pantoprazole    Tablet 40 milliGRAM(s) Oral before breakfast  polyethylene glycol 3350 17 Gram(s) Oral daily  QUEtiapine 12.5 milliGRAM(s) Oral at bedtime  senna 2 Tablet(s) Oral at bedtime  sodium chloride 1 Gram(s) Oral three times a day    PRN Meds:  acetaminophen     Tablet .. 650 milliGRAM(s) Oral every 6 hours PRN Temp greater or equal to 38C (100.4F), Mild Pain (1 - 3), Moderate Pain (4 - 6)    HOME MEDICATIONS:  acetaminophen 325 mg oral tablet: 3 tab(s) orally every 8 hours  alendronate 35 mg oral tablet: 1 tab(s) orally once a week  aspirin 81 mg oral tablet, chewable: 1 tab(s) orally once a day  atorvastatin 80 mg oral tablet: 1 tab(s) orally once a day (at bedtime)  ferrous fumarate 325 mg (106 mg elemental iron) oral tablet: 1 tab(s) orally once a day  lisinopril 5 mg oral tablet: 1 tab(s) orally once a day  metoprolol tartrate 25 mg oral tablet: 1 tab(s) orally 2 times a day  mirtazapine 15 mg oral tablet: 1 tab(s) orally once a day  pantoprazole 40 mg oral delayed release tablet: 1 tab(s) orally once a day (before a meal)  polyethylene glycol 3350 oral powder for reconstitution: 17 gram(s) orally once a day As needed Constipation  senna leaf extract oral tablet: 2 tab(s) orally once a day (at bedtime) as needed for  constipation  sodium chloride 1 g oral tablet: 1 tab(s) orally 3 times a day      Vital Signs:   T(F): 98.3 (07-04-24 @ 06:06), Max: 98.4 (07-04-24 @ 05:00)  HR: 69 (07-04-24 @ 06:06) (66 - 73)  BP: 115/63 (07-04-24 @ 06:06) (91/50 - 135/74)  RR: 18 (07-04-24 @ 06:06) (18 - 20)  SpO2: 97% (07-04-24 @ 06:06) (97% - 100%)      07-03-24 @ 07:01  -  07-04-24 @ 07:00  --------------------------------------------------------  IN: 0 mL / OUT: 2375 mL / NET: -2375 mL        Physical Exam:   GENERAL: NAD  HEENT: Right sided temporal and infraorbital ecchymosis.   CHEST/LUNG: CTAB  HEART: Regular rate and rhythm; s1 s2 appreciated, No murmurs, rubs, or gallops  ABDOMEN: Soft, Nontender, Nondistended; Bowel sounds present  EXTREMITIES: No LE edema b/l  SKIN: no rashes, no new lesions  NERVOUS SYSTEM:  Alert & Oriented X2  LINES/CATHETERS: yañez in place         Labs:                         7.9    9.31  )-----------( 221      ( 03 Jul 2024 17:29 )             23.9     Neutophil% 77.4, Lymphocyte% 12.9, Monocyte% 6.5, Bands% 0.3 07-03-24 @ 13:45    03 Jul 2024 08:42    130    |  98     |  19     ----------------------------<  83     4.4     |  22     |  1.4      Ca    8.8        03 Jul 2024 08:42  Mg     1.8       03 Jul 2024 08:42                  Iron 29, TIBC 235, %Sat 12 Ferritin 125 07-03-24 @ 12:10      Urinalysis Basic - ( 03 Jul 2024 08:42 )    Color: x / Appearance: x / SG: x / pH: x  Gluc: 83 mg/dL / Ketone: x  / Bili: x / Urobili: x   Blood: x / Protein: x / Nitrite: x   Leuk Esterase: x / RBC: x / WBC x   Sq Epi: x / Non Sq Epi: x / Bacteria: x

## 2024-07-05 LAB
ANION GAP SERPL CALC-SCNC: 10 MMOL/L — SIGNIFICANT CHANGE UP (ref 7–14)
BUN SERPL-MCNC: 17 MG/DL — SIGNIFICANT CHANGE UP (ref 10–20)
CALCIUM SERPL-MCNC: 8.8 MG/DL — SIGNIFICANT CHANGE UP (ref 8.4–10.5)
CHLORIDE SERPL-SCNC: 101 MMOL/L — SIGNIFICANT CHANGE UP (ref 98–110)
CO2 SERPL-SCNC: 22 MMOL/L — SIGNIFICANT CHANGE UP (ref 17–32)
CREAT SERPL-MCNC: 1.3 MG/DL — SIGNIFICANT CHANGE UP (ref 0.7–1.5)
EGFR: 53 ML/MIN/1.73M2 — LOW
GLUCOSE SERPL-MCNC: 82 MG/DL — SIGNIFICANT CHANGE UP (ref 70–99)
HCT VFR BLD CALC: 22.5 % — LOW (ref 42–52)
HGB BLD-MCNC: 7.3 G/DL — LOW (ref 14–18)
MAGNESIUM SERPL-MCNC: 1.7 MG/DL — LOW (ref 1.8–2.4)
MCHC RBC-ENTMCNC: 29.1 PG — SIGNIFICANT CHANGE UP (ref 27–31)
MCHC RBC-ENTMCNC: 32.4 G/DL — SIGNIFICANT CHANGE UP (ref 32–37)
MCV RBC AUTO: 89.6 FL — SIGNIFICANT CHANGE UP (ref 80–94)
NRBC # BLD: 0 /100 WBCS — SIGNIFICANT CHANGE UP (ref 0–0)
PLATELET # BLD AUTO: 232 K/UL — SIGNIFICANT CHANGE UP (ref 130–400)
PMV BLD: 9.3 FL — SIGNIFICANT CHANGE UP (ref 7.4–10.4)
POTASSIUM SERPL-MCNC: 4.3 MMOL/L — SIGNIFICANT CHANGE UP (ref 3.5–5)
POTASSIUM SERPL-SCNC: 4.3 MMOL/L — SIGNIFICANT CHANGE UP (ref 3.5–5)
RBC # BLD: 2.51 M/UL — LOW (ref 4.7–6.1)
RBC # FLD: 16.4 % — HIGH (ref 11.5–14.5)
SODIUM SERPL-SCNC: 133 MMOL/L — LOW (ref 135–146)
WBC # BLD: 6.68 K/UL — SIGNIFICANT CHANGE UP (ref 4.8–10.8)
WBC # FLD AUTO: 6.68 K/UL — SIGNIFICANT CHANGE UP (ref 4.8–10.8)

## 2024-07-05 PROCEDURE — 99233 SBSQ HOSP IP/OBS HIGH 50: CPT

## 2024-07-05 PROCEDURE — 99232 SBSQ HOSP IP/OBS MODERATE 35: CPT

## 2024-07-05 RX ORDER — MAGNESIUM SULFATE 100 %
2 POWDER (GRAM) MISCELLANEOUS ONCE
Refills: 0 | Status: COMPLETED | OUTPATIENT
Start: 2024-07-05 | End: 2024-07-05

## 2024-07-05 RX ORDER — FERROUS SULFATE 325(65) MG
1 TABLET ORAL
Qty: 90 | Refills: 0
Start: 2024-07-05 | End: 2024-08-03

## 2024-07-05 RX ADMIN — ATORVASTATIN CALCIUM 80 MILLIGRAM(S): 20 TABLET, FILM COATED ORAL at 21:01

## 2024-07-05 RX ADMIN — MIRTAZAPINE 15 MILLIGRAM(S): 15 TABLET, FILM COATED ORAL at 11:50

## 2024-07-05 RX ADMIN — Medication 1 GRAM(S): at 05:51

## 2024-07-05 RX ADMIN — POLYETHYLENE GLYCOL 3350 17 GRAM(S): 1 POWDER ORAL at 11:50

## 2024-07-05 RX ADMIN — Medication 1 APPLICATION(S): at 05:55

## 2024-07-05 RX ADMIN — Medication 12.5 MILLIGRAM(S): at 21:01

## 2024-07-05 RX ADMIN — Medication 12.5 MILLIGRAM(S): at 17:02

## 2024-07-05 RX ADMIN — Medication 1 GRAM(S): at 21:02

## 2024-07-05 RX ADMIN — Medication 1 APPLICATION(S): at 11:57

## 2024-07-05 RX ADMIN — Medication 1 APPLICATION(S): at 21:01

## 2024-07-05 RX ADMIN — IRON SUCROSE 110 MILLIGRAM(S): 20 INJECTION, SOLUTION INTRAVENOUS at 12:56

## 2024-07-05 RX ADMIN — Medication 25 GRAM(S): at 13:26

## 2024-07-05 RX ADMIN — ASPIRIN 81 MILLIGRAM(S): 325 TABLET, FILM COATED ORAL at 11:50

## 2024-07-05 RX ADMIN — Medication 2 TABLET(S): at 21:01

## 2024-07-05 RX ADMIN — Medication 1 APPLICATION(S): at 14:27

## 2024-07-05 RX ADMIN — Medication 1 GRAM(S): at 14:27

## 2024-07-05 RX ADMIN — Medication 325 MILLIGRAM(S): at 11:50

## 2024-07-05 RX ADMIN — Medication 1 TABLET(S): at 11:50

## 2024-07-05 RX ADMIN — PANTOPRAZOLE SODIUM 40 MILLIGRAM(S): 40 INJECTION, POWDER, FOR SOLUTION INTRAVENOUS at 05:50

## 2024-07-05 NOTE — PROGRESS NOTE ADULT - SUBJECTIVE AND OBJECTIVE BOX
Hospital Day:  11d    Subjective:    Patient is a 89y old  Male who presents with a chief complaint of Fall. PT was seen and evaluated at bedside. No acute events overnight. HGB has been stable.   Admitted to medicine for a primary diagnosis of     Past Medical Hx:   HTN (hypertension)    Neurogenic bladder    Waldron catheter in place    Dementia      Past Sx:  No significant past surgical history      Allergies:  No Known Allergies    Current Meds:   Standng Meds:  aspirin  chewable 81 milliGRAM(s) Oral daily  atorvastatin 80 milliGRAM(s) Oral at bedtime  bacitracin   Ointment 1 Application(s) Topical three times a day  chlorhexidine 2% Cloths 1 Application(s) Topical daily  ferrous    sulfate 325 milliGRAM(s) Oral daily  iron sucrose IVPB 200 milliGRAM(s) IV Intermittent every 24 hours  metoprolol tartrate 12.5 milliGRAM(s) Oral two times a day  mirtazapine 15 milliGRAM(s) Oral daily  multivitamin 1 Tablet(s) Oral every 24 hours  pantoprazole    Tablet 40 milliGRAM(s) Oral before breakfast  polyethylene glycol 3350 17 Gram(s) Oral daily  QUEtiapine 12.5 milliGRAM(s) Oral at bedtime  senna 2 Tablet(s) Oral at bedtime  sodium chloride 1 Gram(s) Oral three times a day    PRN Meds:  acetaminophen     Tablet .. 650 milliGRAM(s) Oral every 6 hours PRN Temp greater or equal to 38C (100.4F), Mild Pain (1 - 3), Moderate Pain (4 - 6)    HOME MEDICATIONS:  acetaminophen 325 mg oral tablet: 3 tab(s) orally every 8 hours  alendronate 35 mg oral tablet: 1 tab(s) orally once a week  aspirin 81 mg oral tablet, chewable: 1 tab(s) orally once a day  atorvastatin 80 mg oral tablet: 1 tab(s) orally once a day (at bedtime)  lisinopril 5 mg oral tablet: 1 tab(s) orally once a day  metoprolol tartrate 25 mg oral tablet: 1 tab(s) orally 2 times a day  mirtazapine 15 mg oral tablet: 1 tab(s) orally once a day  Multiple Vitamins oral tablet: 1 tab(s) orally every 24 hours  pantoprazole 40 mg oral delayed release tablet: 1 tab(s) orally once a day (before a meal)  polyethylene glycol 3350 oral powder for reconstitution: 17 gram(s) orally once a day As needed Constipation  senna leaf extract oral tablet: 2 tab(s) orally once a day (at bedtime) as needed for  constipation  sodium chloride 1 g oral tablet: 1 tab(s) orally 3 times a day      Vital Signs:   T(F): 98.2 (07-05-24 @ 05:06), Max: 98.2 (07-05-24 @ 05:06)  HR: 84 (07-05-24 @ 05:06) (62 - 100)  BP: 102/57 (07-05-24 @ 05:06) (90/54 - 153/81)  RR: 18 (07-05-24 @ 05:06) (18 - 19)  SpO2: 97% (07-05-24 @ 05:06) (97% - 100%)      07-04-24 @ 07:01  -  07-05-24 @ 07:00  --------------------------------------------------------  IN: 0 mL / OUT: 2000 mL / NET: -2000 mL        Physical Exam:   GENERAL: NAD  HEENT: NCAT  CHEST/LUNG: CTAB  HEART: Regular rate and rhythm; s1 s2 appreciated, No murmurs, rubs, or gallops  ABDOMEN: Soft, Nontender, Nondistended; Bowel sounds present  EXTREMITIES: No LE edema b/l  SKIN: no rashes, no new lesions  NERVOUS SYSTEM:  Alert & Oriented X3  LINES/CATHETERS:        Labs:                         7.9    6.68  )-----------( 243      ( 04 Jul 2024 16:00 )             23.8     Neutophil% 70.7, Lymphocyte% 15.6, Monocyte% 8.8, Bands% 0.6 07-04-24 @ 16:00    04 Jul 2024 07:32    133    |  99     |  20     ----------------------------<  94     4.1     |  23     |  1.2      Ca    8.6        04 Jul 2024 07:32  Mg     1.8       04 Jul 2024 07:32                  Iron 29, TIBC 235, %Sat 12 Ferritin 125 07-03-24 @ 12:10      Urinalysis Basic - ( 04 Jul 2024 07:32 )    Color: x / Appearance: x / SG: x / pH: x  Gluc: 94 mg/dL / Ketone: x  / Bili: x / Urobili: x   Blood: x / Protein: x / Nitrite: x   Leuk Esterase: x / RBC: x / WBC x   Sq Epi: x / Non Sq Epi: x / Bacteria: x             Hospital Day:  11d    Subjective:    Patient is a 89y old  Male who presents with a chief complaint of Fall. PT was seen and evaluated at bedside. No acute events overnight. HGB has been stable.   Admitted to medicine for a primary diagnosis of     Past Medical Hx:   HTN (hypertension)    Neurogenic bladder    Yañez catheter in place    Dementia      Past Sx:  No significant past surgical history      Allergies:  No Known Allergies    Current Meds:   Standng Meds:  aspirin  chewable 81 milliGRAM(s) Oral daily  atorvastatin 80 milliGRAM(s) Oral at bedtime  bacitracin   Ointment 1 Application(s) Topical three times a day  chlorhexidine 2% Cloths 1 Application(s) Topical daily  ferrous    sulfate 325 milliGRAM(s) Oral daily  iron sucrose IVPB 200 milliGRAM(s) IV Intermittent every 24 hours  metoprolol tartrate 12.5 milliGRAM(s) Oral two times a day  mirtazapine 15 milliGRAM(s) Oral daily  multivitamin 1 Tablet(s) Oral every 24 hours  pantoprazole    Tablet 40 milliGRAM(s) Oral before breakfast  polyethylene glycol 3350 17 Gram(s) Oral daily  QUEtiapine 12.5 milliGRAM(s) Oral at bedtime  senna 2 Tablet(s) Oral at bedtime  sodium chloride 1 Gram(s) Oral three times a day    PRN Meds:  acetaminophen     Tablet .. 650 milliGRAM(s) Oral every 6 hours PRN Temp greater or equal to 38C (100.4F), Mild Pain (1 - 3), Moderate Pain (4 - 6)    HOME MEDICATIONS:  acetaminophen 325 mg oral tablet: 3 tab(s) orally every 8 hours  alendronate 35 mg oral tablet: 1 tab(s) orally once a week  aspirin 81 mg oral tablet, chewable: 1 tab(s) orally once a day  atorvastatin 80 mg oral tablet: 1 tab(s) orally once a day (at bedtime)  lisinopril 5 mg oral tablet: 1 tab(s) orally once a day  metoprolol tartrate 25 mg oral tablet: 1 tab(s) orally 2 times a day  mirtazapine 15 mg oral tablet: 1 tab(s) orally once a day  Multiple Vitamins oral tablet: 1 tab(s) orally every 24 hours  pantoprazole 40 mg oral delayed release tablet: 1 tab(s) orally once a day (before a meal)  polyethylene glycol 3350 oral powder for reconstitution: 17 gram(s) orally once a day As needed Constipation  senna leaf extract oral tablet: 2 tab(s) orally once a day (at bedtime) as needed for  constipation  sodium chloride 1 g oral tablet: 1 tab(s) orally 3 times a day      Vital Signs:   T(F): 98.2 (07-05-24 @ 05:06), Max: 98.2 (07-05-24 @ 05:06)  HR: 84 (07-05-24 @ 05:06) (62 - 100)  BP: 102/57 (07-05-24 @ 05:06) (90/54 - 153/81)  RR: 18 (07-05-24 @ 05:06) (18 - 19)  SpO2: 97% (07-05-24 @ 05:06) (97% - 100%)      07-04-24 @ 07:01  -  07-05-24 @ 07:00  --------------------------------------------------------  IN: 0 mL / OUT: 2000 mL / NET: -2000 mL        Physical Exam:   GENERAL: NAD  HEENT: Right sided temporal and infraorbital ecchymosis.   CHEST/LUNG: CTAB  HEART: Regular rate and rhythm; s1 s2 appreciated, No murmurs, rubs, or gallops  ABDOMEN: Soft, Nontender, Nondistended; Bowel sounds present  EXTREMITIES: No LE edema b/l  SKIN: no rashes, no new lesions  NERVOUS SYSTEM:  Alert & Oriented X2  LINES/CATHETERS: yañez in place           Labs:                         7.9    6.68  )-----------( 243      ( 04 Jul 2024 16:00 )             23.8     Neutophil% 70.7, Lymphocyte% 15.6, Monocyte% 8.8, Bands% 0.6 07-04-24 @ 16:00    04 Jul 2024 07:32    133    |  99     |  20     ----------------------------<  94     4.1     |  23     |  1.2      Ca    8.6        04 Jul 2024 07:32  Mg     1.8       04 Jul 2024 07:32                  Iron 29, TIBC 235, %Sat 12 Ferritin 125 07-03-24 @ 12:10      Urinalysis Basic - ( 04 Jul 2024 07:32 )    Color: x / Appearance: x / SG: x / pH: x  Gluc: 94 mg/dL / Ketone: x  / Bili: x / Urobili: x   Blood: x / Protein: x / Nitrite: x   Leuk Esterase: x / RBC: x / WBC x   Sq Epi: x / Non Sq Epi: x / Bacteria: x

## 2024-07-05 NOTE — PROGRESS NOTE ADULT - ASSESSMENT
88 y/o M presenting with right temporal laceration after an unwitnessed fall, found to have ST elevation in inferior leads with reciprocal ST depression in lateral leads.    #Acute blood loss anemia   # Iron deficiency   - hgb stable 7.9 trend CBC  - maintain active type and screen  - f/u b12, folate  - daily multivitamin therapy  - IV Venofer 200mg daily    # Fall, temporal sculp laceration, local care   - sutured laceration done in ER ,  ( placed june 24, may remove after 10 days)   - f/u orthostatics if +ve d/c lisinopril     #STEMI   medical management    - TTE: Left ventricular ejection fraction, by visual estimation, is 60 to 65%. Normal global left ventricular systolic function. Impaired relaxation pattern of left  ventricular myocardial filling (Grade I diastolic dysfunction). Mildly enlarged left atrium.  - Cardio consult appreciated  - No plan for LHC  - f/u cardio in 2 weeks   - fasted lipid panel in 1 month     #Hypertension   controlled,   - C/w home lisinopril 5 mg daily and metoprolol tartrate 25 mg BID  hold lisinopril if bp is low     #T12 compression fracture:  - Neurosurgery consulted  - No acute surgical intervention --> abdominal binder for support - TLSO when OOB. Will need PT/OT. Pain management consulted --> tylenol 975 mg q8h.    #Dementia:  - Continue with home Mirtazapine  - Avoid CNS depressants  - reorientation  - Seroquel hs prn     #Chronic  Hyponatremia:  Sodium: 134 mmol/L (06-29-24 @ 08:44)  Sodium: 135 mmol/L (06-28-24 @ 06:51)  - Continue with salt tablets    #Neurogenic bladder  - Chronic yañez   cont     #Progress Note Handoff    Pending :  discharge planning   Disposition: snf walk with assistance and with a walker   code status: full code   d/c:  f/u cardio in 2 weeks, fasted lipid panel in 1 month  88 y/o M presenting with right temporal laceration after an unwitnessed fall, found to have ST elevation in inferior leads with reciprocal ST depression in lateral leads.    #Acute blood loss anemia   # Iron deficiency   - hgb stable 7.3 trend CBC  - maintain active type and screen  - b12, folate wnl  - daily multivitamin therapy  - IV Venofer 200mg daily  - GI no intervention if hgb stable, [facility]     # Fall, temporal sculp laceration, local care   - sutured laceration done in ER ,  ( placed june 24, may remove after 10 days)   - f/u orthostatics if +ve d/c lisinopril     #STEMI   medical management    - TTE: Left ventricular ejection fraction, by visual estimation, is 60 to 65%. Normal global left ventricular systolic function. Impaired relaxation pattern of left  ventricular myocardial filling (Grade I diastolic dysfunction). Mildly enlarged left atrium.  - Cardio consult appreciated  - No plan for LHC  - f/u cardio in 2 weeks   - fasted lipid panel in 1 month     #Hypertension   controlled,   - C/w home lisinopril 5 mg daily and metoprolol tartrate 25 mg BID  hold lisinopril if bp is low     #T12 compression fracture:  - Neurosurgery consulted  - No acute surgical intervention --> abdominal binder for support - TLSO when OOB. Will need PT/OT. Pain management consulted --> tylenol 975 mg q8h.    #Dementia:  - Continue with home Mirtazapine  - Avoid CNS depressants  - reorientation  - Seroquel hs prn     #Chronic  Hyponatremia:  Sodium: 134 mmol/L (06-29-24 @ 08:44)  Sodium: 135 mmol/L (06-28-24 @ 06:51)  - Continue with salt tablets    #Neurogenic bladder  - Chronic yañez   cont     #Progress Note Handoff    Pending :  discharge planning   Disposition: snf walk with assistance and with a walker   code status: full code   d/c:  f/u cardio in 2 weeks, fasted lipid panel in 1 month

## 2024-07-05 NOTE — PROGRESS NOTE ADULT - ATTENDING COMMENTS
Agree with the above.  Patient being followed for anemia.  Iron studies consistent with iron deficiency anemia.  There is no evidence of bleeding at this time. Patient had STEMI on admission and was treated medically. He would benefit from EGD and colonoscopy if within goals of care at once optimized and cleared by cardiology.  Rest of recommendations per note above.
Patient is an 88 y/o Male w/ Hx of dementia, chronic hyponatremia, neurogenic bladder with chronic Yañez, Hx left Hx fracture 3/24 presenting with right temporal laceration after an unwitnessed fall, found to have ST elevation in inferior leads with reciprocal ST depression in lateral leads.      # Anemia of chronic disease - no gross bleeding events from admission  - s/p 1 unit PRBC on 7/3: 7/4 hg 7.4, daily CBC monitoring   - vit b12: 406 , folate 20 , hold lovenox.   - in view of stemi - continue daily asa tx.     # Recurrent fall, temporal sculp laceration, local care - most likely due to Orthostatic Hypotension, borderline low b/p   sutured laceration done in ER ,  ( placed june 24, may remove after 10 days)   -PT/OT tx as tolerated, maintain falls precautions    # STEMI   - post Cardiology specialist evaluation - recommended conservative medical management    - TTE: Left ventricular ejection fraction, by visual estimation, is 60 to 65%. Normal global left ventricular systolic function. Impaired relaxation pattern of left  ventricular myocardial filling (Grade I diastolic dysfunction). Mildly enlarged left atrium.  OP cardiology follow up     #  Hypertension/ Orthostatic Hypotension  - - d/c  lisinopril 5 , decreased  metoprolol tartrate 12.5  mg BID with holding parameters       # T12 compression fracture:  - Neurosurgery consulted  - No acute surgical intervention --> abdominal binder for support - TLSO when OOB. Will need PT/OT. Pain management consulted --> tylenol prn. tx.     #  Dementia:  - Continue with home Mirtazapine  - Avoid CNS depressants  - reorientation  - Seroquel hs prn     # Chronic  Hyponatremia - Continue with salt tablets    #Neurogenic bladder - Chronic yañez   - outpatient  f/up     DVT proph: hold lovenox tx. due to h/h drop    Code status: full code     #Progress Note Handoff: continue to  hold lovenox subc. tx. , f/up CBC in am,  Orthostatic VS, maintain falls precautions at all times, patient also pending insurance auth for STR.   Family discussion: yes, medical team Disposition: STR once medically stable    Total time spent to complete patient's bedside assessment, review medical chart, discuss medical plan of care with covering medical team was more than 35 minutes with >50% of time spent face to face with patient, discussion with patient/family and/or coordination of care
Plan as outlined above.  Stop heparin due to acute blood loss anemia. Transfuse 1 u PRBC.  Continue ASA, BB, statin.  TTE showed normal LV function. EKG showed resolution of EKG changes. Suspect ST changes occurred in setting of acute blood loss.  No plans for C at this time.
Patient is an 89 y/o Male w/ Hx of dementia, chronic hyponatremia, neurogenic bladder with chronic Yañez, Hx left Hx fracture 3/24 presenting with right temporal laceration after an unwitnessed fall, found to have ST elevation in inferior leads with reciprocal ST depression in lateral leads.      # Anemia of chronic disease - no gross bleeding events from admission  - s/p 1 unit PRBC on 7/3: 7/5 hg 7.3, daily CBC monitoring   - vit b12: 406 , folate 20 , hold lovenox.   - in view of stemi - continue daily asa tx.     # Recurrent fall, temporal sculp laceration, local care - most likely due to Orthostatic Hypotension, borderline low b/p   sutured laceration done in ER ,  ( placed june 24, may remove after 10 days)   -PT/OT tx as tolerated, maintain falls precautions    # STEMI   - post Cardiology specialist evaluation - recommended conservative medical management    - TTE: Left ventricular ejection fraction, by visual estimation, is 60 to 65%. Normal global left ventricular systolic function. Impaired relaxation pattern of left  ventricular myocardial filling (Grade I diastolic dysfunction). Mildly enlarged left atrium.  OP cardiology follow up     #  Hypertension/ Orthostatic Hypotension  - - d/c  lisinopril 5 , decreased  metoprolol tartrate 12.5  mg BID with holding parameters       # T12 compression fracture:  - Neurosurgery consulted  - No acute surgical intervention --> abdominal binder for support - TLSO when OOB. Will need PT/OT. Pain management consulted --> tylenol prn. tx.     #  Dementia:  - Continue with home Mirtazapine  - Avoid CNS depressants  - reorientation  - Seroquel hs prn     # Chronic  Hyponatremia - Continue with salt tablets    #Neurogenic bladder - Chronic yañez   - outpatient  f/up     DVT proph: hold lovenox tx. due to h/h drop    Code status: full code     #Progress Note Handoff: continue to  hold lovenox subc. tx. , f/up CBC in am,  Orthostatic VS, maintain falls precautions at all times, patient also pending insurance auth for STR.   Family discussion: yes, medical team Disposition: STR once medically stable    Total time spent to complete patient's bedside assessment, review medical chart, discuss medical plan of care with covering medical team was more than 35 minutes with >50% of time spent face to face with patient, discussion with patient/family and/or coordination of care.
88yo male with anemia no overt GI bleeding. Would defer endoscopic evaluation at this time as risks may outweigh potential benefits, pt/pts son also prefer to avoid. Recommend further anemia workup and CT imaging if further decline in Hb. If overt GI bleeding with drop in Hb please notify GI.
Patient seen and examined independently.     PAST MEDICAL & SURGICAL HISTORY:  HTN (hypertension)  Neurogenic bladder  Waldron catheter in place  Dementia  No significant past surgical history  Right Hip ORIF    Vitals:  T(F): 98 (06-27-24 @ 13:28)  HR: 81 (06-27-24 @ 13:28)  BP: 143/66 (06-27-24 @ 13:28)  RR: 18 (06-27-24 @ 13:28)      TESTS & MEASUREMENTS:                        7.7    8.73  )-----------( 185      ( 27 Jun 2024 07:14 )             22.4     Hemoglobin Trend:  Hemoglobin: 7.7 g/dL (06-27 @ 07:14)  Hemoglobin: 7.9 g/dL (06-26 @ 04:45)  Hemoglobin: 7.8 g/dL (06-25 @ 20:00)      06-26    130<L>  |  96<L>  |  16  ----------------------------<  84  4.0   |  23  |  1.3      Sodium Trend:  Sodium: 130 mmol/L (06-26-24 @ 04:45)  Sodium: 130 mmol/L (06-25-24 @ 16:00)  Sodium: 128 mmol/L (06-25-24 @ 06:00)      Ca    9.1      26 Jun 2024 04:45    TPro  5.7<L>  /  Alb  3.5  /  TBili  0.3  /  DBili  x   /  AST  22  /  ALT  8   /  AlkPhos  96  06-26    LIVER FUNCTIONS - ( 26 Jun 2024 04:45 )  Alb: 3.5 g/dL / Pro: 5.7 g/dL / ALK PHOS: 96 U/L / ALT: 8 U/L / AST: 22 U/L / GGT: x             In summary:  HEALTH ISSUES - PROBLEM Dx:  - type 2 MI, due to anemia most likely (demand ischemia); on medical therapy as per cardiology eval and recs. no plans for LHC as risks>>benefits    - Fall and trauma, multiple contusions and lacerations   - Anemia, will need gi w/u (son informed)  - Age indeterminate T12 compression fracture   - Age related dementia at baseline   - HTN on therapy   - Neurogenic bladder with chronic indwelling Waldron     PLAN  - Follow up Hb: resume prophylactic anticoagulant if stable   - antiplatelet medication as per cardiology   - FOBT negative at bedside - GI eval noted   - PT/ rehab   - Waldron care   - Abdominal binder   - Patient's next-of-kin not in favor of inpatient anemia endoscopic w/u   - if significant decrease in Hb, will need to r/o hematoma (late trauma complication)     Downgraded from CCU to 3C overnight with very poor prognosis due to advanced frailty and poor cognitive reserves. Will need Goals of Care Conversation documentation follow-up.

## 2024-07-05 NOTE — PROGRESS NOTE ADULT - ASSESSMENT
89 year old male patient known to have a history of Dementia, neurogenic bladder, HTN, recurrent falls s/p left hip replacement, HTN, and HFpEF who was brought to the ED on 06/24 following a fall complicated by right temporal laceration and bleeding, found to have elevated troponins and evidence of inferior STEMI on ECG s/p IV heparin initiation. Stay was complicated by acute on chronic anemia with no overt GI Bleeding.    #Acute on Chronic Microcytic Anemia - No Evidence of Overt Gastrointestinal Bleeding  #Right Temporal Laceration with Bleeding- Resolved  - Admitted on 06/24 for fall; found to have elevated troponins and inferior ST elevations on ECG; s/p Aspirin loading dose on 06/24 and s/p IV heparin from 06/24 to 06/25  - Stay complicated by drop in Hb drom 9.4 on 06/24 to 6.9 on 06/25 (s/p1 unit of pRBC) -> Hb 7.7 on 06/27 -> Hb 8.2 on 06/28 (baseline Hb 11.1 in 03/2024)  - No melena or hematochezia or hematemesis since admission; denies abdominal pain, nausea, vomiting, weight loss, NSAID use, reflux symptoms, dysphagia, odynophagia; denies constipation; baseline BMs: formed brown stools QOD  - Iron with Total Binding Capacity in AM (03.29.24 @ 07:13) Iron - Total Binding Capacity.: 167 ug/dL; % Saturation, Iron: 26 %; Iron Total: 44 ug/dL; Unsaturated Iron Binding Capacity: 123; ferritin 404  - NICO brown stools on 06/27  - History of chronic anemia; had EGD and colonoscopy many years ago per patient and son (unremarkable)  - No family history of colon cancer or GI malignancies  - Gi reconsulted 7/4 for dorp in hemoglobin   - Hb 8>7>1 unit PRBC >8.5>7.9  - no evidence of GIB. No documented BM in chart. Per RN, do BM in past 3 days     RECOMMENDATIONS  - May ideally benefit from EGD and colonoscopy for workup of anemia  - However, in absence of overt GI bleeding and in setting of hemodynamic stability and stable Hb, currently risks outweigh benefits  - Discssed the risks and benefits of EGD and colonoscopy with son and patient   - We offered outpatient follow up at [facility] for EGD and colonoscopy if patient and family become agreeable   - In the setting of temporal relation between the acute drop in Hb and the initiation of therapeutic anticoagulation and in the setting of no overt GI bleeding, would recommend complete anemia workup if Hb drops again (including CT imaging to rule out hematoma)  - In the setting of stable Hb and no overt GI bleed, can advance diet as tolerated and resume blood thinners as deemed necessary per cardiology if benefits outweigh risks (the elevated troponins was likely from blood loss anemia > true STEMI per cardiology)  - Trend Hb and keep active T/S. Transfuse as needed to keep Hb >7  - Continue PO protonix 40 mg QD and FeSO4 325mg QD  - Monitor BM for signs of bleeding (no melena or hematochezia)  - Follow up with our [facility] located at [address] [place] [address]. Phone Number: [phone]   89 year old male patient known to have a history of Dementia, neurogenic bladder, HTN, recurrent falls s/p left hip replacement, HTN, and HFpEF who was brought to the ED on 06/24 following a fall complicated by right temporal laceration and bleeding, found to have elevated troponins and evidence of inferior STEMI on ECG s/p IV heparin initiation. Stay was complicated by acute on chronic anemia with no overt GI Bleeding.    #Acute on Chronic Microcytic Anemia - No Evidence of Overt Gastrointestinal Bleeding  #Right Temporal Laceration with Bleeding- Resolved  - Admitted on 06/24 for fall; found to have elevated troponins and inferior ST elevations on ECG; s/p Aspirin loading dose on 06/24 and s/p IV heparin from 06/24 to 06/25  - Stay complicated by drop in Hb drom 9.4 on 06/24 to 6.9 on 06/25 (s/p1 unit of pRBC) -> Hb 7.7 on 06/27 -> Hb 8.2 on 06/28 (baseline Hb 11.1 in 03/2024)  - No melena or hematochezia or hematemesis since admission; denies abdominal pain, nausea, vomiting, weight loss, NSAID use, reflux symptoms, dysphagia, odynophagia; denies constipation; baseline BMs: formed brown stools QOD  - Iron with Total Binding Capacity in AM (03.29.24 @ 07:13) Iron - Total Binding Capacity.: 167 ug/dL; % Saturation, Iron: 26 %; Iron Total: 44 ug/dL; Unsaturated Iron Binding Capacity: 123; ferritin 404  - NICO brown stools on 06/27  - History of chronic anemia; had EGD and colonoscopy many years ago per patient and son (unremarkable)  - No family history of colon cancer or GI malignancies  - Gi reconsulted 7/4 for dorp in hemoglobin   - Hb 8>7>1 unit PRBC >8.5>7.9  - no evidence of GIB. No documented BM in chart. Per RN, do BM in past 3 days     RECOMMENDATIONS  - May ideally benefit from EGD and colonoscopy for workup of anemia  - However, in absence of overt GI bleeding and stable Hb, currently risks outweigh benefits. Patient will need cardiac workup prior to endoscopic intervention   - Discussed the risks and benefits of EGD and colonoscopy with son and patient, they would not like to pursue endoscopic intervention at this time   - We offered outpatient follow up at [facility] for EGD and colonoscopy  - In the setting of temporal relation between the acute drop in Hb and the initiation of therapeutic anticoagulation and in the setting of no overt GI bleeding, would recommend complete anemia workup if Hb drops again (including CT imaging to rule out hematoma)  - In the setting of stable Hb and no overt GI bleed, can advance diet as tolerated and resume blood thinners as deemed necessary per cardiology if benefits outweigh risks (the elevated troponins was likely from blood loss anemia > true STEMI per cardiology)  - Trend Hb and keep active T/S. Transfuse as needed to keep Hb >7  - Continue PO protonix 40 mg QD and FeSO4 325mg QD  - Monitor BM for signs of bleeding (no melena or hematochezia)  - Follow up with our [facility] located at [address] [place] [address]. Phone Number: [phone]

## 2024-07-05 NOTE — PROGRESS NOTE ADULT - SUBJECTIVE AND OBJECTIVE BOX
Gastroenterology progress note:     Patient is a 89y old  Male who presents with a chief complaint of Fall (05 Jul 2024 08:02)       Admitted on: 06-24-24    We are following the patient for:       Interval History:    No acute events overnight.   - Diet -   - last BM -   - Abdominal pain -       PAST MEDICAL & SURGICAL HISTORY:  HTN (hypertension)      Neurogenic bladder      Waldron catheter in place  2016      Dementia      No significant past surgical history  Right Hip ORIF          MEDICATIONS  (STANDING):  aspirin  chewable 81 milliGRAM(s) Oral daily  atorvastatin 80 milliGRAM(s) Oral at bedtime  bacitracin   Ointment 1 Application(s) Topical three times a day  chlorhexidine 2% Cloths 1 Application(s) Topical daily  ferrous    sulfate 325 milliGRAM(s) Oral daily  iron sucrose IVPB 200 milliGRAM(s) IV Intermittent every 24 hours  metoprolol tartrate 12.5 milliGRAM(s) Oral two times a day  mirtazapine 15 milliGRAM(s) Oral daily  multivitamin 1 Tablet(s) Oral every 24 hours  pantoprazole    Tablet 40 milliGRAM(s) Oral before breakfast  polyethylene glycol 3350 17 Gram(s) Oral daily  QUEtiapine 12.5 milliGRAM(s) Oral at bedtime  senna 2 Tablet(s) Oral at bedtime  sodium chloride 1 Gram(s) Oral three times a day    MEDICATIONS  (PRN):  acetaminophen     Tablet .. 650 milliGRAM(s) Oral every 6 hours PRN Temp greater or equal to 38C (100.4F), Mild Pain (1 - 3), Moderate Pain (4 - 6)      Allergies  No Known Allergies      Review of Systems:   Cardiovascular:  No Chest Pain, No Palpitations  Respiratory:  No Cough, No Dyspnea  Gastrointestinal:  As described in HPI  Skin:  No Skin Lesions, No Jaundice  Neuro:  No Syncope, No Dizziness    Physical Examination:  T(C): 36.8 (07-05-24 @ 05:06), Max: 36.8 (07-05-24 @ 05:06)  HR: 84 (07-05-24 @ 05:06) (62 - 100)  BP: 102/57 (07-05-24 @ 05:06) (90/54 - 153/81)  RR: 18 (07-05-24 @ 05:06) (18 - 19)  SpO2: 97% (07-05-24 @ 05:06) (97% - 100%)      07-04-24 @ 07:01  -  07-05-24 @ 07:00  --------------------------------------------------------  IN: 0 mL / OUT: 2000 mL / NET: -2000 mL        GENERAL: AAOx3, no acute distress.  HEAD:  Atraumatic, Normocephalic  EYES: conjunctiva and sclera clear  NECK: Supple, no JVD or thyromegaly  CHEST/LUNG: Clear to auscultation bilaterally; No wheeze, rhonchi, or rales  HEART: Regular rate and rhythm; normal S1, S2, No murmurs.  ABDOMEN: Soft, nontender, nondistended; Bowel sounds present  NEUROLOGY: No asterixis or tremor.   SKIN: Intact, no jaundice     Data:                        7.9    6.68  )-----------( 243      ( 04 Jul 2024 16:00 )             23.8     Hgb trend:  7.9  07-04-24 @ 16:00  7.4  07-04-24 @ 07:32  7.9  07-03-24 @ 17:29  8.5  07-03-24 @ 13:45  7.0  07-03-24 @ 08:42        07-04    133<L>  |  99  |  20  ----------------------------<  94  4.1   |  23  |  1.2    Ca    8.6      04 Jul 2024 07:32  Mg     1.8     07-04      Liver panel trend:  TBili 0.4   /   AST 19   /   ALT 9   /   AlkP 89   /   Tptn 5.9   /   Alb 3.6    /   DBili -- 06-29  TBili 0.6   /   AST 26   /   ALT 8   /   AlkP 96   /   Tptn 6.3   /   Alb 3.9    /   DBili --      06-28  TBili 0.3   /   AST 22   /   ALT 8   /   AlkP 96   /   Tptn 5.7   /   Alb 3.5    /   DBili -- 06-26             Radiology:

## 2024-07-06 LAB
ANION GAP SERPL CALC-SCNC: 12 MMOL/L — SIGNIFICANT CHANGE UP (ref 7–14)
BUN SERPL-MCNC: 18 MG/DL — SIGNIFICANT CHANGE UP (ref 10–20)
CALCIUM SERPL-MCNC: 9.6 MG/DL — SIGNIFICANT CHANGE UP (ref 8.4–10.5)
CHLORIDE SERPL-SCNC: 98 MMOL/L — SIGNIFICANT CHANGE UP (ref 98–110)
CO2 SERPL-SCNC: 24 MMOL/L — SIGNIFICANT CHANGE UP (ref 17–32)
CREAT SERPL-MCNC: 1.4 MG/DL — SIGNIFICANT CHANGE UP (ref 0.7–1.5)
EGFR: 49 ML/MIN/1.73M2 — LOW
GLUCOSE SERPL-MCNC: 103 MG/DL — HIGH (ref 70–99)
HCT VFR BLD CALC: 26 % — LOW (ref 42–52)
HGB BLD-MCNC: 8.4 G/DL — LOW (ref 14–18)
MAGNESIUM SERPL-MCNC: 2.1 MG/DL — SIGNIFICANT CHANGE UP (ref 1.8–2.4)
MCHC RBC-ENTMCNC: 28.9 PG — SIGNIFICANT CHANGE UP (ref 27–31)
MCHC RBC-ENTMCNC: 32.3 G/DL — SIGNIFICANT CHANGE UP (ref 32–37)
MCV RBC AUTO: 89.3 FL — SIGNIFICANT CHANGE UP (ref 80–94)
NRBC # BLD: 0 /100 WBCS — SIGNIFICANT CHANGE UP (ref 0–0)
PLATELET # BLD AUTO: 270 K/UL — SIGNIFICANT CHANGE UP (ref 130–400)
PMV BLD: 9 FL — SIGNIFICANT CHANGE UP (ref 7.4–10.4)
POTASSIUM SERPL-MCNC: 4.9 MMOL/L — SIGNIFICANT CHANGE UP (ref 3.5–5)
POTASSIUM SERPL-SCNC: 4.9 MMOL/L — SIGNIFICANT CHANGE UP (ref 3.5–5)
RBC # BLD: 2.91 M/UL — LOW (ref 4.7–6.1)
RBC # FLD: 16.6 % — HIGH (ref 11.5–14.5)
SODIUM SERPL-SCNC: 134 MMOL/L — LOW (ref 135–146)
WBC # BLD: 7.8 K/UL — SIGNIFICANT CHANGE UP (ref 4.8–10.8)
WBC # FLD AUTO: 7.8 K/UL — SIGNIFICANT CHANGE UP (ref 4.8–10.8)

## 2024-07-06 PROCEDURE — 99232 SBSQ HOSP IP/OBS MODERATE 35: CPT

## 2024-07-06 RX ORDER — LISINOPRIL 5 MG/1
5 TABLET ORAL ONCE
Refills: 0 | Status: DISCONTINUED | OUTPATIENT
Start: 2024-07-06 | End: 2024-07-06

## 2024-07-06 RX ORDER — LISINOPRIL 5 MG/1
5 TABLET ORAL ONCE
Refills: 0 | Status: COMPLETED | OUTPATIENT
Start: 2024-07-06 | End: 2024-07-06

## 2024-07-06 RX ADMIN — Medication 1 APPLICATION(S): at 11:47

## 2024-07-06 RX ADMIN — Medication 1 APPLICATION(S): at 21:42

## 2024-07-06 RX ADMIN — Medication 12.5 MILLIGRAM(S): at 05:11

## 2024-07-06 RX ADMIN — Medication 1 APPLICATION(S): at 14:55

## 2024-07-06 RX ADMIN — Medication 1 GRAM(S): at 21:41

## 2024-07-06 RX ADMIN — MIRTAZAPINE 15 MILLIGRAM(S): 15 TABLET, FILM COATED ORAL at 11:44

## 2024-07-06 RX ADMIN — Medication 12.5 MILLIGRAM(S): at 21:41

## 2024-07-06 RX ADMIN — POLYETHYLENE GLYCOL 3350 17 GRAM(S): 1 POWDER ORAL at 11:42

## 2024-07-06 RX ADMIN — Medication 12.5 MILLIGRAM(S): at 17:17

## 2024-07-06 RX ADMIN — ATORVASTATIN CALCIUM 80 MILLIGRAM(S): 20 TABLET, FILM COATED ORAL at 21:41

## 2024-07-06 RX ADMIN — LISINOPRIL 5 MILLIGRAM(S): 5 TABLET ORAL at 23:42

## 2024-07-06 RX ADMIN — IRON SUCROSE 110 MILLIGRAM(S): 20 INJECTION, SOLUTION INTRAVENOUS at 11:43

## 2024-07-06 RX ADMIN — Medication 325 MILLIGRAM(S): at 11:42

## 2024-07-06 RX ADMIN — ASPIRIN 81 MILLIGRAM(S): 325 TABLET, FILM COATED ORAL at 11:44

## 2024-07-06 RX ADMIN — PANTOPRAZOLE SODIUM 40 MILLIGRAM(S): 40 INJECTION, POWDER, FOR SOLUTION INTRAVENOUS at 05:11

## 2024-07-06 RX ADMIN — Medication 1 TABLET(S): at 11:42

## 2024-07-06 RX ADMIN — Medication 2 TABLET(S): at 21:42

## 2024-07-06 RX ADMIN — Medication 1 APPLICATION(S): at 05:11

## 2024-07-06 NOTE — PROGRESS NOTE ADULT - SUBJECTIVE AND OBJECTIVE BOX
JEMAL RANGEL  [facility]-N 3A 016 A (SI-N 3A)        Patient was evaluated and examined  by bedside, no active events over night time as per covering nurse report       REVIEW OF SYSTEMS: unable to obtain , patient is a poor historian      T(C): , Max: 36.7 (07-06-24 @ 05:00)  HR: 81 (07-06-24 @ 05:00)  BP: 110/61 (07-06-24 @ 05:00)  RR: 18 (07-06-24 @ 05:00)  SpO2: 98% (07-06-24 @ 05:00)  CAPILLARY BLOOD GLUCOSE          PHYSICAL EXAM:  General: NAD, AAOX2, patient is laying comfortably in bed  HEENT: right infraorbital ecchymosis with bruising, right temporal area - 3 sutures present,  NC, Supple, NO JVD, NO CB  Lungs: CTA B/L, no wheezing, no rhonchi  CVS: normal S1, S2, RRR, NO M/G/R  Abdomen: soft, bowel sounds present, non-tender, non-distended  Extremities: no edema, no clubbing, no cyanosis, positive peripheral pulses b/l  Neuro: no acute focal neurological deficits, forgetful , sensory intact   Skin: as above         LAB  CBC  Date: 07-06-24 @ 09:04  Mean cell Myvvpkgvlq30.9  Mean cell Hemoglobin Conc32.3  Mean cell Volum 89.3  Platelet count-Automate 270  RBC Count 2.91  Red Cell Distrib Width16.6  WBC Count7.80  % Albumin, Urine--  Hematocrit 26.0  Hemoglobin 8.4  CBC  Date: 07-05-24 @ 08:04  Mean cell Muhqftxlea71.1  Mean cell Hemoglobin Conc32.4  Mean cell Volum 89.6  Platelet count-Automate 232  RBC Count 2.51  Red Cell Distrib Width16.4  WBC Count6.68  % Albumin, Urine--  Hematocrit 22.5  Hemoglobin 7.3  CBC  Date: 07-04-24 @ 16:00  Mean cell Tmisdostdh31.5  Mean cell Hemoglobin Conc33.2  Mean cell Volum 88.8  Platelet count-Automate 243  RBC Count 2.68  Red Cell Distrib Width16.4  WBC Count6.68  % Albumin, Urine--  Hematocrit 23.8  Hemoglobin 7.9  CBC  Date: 07-04-24 @ 07:32  Mean cell Zopuhalsmx98.6  Mean cell Hemoglobin Conc33.2  Mean cell Volum 89.2  Platelet count-Automate 236  RBC Count 2.50  Red Cell Distrib Width16.2  WBC Count6.71  % Albumin, Urine--  Hematocrit 22.3  Hemoglobin 7.4  CBC  Date: 07-03-24 @ 17:29  Mean cell Ypsfrqxrjv78.3  Mean cell Hemoglobin Conc33.1  Mean cell Volum 88.5  Platelet count-Automate 221  RBC Count 2.70  Red Cell Distrib Width16.3  WBC Count9.31  % Albumin, Urine--  Hematocrit 23.9  Hemoglobin 7.9  CBC  Date: 07-03-24 @ 13:45  Mean cell Rpfvgwmqih92.8  Mean cell Hemoglobin Conc32.6  Mean cell Volum 88.5  Platelet count-Automate 254  RBC Count 2.95  Red Cell Distrib Width16.4  WBC Count7.20  % Albumin, Urine--  Hematocrit 26.1  Hemoglobin 8.5  CBC  Date: 07-03-24 @ 08:42  Mean cell Aymnvgnkyh33.3  Mean cell Hemoglobin Conc31.4  Mean cell Volum 90.3  Platelet count-Automate 208  RBC Count 2.47  Red Cell Distrib Width16.5  WBC Count6.16  % Albumin, Urine--  Hematocrit 22.3  Hemoglobin 7.0  CBC  Date: 07-02-24 @ 08:30  Mean cell Dbmafuxygt18.5  Mean cell Hemoglobin Conc33.6  Mean cell Volum 87.8  Platelet count-Automate 214  RBC Count 2.71  Red Cell Distrib Width16.3  WBC Count7.48  % Albumin, Urine--  Hematocrit 23.8  Hemoglobin 8.0  CBC  Date: 07-01-24 @ 06:25  Mean cell Qeswyxyutr48.5  Mean cell Hemoglobin Conc33.3  Mean cell Volum 88.6  Platelet count-Automate 221  RBC Count 2.71  Red Cell Distrib Width16.3  WBC Count7.20  % Albumin, Urine--  Hematocrit 24.0  Hemoglobin 8.0  CBC  Date: 06-30-24 @ 08:15  Mean cell Szvvxmzfik99.8  Mean cell Hemoglobin Conc32.8  Mean cell Volum 87.8  Platelet count-Automate 215  RBC Count 2.71  Red Cell Distrib Width16.0  WBC Count9.86  % Albumin, Urine--  Hematocrit 23.8  Hemoglobin 7.8    BMP  07-06-24 @ 09:04  Blood Gas Arterial-Calcium,Ionized--  Blood Urea Nitrogen, Serum 18 mg/dL [10 - 20]  Carbon Dioxide, Serum24 mmol/L [17 - 32]  Chloride, Serum98 mmol/L [98 - 110]  Creatinie, Serum1.4 mg/dL [0.7 - 1.5]  Glucose, Migsd530 mg/dL<H> [70 - 99]  Potassium, Serum4.9 mmol/L [3.5 - 5.0]  Sodium, Serum 134 mmol/L<L> [135 - 146]  [facility]  07-05-24 @ 08:04  Blood Gas Arterial-Calcium,Ionized--  Blood Urea Nitrogen, Serum 17 mg/dL [10 - 20]  Carbon Dioxide, Serum22 mmol/L [17 - 32]  Chloride, Qnyig415 mmol/L [98 - 110]  Creatinie, Serum1.3 mg/dL [0.7 - 1.5]  Glucose, Serum82 mg/dL [70 - 99]  Potassium, Serum4.3 mmol/L [3.5 - 5.0]  Sodium, Serum 133 mmol/L<L> [135 - 146]  [facility]  07-04-24 @ 07:32  Blood Gas Arterial-Calcium,Ionized--  Blood Urea Nitrogen, Serum 20 mg/dL [10 - 20]  Carbon Dioxide, Serum23 mmol/L [17 - 32]  Chloride, Serum99 mmol/L [98 - 110]  Creatinie, Serum1.2 mg/dL [0.7 - 1.5]  Glucose, Serum94 mg/dL [70 - 99]  Potassium, Serum4.1 mmol/L [3.5 - 5.0]  Sodium, Serum 133 mmol/L<L> [135 - 146]  [facility]  07-03-24 @ 08:42  Blood Gas Arterial-Calcium,Ionized--  Blood Urea Nitrogen, Serum 19 mg/dL [10 - 20]  Carbon Dioxide, Serum22 mmol/L [17 - 32]  Chloride, Serum98 mmol/L [98 - 110]  Creatinie, Serum1.4 mg/dL [0.7 - 1.5]  Glucose, Serum83 mg/dL [70 - 99]  Potassium, Serum4.4 mmol/L [3.5 - 5.0] [Hemolyzed. Interpret with caution]  Sodium, Serum 130 mmol/L<L> [135 - 146]  [facility]  07-02-24 @ 08:30  Blood Gas Arterial-Calcium,Ionized--  Blood Urea Nitrogen, Serum 17 mg/dL [10 - 20]  Carbon Dioxide, Serum25 mmol/L [17 - 32]  Chloride, Lsopm236 mmol/L [98 - 110]  Creatinie, Serum1.2 mg/dL [0.7 - 1.5]  Glucose, Serum81 mg/dL [70 - 99]  Potassium, Serum4.9 mmol/L [3.5 - 5.0]  Sodium, Serum 137 mmol/L [135 - 146]          Medications:  acetaminophen     Tablet .. 650 milliGRAM(s) Oral every 6 hours PRN  aspirin  chewable 81 milliGRAM(s) Oral daily  atorvastatin 80 milliGRAM(s) Oral at bedtime  bacitracin   Ointment 1 Application(s) Topical three times a day  chlorhexidine 2% Cloths 1 Application(s) Topical daily  ferrous    sulfate 325 milliGRAM(s) Oral daily  iron sucrose IVPB 200 milliGRAM(s) IV Intermittent every 24 hours  metoprolol tartrate 12.5 milliGRAM(s) Oral two times a day  mirtazapine 15 milliGRAM(s) Oral daily  multivitamin 1 Tablet(s) Oral every 24 hours  pantoprazole    Tablet 40 milliGRAM(s) Oral before breakfast  polyethylene glycol 3350 17 Gram(s) Oral daily  QUEtiapine 12.5 milliGRAM(s) Oral at bedtime  senna 2 Tablet(s) Oral at bedtime  sodium chloride 1 Gram(s) Oral three times a day        Assessment and Plan:  Patient is an 89 y/o Male w/ Hx of dementia, chronic hyponatremia, neurogenic bladder with chronic Yañez, Hx left Hx fracture 3/24 presenting with right temporal laceration after an unwitnessed fall, found to have ST elevation in inferior leads with reciprocal ST depression in lateral leads.      # Anemia of chronic disease - no gross bleeding events from admission  - s/p 1 unit PRBC on 7/3: 7/6 hg 8.4, close outpatient  CBC monitoring   - vit b12: 406 , folate 20.   - in view of stemi - continue daily asa tx.     # Recurrent fall, temporal sculp laceration, local care - most likely due to Orthostatic Hypotension, borderline low b/p   sutured laceration done in ER ,  ( placed june 24, may remove after 10 days)   -PT/OT tx as tolerated, maintain falls precautions    # STEMI   - post Cardiology specialist evaluation - recommended conservative medical management    - TTE: Left ventricular ejection fraction, by visual estimation, is 60 to 65%. Normal global left ventricular systolic function. Impaired relaxation pattern of left  ventricular myocardial filling (Grade I diastolic dysfunction). Mildly enlarged left atrium.  OP cardiology follow up     #  Hypertension/ Orthostatic Hypotension  - - d/c  lisinopril 5 , decreased  metoprolol tartrate 12.5  mg BID with holding parameters       # T12 compression fracture:  - Neurosurgery consulted  - No acute surgical intervention --> abdominal binder for support - TLSO when OOB. Will need PT/OT. Pain management consulted --> tylenol prn. tx.     #  Dementia:  - Continue with home Mirtazapine  - Avoid CNS depressants  - reorientation  - Seroquel hs prn     # Chronic  Hyponatremia - Continue with salt tablets    #Neurogenic bladder - Chronic yañez   - outpatient  f/up     DVT proph: hold lovenox tx. due to anemia    Code status: full code     #Progress Note Handoff: d/c planning ,  maintain falls precautions at all times, PT as tolerated   Family discussion: yes, medical team Disposition: STR once medically stable    Total time spent to complete patient's bedside assessment, review medical chart, discuss medical plan of care with covering medical team was more than 35 minutes with >50% of time spent face to face with patient, discussion with patient/family and/or coordination of care

## 2024-07-06 NOTE — PROGRESS NOTE ADULT - ASSESSMENT
89 y/o M presenting with right temporal laceration after an unwitnessed fall, found to have ST elevation in inferior leads with reciprocal ST depression in lateral leads.    #Acute blood loss anemia   # Iron deficiency   - hgb stable 7.3 trend CBC  - maintain active type and screen  - b12, folate wnl  - daily multivitamin therapy  - IV Venofer 200mg daily  - GI no intervention if hgb stable, outpt followup in [facility], ig HGB drops, CT to r/o hematoma    # Fall, temporal sculp laceration, local care   - sutured laceration done in ER ,  ( placed june 24, may remove after 10 days)   - f/u orthostatics if +ve d/c lisinopril     #STEMI   medical management    - TTE: Left ventricular ejection fraction, by visual estimation, is 60 to 65%. Normal global left ventricular systolic function. Impaired relaxation pattern of left  ventricular myocardial filling (Grade I diastolic dysfunction). Mildly enlarged left atrium.  - Cardio consult appreciated  - No plan for LHC  - f/u cardio in 2 weeks   - fasted lipid panel in 1 month     #Hypertension   controlled,   - C/w home lisinopril 5 mg daily and metoprolol tartrate 25 mg BID  hold lisinopril if bp is low     #T12 compression fracture:  - Neurosurgery consulted  - No acute surgical intervention --> abdominal binder for support - TLSO when OOB. Will need PT/OT. Pain management consulted --> tylenol 975 mg q8h.    #Dementia:  - Continue with home Mirtazapine  - Avoid CNS depressants  - reorientation  - Seroquel hs prn     #Chronic  Hyponatremia:  Sodium: 134 mmol/L (06-29-24 @ 08:44)  Sodium: 135 mmol/L (06-28-24 @ 06:51)  - Continue with salt tablets    #Neurogenic bladder  - Chronic yañez   cont     #Progress Note Handoff    Pending :  discharge planning   Disposition: snf walk with assistance and with a walker   code status: full code   d/c:  f/u cardio in 2 weeks, fasted lipid panel in 1 month

## 2024-07-06 NOTE — PROGRESS NOTE ADULT - SUBJECTIVE AND OBJECTIVE BOX
Hospital Day:  12d    Subjective:    Patient is a 89y old  Male who presents with a chief complaint of Fall. Pt was seen and evaluated at bedside. No acute events      Admitted to medicine for a primary diagnosis of     Past Medical Hx:   HTN (hypertension)    Neurogenic bladder    Yañez catheter in place    Dementia      Past Sx:  No significant past surgical history      Allergies:  No Known Allergies    Current Meds:   Standng Meds:  aspirin  chewable 81 milliGRAM(s) Oral daily  atorvastatin 80 milliGRAM(s) Oral at bedtime  bacitracin   Ointment 1 Application(s) Topical three times a day  chlorhexidine 2% Cloths 1 Application(s) Topical daily  ferrous    sulfate 325 milliGRAM(s) Oral daily  iron sucrose IVPB 200 milliGRAM(s) IV Intermittent every 24 hours  lisinopril 5 milliGRAM(s) Oral once  metoprolol tartrate 12.5 milliGRAM(s) Oral two times a day  mirtazapine 15 milliGRAM(s) Oral daily  multivitamin 1 Tablet(s) Oral every 24 hours  pantoprazole    Tablet 40 milliGRAM(s) Oral before breakfast  polyethylene glycol 3350 17 Gram(s) Oral daily  QUEtiapine 12.5 milliGRAM(s) Oral at bedtime  senna 2 Tablet(s) Oral at bedtime  sodium chloride 1 Gram(s) Oral three times a day    PRN Meds:  acetaminophen     Tablet .. 650 milliGRAM(s) Oral every 6 hours PRN Temp greater or equal to 38C (100.4F), Mild Pain (1 - 3), Moderate Pain (4 - 6)    HOME MEDICATIONS:  acetaminophen 325 mg oral tablet: 3 tab(s) orally every 8 hours  alendronate 35 mg oral tablet: 1 tab(s) orally once a week  aspirin 81 mg oral tablet, chewable: 1 tab(s) orally once a day  atorvastatin 80 mg oral tablet: 1 tab(s) orally once a day (at bedtime)  mirtazapine 15 mg oral tablet: 1 tab(s) orally once a day  Multiple Vitamins oral tablet: 1 tab(s) orally every 24 hours  pantoprazole 40 mg oral delayed release tablet: 1 tab(s) orally once a day (before a meal)  polyethylene glycol 3350 oral powder for reconstitution: 17 gram(s) orally once a day As needed Constipation  senna leaf extract oral tablet: 2 tab(s) orally once a day (at bedtime) as needed for  constipation  sodium chloride 1 g oral tablet: 1 tab(s) orally 3 times a day      Vital Signs:   T(F): 97.2 (07-06-24 @ 13:59), Max: 98 (07-06-24 @ 05:00)  HR: 90 (07-06-24 @ 17:19) (73 - 90)  BP: 155/80 (07-06-24 @ 17:19) (110/61 - 155/80)  RR: 18 (07-06-24 @ 13:59) (18 - 18)  SpO2: 98% (07-06-24 @ 13:59) (98% - 98%)      07-05-24 @ 07:01  -  07-06-24 @ 07:00  --------------------------------------------------------  IN: 0 mL / OUT: 2975 mL / NET: -2975 mL    07-06-24 @ 07:01  -  07-06-24 @ 22:10  --------------------------------------------------------  IN: 0 mL / OUT: 350 mL / NET: -350 mL        Physical Exam:   GENERAL: NAD  HEENT: Right sided temporal and infraorbital ecchymosis.   CHEST/LUNG: CTAB  HEART: Regular rate and rhythm; s1 s2 appreciated, No murmurs, rubs, or gallops  ABDOMEN: Soft, Nontender, Nondistended; Bowel sounds present  EXTREMITIES: No LE edema b/l  SKIN: no rashes, no new lesions  NERVOUS SYSTEM:  Alert & Oriented X2  LINES/CATHETERS: yañez in place         Labs:                         8.4    7.80  )-----------( 270      ( 06 Jul 2024 09:04 )             26.0       06 Jul 2024 09:04    134    |  98     |  18     ----------------------------<  103    4.9     |  24     |  1.4      Ca    9.6        06 Jul 2024 09:04  Mg     2.1       06 Jul 2024 09:04                      Urinalysis Basic - ( 06 Jul 2024 09:04 )    Color: x / Appearance: x / SG: x / pH: x  Gluc: 103 mg/dL / Ketone: x  / Bili: x / Urobili: x   Blood: x / Protein: x / Nitrite: x   Leuk Esterase: x / RBC: x / WBC x   Sq Epi: x / Non Sq Epi: x / Bacteria: x

## 2024-07-07 LAB — GLUCOSE BLDC GLUCOMTR-MCNC: 86 MG/DL — SIGNIFICANT CHANGE UP (ref 70–99)

## 2024-07-07 PROCEDURE — 99232 SBSQ HOSP IP/OBS MODERATE 35: CPT

## 2024-07-07 RX ADMIN — MIRTAZAPINE 15 MILLIGRAM(S): 15 TABLET, FILM COATED ORAL at 11:06

## 2024-07-07 RX ADMIN — IRON SUCROSE 110 MILLIGRAM(S): 20 INJECTION, SOLUTION INTRAVENOUS at 11:51

## 2024-07-07 RX ADMIN — Medication 1 GRAM(S): at 21:13

## 2024-07-07 RX ADMIN — Medication 1 APPLICATION(S): at 21:13

## 2024-07-07 RX ADMIN — Medication 1 TABLET(S): at 11:06

## 2024-07-07 RX ADMIN — ASPIRIN 81 MILLIGRAM(S): 325 TABLET, FILM COATED ORAL at 11:06

## 2024-07-07 RX ADMIN — Medication 1 GRAM(S): at 13:12

## 2024-07-07 RX ADMIN — Medication 12.5 MILLIGRAM(S): at 17:04

## 2024-07-07 RX ADMIN — Medication 12.5 MILLIGRAM(S): at 05:49

## 2024-07-07 RX ADMIN — PANTOPRAZOLE SODIUM 40 MILLIGRAM(S): 40 INJECTION, POWDER, FOR SOLUTION INTRAVENOUS at 05:49

## 2024-07-07 RX ADMIN — Medication 12.5 MILLIGRAM(S): at 21:12

## 2024-07-07 RX ADMIN — Medication 1 APPLICATION(S): at 05:49

## 2024-07-07 RX ADMIN — ATORVASTATIN CALCIUM 80 MILLIGRAM(S): 20 TABLET, FILM COATED ORAL at 21:12

## 2024-07-07 RX ADMIN — Medication 1 APPLICATION(S): at 13:12

## 2024-07-07 RX ADMIN — POLYETHYLENE GLYCOL 3350 17 GRAM(S): 1 POWDER ORAL at 11:06

## 2024-07-07 RX ADMIN — Medication 1 GRAM(S): at 05:49

## 2024-07-07 RX ADMIN — Medication 1 APPLICATION(S): at 11:06

## 2024-07-07 NOTE — PROGRESS NOTE ADULT - SUBJECTIVE AND OBJECTIVE BOX
JEMAL RANGEL  [facility]-N 3A 016 A (SI-N 3A)        Patient was evaluated and examined  by bedside, tolerating diet well, no active issues over night time         REVIEW OF SYSTEMS: unable to obtain, patient is forgetful         T(C): , Max: 36.7 (07-06-24 @ 21:05)  HR: 69 (07-07-24 @ 06:16)  BP: 122/67 (07-07-24 @ 06:16)  RR: 18 (07-07-24 @ 06:16)  SpO2: 99% (07-07-24 @ 07:30)  CAPILLARY BLOOD GLUCOSE          PHYSICAL EXAM:  General: NAD, AAOX2, patient is laying comfortably in bed  HEENT: right infraorbital ecchymosis with bruising, right temporal area - 3 sutures present,  NC, Supple, NO JVD, NO CB  Lungs: CTA B/L, no wheezing, no rhonchi  CVS: normal S1, S2, RRR, NO M/G/R  Abdomen: soft, bowel sounds present, non-tender, non-distended  Extremities: no edema, no clubbing, no cyanosis, positive peripheral pulses b/l  Neuro: no acute focal neurological deficits, forgetful , sensory intact   Skin: as above         LAB  CBC  Date: 07-06-24 @ 09:04  Mean cell Ucujbggefg11.9  Mean cell Hemoglobin Conc32.3  Mean cell Volum 89.3  Platelet count-Automate 270  RBC Count 2.91  Red Cell Distrib Width16.6  WBC Count7.80  % Albumin, Urine--  Hematocrit 26.0  Hemoglobin 8.4  CBC  Date: 07-05-24 @ 08:04  Mean cell Jzrjzrmgld38.1  Mean cell Hemoglobin Conc32.4  Mean cell Volum 89.6  Platelet count-Automate 232  RBC Count 2.51  Red Cell Distrib Width16.4  WBC Count6.68  % Albumin, Urine--  Hematocrit 22.5  Hemoglobin 7.3  CBC  Date: 07-04-24 @ 16:00  Mean cell Wxizqdooqz70.5  Mean cell Hemoglobin Conc33.2  Mean cell Volum 88.8  Platelet count-Automate 243  RBC Count 2.68  Red Cell Distrib Width16.4  WBC Count6.68  % Albumin, Urine--  Hematocrit 23.8  Hemoglobin 7.9  CBC  Date: 07-04-24 @ 07:32  Mean cell Bweffsqsjg66.6  Mean cell Hemoglobin Conc33.2  Mean cell Volum 89.2  Platelet count-Automate 236  RBC Count 2.50  Red Cell Distrib Width16.2  WBC Count6.71  % Albumin, Urine--  Hematocrit 22.3  Hemoglobin 7.4  CBC  Date: 07-03-24 @ 17:29  Mean cell Otoskkjusn64.3  Mean cell Hemoglobin Conc33.1  Mean cell Volum 88.5  Platelet count-Automate 221  RBC Count 2.70  Red Cell Distrib Width16.3  WBC Count9.31  % Albumin, Urine--  Hematocrit 23.9  Hemoglobin 7.9  CBC  Date: 07-03-24 @ 13:45  Mean cell Wpushmivfv28.8  Mean cell Hemoglobin Conc32.6  Mean cell Volum 88.5  Platelet count-Automate 254  RBC Count 2.95  Red Cell Distrib Width16.4  WBC Count7.20  % Albumin, Urine--  Hematocrit 26.1  Hemoglobin 8.5  CBC  Date: 07-03-24 @ 08:42  Mean cell Mcjagtlkde98.3  Mean cell Hemoglobin Conc31.4  Mean cell Volum 90.3  Platelet count-Automate 208  RBC Count 2.47  Red Cell Distrib Width16.5  WBC Count6.16  % Albumin, Urine--  Hematocrit 22.3  Hemoglobin 7.0  CBC  Date: 07-02-24 @ 08:30  Mean cell Ohppysutub17.5  Mean cell Hemoglobin Conc33.6  Mean cell Volum 87.8  Platelet count-Automate 214  RBC Count 2.71  Red Cell Distrib Width16.3  WBC Count7.48  % Albumin, Urine--  Hematocrit 23.8  Hemoglobin 8.0  CBC  Date: 07-01-24 @ 06:25  Mean cell Uyxykbuvvu90.5  Mean cell Hemoglobin Conc33.3  Mean cell Volum 88.6  Platelet count-Automate 221  RBC Count 2.71  Red Cell Distrib Width16.3  WBC Count7.20  % Albumin, Urine--  Hematocrit 24.0  Hemoglobin 8.0    BMP  07-06-24 @ 09:04  Blood Gas Arterial-Calcium,Ionized--  Blood Urea Nitrogen, Serum 18 mg/dL [10 - 20]  Carbon Dioxide, Serum24 mmol/L [17 - 32]  Chloride, Serum98 mmol/L [98 - 110]  Creatinie, Serum1.4 mg/dL [0.7 - 1.5]  Glucose, Rcpfs559 mg/dL<H> [70 - 99]  Potassium, Serum4.9 mmol/L [3.5 - 5.0]  Sodium, Serum 134 mmol/L<L> [135 - 146]  BMP  07-05-24 @ 08:04  Blood Gas Arterial-Calcium,Ionized--  Blood Urea Nitrogen, Serum 17 mg/dL [10 - 20]  Carbon Dioxide, Serum22 mmol/L [17 - 32]  Chloride, Vskee809 mmol/L [98 - 110]  Creatinie, Serum1.3 mg/dL [0.7 - 1.5]  Glucose, Serum82 mg/dL [70 - 99]  Potassium, Serum4.3 mmol/L [3.5 - 5.0]  Sodium, Serum 133 mmol/L<L> [135 - 146]  [facility]  07-04-24 @ 07:32  Blood Gas Arterial-Calcium,Ionized--  Blood Urea Nitrogen, Serum 20 mg/dL [10 - 20]  Carbon Dioxide, Serum23 mmol/L [17 - 32]  Chloride, Serum99 mmol/L [98 - 110]  Creatinie, Serum1.2 mg/dL [0.7 - 1.5]  Glucose, Serum94 mg/dL [70 - 99]  Potassium, Serum4.1 mmol/L [3.5 - 5.0]  Sodium, Serum 133 mmol/L<L> [135 - 146]  [facility]  07-03-24 @ 08:42  Blood Gas Arterial-Calcium,Ionized--  Blood Urea Nitrogen, Serum 19 mg/dL [10 - 20]  Carbon Dioxide, Serum22 mmol/L [17 - 32]  Chloride, Serum98 mmol/L [98 - 110]  Creatinie, Serum1.4 mg/dL [0.7 - 1.5]  Glucose, Serum83 mg/dL [70 - 99]  Potassium, Serum4.4 mmol/L [3.5 - 5.0] [Hemolyzed. Interpret with caution]  Sodium, Serum 130 mmol/L<L> [135 - 146]        Medications:  acetaminophen     Tablet .. 650 milliGRAM(s) Oral every 6 hours PRN  aspirin  chewable 81 milliGRAM(s) Oral daily  atorvastatin 80 milliGRAM(s) Oral at bedtime  bacitracin   Ointment 1 Application(s) Topical three times a day  chlorhexidine 2% Cloths 1 Application(s) Topical daily  iron sucrose IVPB 200 milliGRAM(s) IV Intermittent every 24 hours  metoprolol tartrate 12.5 milliGRAM(s) Oral two times a day  mirtazapine 15 milliGRAM(s) Oral daily  multivitamin 1 Tablet(s) Oral every 24 hours  pantoprazole    Tablet 40 milliGRAM(s) Oral before breakfast  polyethylene glycol 3350 17 Gram(s) Oral daily  QUEtiapine 12.5 milliGRAM(s) Oral at bedtime  senna 2 Tablet(s) Oral at bedtime  sodium chloride 1 Gram(s) Oral three times a day        Assessment and Plan:  Patient is an 89 y/o Male w/ Hx of dementia, chronic hyponatremia, neurogenic bladder with chronic Yañez, Hx left Hx fracture 3/24 presenting with right temporal laceration after an unwitnessed fall, found to have ST elevation in inferior leads with reciprocal ST depression in lateral leads.      # Anemia of chronic disease - no gross bleeding events from admission  - s/p 1 unit PRBC on 7/3: 7/6 hg 8.4, close outpatient  CBC monitoring   - vit b12: 406 , folate 20.   - in view of stemi - continue daily asa tx.     # Recurrent fall, temporal sculp laceration, local care - most likely due to Orthostatic Hypotension, borderline low b/p   sutured laceration done in ER ,  ( placed june 24, may remove after 10 days)   -PT/OT tx as tolerated, maintain falls precautions    # STEMI   - post Cardiology specialist evaluation - recommended conservative medical management    - TTE: Left ventricular ejection fraction, by visual estimation, is 60 to 65%. Normal global left ventricular systolic function. Impaired relaxation pattern of left  ventricular myocardial filling (Grade I diastolic dysfunction). Mildly enlarged left atrium.  OP cardiology follow up     #  Hypertension/ Orthostatic Hypotension  - - d/c  lisinopril 5 , decreased  metoprolol tartrate 12.5  mg BID with holding parameters       # T12 compression fracture:  - Neurosurgery consulted  - No acute surgical intervention --> abdominal binder for support - TLSO when OOB. Will need PT/OT. Pain management consulted --> tylenol prn. tx.     #  Dementia:  - Continue with home Mirtazapine  - Avoid CNS depressants  - reorientation  - Seroquel hs prn     # Chronic  Hyponatremia - Continue with salt tablets    #Neurogenic bladder - Chronic yañez   - outpatient  f/up     DVT proph: hold lovenox tx. due to anemia    Code status: full code     #Progress Note Handoff: d/c planning to SNF once bed is available ,  maintain falls precautions at all times, PT as tolerated   Family discussion: yes, medical team Disposition: STR once bed is available     Total time spent to complete patient's bedside assessment, review medical chart, discuss medical plan of care with covering medical team was more than 35 minutes with >50% of time spent face to face with patient, discussion with patient/family and/or coordination of care

## 2024-07-08 ENCOUNTER — TRANSCRIPTION ENCOUNTER (OUTPATIENT)
Age: 89
End: 2024-07-08

## 2024-07-08 PROCEDURE — 99239 HOSP IP/OBS DSCHRG MGMT >30: CPT

## 2024-07-08 RX ORDER — METOPROLOL TARTRATE 50 MG
0.5 TABLET ORAL
Qty: 30 | Refills: 0
Start: 2024-07-08 | End: 2024-08-06

## 2024-07-08 RX ORDER — FERROUS SULFATE 325(65) MG
325 TABLET ORAL DAILY
Refills: 0 | Status: DISCONTINUED | OUTPATIENT
Start: 2024-07-08 | End: 2024-07-09

## 2024-07-08 RX ADMIN — POLYETHYLENE GLYCOL 3350 17 GRAM(S): 1 POWDER ORAL at 11:27

## 2024-07-08 RX ADMIN — Medication 12.5 MILLIGRAM(S): at 21:17

## 2024-07-08 RX ADMIN — Medication 325 MILLIGRAM(S): at 11:27

## 2024-07-08 RX ADMIN — Medication 12.5 MILLIGRAM(S): at 05:53

## 2024-07-08 RX ADMIN — Medication 1 APPLICATION(S): at 13:13

## 2024-07-08 RX ADMIN — ATORVASTATIN CALCIUM 80 MILLIGRAM(S): 20 TABLET, FILM COATED ORAL at 21:16

## 2024-07-08 RX ADMIN — Medication 1 GRAM(S): at 21:16

## 2024-07-08 RX ADMIN — Medication 1 APPLICATION(S): at 11:30

## 2024-07-08 RX ADMIN — Medication 1 GRAM(S): at 05:54

## 2024-07-08 RX ADMIN — MIRTAZAPINE 15 MILLIGRAM(S): 15 TABLET, FILM COATED ORAL at 11:27

## 2024-07-08 RX ADMIN — Medication 1 TABLET(S): at 11:27

## 2024-07-08 RX ADMIN — Medication 2 TABLET(S): at 21:17

## 2024-07-08 RX ADMIN — PANTOPRAZOLE SODIUM 40 MILLIGRAM(S): 40 INJECTION, POWDER, FOR SOLUTION INTRAVENOUS at 05:54

## 2024-07-08 RX ADMIN — Medication 1 GRAM(S): at 13:13

## 2024-07-08 RX ADMIN — Medication 1 APPLICATION(S): at 21:16

## 2024-07-08 RX ADMIN — Medication 1 APPLICATION(S): at 05:53

## 2024-07-08 RX ADMIN — Medication 12.5 MILLIGRAM(S): at 17:06

## 2024-07-08 RX ADMIN — ASPIRIN 81 MILLIGRAM(S): 325 TABLET, FILM COATED ORAL at 11:27

## 2024-07-08 NOTE — PROGRESS NOTE ADULT - ASSESSMENT
88 y/o M presenting with right temporal laceration after an unwitnessed fall, found to have ST elevation in inferior leads with reciprocal ST depression in lateral leads.    #Anemia of chronic disease   #Iron deficiency anemia  - pt received 1 unit PRBC on 7/3  - hgb stable 8.4, trend CBC  - GI no intervention if hgb stable, outpt [facility]up [facility], ig HGB drops, CT to r/o hematoma  - maintain active type and screen  - B12 406, folate 20, wnl  - daily multivitamin therapy  - IV Venofer 200mg daily -->switch to PO with Miralax for constipation     #Fall, temporal sculp laceration, local care   #Orthostatic HTN  #HTN  - sutured laceration done in ER 6/24-->removed 7/8   - dc lisinopril 5  - decrease metoprolol tartrate 12.5mg BID   - PT/OT    #STEMI   - TTE: EF 60-65%. Normal global left ventricular systolic function. Impaired relaxation pattern of left  ventricular myocardial filling (Grade I diastolic dysfunction). Mildly enlarged left atrium.  - Cardio consult: not a candidate for PCI, recommend medical mgmt  - op cardio f/u, fasted lipid panel in 1 month    #T12 compression fracture:  - Neurosurgery consulted  - No acute surgical intervention --> abdominal binder for support - TLSO when OOB. Will need PT/OT. Pain management consulted --> tylenol 975 mg q8h.    #Dementia  - Continue with home Mirtazapine  - Avoid CNS depressants  - reorientation  - Seroquel hs prn     #Chronic  Hyponatremia:  - Na 134 on 7/8  - Continue with salt tablets    #Neurogenic bladder  - Chronic yañez   - op  f/u  #Progress Note Handoff    #MISC  #DVT prophylaxis: N/A, active bleed  #GI prophylaxis: PPI  #Diet: DASH  #Activity: as tolerated  #Code status: Full Code  #Disposition: To SNF, walks with assistance and with a walker        89 y/o M presenting with right temporal laceration after an unwitnessed fall, found to have ST elevation in inferior leads with reciprocal ST depression in lateral leads.    #Anemia of chronic disease   #Iron deficiency anemia  - pt received 1 unit PRBC on 7/3  - hgb stable 8.4, trend CBC  - GI no intervention if hgb stable, outpt [facility]up [facility], ig HGB drops, CT to r/o hematoma  - maintain active type and screen  - B12 406, folate 20, wnl  - daily multivitamin therapy  - IV Venofer 200mg daily -->switch to PO with Miralax for constipation   - anticipate d/c 7/9 pending Pt, placement to [place]     #Fall, temporal sculp laceration, local care   #Orthostatic HTN  #HTN  - sutured laceration done in ER 6/24-->removed 7/8   - dc lisinopril 5  - decrease metoprolol tartrate 12.5mg BID   - PT/OT    #STEMI   - TTE: EF 60-65%. Normal global left ventricular systolic function. Impaired relaxation pattern of left  ventricular myocardial filling (Grade I diastolic dysfunction). Mildly enlarged left atrium.  - Cardio consult: not a candidate for PCI, recommend medical mgmt  - op cardio f/u, fasted lipid panel in 1 month    #T12 compression fracture:  - Neurosurgery consulted  - No acute surgical intervention --> abdominal binder for support - TLSO when OOB. Will need PT/OT. Pain management consulted --> tylenol 975 mg q8h.    #Dementia  - Continue with home Mirtazapine  - Avoid CNS depressants  - reorientation  - Seroquel hs prn     #Chronic  Hyponatremia:  - Na 134 on 7/8  - Continue with salt tablets    #Neurogenic bladder  - Chronic yañez   - op  f/u    #MISC  #DVT prophylaxis: N/A, active bleed  #GI prophylaxis: PPI  #Diet: DASH  #Activity: as tolerated  #Code status: Full Code  #Disposition: To SNF, walks with assistance and with a walker

## 2024-07-08 NOTE — DIETITIAN INITIAL EVALUATION ADULT - COLLABORATION WITH OTHER PROVIDERS
Interventions: meals and snacks, coordination of care  Monitoring/Evaluation: energy intake, weight, labs, skin status, NFPF

## 2024-07-08 NOTE — DISCHARGE NOTE NURSING/CASE MANAGEMENT/SOCIAL WORK - PATIENT PORTAL LINK FT
Emergency Dept/Urgent Care discharge antibiotic (if prescribed): None  No changes in treatment per Urine culture protocol.   You can access the FollowMyHealth Patient Portal offered by [facility] by registering at the following website: [email or web address]. By joining [employer]’s FollowMyHealth portal, you will also be able to view your health information using other applications (apps) compatible with our system. 24-33 lbs (3)

## 2024-07-08 NOTE — DISCHARGE NOTE NURSING/CASE MANAGEMENT/SOCIAL WORK - NSDCVIVACCINE_GEN_ALL_CORE_FT
Tdap; 04-Mar-2024 15:26; Holli Oden (RN); [employer]; [id number] (Exp. Date: 06-Dec-2025); IntraMuscular; Deltoid Right.; 0.5 milliLiter(s); VIS (VIS Published: 09-May-2013, VIS Presented: 04-Mar-2024);   Tdap; 24-Jun-2024 15:35; Dulce Campbell (RN); [employer]; [id number] (Exp. Date: 06-Dec-2025); IntraMuscular; Deltoid Left.; 0.5 milliLiter(s); VIS (VIS Published: 09-May-2013, VIS Presented: 24-Jun-2024);

## 2024-07-08 NOTE — DIETITIAN INITIAL EVALUATION ADULT - PERTINENT LABORATORY DATA
POCT Blood Glucose.: 86 mg/dL (07-07-24 @ 17:38)  A1C with Estimated Average Glucose Result: 4.9 % (06-25-24 @ 06:00)  A1C with Estimated Average Glucose Result: 5.7 % (03-29-24 @ 07:12)  A1C with Estimated Average Glucose Result: 5.5 % (10-25-23 @ 11:51)

## 2024-07-08 NOTE — DIETITIAN INITIAL EVALUATION ADULT - OTHER CALCULATIONS
Energy: 1235 - 1338 kcal/day (MS 1029.24 x 1.2 - 1.3 SF)   Protein: 70 - 76 gm/day (1.2 - 1.3 gm/kg)   Fluid: 1467 - 1761 mL/day (25 - 30 mL/kg)  estimated needs with consideration for age, acuity of illness

## 2024-07-08 NOTE — DIETITIAN INITIAL EVALUATION ADULT - EDUCATION DIETARY MODIFICATIONS
discussed importance of adequate kcal and protein intake, benefits of oral nutrition supplements/(1) partially meets; needs review/practice/verbalization

## 2024-07-08 NOTE — PROGRESS NOTE ADULT - SUBJECTIVE AND OBJECTIVE BOX
JEMAL RANGEL  [facility]-N 3A 016 A (SI-N 3A)        Patient was evaluated and examined  by bedside, no active events over night time as per covering nurse report       REVIEW OF SYSTEMS: unable to obtain, patient is forgetful         T(C): , Max: 36.5 (07-07-24 @ 13:50)  HR: 82 (07-08-24 @ 04:48)  BP: 138/75 (07-08-24 @ 04:48)  RR: 18 (07-08-24 @ 04:48)  SpO2: 98% (07-08-24 @ 04:48)  CAPILLARY BLOOD GLUCOSE      POCT Blood Glucose.: 86 mg/dL (07 Jul 2024 17:38)      PHYSICAL EXAM:  General: NAD, AAOX2, patient is laying comfortably in bed  HEENT: right infraorbital ecchymosis with bruising, right temporal area - 3 sutures present,  NC, Supple, NO JVD, NO CB  Lungs: CTA B/L, no wheezing, no rhonchi  CVS: normal S1, S2, RRR, NO M/G/R  Abdomen: soft, bowel sounds present, non-tender, non-distended  Extremities: no edema, no clubbing, no cyanosis, positive peripheral pulses b/l  Neuro: no acute focal neurological deficits, forgetful , sensory intact   Skin: as above         LAB  CBC  Date: 07-06-24 @ 09:04  Mean cell Upnatewrsf69.9  Mean cell Hemoglobin Conc32.3  Mean cell Volum 89.3  Platelet count-Automate 270  RBC Count 2.91  Red Cell Distrib Width16.6  WBC Count7.80  % Albumin, Urine--  Hematocrit 26.0  Hemoglobin 8.4  CBC  Date: 07-05-24 @ 08:04  Mean cell Jvnsjittuj54.1  Mean cell Hemoglobin Conc32.4  Mean cell Volum 89.6  Platelet count-Automate 232  RBC Count 2.51  Red Cell Distrib Width16.4  WBC Count6.68  % Albumin, Urine--  Hematocrit 22.5  Hemoglobin 7.3  CBC  Date: 07-04-24 @ 16:00  Mean cell Raezfwuxyk26.5  Mean cell Hemoglobin Conc33.2  Mean cell Volum 88.8  Platelet count-Automate 243  RBC Count 2.68  Red Cell Distrib Width16.4  WBC Count6.68  % Albumin, Urine--  Hematocrit 23.8  Hemoglobin 7.9  CBC  Date: 07-04-24 @ 07:32  Mean cell Yboqinayra92.6  Mean cell Hemoglobin Conc33.2  Mean cell Volum 89.2  Platelet count-Automate 236  RBC Count 2.50  Red Cell Distrib Width16.2  WBC Count6.71  % Albumin, Urine--  Hematocrit 22.3  Hemoglobin 7.4  CBC  Date: 07-03-24 @ 17:29  Mean cell Ouinomkebe29.3  Mean cell Hemoglobin Conc33.1  Mean cell Volum 88.5  Platelet count-Automate 221  RBC Count 2.70  Red Cell Distrib Width16.3  WBC Count9.31  % Albumin, Urine--  Hematocrit 23.9  Hemoglobin 7.9  CBC  Date: 07-03-24 @ 13:45  Mean cell Ufdouwdswi82.8  Mean cell Hemoglobin Conc32.6  Mean cell Volum 88.5  Platelet count-Automate 254  RBC Count 2.95  Red Cell Distrib Width16.4  WBC Count7.20  % Albumin, Urine--  Hematocrit 26.1  Hemoglobin 8.5  CBC  Date: 07-03-24 @ 08:42  Mean cell Ncfvrqjkrc98.3  Mean cell Hemoglobin Conc31.4  Mean cell Volum 90.3  Platelet count-Automate 208  RBC Count 2.47  Red Cell Distrib Width16.5  WBC Count6.16  % Albumin, Urine--  Hematocrit 22.3  Hemoglobin 7.0  CBC  Date: 07-02-24 @ 08:30  Mean cell Kwnguymqmx15.5  Mean cell Hemoglobin Conc33.6  Mean cell Volum 87.8  Platelet count-Automate 214  RBC Count 2.71  Red Cell Distrib Width16.3  WBC Count7.48  % Albumin, Urine--  Hematocrit 23.8  Hemoglobin 8.0    [facility]  07-06-24 @ 09:04  Blood Gas Arterial-Calcium,Ionized--  Blood Urea Nitrogen, Serum 18 mg/dL [10 - 20]  Carbon Dioxide, Serum24 mmol/L [17 - 32]  Chloride, Serum98 mmol/L [98 - 110]  Creatinie, Serum1.4 mg/dL [0.7 - 1.5]  Glucose, Atdhh678 mg/dL<H> [70 - 99]  Potassium, Serum4.9 mmol/L [3.5 - 5.0]  Sodium, Serum 134 mmol/L<L> [135 - 146]  [facility]  07-05-24 @ 08:04  Blood Gas Arterial-Calcium,Ionized--  Blood Urea Nitrogen, Serum 17 mg/dL [10 - 20]  Carbon Dioxide, Serum22 mmol/L [17 - 32]  Chloride, Znrnz747 mmol/L [98 - 110]  Creatinie, Serum1.3 mg/dL [0.7 - 1.5]  Glucose, Serum82 mg/dL [70 - 99]  Potassium, Serum4.3 mmol/L [3.5 - 5.0]  Sodium, Serum 133 mmol/L<L> [135 - 146]  [facility]  07-04-24 @ 07:32  Blood Gas Arterial-Calcium,Ionized--  Blood Urea Nitrogen, Serum 20 mg/dL [10 - 20]  Carbon Dioxide, Serum23 mmol/L [17 - 32]  Chloride, Serum99 mmol/L [98 - 110]  Creatinie, Serum1.2 mg/dL [0.7 - 1.5]  Glucose, Serum94 mg/dL [70 - 99]  Potassium, Serum4.1 mmol/L [3.5 - 5.0]  Sodium, Serum 133 mmol/L<L> [135 - 146]      Medications:  acetaminophen     Tablet .. 650 milliGRAM(s) Oral every 6 hours PRN  aspirin  chewable 81 milliGRAM(s) Oral daily  atorvastatin 80 milliGRAM(s) Oral at bedtime  bacitracin   Ointment 1 Application(s) Topical three times a day  chlorhexidine 2% Cloths 1 Application(s) Topical daily  ferrous    sulfate 325 milliGRAM(s) Oral daily  metoprolol tartrate 12.5 milliGRAM(s) Oral two times a day  mirtazapine 15 milliGRAM(s) Oral daily  multivitamin 1 Tablet(s) Oral every 24 hours  pantoprazole    Tablet 40 milliGRAM(s) Oral before breakfast  polyethylene glycol 3350 17 Gram(s) Oral daily  QUEtiapine 12.5 milliGRAM(s) Oral at bedtime  senna 2 Tablet(s) Oral at bedtime  sodium chloride 1 Gram(s) Oral three times a day        Assessment and Plan:  Patient is an 88 y/o Male w/ Hx of dementia, chronic hyponatremia, neurogenic bladder with chronic Yañez, Hx left Hx fracture 3/24 presenting with right temporal laceration after an unwitnessed fall, found to have ST elevation in inferior leads with reciprocal ST depression in lateral leads.      # Anemia of chronic disease - no gross bleeding events from admission  - s/p 1 unit PRBC on 7/3: 7/6 hg 8.4, close outpatient  CBC monitoring   - vit b12: 406 , folate 20.   - in view of stemi - continue daily asa tx.     # Recurrent fall, temporal sculp laceration, local care - most likely due to Orthostatic Hypotension, borderline low b/p   sutured laceration done in ER ,  ( placed june 24, may remove after 10 days)   -PT/OT tx as tolerated, maintain falls precautions    # STEMI   - post Cardiology specialist evaluation - recommended conservative medical management    - TTE: Left ventricular ejection fraction, by visual estimation, is 60 to 65%. Normal global left ventricular systolic function. Impaired relaxation pattern of left  ventricular myocardial filling (Grade I diastolic dysfunction). Mildly enlarged left atrium.  OP cardiology follow up     #  Hypertension/ Orthostatic Hypotension  - - d/c  lisinopril 5 , decreased  metoprolol tartrate 12.5  mg BID with holding parameters       # T12 compression fracture:  - Neurosurgery consulted  - No acute surgical intervention --> abdominal binder for support - TLSO when OOB. Will need PT/OT. Pain management consulted --> tylenol prn. tx.     #  Dementia:  - Continue with home Mirtazapine  - Avoid CNS depressants  - reorientation  - Seroquel hs prn     # Chronic  Hyponatremia - Continue with salt tablets    #Neurogenic bladder - Chronic yañez   - outpatient  f/up     DVT proph:  lovenox tx.     Code status: full code     #Progress Note Handoff: d/c planning to SNF once bed is available ,  maintain falls precautions at all times, PT as tolerated   Family discussion: yes, medical team Disposition: STR once bed is available     Total time spent to complete patient's bedside assessment, review medical chart, discuss medical plan of care with covering medical team was more than 25 minutes with >50% of time spent face to face with patient, discussion with patient/family and/or coordination of care

## 2024-07-08 NOTE — DIETITIAN INITIAL EVALUATION ADULT - ORAL INTAKE PTA/DIET HISTORY
Patient noted to be disoriented x2, but he was able to answer RD's questions appropriately. He reports good PO intake PTA. He reports  - 150 lbs. NKFA, no food intolerances - eats softer foods, but can tolerate regular diet.   Per NH papers in chart - patient is from [facility] and was on a high fiber diet with regular texture and thin liquids. He was receiving Ensure Plus HP 2x/day.

## 2024-07-08 NOTE — DIETITIAN INITIAL EVALUATION ADULT - ADD RECOMMEND
-monitor electrolytes, BG, renal profile  -continue multivitamin as ordered for supplementation  -noted to be on mirtazapine which can aid in improving appetite and PO intake - monitor PO intake

## 2024-07-08 NOTE — DISCHARGE NOTE NURSING/CASE MANAGEMENT/SOCIAL WORK - NSDCPEFALRISK_GEN_ALL_CORE
For information on Fall & Injury Prevention, visit: [email or web address] OR  [email or web address] OR  https://www.cdc.gov/steadi/patient.html

## 2024-07-08 NOTE — DIETITIAN INITIAL EVALUATION ADULT - PERTINENT MEDS FT
MEDICATIONS  (STANDING):  aspirin  chewable 81 milliGRAM(s) Oral daily  atorvastatin 80 milliGRAM(s) Oral at bedtime  bacitracin   Ointment 1 Application(s) Topical three times a day  chlorhexidine 2% Cloths 1 Application(s) Topical daily  ferrous    sulfate 325 milliGRAM(s) Oral daily  metoprolol tartrate 12.5 milliGRAM(s) Oral two times a day  mirtazapine 15 milliGRAM(s) Oral daily  multivitamin 1 Tablet(s) Oral every 24 hours  pantoprazole    Tablet 40 milliGRAM(s) Oral before breakfast  polyethylene glycol 3350 17 Gram(s) Oral daily  QUEtiapine 12.5 milliGRAM(s) Oral at bedtime  senna 2 Tablet(s) Oral at bedtime  sodium chloride 1 Gram(s) Oral three times a day    MEDICATIONS  (PRN):  acetaminophen     Tablet .. 650 milliGRAM(s) Oral every 6 hours PRN Temp greater or equal to 38C (100.4F), Mild Pain (1 - 3), Moderate Pain (4 - 6)

## 2024-07-08 NOTE — DIETITIAN INITIAL EVALUATION ADULT - NAME AND PHONE
Mary Carmen Asencio, RD [phone] or via Teams    Patient is at moderate nutrition risk, RD to f/u in 5-7 days or PRN

## 2024-07-08 NOTE — DIETITIAN INITIAL EVALUATION ADULT - OTHER INFO
Patient is a 88 y/o M presenting with right temporal laceration after an unwitnessed fall, found to have ST elevation in inferior leads with reciprocal ST depression in lateral leads.    Anemia of chronic disease; Iron deficiency anemia; Fall, temporal scalp laceration, local care; Orthostatic HTN; HTN; STEMI; T12 compression fracture; Dementia; Chronic Hyponatremia; Neurogenic bladder

## 2024-07-08 NOTE — DIETITIAN INITIAL EVALUATION ADULT - NUTRITION CONSULT
Pt discharged to senior care in [place] [employer] custody. Assessment complete. Pt ambulates self. VS stable. Pt verbalized discharge instructions. n pt verbalizes teaching provided     no

## 2024-07-08 NOTE — PROGRESS NOTE ADULT - SUBJECTIVE AND OBJECTIVE BOX
SUBJECTIVE/OVERNIGHT EVENTS  Today is hospital day 14d. This morning patient was seen and examined at bedside, resting comfortably in bed. No acute or major events overnight.    HOSPITAL COURSE  Day 1:   Day 2:   Day 3:     CODE STATUS:    FAMILY COMMUNICATION  Contact date:  Name of person contacted:  Relationship to patient:  Communication details:    MEDICATIONS  STANDING MEDICATIONS  aspirin  chewable 81 milliGRAM(s) Oral daily  atorvastatin 80 milliGRAM(s) Oral at bedtime  bacitracin   Ointment 1 Application(s) Topical three times a day  chlorhexidine 2% Cloths 1 Application(s) Topical daily  ferrous    sulfate 325 milliGRAM(s) Oral daily  metoprolol tartrate 12.5 milliGRAM(s) Oral two times a day  mirtazapine 15 milliGRAM(s) Oral daily  multivitamin 1 Tablet(s) Oral every 24 hours  pantoprazole    Tablet 40 milliGRAM(s) Oral before breakfast  polyethylene glycol 3350 17 Gram(s) Oral daily  QUEtiapine 12.5 milliGRAM(s) Oral at bedtime  senna 2 Tablet(s) Oral at bedtime  sodium chloride 1 Gram(s) Oral three times a day    PRN MEDICATIONS  acetaminophen     Tablet .. 650 milliGRAM(s) Oral every 6 hours PRN    VITALS  T(F): 97.5 (07-08-24 @ 04:48), Max: 97.7 (07-07-24 @ 13:50)  HR: 82 (07-08-24 @ 04:48) (66 - 100)  BP: 138/75 (07-08-24 @ 04:48) (129/63 - 166/76)  RR: 18 (07-08-24 @ 04:48) (18 - 18)  SpO2: 98% (07-08-24 @ 04:48) (97% - 100%)  POCT Blood Glucose.: 86 mg/dL (07-07-24 @ 17:38)    PHYSICAL EXAM  GENERAL: NAD, lying in bed comfortably  HEAD:  Atraumatic, normocephalic  EYES: EOMI, PERRLA, conjunctiva and sclera clear  ENT: Moist mucous membranes  NECK: Supple, no JVD  HEART: Regular rate and rhythm, no murmurs, rubs, or gallops  LUNGS: Unlabored respirations.  Clear to auscultation bilaterally, no crackles, wheezing, or rhonchi  ABDOMEN: Soft, nontender, nondistended, +BS  EXTREMITIES: 2+ peripheral pulses bilaterally. No clubbing, cyanosis, or edema  NERVOUS SYSTEM:  A&Ox3, no focal deficits   SKIN: No rashes or lesions    LABS                    IMAGING SUBJECTIVE/OVERNIGHT EVENTS  Today is hospital day 14d. This morning patient was seen and examined at bedside, resting comfortably in bed. No acute or major events overnight.    HOSPITAL COURSE  Day 1:   Day 2:   Day 3:     CODE STATUS:    FAMILY COMMUNICATION  Contact date:  Name of person contacted:  Relationship to patient:  Communication details:    MEDICATIONS  STANDING MEDICATIONS  aspirin  chewable 81 milliGRAM(s) Oral daily  atorvastatin 80 milliGRAM(s) Oral at bedtime  bacitracin   Ointment 1 Application(s) Topical three times a day  chlorhexidine 2% Cloths 1 Application(s) Topical daily  ferrous    sulfate 325 milliGRAM(s) Oral daily  metoprolol tartrate 12.5 milliGRAM(s) Oral two times a day  mirtazapine 15 milliGRAM(s) Oral daily  multivitamin 1 Tablet(s) Oral every 24 hours  pantoprazole    Tablet 40 milliGRAM(s) Oral before breakfast  polyethylene glycol 3350 17 Gram(s) Oral daily  QUEtiapine 12.5 milliGRAM(s) Oral at bedtime  senna 2 Tablet(s) Oral at bedtime  sodium chloride 1 Gram(s) Oral three times a day    PRN MEDICATIONS  acetaminophen     Tablet .. 650 milliGRAM(s) Oral every 6 hours PRN    VITALS  T(F): 97.5 (07-08-24 @ 04:48), Max: 97.7 (07-07-24 @ 13:50)  HR: 82 (07-08-24 @ 04:48) (66 - 100)  BP: 138/75 (07-08-24 @ 04:48) (129/63 - 166/76)  RR: 18 (07-08-24 @ 04:48) (18 - 18)  SpO2: 98% (07-08-24 @ 04:48) (97% - 100%)  POCT Blood Glucose.: 86 mg/dL (07-07-24 @ 17:38)    PHYSICAL EXAM  GENERAL: NAD, lying in bed comfortably  HEAD: sutures and bruising on R temple   EYES: R infraorbital bruising  ENT: Moist mucous membranes  NECK: Supple, no JVD  HEART: Regular rate and rhythm, no murmurs  LUNGS: Unlabored respirations.  Clear to auscultation bilaterally, no crackles, wheezing, or rhonchi  ABDOMEN: Soft, nontender, nondistended, +BS  EXTREMITIES: 2+ peripheral pulses bilaterally. No clubbing, cyanosis, or edema  NERVOUS SYSTEM:  A&Ox2, no focal deficits       LABS                    IMAGING

## 2024-07-09 ENCOUNTER — NON-APPOINTMENT (OUTPATIENT)
Age: 89
End: 2024-07-09

## 2024-07-09 VITALS
DIASTOLIC BLOOD PRESSURE: 61 MMHG | HEART RATE: 77 BPM | OXYGEN SATURATION: 100 % | TEMPERATURE: 98 F | RESPIRATION RATE: 18 BRPM | SYSTOLIC BLOOD PRESSURE: 98 MMHG

## 2024-07-09 PROCEDURE — 99239 HOSP IP/OBS DSCHRG MGMT >30: CPT

## 2024-07-09 RX ADMIN — Medication 1 APPLICATION(S): at 05:19

## 2024-07-09 RX ADMIN — ASPIRIN 81 MILLIGRAM(S): 325 TABLET, FILM COATED ORAL at 11:02

## 2024-07-09 RX ADMIN — POLYETHYLENE GLYCOL 3350 17 GRAM(S): 1 POWDER ORAL at 11:02

## 2024-07-09 RX ADMIN — Medication 325 MILLIGRAM(S): at 11:02

## 2024-07-09 RX ADMIN — PANTOPRAZOLE SODIUM 40 MILLIGRAM(S): 40 INJECTION, POWDER, FOR SOLUTION INTRAVENOUS at 05:19

## 2024-07-09 RX ADMIN — MIRTAZAPINE 15 MILLIGRAM(S): 15 TABLET, FILM COATED ORAL at 11:02

## 2024-07-09 RX ADMIN — Medication 1 GRAM(S): at 05:19

## 2024-07-09 RX ADMIN — Medication 12.5 MILLIGRAM(S): at 05:19

## 2024-07-09 RX ADMIN — Medication 1 APPLICATION(S): at 11:01

## 2024-07-09 RX ADMIN — Medication 1 TABLET(S): at 11:02

## 2024-07-09 NOTE — PHARMACOTHERAPY INTERVENTION NOTE - COMMENTS
Mr. Milner is an 89 yo M with a PMH of dementia, HTN, neurogenic bladder, L hip fx 3/2024 and multiple falls presenting from NH after being found down on the floor this morning after an unwitnessed fall w/ actively bleeding R temporal laceration, trauma code was called in ED and laceration was repaired. Found to have T12 compression fracture.  code STEMI called for inferior lead RYAN, admitted to CCU for further monitoring. Orthostatic positive (125 lying--->91 sitting) so primary team dc'ed lisinopril 5mg and decreased lopressor to 12.5mg BID with paramteres Mr. Milner is an 88 yo M with a PMH of dementia, HTN, neurogenic bladder, L hip fx 3/2024 and multiple falls presenting from NH after being found down on the floor this morning after an unwitnessed fall w/ actively bleeding R temporal laceration, trauma code was called in ED and laceration was repaired. Found to have T12 compression fracture.  code STEMI called for inferior lead RYAN, admitted to CCU for further monitoring. Noted that pt was orthostatic positive on 7/4, would recommend decreasing mirtazapine to 7.5mg as mirtazapine is associated with orthostatic hypotension. If not checked on this admission, would check Vitamin D, TSH and procalcitonin level on discharge. No need for DEXA scan as pt meets criteria for osteoporosis given recent hip fracture and acute T12 compression fracture. It not contraindicated, would consider starting bisphosphonate therapy when discharged. If contraindicated, would consider starting prolia when discharged.

## 2024-07-09 NOTE — PROGRESS NOTE ADULT - REASON FOR ADMISSION
Fall

## 2024-07-09 NOTE — PROGRESS NOTE ADULT - SUBJECTIVE AND OBJECTIVE BOX
JEMAL RANGEL  [facility]-N 3A 016 A (SI-N 3A)      Patient was evaluated and examined  by bedside, no active events over night time as per covering nurse report      REVIEW OF SYSTEMS:  unable to obtain, patient is forgetful       T(C): , Max: 37.1 (07-09-24 @ 04:43)  HR: 89 (07-09-24 @ 04:43)  BP: 167/71 (07-09-24 @ 04:43)  RR: 18 (07-08-24 @ 20:39)  SpO2: 98% (07-09-24 @ 07:45)  CAPILLARY BLOOD GLUCOSE          PHYSICAL EXAM:  General: NAD, AAOX2, patient is laying comfortably in bed  HEENT: right infraorbital ecchymosis with bruising, resolving, right temporal area - removed sutures,  NC, Supple, NO JVD, NO CB  Lungs: CTA B/L, no wheezing, no rhonchi  CVS: normal S1, S2, RRR, NO M/G/R  Abdomen: soft, bowel sounds present, non-tender, non-distended  Extremities: no edema, no clubbing, no cyanosis, positive peripheral pulses b/l  Neuro: no acute focal neurological deficits, forgetful , sensory intact   Skin: as above         LAB  CBC  Date: 07-06-24 @ 09:04  Mean cell Vbainhunll92.9  Mean cell Hemoglobin Conc32.3  Mean cell Volum 89.3  Platelet count-Automate 270  RBC Count 2.91  Red Cell Distrib Width16.6  WBC Count7.80  % Albumin, Urine--  Hematocrit 26.0  Hemoglobin 8.4  CBC  Date: 07-05-24 @ 08:04  Mean cell Wktoylipgg54.1  Mean cell Hemoglobin Conc32.4  Mean cell Volum 89.6  Platelet count-Automate 232  RBC Count 2.51  Red Cell Distrib Width16.4  WBC Count6.68  % Albumin, Urine--  Hematocrit 22.5  Hemoglobin 7.3  CBC  Date: 07-04-24 @ 16:00  Mean cell Yccramhwgm06.5  Mean cell Hemoglobin Conc33.2  Mean cell Volum 88.8  Platelet count-Automate 243  RBC Count 2.68  Red Cell Distrib Width16.4  WBC Count6.68  % Albumin, Urine--  Hematocrit 23.8  Hemoglobin 7.9  CBC  Date: 07-04-24 @ 07:32  Mean cell Mgzjbvoyzy55.6  Mean cell Hemoglobin Conc33.2  Mean cell Volum 89.2  Platelet count-Automate 236  RBC Count 2.50  Red Cell Distrib Width16.2  WBC Count6.71  % Albumin, Urine--  Hematocrit 22.3  Hemoglobin 7.4  CBC  Date: 07-03-24 @ 17:29  Mean cell Kdpbpceorq74.3  Mean cell Hemoglobin Conc33.1  Mean cell Volum 88.5  Platelet count-Automate 221  RBC Count 2.70  Red Cell Distrib Width16.3  WBC Count9.31  % Albumin, Urine--  Hematocrit 23.9  Hemoglobin 7.9  CBC  Date: 07-03-24 @ 13:45  Mean cell Aqtpwvlrno41.8  Mean cell Hemoglobin Conc32.6  Mean cell Volum 88.5  Platelet count-Automate 254  RBC Count 2.95  Red Cell Distrib Width16.4  WBC Count7.20  % Albumin, Urine--  Hematocrit 26.1  Hemoglobin 8.5  CBC  Date: 07-03-24 @ 08:42  Mean cell Jhrezvvjvt51.3  Mean cell Hemoglobin Conc31.4  Mean cell Volum 90.3  Platelet count-Automate 208  RBC Count 2.47  Red Cell Distrib Width16.5  WBC Count6.16  % Albumin, Urine--  Hematocrit 22.3  Hemoglobin 7.0    BMP  07-06-24 @ 09:04  Blood Gas Arterial-Calcium,Ionized--  Blood Urea Nitrogen, Serum 18 mg/dL [10 - 20]  Carbon Dioxide, Serum24 mmol/L [17 - 32]  Chloride, Serum98 mmol/L [98 - 110]  Creatinie, Serum1.4 mg/dL [0.7 - 1.5]  Glucose, Aaqdl877 mg/dL<H> [70 - 99]  Potassium, Serum4.9 mmol/L [3.5 - 5.0]  Sodium, Serum 134 mmol/L<L> [135 - 146]  [facility]  07-05-24 @ 08:04  Blood Gas Arterial-Calcium,Ionized--  Blood Urea Nitrogen, Serum 17 mg/dL [10 - 20]  Carbon Dioxide, Serum22 mmol/L [17 - 32]  Chloride, Hugai079 mmol/L [98 - 110]  Creatinie, Serum1.3 mg/dL [0.7 - 1.5]  Glucose, Serum82 mg/dL [70 - 99]  Potassium, Serum4.3 mmol/L [3.5 - 5.0]  Sodium, Serum 133 mmol/L<L> [135 - 146]        Medications:  acetaminophen     Tablet .. 650 milliGRAM(s) Oral every 6 hours PRN  aspirin  chewable 81 milliGRAM(s) Oral daily  atorvastatin 80 milliGRAM(s) Oral at bedtime  bacitracin   Ointment 1 Application(s) Topical three times a day  chlorhexidine 2% Cloths 1 Application(s) Topical daily  ferrous    sulfate 325 milliGRAM(s) Oral daily  metoprolol tartrate 12.5 milliGRAM(s) Oral two times a day  mirtazapine 15 milliGRAM(s) Oral daily  multivitamin 1 Tablet(s) Oral every 24 hours  pantoprazole    Tablet 40 milliGRAM(s) Oral before breakfast  polyethylene glycol 3350 17 Gram(s) Oral daily  QUEtiapine 12.5 milliGRAM(s) Oral at bedtime  senna 2 Tablet(s) Oral at bedtime  sodium chloride 1 Gram(s) Oral three times a day        Assessment and Plan:  Patient is an 89 y/o Male w/ Hx of dementia, chronic hyponatremia, neurogenic bladder with chronic Yañez, Hx left Hx fracture 3/24 presenting with right temporal laceration after an unwitnessed fall, found to have ST elevation in inferior leads with reciprocal ST depression in lateral leads.      # Anemia of chronic disease - no gross bleeding events from admission  - s/p 1 unit PRBC on 7/3: 7/6 hg 8.4, close outpatient  CBC monitoring   - vit b12: 406 , folate 20.   - in view of stemi - continue daily asa tx.     # Recurrent fall, temporal sculp laceration, local care - most likely due to Orthostatic Hypotension, borderline low b/p   sutured laceration done in ER ,  ( placed june 24, may remove after 10 days)   -PT/OT tx as tolerated, maintain falls precautions    # STEMI   - post Cardiology specialist evaluation - recommended conservative medical management    - TTE: Left ventricular ejection fraction, by visual estimation, is 60 to 65%. Normal global left ventricular systolic function. Impaired relaxation pattern of left  ventricular myocardial filling (Grade I diastolic dysfunction). Mildly enlarged left atrium.  OP cardiology follow up     #  Hypertension/ Orthostatic Hypotension  - - d/c  lisinopril 5 , decreased  metoprolol tartrate 12.5  mg BID with holding parameters       # T12 compression fracture:  - Neurosurgery consulted  - No acute surgical intervention --> abdominal binder for support - TLSO when OOB. Will need PT/OT. Pain management consulted --> tylenol prn. tx.     #  Dementia:  - Continue with home Mirtazapine  - Avoid CNS depressants  - reorientation  - Seroquel hs prn     # Chronic  Hyponatremia - Continue with salt tablets    #Neurogenic bladder - Chronic yañez   - outpatient  f/up     DVT proph:  lovenox tx.     Code status: full code     #Progress Note Handoff: d/c planning to SNF  ,  bed was available yesterday, but there was a problem with transportation, f/up with case management today if patient can be d/c   Family discussion: yes, medical team Disposition: STR     Total time spent to complete patient's bedside assessment, review medical chart, discuss medical plan of care with covering medical team was more than 25 minutes with >50% of time spent face to face with patient, discussion with patient/family and/or coordination of care

## 2024-07-09 NOTE — PROGRESS NOTE ADULT - PROVIDER SPECIALTY LIST ADULT
Gastroenterology
Gastroenterology
Hospitalist
Hospitalist
Internal Medicine
Hospitalist
Internal Medicine
CCU
Hospitalist
Internal Medicine
Hospitalist
Internal Medicine
Physiatry
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
## 2024-07-17 DIAGNOSIS — N31.9 NEUROMUSCULAR DYSFUNCTION OF BLADDER, UNSPECIFIED: ICD-10-CM

## 2024-07-17 DIAGNOSIS — I21.A1 MYOCARDIAL INFARCTION TYPE 2: ICD-10-CM
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
## 2024-07-17 DIAGNOSIS — D50.9 IRON DEFICIENCY ANEMIA, UNSPECIFIED: ICD-10-CM

## 2024-07-17 DIAGNOSIS — Z78.1 PHYSICAL RESTRAINT STATUS: ICD-10-CM

## 2024-07-17 DIAGNOSIS — E87.1 HYPO-OSMOLALITY AND HYPONATREMIA: ICD-10-CM

## 2024-07-17 DIAGNOSIS — S22.089A UNSPECIFIED FRACTURE OF T11-T12 VERTEBRA, INITIAL ENCOUNTER FOR CLOSED FRACTURE: ICD-10-CM

## 2024-07-17 DIAGNOSIS — I10 ESSENTIAL (PRIMARY) HYPERTENSION: ICD-10-CM

## 2024-07-17 DIAGNOSIS — S01.81XA LACERATION WITHOUT FOREIGN BODY OF OTHER PART OF HEAD, INITIAL ENCOUNTER: ICD-10-CM

## 2024-07-17 DIAGNOSIS — F03.90 UNSPECIFIED DEMENTIA, UNSPECIFIED SEVERITY, WITHOUT BEHAVIORAL DISTURBANCE, PSYCHOTIC DISTURBANCE, MOOD DISTURBANCE, AND ANXIETY: ICD-10-CM

## 2024-07-17 DIAGNOSIS — D62 ACUTE POSTHEMORRHAGIC ANEMIA: ICD-10-CM

## 2024-07-28 ENCOUNTER — EMERGENCY (EMERGENCY)
Facility: HOSPITAL | Age: 89
LOS: 0 days | Discharge: HOME | End: 2024-07-28
Attending: EMERGENCY MEDICINE
Payer: MEDICARE

## 2024-07-28 VITALS
OXYGEN SATURATION: 95 % | HEIGHT: 65 IN | DIASTOLIC BLOOD PRESSURE: 85 MMHG | HEART RATE: 92 BPM | TEMPERATURE: 98 F | RESPIRATION RATE: 18 BRPM | SYSTOLIC BLOOD PRESSURE: 171 MMHG

## 2024-07-28 VITALS
HEART RATE: 106 BPM | DIASTOLIC BLOOD PRESSURE: 81 MMHG | RESPIRATION RATE: 18 BRPM | OXYGEN SATURATION: 95 % | TEMPERATURE: 98 F | SYSTOLIC BLOOD PRESSURE: 174 MMHG

## 2024-07-28 DIAGNOSIS — Z87.448 PERSONAL HISTORY OF OTHER DISEASES OF URINARY SYSTEM: ICD-10-CM

## 2024-07-28 DIAGNOSIS — Z79.82 LONG TERM (CURRENT) USE OF ASPIRIN: ICD-10-CM

## 2024-07-28 DIAGNOSIS — F03.90 UNSPECIFIED DEMENTIA, UNSPECIFIED SEVERITY, WITHOUT BEHAVIORAL DISTURBANCE, PSYCHOTIC DISTURBANCE, MOOD DISTURBANCE, AND ANXIETY: ICD-10-CM

## 2024-07-28 DIAGNOSIS — Z96.0 PRESENCE OF UROGENITAL IMPLANTS: ICD-10-CM

## 2024-07-28 DIAGNOSIS — T83.098A OTHER MECHANICAL COMPLICATION OF OTHER URINARY CATHETER, INITIAL ENCOUNTER: ICD-10-CM

## 2024-07-28 DIAGNOSIS — I10 ESSENTIAL (PRIMARY) HYPERTENSION: ICD-10-CM

## 2024-07-28 PROCEDURE — 99282 EMERGENCY DEPT VISIT SF MDM: CPT

## 2024-07-28 PROCEDURE — 99283 EMERGENCY DEPT VISIT LOW MDM: CPT

## 2024-07-28 NOTE — ED PROVIDER NOTE - CLINICAL SUMMARY MEDICAL DECISION MAKING FREE TEXT BOX
89-year-old male with history of HTN, dementia, chronic Waldron, on aspirin and Lovenox 40 mg daily, presents with difficulty and blood during Waldron insertion today at his nursing home. States feels the urine backing up at this time. Denies all other symptoms. On exam, afebrile, hemodynamically stable, saturating well on room air, NAD, elderly, nontoxic appearing, laying comfortably in bed, no WOB, speaking full sentences, head NCAT, EOMI grossly, anicteric, no conjunctival pallor, MMM, breathing comfortably on room air, abd soft, NT, ND, nml BS, no rebound or guarding, AAO, CN's 3-12 grossly intact, ANDREA spontaneously, skin warm, dry. No evidence of anemia or active bleeding. Low suspicion clinically for UTI to warrant antibiotics at this time. Waldron coudé catheter inserted without issues and no hematuria. NAD, nontoxic appearing. Discharged back to facility.

## 2024-07-28 NOTE — ED PROVIDER NOTE - ATTENDING CONTRIBUTION TO CARE
89-year-old male with history of HTN, dementia, chronic Waldron, on aspirin and Lovenox 40 mg daily, presents with difficulty and blood during Waldron insertion today at his nursing home. States feels the urine backing up at this time. Denies all other symptoms. On exam, afebrile, hemodynamically stable, saturating well on room air, NAD, elderly, nontoxic appearing, laying comfortably in bed, no WOB, speaking full sentences, head NCAT, EOMI grossly, anicteric, no conjunctival pallor, MMM, breathing comfortably on room air, abd soft, NT, ND, nml BS, no rebound or guarding, AAO, CN's 3-12 grossly intact, ANDREA spontaneously, skin warm, dry. No evidence of anemia or active bleeding. Low suspicion clinically for UTI to warrant antibiotics at this time. 89-year-old male with history of HTN, dementia, chronic Waldron, on aspirin and Lovenox 40 mg daily, presents with difficulty and blood during Waldron insertion today at his nursing home. States feels the urine backing up at this time. Denies all other symptoms. On exam, afebrile, hemodynamically stable, saturating well on room air, NAD, elderly, nontoxic appearing, laying comfortably in bed, no WOB, speaking full sentences, head NCAT, EOMI grossly, anicteric, no conjunctival pallor, MMM, breathing comfortably on room air, abd soft, NT, ND, nml BS, no rebound or guarding, AAO, CN's 3-12 grossly intact, ANDREA spontaneously, skin warm, dry. No evidence of anemia or active bleeding. Low suspicion clinically for UTI to warrant antibiotics at this time. Waldron coudé catheter inserted without issues and no hematuria. NAD, nontoxic appearing. Discharged back to facility.

## 2024-07-28 NOTE — ED PROVIDER NOTE - OBJECTIVE STATEMENT
89-year-old male with past ministry of dementia, hypertension, neurogenic bladder status post chronic Waldron Sent in from [facility] for inability to place Waldron catheter and traumatic hematuria.  Call [facility], spoke to staff who said that when they tried to change his Waldron catheter they were unsuccessful and they caused bleeding presenting to ED for Waldron placement.  No fever, headache medicines, lightness, chest pain, shortness of breath, abdominal pain, burning or pain with urination.

## 2024-07-28 NOTE — ED ADULT NURSE NOTE - CHIEF COMPLAINT QUOTE
Patient's presents to triage c/o generalized weakness, fever, burning with urination, SOB and lethargy that began yesterday. Patient currently receiving chemotherapy for metastatic liver cancer.    BIBEMS from [facility], yañez was not flushing they attempted to replace it but met resistance and it started bleeding. No yañez currently,  Pt takes aspirin. denies pain and discomfort

## 2024-07-28 NOTE — ED ADULT TRIAGE NOTE - CHIEF COMPLAINT QUOTE
BIBEMS from [facility], yañez was not flushing they attempted to replace it but met resistance and it started bleeding. No yañez currently,  Pt takes aspirin. denies pain and discomfort

## 2024-07-28 NOTE — ED ADULT NURSE NOTE - OBJECTIVE STATEMENT
Pt From [facility] for urinary retention , as per report the ware unable to replace Waldron catheter in place ,pr denies any pain denies SOB no N/V/no dizziness on the bed unable to stand .

## 2024-07-28 NOTE — ED PROVIDER NOTE - PATIENT PORTAL LINK FT
You can access the FollowMyHealth Patient Portal offered by [facility] by registering at the following website: [email or web address]. By joining [employer]’s FollowMyHealth portal, you will also be able to view your health information using other applications (apps) compatible with our system.

## 2024-07-28 NOTE — ED PROVIDER NOTE - PHYSICAL EXAMINATION
Constitutional: Well developed, well nourished, no acute distress  Head: Normocephalic, Atraumatic  Eyes: PERRLA, EOMI, conjunctiva and sclera WNL  ENT: Moist mucous membranes, no rhinorrhea,    Neck: Supple, Nontender,    Respiratory: Normal chest excursion with respiration; Breath sounds clear and equal B/L; No wheezes, rales, or rhonchi   Cardiovascular: RRR; Normal S1, S2; No murmurs, rubs or gallops   ABD/GI:  Nondistended; Nontender; No guarding, rigidity or rebound;    EXT/MS: Moving all extremities; Distal pulses 2+ B/L;  Skin: Normal for age and race; Warm and dry; No rash

## 2024-09-04 ENCOUNTER — APPOINTMENT (OUTPATIENT)
Dept: ORTHOPEDIC SURGERY | Facility: CLINIC | Age: 89
End: 2024-09-04

## 2024-09-05 ENCOUNTER — APPOINTMENT (OUTPATIENT)
Dept: CARDIOLOGY | Facility: CLINIC | Age: 89
End: 2024-09-05

## 2024-09-05 VITALS — HEIGHT: 65 IN | HEART RATE: 63 BPM | DIASTOLIC BLOOD PRESSURE: 60 MMHG | SYSTOLIC BLOOD PRESSURE: 98 MMHG

## 2024-09-05 DIAGNOSIS — F03.90 UNSPECIFIED DEMENTIA W/OUT BEHAVIORAL DISTURBANCE: ICD-10-CM

## 2024-09-05 DIAGNOSIS — I25.10 ATHEROSCLEROTIC HEART DISEASE OF NATIVE CORONARY ARTERY W/OUT ANGINA PECTORIS: ICD-10-CM

## 2024-09-05 PROCEDURE — 93000 ELECTROCARDIOGRAM COMPLETE: CPT

## 2024-09-05 PROCEDURE — G2211 COMPLEX E/M VISIT ADD ON: CPT

## 2024-09-05 PROCEDURE — 99214 OFFICE O/P EST MOD 30 MIN: CPT

## 2024-09-05 RX ORDER — FINASTERIDE 5 MG/1
5 TABLET, FILM COATED ORAL
Refills: 0 | Status: ACTIVE | COMMUNITY

## 2024-09-05 RX ORDER — NORMAL SALT TABLETS 1 G/G
1 TABLET ORAL
Refills: 0 | Status: ACTIVE | COMMUNITY

## 2024-09-05 RX ORDER — MIRTAZAPINE 15 MG/1
15 TABLET, FILM COATED ORAL
Refills: 0 | Status: ACTIVE | COMMUNITY

## 2024-09-05 RX ORDER — KRILL/OM-3/DHA/EPA/PHOSPHO/AST 1000-230MG
81 CAPSULE ORAL
Refills: 0 | Status: ACTIVE | COMMUNITY

## 2024-09-05 RX ORDER — ALENDRONATE SODIUM 35 MG/1
35 TABLET ORAL
Refills: 0 | Status: ACTIVE | COMMUNITY

## 2024-09-05 RX ORDER — METOPROLOL TARTRATE 25 MG/1
25 TABLET, FILM COATED ORAL
Refills: 0 | Status: ACTIVE | COMMUNITY

## 2024-09-05 RX ORDER — ATORVASTATIN CALCIUM 80 MG/1
80 TABLET, FILM COATED ORAL
Refills: 0 | Status: ACTIVE | COMMUNITY

## 2024-09-05 RX ORDER — PANTOPRAZOLE 40 MG/1
40 TABLET, DELAYED RELEASE ORAL
Refills: 0 | Status: ACTIVE | COMMUNITY

## 2024-09-05 RX ORDER — TAMSULOSIN HYDROCHLORIDE 0.4 MG/1
0.4 CAPSULE ORAL
Refills: 0 | Status: ACTIVE | COMMUNITY

## 2024-09-05 RX ORDER — QUETIAPINE FUMARATE 25 MG/1
25 TABLET ORAL
Refills: 0 | Status: ACTIVE | COMMUNITY

## 2024-09-05 NOTE — PHYSICAL EXAM

## 2024-09-05 NOTE — ASSESSMENT
[FreeTextEntry1] : 89 year old man with medically managed CAD, Dementia and Neurogenic Bladder who presents to [facility] care after hospital stay in July 2024.  1. CAD: medically managed STEMI during hospital stay in July 2024. No cath was done. He denies anginal symptoms. He denies blood in his stool and urine.  - Continue aspirin 81mg daily - Continue statin - Decrease metoprolol tartrate to 12.5mg BID  The secondary prevention of heart disease was discussed in detail with the patient, including adhering to a heart healthy, plant based, or Mediterranean diet, and the importance of 30 minutes of moderate intensity activity for 30 minutes, 5 times a week. All the patient's questions were answered.  RTC in 6 months.

## 2024-09-05 NOTE — HISTORY OF PRESENT ILLNESS
[FreeTextEntry1] : JEMAL RANGEL is a 89 year old man with medically managed CAD, Dementia and Neurogenic Bladder who presents to establish care after hospital stay in July 2024.  He was in the hospital in July 2024 with acute blood loss anemia from iron deficiency and was found to have an inferior STEMI that was medically managed.   The patient denies chest pain, shortness of breath, palpitations and syncope. No leg swelling, orthopnea and PND. He is AAOx1.

## 2024-09-05 NOTE — CARDIOLOGY SUMMARY
[de-identified] : 9/5/24: normal sinus rhythm, ST elevations diffusely consistent with early repolarization [de-identified] : 6/25/24:  1. Endocardial visualization was enhanced with intravenous echo contrast.  2. Left ventricular ejection fraction, by visual estimation, is 60 to 65%.  3. Normal global left ventricular systolic function.  4. Spectral Doppler shows impaired relaxation pattern of left ventricular myocardial filling (Grade I diastolic dysfunction).  5. Mildly enlarged left atrium.  6. Peak transaortic gradient equals 16.0 mmHg, mean transaortic gradient equals 9.0 mmHg, the calculated aortic valve area equals 1.91 cm by the continuity equation consistent with mild aortic stenosis.  7. Degenerative mitral valve.  8. Moderate mitral annular calcification.  9. Thickening and calcification of the anterior and posterior mitral valve leaflets. 10. Mild mitral regurgitation. 11. Mild tricuspid regurgitation.

## 2024-10-11 NOTE — PATIENT PROFILE ADULT - NSPROPTRIGHTBILLOFRIGHTS_GEN_A_NUR
ORTHOPEDIC POSTOPERATIVE VISIT    CHIEF COMPLAINT:  Office Visit (1st Post Op; DOS 9/19/24 Left BOO, Direct Anterior EOG 12/17/24. Patient states he's doing well)       SUBJECTIVE:  Rod Berkowitz is a 78 year old male who is now 2 weeks status post direct anterior left total hip arthroplasty. Patient is experiencing mild to moderate pain and has been taking Oxycodone and Celebrex for pain. He is currently participating in outpatient physical therapy and mobilizing well with without an assistive device. Patient remains on 81 mg ASA BID for deep vein thrombosis prophylaxis, and has been compliant with use of knee high EMELYN stockings. He reports no concerns with regards to wound healing. Denies fevers, chills, nausea, new onset shortness of breath, chest pain, or calf pain.     OBJECTIVE:  PHYSICAL EXAM    Constitutional:  Well developed, well nourished male in no acute distress. Afebrile.  Skin: Incision is clean and dry.  Dressing has been removed, Adaptic gauze and prineo mesh left intact.  Musculoskeletal:   LEFT HIP:  Evaluation of the left lower extremity shows no peripheral edema. Skin is warm and dry without any evidence of erythema or ecchymosis. Examination of the hip reveals a benign, well-approximated surgical incision. No warmth, erythema, or drainage noted. There is no tenderness to palpation around the incision. He tolerates gentle ROM at the hip with no pain reproduction. Calf is soft and non tender. Able to DF/PF left ankle. Distal circulation is intact on the operative extremity.    IMAGING STUDIES REVIEWED:   Images taken in the clinic today of the left hip and pelvis were reviewed. These demonstrate stable and anatomic alignment of the uncemented, nonmodular total hip arthroplasty hardware without evidence of fracture or loosening. No interval changes since last films were obtained.    Please see radiology report for additional information.    ASSESSMENT:     1. S/P total left hip arthroplasty         2 weeks post-op DA L BOO    PLAN:    We had a nice discussion today regarding the patient's treatment plan.   Mr. Berkowitz is doing well 2 weeks s/p direct anterior left total hip arthroplasty  Incision is healing well without any concern for infection on exam today.  Dressing and Adaptic gauze were removed and Prineo Mesh left intact. He was advised that he may get incision wet in the shower, but should avoid soaking for 1 month postoperatively.  Begin gentle massage around incision to break up scar tissue and desensitize.  Continue compression stocking on operative leg for 2 more weeks.   Continue Aspirin 81 mg once a day for a total of 30 days following surgery as continued DVT prophylaxis.  Advised against any dental procedures until at least 3 months post-op. Will need prophylactic antibiotics sent at future follow-up visit.  Continue physical therapy and activities as tolerated by pain. Continue to wean down on pain medication.  The patient was advised not to take any narcotic pain medication at least 4 hours prior to driving  Follow up in 6-8 weeks here in the office, sooner if needed.    No orders of the defined types were placed in this encounter.       Michelle Dias PA-C  Orthopedic Surgery Physician Assistant  [facility]    Supervising Physician for this patient encounter: Dr. Lamonte Silva      The patient indicated understanding of the diagnosis and agreed with the plan of care.      cc: Maye Medrano NP     patient

## 2024-12-03 NOTE — H&P ADULT - HIV OFFER
Detail Level: Detailed
Biopsy proven AK, patient states is tender, due to billing issues, we will rebiopsy on 12/30/24 visit.
Provider left in 4 sutures, will remove Monday due to large area.
Patient states this area hurts at night, due to billing issues with [facility], we will rebiopsy area at 12/30/24 appointment, for now recommended using numbing cream(sample given)
Opt out

## 2024-12-24 PROBLEM — F10.90 ALCOHOL USE: Status: ACTIVE | Noted: 2017-11-09

## 2025-03-12 ENCOUNTER — APPOINTMENT (OUTPATIENT)
Dept: CARDIOLOGY | Facility: CLINIC | Age: 89
End: 2025-03-12

## 2025-07-29 NOTE — PRE-ANESTHESIA EVALUATION ADULT - WEIGHT IN KG
[facility] is calling to request a refill on the following medication(s):    Medication Request:  Requested Prescriptions     Pending Prescriptions Disp Refills    albuterol sulfate HFA (PROVENTIL;VENTOLIN;PROAIR) 108 (90 Base) MCG/ACT inhaler 1 each 2     Sig: Inhale 2 puffs into the lungs 4 times daily as needed for Wheezing       Last Visit Date (If Applicable):  7/29/2025 - [facility] with Cecille    Next Visit Date:    Visit date not found          Last refilled 8/10/2022  Rx Pending       56.2